# Patient Record
Sex: FEMALE | Race: BLACK OR AFRICAN AMERICAN | NOT HISPANIC OR LATINO | Employment: OTHER | URBAN - METROPOLITAN AREA
[De-identification: names, ages, dates, MRNs, and addresses within clinical notes are randomized per-mention and may not be internally consistent; named-entity substitution may affect disease eponyms.]

---

## 2017-02-20 ENCOUNTER — ANESTHESIA EVENT (OUTPATIENT)
Dept: GASTROENTEROLOGY | Facility: HOSPITAL | Age: 67
End: 2017-02-20
Payer: COMMERCIAL

## 2017-02-21 ENCOUNTER — ANESTHESIA (OUTPATIENT)
Dept: GASTROENTEROLOGY | Facility: HOSPITAL | Age: 67
End: 2017-02-21
Payer: COMMERCIAL

## 2017-02-21 ENCOUNTER — HOSPITAL ENCOUNTER (OUTPATIENT)
Facility: HOSPITAL | Age: 67
Setting detail: OUTPATIENT SURGERY
Discharge: HOME | End: 2017-02-21
Attending: INTERNAL MEDICINE | Admitting: INTERNAL MEDICINE
Payer: COMMERCIAL

## 2017-02-21 VITALS
RESPIRATION RATE: 18 BRPM | TEMPERATURE: 96.7 F | BODY MASS INDEX: 46.82 KG/M2 | WEIGHT: 281 LBS | HEART RATE: 63 BPM | DIASTOLIC BLOOD PRESSURE: 61 MMHG | HEIGHT: 65 IN | OXYGEN SATURATION: 98 % | SYSTOLIC BLOOD PRESSURE: 123 MMHG

## 2017-02-21 DIAGNOSIS — Z12.11 ENCOUNTER FOR SCREENING FOR MALIGNANT NEOPLASM OF COLON: ICD-10-CM

## 2017-02-21 PROCEDURE — 88305 TISSUE EXAM BY PATHOLOGIST: CPT | Performed by: INTERNAL MEDICINE

## 2017-02-21 RX ORDER — FUROSEMIDE 40 MG/1
40 TABLET ORAL 2 TIMES DAILY
COMMUNITY
End: 2022-03-02

## 2017-02-21 RX ORDER — MOMETASONE FUROATE 50 UG/1
2 SPRAY, METERED NASAL DAILY
COMMUNITY
End: 2020-02-14 | Stop reason: CLARIF

## 2017-02-21 RX ORDER — PROPOFOL 10 MG/ML
INJECTION, EMULSION INTRAVENOUS AS NEEDED
Status: DISCONTINUED | OUTPATIENT
Start: 2017-02-21 | End: 2017-02-21 | Stop reason: SURG

## 2017-02-21 RX ORDER — SODIUM CHLORIDE 9 MG/ML
125 INJECTION, SOLUTION INTRAVENOUS CONTINUOUS
Status: DISCONTINUED | OUTPATIENT
Start: 2017-02-21 | End: 2017-02-21 | Stop reason: HOSPADM

## 2017-02-21 RX ORDER — AMLODIPINE BESYLATE 10 MG/1
10 TABLET ORAL DAILY
COMMUNITY

## 2017-02-21 RX ADMIN — PROPOFOL 60 MG: 10 INJECTION, EMULSION INTRAVENOUS at 14:27

## 2017-02-21 RX ADMIN — PROPOFOL 50 MG: 10 INJECTION, EMULSION INTRAVENOUS at 14:21

## 2017-02-21 RX ADMIN — SODIUM CHLORIDE 125 ML/HR: 0.9 INJECTION, SOLUTION INTRAVENOUS at 13:02

## 2017-02-21 RX ADMIN — PROPOFOL 100 MG: 10 INJECTION, EMULSION INTRAVENOUS at 14:18

## 2017-02-21 RX ADMIN — PROPOFOL 60 MG: 10 INJECTION, EMULSION INTRAVENOUS at 14:32

## 2017-02-21 RX ADMIN — PROPOFOL 50 MG: 10 INJECTION, EMULSION INTRAVENOUS at 14:24

## 2017-02-21 RX ADMIN — LIDOCAINE HYDROCHLORIDE 50 MG: 20 INJECTION, SOLUTION INTRAVENOUS at 14:19

## 2019-01-03 ENCOUNTER — OFFICE VISIT (OUTPATIENT)
Dept: OBGYN CLINIC | Facility: MEDICAL CENTER | Age: 69
End: 2019-01-03
Payer: COMMERCIAL

## 2019-01-03 VITALS
DIASTOLIC BLOOD PRESSURE: 80 MMHG | BODY MASS INDEX: 47.91 KG/M2 | SYSTOLIC BLOOD PRESSURE: 140 MMHG | WEIGHT: 270.4 LBS | HEIGHT: 63 IN

## 2019-01-03 DIAGNOSIS — Z12.31 ENCOUNTER FOR SCREENING MAMMOGRAM FOR MALIGNANT NEOPLASM OF BREAST: ICD-10-CM

## 2019-01-03 DIAGNOSIS — Z78.0 POSTMENOPAUSAL: ICD-10-CM

## 2019-01-03 DIAGNOSIS — Z01.419 ENCNTR FOR GYN EXAM (GENERAL) (ROUTINE) W/O ABN FINDINGS: Primary | ICD-10-CM

## 2019-01-03 PROBLEM — I10 ESSENTIAL HYPERTENSION: Status: ACTIVE | Noted: 2018-05-29

## 2019-01-03 PROBLEM — D72.819 LEUKOPENIA: Status: ACTIVE | Noted: 2018-05-29

## 2019-01-03 PROCEDURE — S0612 ANNUAL GYNECOLOGICAL EXAMINA: HCPCS | Performed by: OBSTETRICS & GYNECOLOGY

## 2019-01-03 NOTE — PROGRESS NOTES
ASSESSMENT & PLAN: Sg was seen today for gynecologic exam     Diagnoses and all orders for this visit:    Encntr for gyn exam (general) (routine) w/o abn findings    Encounter for screening mammogram for malignant neoplasm of breast  -     Mammo screening bilateral w 3d & cad; Future    Postmenopausal  -     DXA bone density spine hip and pelvis; Future    Discussion/Summary:  Pt  Here for yearly gyn preventive exam; has not been seen for at least 3 years; The past year and a half, had a colonoscopy with removal of polyps;pt  Seen withbp a  Student, [place] , [facility]  1   Routine well woman exam done today  2   Pap and HPV:  Pap and cotesting was not done today  Current ASCCP Guidelines reviewed  3   Mammogram was ordered  4   Colonoscopy is up to date  5   DEXA is not up to date  DEXA was ordered today  6  The following were reviewed in today's visit: breast self exam, exercise, healthy diet and DEXA ordered  7   F/u 1year      CC:  Annual Gynecologic Examination    HPI: Blayne Michaels is a 77 y o  who presents for annual gynecologic examination  She has the following concerns:  none    Health Maintenance:    Patient describes her health as good  Patient has weight concerns  She exercises 7 days per week with walking  She does wears her seatbelt routinely  She does perform regular monthly self breast exams  She does feel safe at home  Patients does not follow a  diet  Patient gets 4 servings of dairy or calcium rich foods daily        Last pap: 2012  Last mammogram:  2016  Last colonoscopy: 2017    Patient Active Problem List   Diagnosis    Essential hypertension    Leukopenia       Past Medical History:   Diagnosis Date    Arthritis     osteo    Diverticula of colon     Hypertension     Rhinitis        Past Surgical History:   Procedure Laterality Date    APPENDECTOMY      BREAST SURGERY Left     biopsy    CHOLECYSTECTOMY      COLONOSCOPY      HERNIA REPAIR Right inguinal    JOINT REPLACEMENT Right     Knee    [place] COLONOSCOPY FLX DX W/COLLJ SPEC WHEN PFRMD N/A 2/21/2017    Procedure: COLONOSCOPY with polypectomy and hemo clip marjan ;  Surgeon: Ashlee Mix MD;  Location: [facility]; Service: Gastroenterology       Past OB/Gyn History:    Patient is not currently sexually active  History reviewed  No pertinent family history  Social History:   Social History     Social History    Marital status: Single     Spouse name: N/A    Number of children: N/A    Years of education: N/A     Occupational History    Not on file  Social History Main Topics    Smoking status: Never Smoker    Smokeless tobacco: Never Used    Alcohol use 0 6 oz/week     1 Glasses of wine per week      Comment: social    Drug use: No    Sexual activity: Not on file     Other Topics Concern    Not on file     Social History Narrative    No narrative on file     Presently lives with sisterDino  Patient is currently retired    No Known Allergies      Current Outpatient Prescriptions:     amLODIPine (NORVASC) 10 mg tablet, Take 10 mg by mouth daily, Disp: , Rfl:     furosemide (LASIX) 40 mg tablet, Take 40 mg by mouth 2 (two) times a day, Disp: , Rfl:     mometasone (NASONEX) 50 mcg/act nasal spray, 2 sprays into each nostril daily, Disp: , Rfl:     Review of Systems  Constitutional :no fever, feels well, no tiredness, no recent weight gain or loss  ENT: no ear ache, no loss of hearing, no nosebleeds or nasal discharge, no sore throat or hoarseness  Cardiovascular: no complaints of slow or fast heart beat, no chest pain, no palpitations, no leg claudication or lower extremity edema    Respiratory: no complaints of shortness of shortness of breath, no ROSAS  Breasts:no complaints of breast pain, breast lump, or nipple discharge  Gastrointestinal: no complaints of abdominal pain, constipation, nausea, vomiting, or diarrhea or bloody stools  Genitourinary : no complaints of dysuria, incontinence, pelvic pain, no dysmenorrhea, vaginal discharge or abnormal vaginal bleeding and as noted in HPI  Musculoskeletal: no complaints of arthralgia, no myalgia, no joint swelling or stiffness, no limb pain or swelling  Integumentary: no complaints of skin rash or lesion, itching or dry skin  Neurological: no complaints of headache, no confusion, no numbness or tingling, no dizziness or fainting     Physical Exam:   /80   Ht 5' 2 5" (1 588 m)   Wt 123 kg (270 lb 6 4 oz)   Breastfeeding? No   BMI 48 67 kg/m²     General: appears stated age, cooperative, alert normal mood and affect   Psychiatric oriented to person, place and time  Mood and affect normal   Neck: normal, supple,trachea midline, no masses  Thyroid: normal, no thyromegaly   Heart: regular rate and rhythm, S1, S2 normal, no murmur, click, rub or gallop   Lungs: clear to auscultation bilaterally, no increased work of breathing or signs of respiratory distress   Breasts: normal, no dimpling or skin changes noted   Abdomen: soft, non-tender, without masses or organomegaly   Vulva: normal , no lesions   Vagina: normal , no lesions or dryness   Urethra: normal   Urethal meatus normal   Bladder Normal, soft, non-tender and no prolapse or masses appreciated   Cervix: normal, no palpable masses    Uterus: normal , non-tender, not enlarged, no palpable masses   Adnexa: normal, non-tender without fullness or masses   Lymphatic Palpation of lymph nodes in neck, axilla, groin and/or other locations: no lymphadenopathy or masses noted   Skin Normal skin turgor and no rashes    Palpation of skin and subcutaneous tissue normal

## 2019-01-31 ENCOUNTER — HOSPITAL ENCOUNTER (OUTPATIENT)
Dept: BONE DENSITY | Facility: MEDICAL CENTER | Age: 69
Discharge: HOME | End: 2019-01-31
Payer: COMMERCIAL

## 2019-01-31 ENCOUNTER — HOSPITAL ENCOUNTER (OUTPATIENT)
Dept: MAMMOGRAPHY | Facility: MEDICAL CENTER | Age: 69
Discharge: HOME | End: 2019-01-31
Payer: COMMERCIAL

## 2019-01-31 VITALS — BODY MASS INDEX: 49.61 KG/M2 | WEIGHT: 280 LBS | HEIGHT: 63 IN

## 2019-01-31 DIAGNOSIS — Z12.31 ENCOUNTER FOR SCREENING MAMMOGRAM FOR MALIGNANT NEOPLASM OF BREAST: ICD-10-CM

## 2019-01-31 DIAGNOSIS — Z78.0 POSTMENOPAUSAL: ICD-10-CM

## 2019-01-31 PROCEDURE — 77063 BREAST TOMOSYNTHESIS BI: CPT

## 2019-01-31 PROCEDURE — 77067 SCR MAMMO BI INCL CAD: CPT

## 2019-01-31 PROCEDURE — 77080 DXA BONE DENSITY AXIAL: CPT

## 2019-02-05 ENCOUNTER — TELEPHONE (OUTPATIENT)
Dept: OBGYN CLINIC | Facility: MEDICAL CENTER | Age: 69
End: 2019-02-05

## 2019-02-05 NOTE — TELEPHONE ENCOUNTER
I called pt  About results about  DEXA Scan and sent message to [address]; On phone, spoke with Giovany Haynes who will bring Rever JERO for the Injection after it is precerted

## 2019-02-06 ENCOUNTER — TELEPHONE (OUTPATIENT)
Dept: OBGYN CLINIC | Facility: MEDICAL CENTER | Age: 69
End: 2019-02-06

## 2019-02-06 NOTE — TELEPHONE ENCOUNTER
----- Message from Anamika Bañuelos sent at 2/6/2019  8:38 AM EST -----  Regarding: RE: Prolia preceert  Benefits investigation faxed  ----- Message -----  From: Clay Dickerson DO  Sent: 2/5/2019   6:54 PM  To: Anamika Bañuelos  Subject: Prolia preceert                                  Pt  Has osteoporosis on DEXA Scan and needs Prolia; please precert, call her; Maritza Ruiz may answer and will bring Rever in for injection as needed; spoke with Clint Rodriguez    thank you

## 2019-02-13 ENCOUNTER — TELEPHONE (OUTPATIENT)
Dept: OBGYN CLINIC | Facility: MEDICAL CENTER | Age: 69
End: 2019-02-13

## 2019-02-13 NOTE — TELEPHONE ENCOUNTER
Pt's prolia is covered for her but PA is needed  Pt will also owe 20% of injection with a $40 copay for the administration fee  Prior auth initiated and faxed to Patricia   Fax# [phone]

## 2019-02-19 ENCOUNTER — TELEPHONE (OUTPATIENT)
Dept: OBGYN CLINIC | Facility: MEDICAL CENTER | Age: 69
End: 2019-02-19

## 2019-02-19 NOTE — TELEPHONE ENCOUNTER
PA for prolia approved for pt  I called pt and spoke with her in regards to coverage and cost  Pt agrees to financial cost and wanted to schedule for March  appt scheduled and prolia injection ordered

## 2019-02-20 ENCOUNTER — HOSPITAL ENCOUNTER (OUTPATIENT)
Dept: MAMMOGRAPHY | Facility: CLINIC | Age: 69
Discharge: HOME | End: 2019-02-20
Payer: COMMERCIAL

## 2019-02-20 ENCOUNTER — HOSPITAL ENCOUNTER (OUTPATIENT)
Dept: ULTRASOUND IMAGING | Facility: CLINIC | Age: 69
Discharge: HOME | End: 2019-02-20
Payer: COMMERCIAL

## 2019-02-20 VITALS — BODY MASS INDEX: 49.61 KG/M2 | WEIGHT: 280 LBS | HEIGHT: 63 IN

## 2019-02-20 DIAGNOSIS — R92.8 ABNORMAL MAMMOGRAM: ICD-10-CM

## 2019-02-20 PROCEDURE — 76642 ULTRASOUND BREAST LIMITED: CPT

## 2019-02-20 PROCEDURE — 77065 DX MAMMO INCL CAD UNI: CPT

## 2019-02-20 PROCEDURE — G0279 TOMOSYNTHESIS, MAMMO: HCPCS

## 2019-03-18 ENCOUNTER — CLINICAL SUPPORT (OUTPATIENT)
Dept: OBGYN CLINIC | Facility: MEDICAL CENTER | Age: 69
End: 2019-03-18
Payer: COMMERCIAL

## 2019-03-18 DIAGNOSIS — M81.0 OSTEOPOROSIS, UNSPECIFIED OSTEOPOROSIS TYPE, UNSPECIFIED PATHOLOGICAL FRACTURE PRESENCE: Primary | ICD-10-CM

## 2019-03-18 PROCEDURE — 96372 THER/PROPH/DIAG INJ SC/IM: CPT | Performed by: OBSTETRICS & GYNECOLOGY

## 2019-08-16 ENCOUNTER — TELEPHONE (OUTPATIENT)
Dept: OBGYN CLINIC | Facility: MEDICAL CENTER | Age: 69
End: 2019-08-16

## 2019-08-16 NOTE — TELEPHONE ENCOUNTER
I called [facility] and called in new rx for pt's jona  Spoke with indio in pharmacy dept at [phone]  They will process rx, call pt to obtain authorization to have medication shipped to us and then call us to schedule delivery   Will await cb

## 2019-08-21 ENCOUNTER — TELEPHONE (OUTPATIENT)
Dept: OBGYN CLINIC | Facility: MEDICAL CENTER | Age: 69
End: 2019-08-21

## 2019-08-21 NOTE — TELEPHONE ENCOUNTER
I called [facility] to check status on pt's prolia  State medication is ready to be shipped  Need to collect $40 copay from pt first though  Will contact pt and cb to schedule delivery

## 2019-08-29 ENCOUNTER — TELEPHONE (OUTPATIENT)
Dept: OBGYN CLINIC | Facility: MEDICAL CENTER | Age: 69
End: 2019-08-29

## 2019-08-29 NOTE — TELEPHONE ENCOUNTER
kelly for pt to cb  I contacted [facility] for prolia  Still awaiting phone call from pt to collect $40 copay  Will try calling pt again at another time

## 2019-09-05 ENCOUNTER — TELEPHONE (OUTPATIENT)
Dept: OBGYN CLINIC | Facility: MEDICAL CENTER | Age: 69
End: 2019-09-05

## 2019-09-05 NOTE — TELEPHONE ENCOUNTER
Spoke with pt  Aware she needs to call [employer] for copay  States she Will contact them now  Will await phone call

## 2019-09-09 ENCOUNTER — TELEPHONE (OUTPATIENT)
Dept: OBGYN CLINIC | Facility: MEDICAL CENTER | Age: 69
End: 2019-09-09

## 2019-09-09 NOTE — TELEPHONE ENCOUNTER
kelly for pt stating that we received her prolia   Please scheduled an appt for injection when pt calls back

## 2019-10-17 ENCOUNTER — CLINICAL SUPPORT (OUTPATIENT)
Dept: OBGYN CLINIC | Facility: MEDICAL CENTER | Age: 69
End: 2019-10-17
Payer: COMMERCIAL

## 2019-10-17 DIAGNOSIS — M81.0 OSTEOPOROSIS, UNSPECIFIED OSTEOPOROSIS TYPE, UNSPECIFIED PATHOLOGICAL FRACTURE PRESENCE: Primary | ICD-10-CM

## 2019-10-17 PROCEDURE — 96372 THER/PROPH/DIAG INJ SC/IM: CPT | Performed by: OBSTETRICS & GYNECOLOGY

## 2019-10-17 NOTE — PROGRESS NOTES
Patient presents for Prolia injection    Given SQ in left arm  MLP:[id number]  NZZ:[id number]  EXP: 10/2021

## 2020-01-29 ENCOUNTER — HOSPITAL ENCOUNTER (OUTPATIENT)
Dept: RADIOLOGY | Facility: HOSPITAL | Age: 70
Discharge: HOME | End: 2020-01-29
Payer: COMMERCIAL

## 2020-01-29 ENCOUNTER — TRANSCRIBE ORDERS (OUTPATIENT)
Dept: ADMINISTRATIVE | Facility: HOSPITAL | Age: 70
End: 2020-01-29

## 2020-01-29 ENCOUNTER — APPOINTMENT (OUTPATIENT)
Dept: LAB | Facility: HOSPITAL | Age: 70
End: 2020-01-29
Payer: COMMERCIAL

## 2020-01-29 DIAGNOSIS — M25.551 PAIN IN JOINT OF RIGHT HIP: ICD-10-CM

## 2020-01-29 DIAGNOSIS — I10 ESSENTIAL HYPERTENSION, MALIGNANT: ICD-10-CM

## 2020-01-29 DIAGNOSIS — R79.89 HYPOURICEMIA: ICD-10-CM

## 2020-01-29 DIAGNOSIS — Z13.220 SCREENING FOR LIPOID DISORDERS: ICD-10-CM

## 2020-01-29 DIAGNOSIS — M25.551 PAIN IN JOINT OF RIGHT HIP: Primary | ICD-10-CM

## 2020-01-29 LAB
ALBUMIN SERPL BCP-MCNC: 3.3 G/DL (ref 3.5–5)
ALP SERPL-CCNC: 53 U/L (ref 46–116)
ALT SERPL W P-5'-P-CCNC: 14 U/L (ref 12–78)
ANION GAP SERPL CALCULATED.3IONS-SCNC: 9 MMOL/L (ref 4–13)
AST SERPL W P-5'-P-CCNC: 15 U/L (ref 5–45)
BASOPHILS # BLD AUTO: 0.03 THOUSANDS/ΜL (ref 0–0.1)
BASOPHILS NFR BLD AUTO: 1 % (ref 0–1)
BILIRUB SERPL-MCNC: 0.71 MG/DL (ref 0.2–1)
BUN SERPL-MCNC: 19 MG/DL (ref 5–25)
CALCIUM SERPL-MCNC: 8.6 MG/DL (ref 8.3–10.1)
CHLORIDE SERPL-SCNC: 108 MMOL/L (ref 100–108)
CHOLEST SERPL-MCNC: 191 MG/DL (ref 50–200)
CO2 SERPL-SCNC: 29 MMOL/L (ref 21–32)
CREAT SERPL-MCNC: 1.34 MG/DL (ref 0.6–1.3)
EOSINOPHIL # BLD AUTO: 0.12 THOUSAND/ΜL (ref 0–0.61)
EOSINOPHIL NFR BLD AUTO: 3 % (ref 0–6)
ERYTHROCYTE [DISTWIDTH] IN BLOOD BY AUTOMATED COUNT: 13.7 % (ref 11.6–15.1)
GFR SERPL CREATININE-BSD FRML MDRD: 46 ML/MIN/1.73SQ M
GLUCOSE P FAST SERPL-MCNC: 85 MG/DL (ref 65–99)
HCT VFR BLD AUTO: 39.4 % (ref 34.8–46.1)
HDLC SERPL-MCNC: 65 MG/DL
HGB BLD-MCNC: 12.2 G/DL (ref 11.5–15.4)
IMM GRANULOCYTES # BLD AUTO: 0.01 THOUSAND/UL (ref 0–0.2)
IMM GRANULOCYTES NFR BLD AUTO: 0 % (ref 0–2)
LDLC SERPL CALC-MCNC: 113 MG/DL (ref 0–100)
LYMPHOCYTES # BLD AUTO: 1.35 THOUSANDS/ΜL (ref 0.6–4.47)
LYMPHOCYTES NFR BLD AUTO: 33 % (ref 14–44)
MCH RBC QN AUTO: 31.5 PG (ref 26.8–34.3)
MCHC RBC AUTO-ENTMCNC: 31 G/DL (ref 31.4–37.4)
MCV RBC AUTO: 102 FL (ref 82–98)
MONOCYTES # BLD AUTO: 0.39 THOUSAND/ΜL (ref 0.17–1.22)
MONOCYTES NFR BLD AUTO: 10 % (ref 4–12)
NEUTROPHILS # BLD AUTO: 2.2 THOUSANDS/ΜL (ref 1.85–7.62)
NEUTS SEG NFR BLD AUTO: 53 % (ref 43–75)
NONHDLC SERPL-MCNC: 126 MG/DL
NRBC BLD AUTO-RTO: 0 /100 WBCS
PLATELET # BLD AUTO: 138 THOUSANDS/UL (ref 149–390)
PMV BLD AUTO: 11.1 FL (ref 8.9–12.7)
POTASSIUM SERPL-SCNC: 3.7 MMOL/L (ref 3.5–5.3)
PROT SERPL-MCNC: 7.6 G/DL (ref 6.4–8.2)
RBC # BLD AUTO: 3.87 MILLION/UL (ref 3.81–5.12)
SODIUM SERPL-SCNC: 146 MMOL/L (ref 136–145)
TRIGL SERPL-MCNC: 64 MG/DL
WBC # BLD AUTO: 4.1 THOUSAND/UL (ref 4.31–10.16)

## 2020-01-29 PROCEDURE — 80061 LIPID PANEL: CPT

## 2020-01-29 PROCEDURE — 85025 COMPLETE CBC W/AUTO DIFF WBC: CPT

## 2020-01-29 PROCEDURE — 36415 COLL VENOUS BLD VENIPUNCTURE: CPT

## 2020-01-29 PROCEDURE — 80053 COMPREHEN METABOLIC PANEL: CPT

## 2020-01-29 PROCEDURE — 73502 X-RAY EXAM HIP UNI 2-3 VIEWS: CPT

## 2020-02-14 ENCOUNTER — ANNUAL EXAM (OUTPATIENT)
Dept: OBGYN CLINIC | Facility: MEDICAL CENTER | Age: 70
End: 2020-02-14
Payer: COMMERCIAL

## 2020-02-14 VITALS
SYSTOLIC BLOOD PRESSURE: 122 MMHG | WEIGHT: 270.7 LBS | HEIGHT: 63 IN | BODY MASS INDEX: 47.96 KG/M2 | DIASTOLIC BLOOD PRESSURE: 70 MMHG

## 2020-02-14 DIAGNOSIS — Z01.419 ENCNTR FOR GYN EXAM (GENERAL) (ROUTINE) W/O ABN FINDINGS: Primary | ICD-10-CM

## 2020-02-14 DIAGNOSIS — R92.2 DENSE BREASTS: ICD-10-CM

## 2020-02-14 DIAGNOSIS — Z12.31 ENCOUNTER FOR SCREENING MAMMOGRAM FOR MALIGNANT NEOPLASM OF BREAST: ICD-10-CM

## 2020-02-14 PROCEDURE — G0101 CA SCREEN;PELVIC/BREAST EXAM: HCPCS | Performed by: OBSTETRICS & GYNECOLOGY

## 2020-02-14 RX ORDER — NAPROXEN SODIUM 550 MG/1
550 TABLET ORAL
COMMUNITY
Start: 2020-02-06 | End: 2022-03-02

## 2020-02-14 RX ORDER — FLUTICASONE PROPIONATE 50 MCG
2 SPRAY, SUSPENSION (ML) NASAL DAILY PRN
COMMUNITY
Start: 2019-08-05 | End: 2022-05-09

## 2020-02-14 NOTE — PROGRESS NOTES
ASSESSMENT & PLAN: Sg was seen today for gynecologic exam     Diagnoses and all orders for this visit:    Encntr for gyn exam (general) (routine) w/o abn findings    Encounter for screening mammogram for malignant neoplasm of breast  -     Mammo screening bilateral w 3d & cad; Future    Dense breasts  -     Mammo screening bilateral w 3d & cad; Future    Discussion/Summary:  Patient here for yearly gyn preventive exam; no new health issues; seen with p a  student, Marisel Walker  1  Routine well woman exam done today  2   Pap and HPV:  Pap and cotesting was not done today  Current ASCCP Guidelines reviewed  3   Mammogram was ordered  4   Colorectal cancer screening is up to date  5   DEXA is up to date  DEXA was not ordered today  6  The following were reviewed in today's visit: breast self exam, adequate intake of calcium and vitamin D, exercise and healthy diet  7   F/u 2 years  CC:  Annual Gynecologic Examination    HPI: Buddy Schulz is a 65 y o  who presents for annual gynecologic examination  She has the following concerns:  none    Health Maintenance:    Patient describes her health as good  Patient has weight concerns  She exercises 7 days per week with walking  She does wear her seatbelt routinely  She does perform regular monthly self breast exams  She does feel safe at home  Patients does not follow a  diet  Patient gets 4 servings of dairy or calcium rich foods daily        Last pap: age 70  Last mammogram: 2019  Last colorectal cancer screenin  Last DEXA: 2019    Patient Active Problem List   Diagnosis    Essential hypertension    Leukopenia       Past Medical History:   Diagnosis Date    Arthritis     osteo    Diverticula of colon     Hypertension     Rhinitis        Past Surgical History:   Procedure Laterality Date    APPENDECTOMY      BREAST BIOPSY Left 1977    benign    BREAST SURGERY Left     biopsy    CHOLECYSTECTOMY      COLONOSCOPY  HERNIA REPAIR Right     inguinal    JOINT REPLACEMENT Right     Knee    [place] COLONOSCOPY FLX DX W/COLLJ SPEC WHEN PFRMD N/A 2/21/2017    Procedure: COLONOSCOPY with polypectomy and hemo clip marjan ;  Surgeon: Radha Lewis MD;  Location: [facility]; Service: Gastroenterology       Past OB/Gyn History:    Patient is currently sexually active  No family history on file  Social History:   Social History     Socioeconomic History    Marital status: Single     Spouse name: Not on file    Number of children: Not on file    Years of education: Not on file    Highest education level: Not on file   Occupational History    Not on file   Social Needs    Financial resource strain: Not on file    Food insecurity:     Worry: Not on file     Inability: Not on file    Transportation needs:     Medical: Not on file     Non-medical: Not on file   Tobacco Use    Smoking status: Never Smoker    Smokeless tobacco: Never Used   Substance and Sexual Activity    Alcohol use: Yes     Alcohol/week: 1 0 standard drinks     Types: 1 Glasses of wine per week     Comment: social    Drug use: No    Sexual activity: Not on file   Lifestyle    Physical activity:     Days per week: Not on file     Minutes per session: Not on file    Stress: Not on file   Relationships    Social connections:     Talks on phone: Not on file     Gets together: Not on file     Attends [employer] service: Not on file     Active member of club or organization: Not on file     Attends meetings of clubs or organizations: Not on file     Relationship status: Not on file    Intimate partner violence:     Fear of current or ex partner: Not on file     Emotionally abused: Not on file     Physically abused: Not on file     Forced sexual activity: Not on file   Other Topics Concern    Not on file   Social History Narrative    Not on file     Presently lives with sisterVictorino  Patient is currently retired       No Known Allergies      Current Outpatient Medications:     fluticasone (FLONASE) 50 mcg/act nasal spray, 2 sprays into each nostril daily, Disp: , Rfl:     naproxen sodium (ANAPROX) 550 mg tablet, Take 550 mg by mouth, Disp: , Rfl:     amLODIPine (NORVASC) 10 mg tablet, Take 10 mg by mouth daily, Disp: , Rfl:     furosemide (LASIX) 40 mg tablet, Take 40 mg by mouth 2 (two) times a day, Disp: , Rfl:     Review of Systems  Constitutional :no fever, feels well, no tiredness, no recent weight gain or loss  ENT: no ear ache, no loss of hearing, no nosebleeds or nasal discharge, no sore throat or hoarseness  Cardiovascular: no complaints of slow or fast heart beat, no chest pain, no palpitations, no leg claudication or lower extremity edema  Respiratory: no complaints of shortness of shortness of breath, no ROSAS  Breasts:no complaints of breast pain, breast lump, or nipple discharge  Gastrointestinal: no complaints of abdominal pain, constipation, nausea, vomiting, or diarrhea or bloody stools  Genitourinary : no complaints of dysuria, incontinence, pelvic pain, no dysmenorrhea, vaginal discharge or abnormal vaginal bleeding and as noted in HPI  Musculoskeletal: no complaints of arthralgia, no myalgia, no joint swelling or stiffness, no limb pain or swelling  Integumentary: no complaints of skin rash or lesion, itching or dry skin  Neurological: no complaints of headache, no confusion, no numbness or tingling, no dizziness or fainting     Physical Exam:   /70   Ht 5' 2 5" (1 588 m)   Wt 123 kg (270 lb 11 2 oz)   LMP  (LMP Unknown)   Breastfeeding No   BMI 48 72 kg/m²     General: appears stated age, cooperative, alert normal mood and affect   Psychiatric oriented to person, place and time  Mood and affect normal   Neck: normal, supple,trachea midline, no masses    Thyroid: normal, no thyromegaly   Heart: regular rate and rhythm, S1, S2 normal, no murmur, click, rub or gallop   Lungs: clear to auscultation bilaterally, no increased work of breathing or signs of respiratory distress   Breasts: normal, no dimpling or skin changes noted   Abdomen: soft, non-tender, without masses or organomegaly   Vulva: normal , no lesions   Vagina: normal , no lesions or dryness   Urethra: normal   Urethal meatus normal   Bladder Normal, soft, non-tender and no prolapse or masses appreciated   Cervix: normal, no palpable masses    Uterus: normal , non-tender, not enlarged, no palpable masses   Adnexa: normal, non-tender without fullness or masses; hemoccult neg  Lymphatic Palpation of lymph nodes in neck, axilla, groin and/or other locations: no lymphadenopathy or masses noted   Skin Normal skin turgor and no rashes    Palpation of skin and subcutaneous tissue normal

## 2020-03-19 ENCOUNTER — ANESTHESIA EVENT (OUTPATIENT)
Dept: GASTROENTEROLOGY | Facility: HOSPITAL | Age: 70
End: 2020-03-19

## 2020-03-20 ENCOUNTER — ANESTHESIA (OUTPATIENT)
Dept: GASTROENTEROLOGY | Facility: HOSPITAL | Age: 70
End: 2020-03-20

## 2020-03-20 ENCOUNTER — HOSPITAL ENCOUNTER (OUTPATIENT)
Dept: GASTROENTEROLOGY | Facility: HOSPITAL | Age: 70
Setting detail: OUTPATIENT SURGERY
Discharge: HOME | End: 2020-03-20
Attending: INTERNAL MEDICINE | Admitting: INTERNAL MEDICINE
Payer: COMMERCIAL

## 2020-03-20 VITALS
TEMPERATURE: 96.9 F | HEART RATE: 65 BPM | DIASTOLIC BLOOD PRESSURE: 56 MMHG | RESPIRATION RATE: 18 BRPM | OXYGEN SATURATION: 98 % | SYSTOLIC BLOOD PRESSURE: 107 MMHG

## 2020-03-20 DIAGNOSIS — Z86.010 PERSONAL HISTORY OF COLONIC POLYPS: ICD-10-CM

## 2020-03-20 DIAGNOSIS — Z12.11 ENCOUNTER FOR SCREENING FOR MALIGNANT NEOPLASM OF COLON: ICD-10-CM

## 2020-03-20 DIAGNOSIS — K57.30 DIVERTICULOSIS OF LARGE INTESTINE WITHOUT PERFORATION OR ABSCESS WITHOUT BLEEDING: ICD-10-CM

## 2020-03-20 RX ORDER — PROPOFOL 10 MG/ML
INJECTION, EMULSION INTRAVENOUS AS NEEDED
Status: DISCONTINUED | OUTPATIENT
Start: 2020-03-20 | End: 2020-03-20 | Stop reason: SURG

## 2020-03-20 RX ORDER — SODIUM CHLORIDE 9 MG/ML
125 INJECTION, SOLUTION INTRAVENOUS CONTINUOUS
Status: DISCONTINUED | OUTPATIENT
Start: 2020-03-20 | End: 2020-03-24 | Stop reason: HOSPADM

## 2020-03-20 RX ADMIN — PROPOFOL 40 MG: 10 INJECTION, EMULSION INTRAVENOUS at 08:42

## 2020-03-20 RX ADMIN — PROPOFOL 40 MG: 10 INJECTION, EMULSION INTRAVENOUS at 08:38

## 2020-03-20 RX ADMIN — PROPOFOL 40 MG: 10 INJECTION, EMULSION INTRAVENOUS at 08:46

## 2020-03-20 RX ADMIN — SODIUM CHLORIDE 125 ML/HR: 0.9 INJECTION, SOLUTION INTRAVENOUS at 07:59

## 2020-03-20 RX ADMIN — PROPOFOL 120 MG: 10 INJECTION, EMULSION INTRAVENOUS at 08:35

## 2020-03-20 NOTE — ANESTHESIA POSTPROCEDURE EVALUATION
Post-Op Assessment Note    CV Status:  Stable  Pain Score: 2    Pain management: adequate     Mental Status:  Alert and awake   Hydration Status:  Euvolemic   PONV Controlled:  Controlled   Airway Patency:  Patent   Post Op Vitals Reviewed: Yes      Staff: Anesthesiologist           /56 (03/20/20 0905)    Temp     Pulse 65 (03/20/20 0905)   Resp 18 (03/20/20 0905)    SpO2 98 % (03/20/20 0905)

## 2020-03-20 NOTE — DISCHARGE INSTRUCTIONS
Colonoscopy   WHAT YOU NEED TO KNOW:   A colonoscopy is a procedure to examine the inside of your colon (intestine) with a scope  Polyps or tissue growths may have been removed during your colonoscopy  It is normal to feel bloated and to have some abdominal discomfort  You should be passing gas  If you have hemorrhoids or you had polyps removed, you may have a small amount of bleeding  DISCHARGE INSTRUCTIONS:   Seek care immediately if:   · You have a large amount of bright red blood in your bowel movements  · Your abdomen is hard and firm and you have severe pain  · You have sudden trouble breathing  Contact your healthcare provider if:   · You develop a rash or hives  · You have a fever within 24 hours of your procedure  · You have not had a bowel movement for 3 days after your procedure  · You have questions or concerns about your condition or care  Activity:   · Do not lift, strain, or run  for 3 days after your procedure  · Rest after your procedure  You have been given medicine to relax you  Do not  drive or make important decisions until the day after your procedure  Return to your normal activity as directed  · Relieve gas and discomfort from bloating  by lying on your right side with a heating pad on your abdomen  You may need to take short walks to help the gas move out  Eat small meals until bloating is relieved  If you had polyps removed: For 7 days after your procedure:  · Do not  take aspirin  · Do not  go on long car rides  Help prevent constipation:   · Eat a variety of healthy foods  Healthy foods include fruit, vegetables, whole-grain breads, low-fat dairy products, beans, lean meat, and fish  Ask if you need to be on a special diet  Your healthcare provider may recommend that you eat high-fiber foods such as cooked beans  Fiber helps you have regular bowel movements  · Drink liquids as directed    Adults should drink between 9 and 13 eight-ounce cups of liquid every day  Ask what amount is best for you  For most people, good liquids to drink are water, juice, and milk  · Exercise as directed  Talk to your healthcare provider about the best exercise plan for you  Exercise can help prevent constipation, decrease your blood pressure and improve your health  Follow up with your healthcare provider as directed:  Write down your questions so you remember to ask them during your visits  © 2017 [address] Information is for End User's use only and may not be sold, redistributed or otherwise used for commercial purposes  All illustrations and images included in CareNotes® are the copyrighted property of A D A M [employer] Inc  or Omari Bell  The above information is an  only  It is not intended as medical advice for individual conditions or treatments  Talk to your doctor, nurse or pharmacist before following any medical regimen to see if it is safe and effective for you  Please call [phone] with any problems  I did not find any polyps today  I would repeat this exam in 5 years  Colonoscopy   AMBULATORY CARE:   What you need to know about a colonoscopy:  A colonoscopy is a procedure to examine the inside of your colon (intestine) with a scope  A scope is a flexible tube with a small light and camera on the end  Polyps or tissue growths may be removed during your colonoscopy  What you need to do the week before your colonoscopy: You will need to stop taking medicines that contain aspirin or iron for 7 days before your colonoscopy  If you take anticoagulants, such as warfarin, ask when you should stop taking it  Make plans for someone to drive you home after your procedure  How to prepare for your colonoscopy: Your healthcare provider will have you prepare your bowels before your procedure  Your bowels will need to be empty before your procedure to allow him to clearly see your colon   You will need to do the following the day before your procedure:  · Have only clear liquids  for the entire day before your colonoscopy  Clear liquid diet includes clear fruit juices and broths, clear flavored gelatin, and hard candy  It also includes coffee, tea, carbonated beverages, and clear sports drinks  · Follow your bowel prep as directed  There are many different preparations that can be given before a colonoscopy  Some are given over 2 hours and others over 6 hours  Some are given earlier in the afternoon the day before the colonoscopy  Others are given the day before and then the morning of the colonoscopy  With any bowel prep, stay close to the bathroom  This liquid will cause your bowels to move frequently  · An enema  may be needed  Your healthcare provider may tell you to use an enema to help clean out your bowels  · Do not eat or drink anything after midnight  This will help prevent problems that can happen if you vomit while under anesthesia  What will happen during your colonoscopy:   · You will be given medicine to help you relax  You will lie on your left side and raise one or both knees toward your chest  Your healthcare provider will examine your anus and use a finger to check your rectum  You may need another enema if your bowel is not empty  The scope will be lubricated and gently placed into your anus  It will then be passed through your rectum and into your colon  Water or air will be put into your colon to help clean or expand it  This is done so your healthcare provider can see your colon clearly  · Tissue samples may be taken from the walls of your bowel and sent to a lab for tests  If you have a polyp, your healthcare provider will pass a wire loop through the scope and use it to hold the polyp  The polyp is then burned or cut off the wall of your colon  Removed polyps are sent to a lab for tests  Pictures of your colon may be taken during the procedure   The scope will be removed when the procedure is done  What will happen after your colonoscopy:   · Rest after your procedure  You may feel bloated, have some gas and abdominal discomfort  You may need to lie on your right side with a heating pad on your abdomen  You may need to take short walks to help move the gas out  Eat small meals, if you feel bloated  Do not drive or make important decisions until the day after your procedure  · You may have polyps removed  Do not take aspirin or go on long car trips for 7 days after your procedure  Ask your healthcare provider about any other limits after your procedure  Risks of a colonoscopy: You may have pain or bleeding after the scope or polyps are removed  You may also have a slow heartbeat, decreased blood pressure, or increased sweating  Your colon may tear due to the increased pressure from the scope and other instruments  This may cause bowel contents to leak out of your colon and into your abdomen  If this happens, you will need to stay in the hospital and have surgery on your colon  Seek care immediately if:   · You have a large amount of bright red blood in your bowel movements  · Your abdomen is hard and firm and you have severe pain  · You have sudden trouble breathing  Contact your healthcare provider if:   · You develop a rash or hives  · You have a fever within 24 hours of your procedure  · You have not had a bowel movement for 3 days after your procedure  · You have questions or concerns about your condition or care  Activity:   · Do not lift, strain, or run  for 3 days after your procedure  · Rest after your procedure  You have been given medicine to relax you  Do not  drive or make important decisions until the day after your procedure  Return to your normal activity as directed  · Relieve gas and discomfort from bloating  by lying on your right side with a heating pad on your abdomen  You may need to take short walks to help the gas move out   Eat small meals until bloating is relieved  If you had polyps removed: For 7 days after your procedure:  · Do not  take aspirin  · Do not  go on long car rides  Help prevent constipation:   · Eat a variety of healthy foods  Healthy foods include fruit, vegetables, whole-grain breads, low-fat dairy products, beans, lean meat, and fish  Ask if you need to be on a special diet  Your healthcare provider may recommend that you eat high-fiber foods such as cooked beans  Fiber helps you have regular bowel movements  · Drink liquids as directed  Adults should drink between 9 and 13 eight-ounce cups of liquid every day  Ask what amount is best for you  For most people, good liquids to drink are water, juice, and milk  · Exercise as directed  Talk to your healthcare provider about the best exercise plan for you  Exercise can help prevent constipation, decrease your blood pressure and improve your health  Follow up with your healthcare provider as directed:  Write down your questions so you remember to ask them during your visits  © 2017 [address] [facility] Information is for End User's use only and may not be sold, redistributed or otherwise used for commercial purposes  All illustrations and images included in CareNotes® are the copyrighted property of [employer]  or Omari Bell  The above information is an  only  It is not intended as medical advice for individual conditions or treatments  Talk to your doctor, nurse or pharmacist before following any medical regimen to see if it is safe and effective for you

## 2020-05-19 ENCOUNTER — HOSPITAL ENCOUNTER (OUTPATIENT)
Dept: MAMMOGRAPHY | Facility: MEDICAL CENTER | Age: 70
Discharge: HOME | End: 2020-05-19
Payer: COMMERCIAL

## 2020-05-19 VITALS — HEIGHT: 63 IN | BODY MASS INDEX: 47.84 KG/M2 | WEIGHT: 270 LBS

## 2020-05-19 DIAGNOSIS — R92.2 DENSE BREASTS: ICD-10-CM

## 2020-05-19 DIAGNOSIS — Z12.31 ENCOUNTER FOR SCREENING MAMMOGRAM FOR MALIGNANT NEOPLASM OF BREAST: ICD-10-CM

## 2020-05-19 PROCEDURE — 77067 SCR MAMMO BI INCL CAD: CPT

## 2020-05-19 PROCEDURE — 77063 BREAST TOMOSYNTHESIS BI: CPT

## 2021-02-17 ENCOUNTER — ANNUAL EXAM (OUTPATIENT)
Dept: OBGYN CLINIC | Facility: MEDICAL CENTER | Age: 71
End: 2021-02-17
Payer: COMMERCIAL

## 2021-02-17 VITALS
SYSTOLIC BLOOD PRESSURE: 100 MMHG | WEIGHT: 267 LBS | BODY MASS INDEX: 47.31 KG/M2 | DIASTOLIC BLOOD PRESSURE: 70 MMHG | HEIGHT: 63 IN

## 2021-02-17 DIAGNOSIS — M81.0 AGE-RELATED OSTEOPOROSIS WITHOUT CURRENT PATHOLOGICAL FRACTURE: ICD-10-CM

## 2021-02-17 DIAGNOSIS — Z01.419 WELL WOMAN EXAM WITH ROUTINE GYNECOLOGICAL EXAM: Primary | ICD-10-CM

## 2021-02-17 DIAGNOSIS — Z12.11 COLON CANCER SCREENING: ICD-10-CM

## 2021-02-17 DIAGNOSIS — Z12.31 ENCOUNTER FOR SCREENING MAMMOGRAM FOR MALIGNANT NEOPLASM OF BREAST: ICD-10-CM

## 2021-02-17 PROCEDURE — G0101 CA SCREEN;PELVIC/BREAST EXAM: HCPCS | Performed by: OBSTETRICS & GYNECOLOGY

## 2021-02-17 NOTE — PROGRESS NOTES
Assessment   66 y o  postmenopausal female who is not currently sexually active presenting for annual exam      Plan   Diagnoses and all orders for this visit:    Well woman exam with routine gynecological exam  - Pap not indicated  - Mammo slip given  - Colon ca screening up to date  - Return in 1yr for yearly    Encounter for screening mammogram for malignant neoplasm of breast  -     Mammo screening bilateral w 3d & cad; Future    Age-related osteoporosis without current pathological fracture  -     DXA bone density spine hip and pelvis; Future  - Reviewed recommendations for Ca/VitD supplementation    Colon cancer screening  - Up to date    __________________________________________________________________      Subjective     66 y o  postmenopausal female who is not currently sexually active presenting for annual exam  She is without complaint  GYN  Complaints: denies  Denies genital discharge, genital ulcers, pelvic pain and vulvar/vaginal symptoms  Menopause occurred at age 37  She has had no bleeding since this time  Menopausal symptoms: none  Sexually active: No  Hx STI: denies   Hx Abnormal pap: denies  Last pap:  - NILM    OB   ()  Pregnancy complications: denies      Complaints: denies apart from nocturia (drinks later at night, on Lasix)  Denies hematuria, urinary incontinence and dysuria    BREAST  Complaints: denies  Denies: breast lump, breast tenderness, changed mole, dryness, nipple discharge, pruritus, rash, skin color change and skin lesion(s)  Last mammogram: 2020 - birads1  Personal hx: hx breast bx (benign)  Family hx: denies fhx of breast, uterine, ovarian, or colon cancers  Father with prostate ca  Patient does do regular self-exams    GENERAL  PMH reviewed/updated and is as below  Patient does follow with a PCP  Retired  Denies domestic violence    Exercise: nothing specific  Diet: nothing specific    Vitamin B12 + D + C, calcium, and multivitamin    SCREENING  Cervical Ca: pap not indicated  Breast Ca: mammo slip given  Colon Ca: 2020 - colonoscopy, repeat 5yr due to polyps  Metabolic: 5503 - DEXA, osteoporosis noted in spine and femur      Past Medical History:   Diagnosis Date    Arthritis     osteo    Colon polyp     Diverticula of colon     Hypertension     Obesity     Rhinitis        Past Surgical History:   Procedure Laterality Date    APPENDECTOMY      BREAST BIOPSY Left 1977    benign    BREAST SURGERY Left     biopsy    CHOLECYSTECTOMY      COLONOSCOPY      HERNIA REPAIR Right     inguinal    JOINT REPLACEMENT Right     Knee    OH COLONOSCOPY FLX DX W/COLLJ SPEC WHEN PFRMD N/A 2/21/2017    Procedure: COLONOSCOPY with polypectomy and hemo clip marjan ;  Surgeon: Tracey Dean MD;  Location: [facility]; Service: Gastroenterology         Current Outpatient Medications:     amLODIPine (NORVASC) 10 mg tablet, Take 10 mg by mouth daily, Disp: , Rfl:     fluticasone (FLONASE) 50 mcg/act nasal spray, 2 sprays into each nostril daily, Disp: , Rfl:     furosemide (LASIX) 40 mg tablet, Take 40 mg by mouth 2 (two) times a day, Disp: , Rfl:     naproxen sodium (ANAPROX) 550 mg tablet, Take 550 mg by mouth, Disp: , Rfl:     No Known Allergies    Social History     Tobacco Use    Smoking status: Never Smoker    Smokeless tobacco: Never Used   Substance Use Topics    Alcohol use: Yes     Alcohol/week: 1 0 standard drinks     Types: 1 Glasses of wine per week     Comment: social    Drug use: No           Objective  /70   Ht 5' 2 5" (1 588 m)   Wt 121 kg (267 lb)   LMP  (LMP Unknown)   BMI 48 06 kg/m²      Physical Exam:  Physical Exam  Exam conducted with a chaperone present  Constitutional:       General: She is not in acute distress  Appearance: Normal appearance  She is well-developed  She is not ill-appearing, toxic-appearing or diaphoretic  HENT:      Head: Normocephalic and atraumatic     Eyes: General: No scleral icterus  Right eye: No discharge  Left eye: No discharge  Conjunctiva/sclera: Conjunctivae normal    Cardiovascular:      Rate and Rhythm: Normal rate  Pulmonary:      Effort: Pulmonary effort is normal  No accessory muscle usage or respiratory distress  Chest:      Breasts:         Right: No inverted nipple, mass, nipple discharge, skin change or tenderness  Left: No inverted nipple, mass, nipple discharge, skin change or tenderness  Abdominal:      General: There is no distension  Palpations: Abdomen is soft  There is no mass  Tenderness: There is no abdominal tenderness  There is no guarding or rebound  Genitourinary:     General: Normal vulva  Exam position: Lithotomy position  Labia:         Right: No rash, tenderness or lesion  Left: No rash, tenderness or lesion  Vagina: No signs of injury  No vaginal discharge, erythema or tenderness  Cervix: No cervical motion tenderness, discharge or friability  Uterus: Not enlarged, not fixed and not tender  Adnexa:         Right: No mass, tenderness or fullness  Left: No mass, tenderness or fullness  Rectum: No external hemorrhoid  Normal anal tone  Comments: Urethral meatus: normal  Skin:     General: Skin is warm and dry  Coloration: Skin is not jaundiced  Findings: No bruising, erythema or rash  Neurological:      Mental Status: She is alert  Psychiatric:         Mood and Affect: Mood normal          Behavior: Behavior normal          Thought Content:  Thought content normal          Judgment: Judgment normal

## 2021-03-10 DIAGNOSIS — Z23 ENCOUNTER FOR IMMUNIZATION: ICD-10-CM

## 2021-05-25 ENCOUNTER — HOSPITAL ENCOUNTER (OUTPATIENT)
Dept: MAMMOGRAPHY | Facility: MEDICAL CENTER | Age: 71
Discharge: HOME | End: 2021-05-25
Payer: COMMERCIAL

## 2021-05-25 ENCOUNTER — HOSPITAL ENCOUNTER (OUTPATIENT)
Dept: BONE DENSITY | Facility: MEDICAL CENTER | Age: 71
Discharge: HOME | End: 2021-05-25
Payer: COMMERCIAL

## 2021-05-25 VITALS — BODY MASS INDEX: 47.31 KG/M2 | HEIGHT: 63 IN | WEIGHT: 267 LBS

## 2021-05-25 DIAGNOSIS — M81.0 AGE-RELATED OSTEOPOROSIS WITHOUT CURRENT PATHOLOGICAL FRACTURE: ICD-10-CM

## 2021-05-25 DIAGNOSIS — Z12.31 ENCOUNTER FOR SCREENING MAMMOGRAM FOR MALIGNANT NEOPLASM OF BREAST: ICD-10-CM

## 2021-05-25 PROCEDURE — 77080 DXA BONE DENSITY AXIAL: CPT

## 2021-05-25 PROCEDURE — 77063 BREAST TOMOSYNTHESIS BI: CPT

## 2021-05-25 PROCEDURE — 77067 SCR MAMMO BI INCL CAD: CPT

## 2021-10-05 NOTE — ANESTHESIA PREPROCEDURE EVALUATION
Review of Systems/Medical History  Patient summary reviewed  Chart reviewed      Cardiovascular  Exercise tolerance (METS): <4,  Hypertension controlled,    Pulmonary  Negative pulmonary ROS        GI/Hepatic  Negative GI/hepatic ROS          Negative  ROS        Endo/Other    Obesity    GYN  Negative gynecology ROS          Hematology  Negative hematology ROS      Musculoskeletal    Arthritis     Neurology  Negative neurology ROS      Psychology   Negative psychology ROS              Physical Exam    Airway    Mallampati score: II  TM Distance: <3 FB  Neck ROM: full     Dental       Cardiovascular  Rhythm: regular, Rate: normal,     Pulmonary  Breath sounds clear to auscultation,     Other Findings        Anesthesia Plan  ASA Score- 3     Anesthesia Type- general with ASA Monitors  Additional Monitors:   Airway Plan:         Plan Factors-Patient not instructed to abstain from smoking on day of procedure  Patient did not smoke on day of surgery  Induction- intravenous  Postoperative Plan-     Informed Consent- Anesthetic plan and risks discussed with patient  I personally reviewed this patient with the CRNA  Discussed and agreed on the Anesthesia Plan with the CRNA  Sharron Goldberg
n/a

## 2022-03-02 ENCOUNTER — OFFICE VISIT (OUTPATIENT)
Dept: OBGYN CLINIC | Facility: MEDICAL CENTER | Age: 72
End: 2022-03-02
Payer: COMMERCIAL

## 2022-03-02 VITALS
WEIGHT: 266 LBS | BODY MASS INDEX: 47.13 KG/M2 | SYSTOLIC BLOOD PRESSURE: 110 MMHG | DIASTOLIC BLOOD PRESSURE: 60 MMHG | HEIGHT: 63 IN

## 2022-03-02 DIAGNOSIS — N95.0 PMB (POSTMENOPAUSAL BLEEDING): Primary | ICD-10-CM

## 2022-03-02 PROBLEM — N18.31 STAGE 3A CHRONIC KIDNEY DISEASE (HCC): Status: ACTIVE | Noted: 2020-11-03

## 2022-03-02 PROCEDURE — 88342 IMHCHEM/IMCYTCHM 1ST ANTB: CPT | Performed by: PATHOLOGY

## 2022-03-02 PROCEDURE — 88341 IMHCHEM/IMCYTCHM EA ADD ANTB: CPT | Performed by: PATHOLOGY

## 2022-03-02 PROCEDURE — 88305 TISSUE EXAM BY PATHOLOGIST: CPT | Performed by: PATHOLOGY

## 2022-03-02 PROCEDURE — 99213 OFFICE O/P EST LOW 20 MIN: CPT | Performed by: NURSE PRACTITIONER

## 2022-03-02 PROCEDURE — 58100 BIOPSY OF UTERUS LINING: CPT | Performed by: NURSE PRACTITIONER

## 2022-03-02 RX ORDER — HYDROCHLOROTHIAZIDE 12.5 MG/1
12.5 TABLET ORAL DAILY
COMMUNITY

## 2022-03-02 RX ORDER — OMEPRAZOLE 40 MG/1
20 CAPSULE, DELAYED RELEASE ORAL DAILY
COMMUNITY

## 2022-03-02 NOTE — PATIENT INSTRUCTIONS
Thank you for visiting [facility]  You may be invited to complete a survey about you visit  Your responses will help us improve care we provide  A 10 means the care you received at your visit met your expectations  If you are unable to give a 10 please list reasons so we can work on improving your patient experience  Endometrial Biopsy   WHAT YOU NEED TO KNOW:   Endometrial biopsy is a procedure to remove a tissue sample from the lining of your uterus  This procedure is done through your vagina  WHILE YOU ARE HERE:   Before your procedure:   · Informed consent  is a legal document that explains the tests, treatments, or procedures that you may need  Informed consent means you understand what will be done and can make decisions about what you want  You give your permission when you sign the consent form  You can have someone sign this form for you if you are not able to sign it  You have the right to understand your medical care in words you know  Before you sign the consent form, understand the risks and benefits of what will be done  Make sure all your questions are answered  · NSAIDs  decrease swelling and pain  You may need to take an NSAID before your procedure  Follow your healthcare provider's instruction on when to take it  During your procedure: You will be awake during the procedure  An ultrasound or hysteroscope (tube with a light and a camera on the end) may be used  This helps your healthcare provider see inside your uterus to find the best spot to get the tissue sample  He or she will then insert a speculum into your vagina  This is the same tool used during a Pap smear  The speculum allows your healthcare provider to see inside your vagina to your cervix  He or she may need to numb your cervix  Your healthcare provider will insert a small tube into your vagina and cervix to remove a piece of tissue from the lining of your uterus   The tissue sample will be sent to a lab to be tested  After your procedure:  Do not get up until your healthcare provider says it is okay  When your healthcare provider sees that you are okay, you may be able to go home  You may have mild pain, cramping, or spotting for a few days after your procedure  RISKS:   You could get an infection after your procedure  Your uterus may be damaged  Damage can cause heavy bleeding and pain  You may need surgery to repair the damage  CARE AGREEMENT:   You have the right to help plan your care  Learn about your health condition and how it may be treated  Discuss treatment options with your healthcare providers to decide what care you want to receive  You always have the right to refuse treatment  © Copyright [employer] 2022 Information is for End User's use only and may not be sold, redistributed or otherwise used for commercial purposes  All illustrations and images included in CareNotes® are the copyrighted property of A D A M , Inc  or [facility] Umesh Gordillo   The above information is an  only  It is not intended as medical advice for individual conditions or treatments  Talk to your doctor, nurse or pharmacist before following any medical regimen to see if it is safe and effective for you  Postmenopausal Bleeding   WHAT YOU NEED TO KNOW:   What do I need to know about postmenopausal bleeding? Postmenopausal bleeding is bleeding that occurs after menopause  A woman who has not had a period for a full year after the age of 36 is considered to be in menopause  Postmenopausal bleeding may range from spotting to very heavy bleeding  What causes postmenopausal bleeding?    · Thinning of the endometrium (lining of the uterus) called endometrial atrophy    · Polyps (noncancerous growths) that develop on the inner wall of your uterus or cervix    · Hormone replacement therapy    · Abnormal thickening of the endometrium called endometrial hyperplasia    · Tamoxifen (medicine used to treat breast cancer)    · Cervical, endometrial, or uterine cancer    How is postmenopausal bleeding diagnosed? Your healthcare provider will ask about medical conditions that you have, and that run in your family  He or she will also do a pelvic exam to check for problems with your cervix, uterus, and ovaries  You may also need any of the following:  · An ultrasound  uses sound waves to show pictures of your uterus on a monitor  Your healthcare provider may place saline fluid into your uterus with a catheter  The fluid helps show more detail in the ultrasound pictures of your uterus  · Endometrial biopsy  is used to collect a sample of cells from the inside of your uterus to be tested for cancer  · Hysteroscopy  is a procedure that is done to look inside your uterus  A small scope with a light and camera is placed into your vagina and cervix  Liquid or gas may be put through the scope to help healthcare providers see better  · Dilation and curettage (D&C)  is a procedure to remove tissue from the lining of your uterus  The tissue is sent to a lab for tests  How is postmenopausal bleeding treated? Treatment depends on the cause of your postmenopausal bleeding  If you have polyps, you may need surgery to remove them  Endometrial atrophy can be treated with medicines  Endometrial hyperplasia may be treated with progestin hormone therapy  Surgery to remove your uterus will be needed if you have endometrial or uterine cancer  Your fallopian tubes, ovaries, and nearby lymph nodes may also be removed  When should I call my doctor? · You continue to have vaginal bleeding, even with treatment  · You have pain in your abdomen or pelvis  · You have questions or concerns about your condition or care  CARE AGREEMENT:   You have the right to help plan your care  Learn about your health condition and how it may be treated   Discuss treatment options with your healthcare providers to decide what care you want to receive  You always have the right to refuse treatment  The above information is an  only  It is not intended as medical advice for individual conditions or treatments  Talk to your doctor, nurse or pharmacist before following any medical regimen to see if it is safe and effective for you  © Copyright [employer] 2022 Information is for End User's use only and may not be sold, redistributed or otherwise used for commercial purposes   All illustrations and images included in CareNotes® are the copyrighted property of A D A M , Inc  or [address] [place] [address]

## 2022-03-07 ENCOUNTER — TELEPHONE (OUTPATIENT)
Dept: OBGYN CLINIC | Facility: MEDICAL CENTER | Age: 72
End: 2022-03-07

## 2022-03-07 ENCOUNTER — HOSPITAL ENCOUNTER (OUTPATIENT)
Dept: ULTRASOUND IMAGING | Facility: HOSPITAL | Age: 72
Discharge: HOME | End: 2022-03-07
Payer: COMMERCIAL

## 2022-03-07 DIAGNOSIS — N95.0 PMB (POSTMENOPAUSAL BLEEDING): ICD-10-CM

## 2022-03-07 PROCEDURE — 76830 TRANSVAGINAL US NON-OB: CPT

## 2022-03-07 PROCEDURE — 76856 US EXAM PELVIC COMPLETE: CPT

## 2022-03-07 NOTE — TELEPHONE ENCOUNTER
LMOM for patient to call office regarding pelvic US  Ultrasound showed uterine fibroid and thickened endometrial lining  Need to await endometrial biopsy to develop further plan

## 2022-03-08 ENCOUNTER — TELEPHONE (OUTPATIENT)
Dept: OBGYN CLINIC | Facility: CLINIC | Age: 72
End: 2022-03-08

## 2022-03-08 NOTE — TELEPHONE ENCOUNTER
LMOM for patient to call office regarding endometrial biopsy and pelvic ultrasound results  Endometrial biopsy resulted high-grade endometrial carcinoma  Will need referral to GYN/ ONC    Awaiting to place referral until speaking to patient

## 2022-03-09 ENCOUNTER — TELEPHONE (OUTPATIENT)
Dept: OBGYN CLINIC | Facility: MEDICAL CENTER | Age: 72
End: 2022-03-09

## 2022-03-09 NOTE — TELEPHONE ENCOUNTER
Telephone call to patient's sister, Nisa Cruz  Patient was with sister  Was able to speak on phone with patient  Patient's identity was confirmed by full name date of birth  Reviewed abnormal results of endometrial biopsy and pelvic ultrasound  Referral placed for  GYN/ ONC  Phone number provided  Was able to speak to both patient and sisterRomanen  Both verbalize understanding    Denied questions at this time

## 2022-03-09 NOTE — TELEPHONE ENCOUNTER
Spoke with patient's sister per communication consent, Lili  Reviewed recommendation from GYN/ONC for CT abdomen/pelvis and  blood work  Phone number for central scheduling provided    Reviewed with patient and sister blood work be done at any [facility]

## 2022-03-10 ENCOUNTER — APPOINTMENT (OUTPATIENT)
Dept: LAB | Facility: HOSPITAL | Age: 72
End: 2022-03-10
Payer: COMMERCIAL

## 2022-03-10 ENCOUNTER — TELEPHONE (OUTPATIENT)
Dept: GYNECOLOGIC ONCOLOGY | Facility: CLINIC | Age: 72
End: 2022-03-10

## 2022-03-10 DIAGNOSIS — C54.1 ENDOMETRIAL CANCER (HCC): ICD-10-CM

## 2022-03-10 DIAGNOSIS — C54.1 ENDOMETRIUM CANCER (HCC): ICD-10-CM

## 2022-03-10 LAB
ALBUMIN SERPL BCP-MCNC: 3 G/DL (ref 3.5–5)
ALP SERPL-CCNC: 65 U/L (ref 46–116)
ALT SERPL W P-5'-P-CCNC: 21 U/L (ref 12–78)
ANION GAP SERPL CALCULATED.3IONS-SCNC: 9 MMOL/L (ref 4–13)
AST SERPL W P-5'-P-CCNC: 15 U/L (ref 5–45)
BASOPHILS # BLD AUTO: 0.03 THOUSANDS/ΜL (ref 0–0.1)
BASOPHILS NFR BLD AUTO: 1 % (ref 0–1)
BILIRUB SERPL-MCNC: 0.44 MG/DL (ref 0.2–1)
BUN SERPL-MCNC: 12 MG/DL (ref 5–25)
CALCIUM ALBUM COR SERPL-MCNC: 8.9 MG/DL (ref 8.3–10.1)
CALCIUM SERPL-MCNC: 8.1 MG/DL (ref 8.3–10.1)
CANCER AG125 SERPL-ACNC: 5.1 U/ML (ref 0–30)
CHLORIDE SERPL-SCNC: 106 MMOL/L (ref 100–108)
CO2 SERPL-SCNC: 28 MMOL/L (ref 21–32)
CREAT SERPL-MCNC: 1.3 MG/DL (ref 0.6–1.3)
EOSINOPHIL # BLD AUTO: 0.15 THOUSAND/ΜL (ref 0–0.61)
EOSINOPHIL NFR BLD AUTO: 3 % (ref 0–6)
ERYTHROCYTE [DISTWIDTH] IN BLOOD BY AUTOMATED COUNT: 13.6 % (ref 11.6–15.1)
GFR SERPL CREATININE-BSD FRML MDRD: 40 ML/MIN/1.73SQ M
GLUCOSE P FAST SERPL-MCNC: 88 MG/DL (ref 65–99)
HCT VFR BLD AUTO: 39 % (ref 34.8–46.1)
HGB BLD-MCNC: 12.2 G/DL (ref 11.5–15.4)
IMM GRANULOCYTES # BLD AUTO: 0.01 THOUSAND/UL (ref 0–0.2)
IMM GRANULOCYTES NFR BLD AUTO: 0 % (ref 0–2)
LYMPHOCYTES # BLD AUTO: 2.03 THOUSANDS/ΜL (ref 0.6–4.47)
LYMPHOCYTES NFR BLD AUTO: 47 % (ref 14–44)
MCH RBC QN AUTO: 30.7 PG (ref 26.8–34.3)
MCHC RBC AUTO-ENTMCNC: 31.3 G/DL (ref 31.4–37.4)
MCV RBC AUTO: 98 FL (ref 82–98)
MONOCYTES # BLD AUTO: 0.36 THOUSAND/ΜL (ref 0.17–1.22)
MONOCYTES NFR BLD AUTO: 8 % (ref 4–12)
NEUTROPHILS # BLD AUTO: 1.78 THOUSANDS/ΜL (ref 1.85–7.62)
NEUTS SEG NFR BLD AUTO: 41 % (ref 43–75)
NRBC BLD AUTO-RTO: 0 /100 WBCS
PLATELET # BLD AUTO: 156 THOUSANDS/UL (ref 149–390)
PMV BLD AUTO: 11.1 FL (ref 8.9–12.7)
POTASSIUM SERPL-SCNC: 3.6 MMOL/L (ref 3.5–5.3)
PROT SERPL-MCNC: 7.2 G/DL (ref 6.4–8.2)
RBC # BLD AUTO: 3.97 MILLION/UL (ref 3.81–5.12)
SODIUM SERPL-SCNC: 143 MMOL/L (ref 136–145)
WBC # BLD AUTO: 4.36 THOUSAND/UL (ref 4.31–10.16)

## 2022-03-10 PROCEDURE — 85025 COMPLETE CBC W/AUTO DIFF WBC: CPT

## 2022-03-10 PROCEDURE — 36415 COLL VENOUS BLD VENIPUNCTURE: CPT

## 2022-03-10 PROCEDURE — 86304 IMMUNOASSAY TUMOR CA 125: CPT

## 2022-03-10 PROCEDURE — 80053 COMPREHEN METABOLIC PANEL: CPT

## 2022-03-10 NOTE — TELEPHONE ENCOUNTER
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

## 2022-03-15 ENCOUNTER — LAB REQUISITION (OUTPATIENT)
Dept: LAB | Facility: HOSPITAL | Age: 72
End: 2022-03-15
Payer: COMMERCIAL

## 2022-03-15 ENCOUNTER — TELEPHONE (OUTPATIENT)
Dept: GYNECOLOGIC ONCOLOGY | Facility: CLINIC | Age: 72
End: 2022-03-15

## 2022-03-15 ENCOUNTER — APPOINTMENT (OUTPATIENT)
Dept: LAB | Facility: HOSPITAL | Age: 72
End: 2022-03-15
Attending: OBSTETRICS & GYNECOLOGY
Payer: COMMERCIAL

## 2022-03-15 ENCOUNTER — TELEPHONE (OUTPATIENT)
Dept: NEPHROLOGY | Facility: CLINIC | Age: 72
End: 2022-03-15

## 2022-03-15 ENCOUNTER — CONSULT (OUTPATIENT)
Dept: GYNECOLOGIC ONCOLOGY | Facility: CLINIC | Age: 72
End: 2022-03-15
Payer: COMMERCIAL

## 2022-03-15 ENCOUNTER — HOSPITAL ENCOUNTER (OUTPATIENT)
Dept: NON INVASIVE DIAGNOSTICS | Facility: HOSPITAL | Age: 72
Discharge: HOME | End: 2022-03-15
Attending: OBSTETRICS & GYNECOLOGY
Payer: COMMERCIAL

## 2022-03-15 VITALS
BODY MASS INDEX: 47.59 KG/M2 | TEMPERATURE: 98.1 F | DIASTOLIC BLOOD PRESSURE: 80 MMHG | WEIGHT: 268.6 LBS | HEIGHT: 63 IN | SYSTOLIC BLOOD PRESSURE: 132 MMHG

## 2022-03-15 DIAGNOSIS — Z01.818 OTHER SPECIFIED PRE-OPERATIVE EXAMINATION: ICD-10-CM

## 2022-03-15 DIAGNOSIS — C54.1 ENDOMETRIUM CANCER (HCC): Primary | ICD-10-CM

## 2022-03-15 DIAGNOSIS — C54.1 ENDOMETRIAL CANCER (HCC): ICD-10-CM

## 2022-03-15 DIAGNOSIS — C54.1 ENDOMETRIUM CANCER (HCC): ICD-10-CM

## 2022-03-15 DIAGNOSIS — Z01.818 ENCOUNTER FOR OTHER PREPROCEDURAL EXAMINATION: ICD-10-CM

## 2022-03-15 PROBLEM — N95.0 PMB (POSTMENOPAUSAL BLEEDING): Status: RESOLVED | Noted: 2022-03-02 | Resolved: 2022-03-15

## 2022-03-15 LAB
ABO GROUP BLD: NORMAL
ATRIAL RATE: 70 BPM
BLD GP AB SCN SERPL QL: NEGATIVE
EST. AVERAGE GLUCOSE BLD GHB EST-MCNC: 105 MG/DL
HBA1C MFR BLD: 5.3 %
P AXIS: 71 DEGREES
PR INTERVAL: 166 MS
QRS AXIS: 42 DEGREES
QRSD INTERVAL: 74 MS
QT INTERVAL: 400 MS
QTC INTERVAL: 432 MS
RH BLD: POSITIVE
SPECIMEN EXPIRATION DATE: NORMAL
T WAVE AXIS: 54 DEGREES
VENTRICULAR RATE: 70 BPM

## 2022-03-15 PROCEDURE — 36415 COLL VENOUS BLD VENIPUNCTURE: CPT

## 2022-03-15 PROCEDURE — 99205 OFFICE O/P NEW HI 60 MIN: CPT | Performed by: OBSTETRICS & GYNECOLOGY

## 2022-03-15 PROCEDURE — 86850 RBC ANTIBODY SCREEN: CPT | Performed by: OBSTETRICS & GYNECOLOGY

## 2022-03-15 PROCEDURE — 93005 ELECTROCARDIOGRAM TRACING: CPT

## 2022-03-15 PROCEDURE — 86900 BLOOD TYPING SEROLOGIC ABO: CPT | Performed by: OBSTETRICS & GYNECOLOGY

## 2022-03-15 PROCEDURE — 86901 BLOOD TYPING SEROLOGIC RH(D): CPT | Performed by: OBSTETRICS & GYNECOLOGY

## 2022-03-15 PROCEDURE — 83036 HEMOGLOBIN GLYCOSYLATED A1C: CPT

## 2022-03-15 PROCEDURE — 93010 ELECTROCARDIOGRAM REPORT: CPT | Performed by: INTERNAL MEDICINE

## 2022-03-15 RX ORDER — ACETAMINOPHEN 325 MG/1
975 TABLET ORAL ONCE
Status: CANCELLED | OUTPATIENT
Start: 2022-03-15 | End: 2022-03-15

## 2022-03-15 RX ORDER — HEPARIN SODIUM 5000 [USP'U]/ML
5000 INJECTION, SOLUTION INTRAVENOUS; SUBCUTANEOUS
Status: CANCELLED | OUTPATIENT
Start: 2022-03-15 | End: 2022-03-16

## 2022-03-15 RX ORDER — CEFAZOLIN SODIUM 2 G/50ML
2000 SOLUTION INTRAVENOUS ONCE
Status: CANCELLED | OUTPATIENT
Start: 2022-03-15 | End: 2022-03-15

## 2022-03-15 RX ORDER — DENOSUMAB 60 MG/ML
1 INJECTION SUBCUTANEOUS
COMMUNITY
Start: 2021-10-22

## 2022-03-15 NOTE — PROGRESS NOTES
Assessment/Plan:    Problem List Items Addressed This Visit        Genitourinary    Endometrial cancer [facility])     Patient with newly diagnosed 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  Today I discussed with her implications of this diagnosis and have recommended to proceed with definitive staging/surgical treatment by means of robotic hysterectomy, bilateral salpingo-oophorectomy, staging lymphadenectomy and all other indicated procedures  Patient with stage IIIA chronic kidney disease  Will refer to nephrology per protocol for preoperative optimization  Avoid hypotension and nephrotoxic agents around time of surgery in an attempt to avoid superimposed BARRY  No history concerning for active cardiovascular disease  She has activity level consistent with >4 METS as she is able to walk up 1 flight of stairs and carry on activities of daily living and house chores without assistance  Additional cardiovascular workup not indicated given nature of intended procedure/non elective  I discussed with patient indication, risks, benefits and alternatives of surgical exploration  We discussed possibility of bleeding requiring blood transfusion, life-threatening infections requiring additional procedures, injuries to surrounding organs such as bladder, ureters, gastrointestinal tract and or neurovascular structures  Additionally, we discussed general risks associated to stress of surgery such as venous thromboembolism, acute myocardial events and stroke  All questions answered to patient's satisfaction  She agrees and wants to proceed  Informed consent form signed  Preoperative testing as per procedure pass  CT scan of the chest, abdomen and pelvis ordered given histologic type           Relevant Orders    Ambulatory Referral to Nephrology      Other Visit Diagnoses     Endometrium cancer [facility])    -  Primary    Relevant Orders    Case request operating room: ROBOTIC HYSTERECTOMY, BILATERAL SALPINGO-OOPHORECTOMY, STAGING LYMPHADENECTOMY AND ALL OTHER INDICATED PROCEDURES (Completed)    EKG 12 lead    HEMOGLOBIN A1C W/ EAG ESTIMATION    Type and screen    Ambulatory Referral to Nephrology              CHIEF COMPLAINT:   Newly diagnosed high-grade endometrial cancer, suspect clear cell  Patient ID: Jose Levy is a 74 y o  female  HPI  26-year-old  morbidly obese  female with osteoporosis, hypertension, stage 3 chronic kidney disease and recent onset of postmenopausal bleeding  Reports menopause at age 37  Three weeks ago started noticing some spotting  Light  No clots  No pain  Consulted with primary gynecologist who performed pelvic ultrasound and office endometrial biopsy  This demonstrated high-grade carcinoma 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  Patient was referred for evaluation and treatment  Reports no additional complaints  Last mammogram May 2021  Last colonoscopy 2020  Review of Systems  As per HPI  Concern about possible right-sided abdominal bulge  Twelve point review of systems otherwise noncontributory  Current Outpatient Medications   Medication Sig Dispense Refill    amLODIPine (NORVASC) 10 mg tablet Take 10 mg by mouth daily      denosumab (Prolia) 60 mg/mL Inject 1 mL under the skin every 6 (six) months      hydrochlorothiazide (HYDRODIURIL) 12 5 mg tablet Take 12 5 mg by mouth daily      omeprazole (PriLOSEC) 40 MG capsule Take 40 mg by mouth daily      fluticasone (FLONASE) 50 mcg/act nasal spray 2 sprays into each nostril daily       No current facility-administered medications for this visit         No Known Allergies    Past Medical History:   Diagnosis Date    Arthritis     osteo    Colon polyp     Diverticula of colon     Hypertension     Obesity     Rhinitis        Past Surgical History:   Procedure Laterality Date    APPENDECTOMY      BREAST BIOPSY Left     benign    BREAST SURGERY Left     biopsy    CHOLECYSTECTOMY      COLONOSCOPY      HERNIA REPAIR Right     inguinal    JOINT REPLACEMENT Right     Knee    [place] COLONOSCOPY FLX DX W/COLLJ SPEC WHEN PFRMD N/A 2017    Procedure: COLONOSCOPY with polypectomy and hemo clip marjan ;  Surgeon: Shandra Pedersen MD;  Location: [facility]; Service: Gastroenterology       OB History        1    Para   1    Term   1            AB        Living   1       SAB        IAB        Ectopic        Multiple        Live Births   1                 Family History   Problem Relation Age of Onset    No Known Problems Mother     Prostate cancer Father 80   [place] O[place] No Known Problems Sister     No Known Problems Maternal Grandmother     No Known Problems Maternal Grandfather     No Known Problems Paternal Grandmother     No Known Problems Paternal Grandfather     No Known Problems Sister     No Known Problems Sister     No Known Problems Maternal Aunt     No Known Problems Paternal Aunt     No Known Problems Paternal Aunt     No Known Problems Paternal Aunt        The following portions of the patient's history were reviewed and updated as appropriate: allergies, current medications, past family history, past medical history, past social history, past surgical history and problem list       Objective:    Blood pressure 132/80, temperature 98 1 °F (36 7 °C), temperature source Tympanic, height 5' 2 5" (1 588 m), weight 76 5 kg (168 lb 9 6 oz), not currently breastfeeding  Body mass index is 30 35 kg/m²  Physical Exam  Vitals reviewed  Exam conducted with a chaperone present  Constitutional:       General: She is not in acute distress  Appearance: She is obese  Eyes:      General: No scleral icterus  Right eye: No discharge  Left eye: No discharge  Conjunctiva/sclera: Conjunctivae normal    Cardiovascular:      Rate and Rhythm: Normal rate and regular rhythm  Heart sounds: Normal heart sounds   No murmur heard  Pulmonary:      Effort: Pulmonary effort is normal  No respiratory distress  Breath sounds: Normal breath sounds  No wheezing  Abdominal:      General: Abdomen is flat  There is no distension  Palpations: Abdomen is soft  There is no mass  Tenderness: There is no abdominal tenderness  There is no guarding  Comments: Right-sided subcostal incision consistent with prior cholecystectomy, no appreciable hernias noted  Genitourinary:     Comments: Normal external female genitalia  Normal Bartholin's and [place]'s glands  Normal urethral meatus and no evidence of urethral discharge or masses  Bladder without fullness mass or tenderness  Vagina without lesion or discharge  No significant pelvic organ prolapse noted  Cervix grossly normal appearing without visible lesions  Uterus nontender with normal contour, size and mobility  Adnexae without mass or tenderness  Anus without fissure of lesion  Musculoskeletal:      Cervical back: No rigidity  Right lower leg: No edema  Neurological:      Mental Status: She is alert             Lab Results   Component Value Date     5 1 03/10/2022     Lab Results   Component Value Date    WBC 4 36 03/10/2022    HGB 12 2 03/10/2022    HCT 39 0 03/10/2022    MCV 98 03/10/2022     03/10/2022     Lab Results   Component Value Date    K 3 6 03/10/2022     03/10/2022    CO2 28 03/10/2022    BUN 12 03/10/2022    CREATININE 1 30 03/10/2022    GLUF 88 03/10/2022    CALCIUM 8 1 (L) 03/10/2022    CORRECTEDCA 8 9 03/10/2022    AST 15 03/10/2022    ALT 21 03/10/2022    ALKPHOS 65 03/10/2022    EGFR 40 03/10/2022        Trend:  Lab Results   Component Value Date     5 1 03/10/2022     Juliana Amaya MD, [facility] [address]  3/15/2022  8:51 AM

## 2022-03-15 NOTE — H&P (VIEW-ONLY)
Assessment/Plan:    Problem List Items Addressed This Visit        Genitourinary    Endometrial cancer [facility])     Patient with newly diagnosed 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  Today I discussed with her implications of this diagnosis and have recommended to proceed with definitive staging/surgical treatment by means of robotic hysterectomy, bilateral salpingo-oophorectomy, staging lymphadenectomy and all other indicated procedures  Patient with stage IIIA chronic kidney disease  Will refer to nephrology per protocol for preoperative optimization  Avoid hypotension and nephrotoxic agents around time of surgery in an attempt to avoid superimposed BARRY  No history concerning for active cardiovascular disease  She has activity level consistent with >4 METS as she is able to walk up 1 flight of stairs and carry on activities of daily living and house chores without assistance  Additional cardiovascular workup not indicated given nature of intended procedure/non elective  I discussed with patient indication, risks, benefits and alternatives of surgical exploration  We discussed possibility of bleeding requiring blood transfusion, life-threatening infections requiring additional procedures, injuries to surrounding organs such as bladder, ureters, gastrointestinal tract and or neurovascular structures  Additionally, we discussed general risks associated to stress of surgery such as venous thromboembolism, acute myocardial events and stroke  All questions answered to patient's satisfaction  She agrees and wants to proceed  Informed consent form signed  Preoperative testing as per procedure pass  CT scan of the chest, abdomen and pelvis ordered given histologic type           Relevant Orders    Ambulatory Referral to Nephrology      Other Visit Diagnoses     Endometrium cancer [facility])    -  Primary    Relevant Orders    Case request operating room: ROBOTIC HYSTERECTOMY, BILATERAL SALPINGO-OOPHORECTOMY, STAGING LYMPHADENECTOMY AND ALL OTHER INDICATED PROCEDURES (Completed)    EKG 12 lead    HEMOGLOBIN A1C W/ EAG ESTIMATION    Type and screen    Ambulatory Referral to Nephrology              CHIEF COMPLAINT:   Newly diagnosed high-grade endometrial cancer, suspect clear cell  Patient ID: Mariana Nguyen is a 74 y o  female  HPI  68-year-old  morbidly obese  female with osteoporosis, hypertension, stage 3 chronic kidney disease and recent onset of postmenopausal bleeding  Reports menopause at age 37  Three weeks ago started noticing some spotting  Light  No clots  No pain  Consulted with primary gynecologist who performed pelvic ultrasound and office endometrial biopsy  This demonstrated high-grade carcinoma 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  Patient was referred for evaluation and treatment  Reports no additional complaints  Last mammogram May 2021  Last colonoscopy 2020  Review of Systems  As per HPI  Concern about possible right-sided abdominal bulge  Twelve point review of systems otherwise noncontributory  Current Outpatient Medications   Medication Sig Dispense Refill    amLODIPine (NORVASC) 10 mg tablet Take 10 mg by mouth daily      denosumab (Prolia) 60 mg/mL Inject 1 mL under the skin every 6 (six) months      hydrochlorothiazide (HYDRODIURIL) 12 5 mg tablet Take 12 5 mg by mouth daily      omeprazole (PriLOSEC) 40 MG capsule Take 40 mg by mouth daily      fluticasone (FLONASE) 50 mcg/act nasal spray 2 sprays into each nostril daily       No current facility-administered medications for this visit         No Known Allergies    Past Medical History:   Diagnosis Date    Arthritis     osteo    Colon polyp     Diverticula of colon     Hypertension     Obesity     Rhinitis        Past Surgical History:   Procedure Laterality Date    APPENDECTOMY      BREAST BIOPSY Left     benign    BREAST SURGERY Left     biopsy    CHOLECYSTECTOMY      COLONOSCOPY      HERNIA REPAIR Right     inguinal    JOINT REPLACEMENT Right     Knee    [place] COLONOSCOPY FLX DX W/COLLJ SPEC WHEN PFRMD N/A 2017    Procedure: COLONOSCOPY with polypectomy and hemo clip marjan ;  Surgeon: Williams Wright MD;  Location: [facility]; Service: Gastroenterology       OB History        1    Para   1    Term   1            AB        Living   1       SAB        IAB        Ectopic        Multiple        Live Births   1                 Family History   Problem Relation Age of Onset    No Known Problems Mother     Prostate cancer Father 80   [place] No Known Problems Sister     No Known Problems Maternal Grandmother     No Known Problems Maternal Grandfather     No Known Problems Paternal Grandmother     No Known Problems Paternal Grandfather     No Known Problems Sister     No Known Problems Sister     No Known Problems Maternal Aunt     No Known Problems Paternal Aunt     No Known Problems Paternal Aunt     No Known Problems Paternal Aunt        The following portions of the patient's history were reviewed and updated as appropriate: allergies, current medications, past family history, past medical history, past social history, past surgical history and problem list       Objective:    Blood pressure 132/80, temperature 98 1 °F (36 7 °C), temperature source Tympanic, height 5' 2 5" (1 588 m), weight 76 5 kg (168 lb 9 6 oz), not currently breastfeeding  Body mass index is 30 35 kg/m²  Physical Exam  Vitals reviewed  Exam conducted with a chaperone present  Constitutional:       General: She is not in acute distress  Appearance: She is obese  Eyes:      General: No scleral icterus  Right eye: No discharge  Left eye: No discharge  Conjunctiva/sclera: Conjunctivae normal    Cardiovascular:      Rate and Rhythm: Normal rate and regular rhythm  Heart sounds: Normal heart sounds   No murmur heard  Pulmonary:      Effort: Pulmonary effort is normal  No respiratory distress  Breath sounds: Normal breath sounds  No wheezing  Abdominal:      General: Abdomen is flat  There is no distension  Palpations: Abdomen is soft  There is no mass  Tenderness: There is no abdominal tenderness  There is no guarding  Comments: Right-sided subcostal incision consistent with prior cholecystectomy, no appreciable hernias noted  Genitourinary:     Comments: Normal external female genitalia  Normal Bartholin's and [place]'s glands  Normal urethral meatus and no evidence of urethral discharge or masses  Bladder without fullness mass or tenderness  Vagina without lesion or discharge  No significant pelvic organ prolapse noted  Cervix grossly normal appearing without visible lesions  Uterus nontender with normal contour, size and mobility  Adnexae without mass or tenderness  Anus without fissure of lesion  Musculoskeletal:      Cervical back: No rigidity  Right lower leg: No edema  Neurological:      Mental Status: She is alert             Lab Results   Component Value Date     5 1 03/10/2022     Lab Results   Component Value Date    WBC 4 36 03/10/2022    HGB 12 2 03/10/2022    HCT 39 0 03/10/2022    MCV 98 03/10/2022     03/10/2022     Lab Results   Component Value Date    K 3 6 03/10/2022     03/10/2022    CO2 28 03/10/2022    BUN 12 03/10/2022    CREATININE 1 30 03/10/2022    GLUF 88 03/10/2022    CALCIUM 8 1 (L) 03/10/2022    CORRECTEDCA 8 9 03/10/2022    AST 15 03/10/2022    ALT 21 03/10/2022    ALKPHOS 65 03/10/2022    EGFR 40 03/10/2022        Trend:  Lab Results   Component Value Date     5 1 03/10/2022     Zoe Aldana MD, [facility] [address]  3/15/2022  8:51 AM

## 2022-03-15 NOTE — ASSESSMENT & PLAN NOTE
Patient with newly diagnosed 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  Today I discussed with her implications of this diagnosis and have recommended to proceed with definitive staging/surgical treatment by means of robotic hysterectomy, bilateral salpingo-oophorectomy, staging lymphadenectomy and all other indicated procedures  Patient with stage IIIA chronic kidney disease  Will refer to nephrology per protocol for preoperative optimization  Avoid hypotension and nephrotoxic agents around time of surgery in an attempt to avoid superimposed BARRY  No history concerning for active cardiovascular disease  She has activity level consistent with >4 METS as she is able to walk up 1 flight of stairs and carry on activities of daily living and house chores without assistance  Additional cardiovascular workup not indicated given nature of intended procedure/non elective  I discussed with patient indication, risks, benefits and alternatives of surgical exploration  We discussed possibility of bleeding requiring blood transfusion, life-threatening infections requiring additional procedures, injuries to surrounding organs such as bladder, ureters, gastrointestinal tract and or neurovascular structures  Additionally, we discussed general risks associated to stress of surgery such as venous thromboembolism, acute myocardial events and stroke  All questions answered to patient's satisfaction  She agrees and wants to proceed  Informed consent form signed  Preoperative testing as per procedure pass  CT scan of the chest, abdomen and pelvis ordered given histologic type

## 2022-03-15 NOTE — TELEPHONE ENCOUNTER
[facility] with date and location of surgery for the patient

## 2022-03-15 NOTE — TELEPHONE ENCOUNTER
New Patient Intake Form   Patient Details   Rever John Aas     1950     [id number]     Appointment Information   Who is calling to schedule? If not patient, what is callers name? Providers office; Referring Provider Saulo Cárdenas   Referring Provider Number [id number]   Reason for Appt (Diagnosis) C54 1 (ICD-10-CM) - Endometrium cancer ([facility] [address] )   surgical clearance needed prior to 3/31   Is patient aware of why they are being referred? Yes   Does Patient have labs done at [facility]? If not, where do they go? Yes/[facility]   Has patient had labs / urine work done? List date of most recent lab / urine work Yes   Has patient had a BMP & CBC done in the past 2 years? If so, list the date Yes   Has patient been hospitalized recently? If yes, list name and location of hospital they were In No   Has patient been seen by a Nephrologist before? If yes, list name, location and phone number No   Has patient been see by another Specialty before (ex  Neurology, urology, cardiology)? If yes, please list name, and specialty Yes   Has the patient had imaging done? If so, list the most recent date and type of imagineg Yes   Does the patient has a stone analysis report if history of kidney stones? n/a   Appointment Details   Is there a referral on file?  Yes   Appointment Date 3/21   Location [place]   Miscellaneous   Appointment required for surgical clearance prior to 3/31

## 2022-03-15 NOTE — PATIENT INSTRUCTIONS
Keep appointment for CT scan  Give appointment with Nephrology  Preoperative testing as per procedure pass

## 2022-03-15 NOTE — TELEPHONE ENCOUNTER
Renuka from outpatient registration needs a call back asap to know where patient's sx is scheduled  Patient is currently there

## 2022-03-17 ENCOUNTER — HOSPITAL ENCOUNTER (OUTPATIENT)
Dept: CT IMAGING | Facility: HOSPITAL | Age: 72
Discharge: HOME | End: 2022-03-17
Payer: COMMERCIAL

## 2022-03-17 DIAGNOSIS — C54.1 ENDOMETRIAL CANCER (HCC): ICD-10-CM

## 2022-03-17 PROCEDURE — 71250 CT THORAX DX C-: CPT

## 2022-03-17 PROCEDURE — 74176 CT ABD & PELVIS W/O CONTRAST: CPT

## 2022-03-21 ENCOUNTER — CONSULT (OUTPATIENT)
Dept: NEPHROLOGY | Facility: CLINIC | Age: 72
End: 2022-03-21
Payer: COMMERCIAL

## 2022-03-21 VITALS
BODY MASS INDEX: 47.66 KG/M2 | HEART RATE: 98 BPM | WEIGHT: 269 LBS | DIASTOLIC BLOOD PRESSURE: 82 MMHG | HEIGHT: 63 IN | SYSTOLIC BLOOD PRESSURE: 135 MMHG

## 2022-03-21 DIAGNOSIS — C54.1 ENDOMETRIAL CANCER (HCC): ICD-10-CM

## 2022-03-21 DIAGNOSIS — E66.01 MORBID OBESITY WITH BMI OF 45.0-49.9, ADULT (HCC): ICD-10-CM

## 2022-03-21 DIAGNOSIS — C54.1 ENDOMETRIUM CANCER (HCC): ICD-10-CM

## 2022-03-21 DIAGNOSIS — N18.31 STAGE 3A CHRONIC KIDNEY DISEASE (HCC): Primary | ICD-10-CM

## 2022-03-21 DIAGNOSIS — I10 ESSENTIAL HYPERTENSION: ICD-10-CM

## 2022-03-21 PROCEDURE — 99204 OFFICE O/P NEW MOD 45 MIN: CPT | Performed by: INTERNAL MEDICINE

## 2022-03-21 NOTE — LETTER
March 21, 2022     Christy Manuel, [address] [place] [id number]    Patient: Pastora Zhao   YOB: 1950   Date of Visit: 3/21/2022       Dear Dr Radha Camara: Thank you for referring Sg Scherer to me for evaluation  Below are my notes for this consultation  If you have questions, please do not hesitate to call me  I look forward to following your patient along with you  Sincerely,        Charlene Thornton MD        CC: MD Charlene Gaviria MD  3/21/2022  2:46 PM  Incomplete  OFFICE CONSULT - Nephrology   Sg Scherer 74 y o  female MRN: [id number]        ASSESSMENT and PLAN:  Sg was seen today for consult  Diagnoses and all orders for this visit:    Stage 3a chronic kidney disease ([facility] [address] )  -     CBC; Future  -     Renal function panel; Future  -     PTH, intact; Future  -     Microalbumin / creatinine urine ratio; Future    Essential hypertension    Endometrium cancer ([facility] )  -     Ambulatory Referral to Nephrology    Endometrial cancer [facility])  -     Ambulatory Referral to Nephrology    Morbid obesity with BMI of 45 0-49 9, adult [facility])        This is a 64-year-old lady with known history of chronic kidney disease stage 3 as per review of Care everywhere previous creatinine around 1 1-1 4 back since 2018, hypertension for over 20 years, obesity, recently diagnosed 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 after being evaluated for postmenopausal bleeding  Patient was referred to our office for Nephrology evaluation as per protocol for preoperative optimization  1  Chronic kidney disease stage 3  After review of previous records and Care everywhere, noted her creatinine around 1 1-1 4 back since 2018  Most recent creatinine 1 3  CKD suspected secondary to long-term history hypertension, obesity as well as age-related nephron loss    Long discussion with patient and with her sister regarding her underlying kidney disease  Recommend to hold water pills (hydrochlorothiazide) on the day of the procedure at that can be resumed at discharge as long as blood pressure and kidney function remains stable  Okay to keep taking amlodipine  Noted patient is not on Ace or Arb  Recommend avoid NSAIDs  Recommend to follow low-salt diet  Avoid intraoperative hypotension  Follow labs and intravenously fluids postoperatively as per protocol  Consider nephrology consultation during hospitalization if needed  Follow-up in 4 months with repeat labs    2  Hypertension, blood pressure well controlled on amlodipine and hydrochlorothiazide  As above recommend to hold hydrochlorothiazide on the day of procedure  Follow low-salt diet  3  Postmenopausal breathing with newly diagnosed 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  Plan for hysterectomy as per gynecology Oncology  4  Morbid obesity, advised to lose weight    Patient Instructions   As we discussed in the office visit you have mild-moderate chronic kidney disease for several years  Recommend to hold hydrochlorothiazide (water pill) on the day of the surgery and do not resume it until instructed by primary team as long as your blood pressure and kidney function remains stable  You can continue taking amlodipine 1 tablet daily  It is very important you follow low-salt diet  Avoid NSAIDs (no ibuprofen, Motrin, Advil, Aleve, naproxen)  Okay to take Tylenol or paracetamol or acetaminophen as needed for pain  Would like to see you back in the office in 4 months with repeat labs  Continue close follow-up with your primary care doctor and gynecologist         HPI:  Eloisa Fuchs is a 74 y  o female who was referred by Dr Coburn for evaluation of Consult        This is a patient with history of chronic kidney disease with creatinine fluctuating around 1 1-1 4 back since 2018 as per Care everywhere, hypertension for over 20 years, obesity, was recently diagnosed with 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, patient was seen by gynecologist oncologist and is scheduled for a hysterectomy, given underlying chronic kidney disease patient was referred to Nephrology as part of protocol for preoperative optimization  Patient today presents to the office with her sister Moe Ruiz  Patient today in general is doing well, denies any significant complaints other than postmenopausal bleeding that started approximately 3-4 weeks ago  She denies any chest pain, no leg swelling, she refers some chronic dyspnea on exertion  Denies any abdominal pain, no nausea, no vomiting, no diarrhea or constipation but she noted some indigestion symptoms on and off  Denies any urinary problems, no burning sensation, no gross hematuria  Denies NSAID use, she only take Tylenol as needed for pain  Denies history of kidney problems, no kidney stones, no family history of kidney disease  Denies tobacco abuse    I personally spent over half of a total 42 minutes face to face with the patient in counseling and discussion and/or coordination of care as described above  ROS: All the systems were reviewed and were negative except as documented on the HPI  Allergies: Patient has no known allergies      Medications:   Current Outpatient Medications:     amLODIPine (NORVASC) 10 mg tablet, Take 10 mg by mouth daily, Disp: , Rfl:     denosumab (Prolia) 60 mg/mL, Inject 1 mL under the skin every 6 (six) months, Disp: , Rfl:     fluticasone (FLONASE) 50 mcg/act nasal spray, 2 sprays into each nostril daily, Disp: , Rfl:     hydrochlorothiazide (HYDRODIURIL) 12 5 mg tablet, Take 12 5 mg by mouth daily, Disp: , Rfl:     omeprazole (PriLOSEC) 40 MG capsule, Take 40 mg by mouth daily, Disp: , Rfl:     Past Medical History:   Diagnosis Date    Arthritis     osteo    Colon polyp     Diverticula of colon     Hypertension     Obesity     Rhinitis Past Surgical History:   Procedure Laterality Date    APPENDECTOMY      BREAST BIOPSY Left 1977    benign    BREAST SURGERY Left     biopsy    CHOLECYSTECTOMY      COLONOSCOPY      HERNIA REPAIR Right     inguinal    JOINT REPLACEMENT Right     Knee    AK COLONOSCOPY FLX DX W/COLLJ SPEC WHEN PFRMD N/A 2/21/2017    Procedure: COLONOSCOPY with polypectomy and hemo clip marjan ;  Surgeon: Alejandro Ohara MD;  Location: [facility]; Service: Gastroenterology     Family History   Problem Relation Age of Onset    No Known Problems Mother     Prostate cancer Father 80    No Known Problems Sister     No Known Problems Maternal Grandmother     No Known Problems Maternal Grandfather     No Known Problems Paternal Grandmother     No Known Problems Paternal Grandfather     No Known Problems Sister     No Known Problems Sister     No Known Problems Maternal Aunt     No Known Problems Paternal Aunt     No Known Problems Paternal Aunt     No Known Problems Paternal Aunt       reports that she has never smoked  She has never used smokeless tobacco  She reports current alcohol use of about 1 0 standard drink of alcohol per week  She reports that she does not use drugs  Physical Exam:   Vitals:    03/21/22 1418   BP: 135/82   BP Location: Left arm   Patient Position: Sitting   Cuff Size: Standard   Pulse: 98   Weight: 122 kg (269 lb)   Height: 5' 2 5" (1 588 m)     Body mass index is 48 42 kg/m²      General:  Morbidly obese, conscious, cooperative, in not acute distress  Eyes: conjunctivae pink, anicteric sclerae  ENT: lips and mucous membranes moist  Neck: supple, no JVD  Chest: clear breath sounds bilateral, no crackles, ronchus or wheezings  CVS: distinct S1 & S2, normal rate, regular rhythm  Abdomen:  Obese, non-tender, non-distended, normoactive bowel sounds  Back: no CVA tenderness  Extremities: no edema of both legs  Skin: no rash  Neuro: awake, alert, oriented      Laboratory Results:  Lab Results Component Value Date    WBC 4 36 03/10/2022    HGB 12 2 03/10/2022    HCT 39 0 03/10/2022    MCV 98 03/10/2022     03/10/2022     Lab Results   Component Value Date    SODIUM 143 03/10/2022    K 3 6 03/10/2022     03/10/2022    CO2 28 03/10/2022    BUN 12 03/10/2022    CREATININE 1 30 03/10/2022    CALCIUM 8 1 (L) 03/10/2022         Portions of the record may have been created with voice recognition software  Occasional wrong word or "sound a like" substitutions may have occurred due to the inherent limitations of voice recognition software  Read the chart carefully and recognize, using context, where substitutions have occurred  If you have any questions, please contact the dictating provider

## 2022-03-21 NOTE — PATIENT INSTRUCTIONS
As we discussed in the office visit you have mild-moderate chronic kidney disease for several years  Recommend to hold hydrochlorothiazide (water pill) on the day of the surgery and do not resume it until instructed by primary team as long as your blood pressure and kidney function remains stable  You can continue taking amlodipine 1 tablet daily  It is very important you follow low-salt diet  Avoid NSAIDs (no ibuprofen, Motrin, Advil, Aleve, naproxen)  Okay to take Tylenol or paracetamol or acetaminophen as needed for pain  Would like to see you back in the office in 4 months with repeat labs    Continue close follow-up with your primary care doctor and gynecologist

## 2022-03-28 ENCOUNTER — ANESTHESIA EVENT (OUTPATIENT)
Dept: PERIOP | Facility: HOSPITAL | Age: 72
End: 2022-03-28
Payer: COMMERCIAL

## 2022-03-28 NOTE — PRE-PROCEDURE INSTRUCTIONS
Pre-Surgery Instructions:   Medication Instructions    amLODIPine (NORVASC) 10 mg tablet Instructed patient per Anesthesia Guidelines  take am of sx    denosumab (Prolia) 60 mg/mL Instructed patient per Anesthesia Guidelines  monthly    fluticasone (FLONASE) 50 mcg/act nasal spray Instructed patient per Anesthesia Guidelines  may use    hydrochlorothiazide (HYDRODIURIL) 12 5 mg tablet Instructed patient per Anesthesia Guidelines  hold am of sx    omeprazole (PriLOSEC) 40 MG capsule Instructed patient per Anesthesia Guidelines  take am of sx    You will receive a phone call from hospital for arrival time  Please call surgeons office if any changes in your condition  Wear easy on/off clothing; consider type of surgery;  Valuables, jewelry, piercing's please keep at home  **COVID-19  education/surgical guidelines  Updated covid    Visitation policy  Please: No contact lenses or eye make up, artificial eyelashes    *ensure as per office    Please secure transportation     Follow pre surgery showering or cleaning instructions as  Reviewed by nurse or surgeons office      Questions answered and concerns addressed

## 2022-03-31 ENCOUNTER — HOSPITAL ENCOUNTER (OUTPATIENT)
Facility: HOSPITAL | Age: 72
Setting detail: OUTPATIENT SURGERY
Discharge: HOME | End: 2022-03-31
Attending: OBSTETRICS & GYNECOLOGY | Admitting: OBSTETRICS & GYNECOLOGY
Payer: COMMERCIAL

## 2022-03-31 ENCOUNTER — ANESTHESIA (OUTPATIENT)
Dept: PERIOP | Facility: HOSPITAL | Age: 72
End: 2022-03-31
Payer: COMMERCIAL

## 2022-03-31 VITALS
SYSTOLIC BLOOD PRESSURE: 123 MMHG | DIASTOLIC BLOOD PRESSURE: 65 MMHG | TEMPERATURE: 97 F | OXYGEN SATURATION: 93 % | HEIGHT: 63 IN | WEIGHT: 269 LBS | BODY MASS INDEX: 47.66 KG/M2 | RESPIRATION RATE: 16 BRPM | HEART RATE: 81 BPM

## 2022-03-31 DIAGNOSIS — Z90.710 S/P HYSTERECTOMY WITH OOPHORECTOMY: Primary | ICD-10-CM

## 2022-03-31 DIAGNOSIS — C54.1 ENDOMETRIUM CANCER (HCC): ICD-10-CM

## 2022-03-31 DIAGNOSIS — Z90.721 S/P HYSTERECTOMY WITH OOPHORECTOMY: Primary | ICD-10-CM

## 2022-03-31 LAB
ABO GROUP BLD: NORMAL
RH BLD: POSITIVE

## 2022-03-31 PROCEDURE — 88342 IMHCHEM/IMCYTCHM 1ST ANTB: CPT | Performed by: PATHOLOGY

## 2022-03-31 PROCEDURE — NC001 PR NO CHARGE: Performed by: PHYSICIAN ASSISTANT

## 2022-03-31 PROCEDURE — 38570 LAPAROSCOPY LYMPH NODE BIOP: CPT | Performed by: OBSTETRICS & GYNECOLOGY

## 2022-03-31 PROCEDURE — 88307 TISSUE EXAM BY PATHOLOGIST: CPT | Performed by: PATHOLOGY

## 2022-03-31 PROCEDURE — 88341 IMHCHEM/IMCYTCHM EA ADD ANTB: CPT | Performed by: PATHOLOGY

## 2022-03-31 PROCEDURE — 58575 LAPS TOT HYST RESJ MAL: CPT | Performed by: OBSTETRICS & GYNECOLOGY

## 2022-03-31 PROCEDURE — 38900 IO MAP OF SENT LYMPH NODE: CPT | Performed by: OBSTETRICS & GYNECOLOGY

## 2022-03-31 PROCEDURE — 86920 COMPATIBILITY TEST SPIN: CPT

## 2022-03-31 PROCEDURE — 88309 TISSUE EXAM BY PATHOLOGIST: CPT | Performed by: PATHOLOGY

## 2022-03-31 PROCEDURE — S2900 ROBOTIC SURGICAL SYSTEM: HCPCS | Performed by: OBSTETRICS & GYNECOLOGY

## 2022-03-31 PROCEDURE — 88305 TISSUE EXAM BY PATHOLOGIST: CPT | Performed by: PATHOLOGY

## 2022-03-31 RX ORDER — DEXAMETHASONE SODIUM PHOSPHATE 10 MG/ML
INJECTION, SOLUTION INTRAMUSCULAR; INTRAVENOUS AS NEEDED
Status: DISCONTINUED | OUTPATIENT
Start: 2022-03-31 | End: 2022-03-31

## 2022-03-31 RX ORDER — HYDROMORPHONE HCL IN WATER/PF 6 MG/30 ML
0.2 PATIENT CONTROLLED ANALGESIA SYRINGE INTRAVENOUS
Status: DISCONTINUED | OUTPATIENT
Start: 2022-03-31 | End: 2022-03-31 | Stop reason: HOSPADM

## 2022-03-31 RX ORDER — MAGNESIUM HYDROXIDE 1200 MG/15ML
LIQUID ORAL AS NEEDED
Status: DISCONTINUED | OUTPATIENT
Start: 2022-03-31 | End: 2022-03-31 | Stop reason: HOSPADM

## 2022-03-31 RX ORDER — SUCCINYLCHOLINE/SOD CL,ISO/PF 100 MG/5ML
SYRINGE (ML) INTRAVENOUS AS NEEDED
Status: DISCONTINUED | OUTPATIENT
Start: 2022-03-31 | End: 2022-03-31

## 2022-03-31 RX ORDER — SODIUM CHLORIDE, SODIUM LACTATE, POTASSIUM CHLORIDE, CALCIUM CHLORIDE 600; 310; 30; 20 MG/100ML; MG/100ML; MG/100ML; MG/100ML
75 INJECTION, SOLUTION INTRAVENOUS CONTINUOUS
Status: DISCONTINUED | OUTPATIENT
Start: 2022-03-31 | End: 2022-03-31 | Stop reason: HOSPADM

## 2022-03-31 RX ORDER — ONDANSETRON 2 MG/ML
4 INJECTION INTRAMUSCULAR; INTRAVENOUS EVERY 6 HOURS PRN
Status: DISCONTINUED | OUTPATIENT
Start: 2022-03-31 | End: 2022-03-31 | Stop reason: HOSPADM

## 2022-03-31 RX ORDER — SODIUM CHLORIDE 9 MG/ML
INJECTION, SOLUTION INTRAVENOUS CONTINUOUS PRN
Status: DISCONTINUED | OUTPATIENT
Start: 2022-03-31 | End: 2022-03-31

## 2022-03-31 RX ORDER — KETAMINE HYDROCHLORIDE 50 MG/ML
INJECTION, SOLUTION, CONCENTRATE INTRAMUSCULAR; INTRAVENOUS AS NEEDED
Status: DISCONTINUED | OUTPATIENT
Start: 2022-03-31 | End: 2022-03-31

## 2022-03-31 RX ORDER — MIDAZOLAM HYDROCHLORIDE 2 MG/2ML
INJECTION, SOLUTION INTRAMUSCULAR; INTRAVENOUS AS NEEDED
Status: DISCONTINUED | OUTPATIENT
Start: 2022-03-31 | End: 2022-03-31

## 2022-03-31 RX ORDER — PROPOFOL 10 MG/ML
INJECTION, EMULSION INTRAVENOUS AS NEEDED
Status: DISCONTINUED | OUTPATIENT
Start: 2022-03-31 | End: 2022-03-31

## 2022-03-31 RX ORDER — OXYCODONE HYDROCHLORIDE 5 MG/1
2.5 TABLET ORAL EVERY 4 HOURS PRN
Status: DISCONTINUED | OUTPATIENT
Start: 2022-03-31 | End: 2022-03-31 | Stop reason: HOSPADM

## 2022-03-31 RX ORDER — FENTANYL CITRATE 50 UG/ML
INJECTION, SOLUTION INTRAMUSCULAR; INTRAVENOUS AS NEEDED
Status: DISCONTINUED | OUTPATIENT
Start: 2022-03-31 | End: 2022-03-31

## 2022-03-31 RX ORDER — SODIUM CHLORIDE, SODIUM LACTATE, POTASSIUM CHLORIDE, CALCIUM CHLORIDE 600; 310; 30; 20 MG/100ML; MG/100ML; MG/100ML; MG/100ML
125 INJECTION, SOLUTION INTRAVENOUS CONTINUOUS
Status: DISCONTINUED | OUTPATIENT
Start: 2022-03-31 | End: 2022-03-31 | Stop reason: HOSPADM

## 2022-03-31 RX ORDER — OXYCODONE HYDROCHLORIDE 5 MG/1
5 TABLET ORAL EVERY 4 HOURS PRN
Status: DISCONTINUED | OUTPATIENT
Start: 2022-03-31 | End: 2022-03-31 | Stop reason: HOSPADM

## 2022-03-31 RX ORDER — FENTANYL CITRATE/PF 50 MCG/ML
25 SYRINGE (ML) INJECTION
Status: DISCONTINUED | OUTPATIENT
Start: 2022-03-31 | End: 2022-03-31 | Stop reason: HOSPADM

## 2022-03-31 RX ORDER — ACETAMINOPHEN 325 MG/1
975 TABLET ORAL ONCE
Status: COMPLETED | OUTPATIENT
Start: 2022-03-31 | End: 2022-03-31

## 2022-03-31 RX ORDER — SENNOSIDES 8.6 MG
650 CAPSULE ORAL EVERY 6 HOURS PRN
Qty: 30 TABLET | Refills: 0
Start: 2022-03-31

## 2022-03-31 RX ORDER — INDOCYANINE GREEN AND WATER 25 MG
KIT INJECTION AS NEEDED
Status: DISCONTINUED | OUTPATIENT
Start: 2022-03-31 | End: 2022-03-31 | Stop reason: HOSPADM

## 2022-03-31 RX ORDER — LIDOCAINE HYDROCHLORIDE 10 MG/ML
INJECTION, SOLUTION EPIDURAL; INFILTRATION; INTRACAUDAL; PERINEURAL AS NEEDED
Status: DISCONTINUED | OUTPATIENT
Start: 2022-03-31 | End: 2022-03-31

## 2022-03-31 RX ORDER — ONDANSETRON 2 MG/ML
4 INJECTION INTRAMUSCULAR; INTRAVENOUS ONCE AS NEEDED
Status: DISCONTINUED | OUTPATIENT
Start: 2022-03-31 | End: 2022-03-31 | Stop reason: HOSPADM

## 2022-03-31 RX ORDER — HEPARIN SODIUM 5000 [USP'U]/ML
5000 INJECTION, SOLUTION INTRAVENOUS; SUBCUTANEOUS
Status: COMPLETED | OUTPATIENT
Start: 2022-03-31 | End: 2022-03-31

## 2022-03-31 RX ORDER — HEPARIN SODIUM 5000 [USP'U]/ML
INJECTION, SOLUTION INTRAVENOUS; SUBCUTANEOUS AS NEEDED
Status: DISCONTINUED | OUTPATIENT
Start: 2022-03-31 | End: 2022-03-31

## 2022-03-31 RX ORDER — SENNOSIDES 8.6 MG
650 CAPSULE ORAL EVERY 8 HOURS PRN
Qty: 30 TABLET | Refills: 0
Start: 2022-03-31 | End: 2022-03-31 | Stop reason: SDUPTHER

## 2022-03-31 RX ORDER — LIDOCAINE HYDROCHLORIDE 10 MG/ML
0.5 INJECTION, SOLUTION EPIDURAL; INFILTRATION; INTRACAUDAL; PERINEURAL ONCE AS NEEDED
Status: COMPLETED | OUTPATIENT
Start: 2022-03-31 | End: 2022-03-31

## 2022-03-31 RX ORDER — BUPIVACAINE HYDROCHLORIDE 2.5 MG/ML
INJECTION, SOLUTION EPIDURAL; INFILTRATION; INTRACAUDAL AS NEEDED
Status: DISCONTINUED | OUTPATIENT
Start: 2022-03-31 | End: 2022-03-31 | Stop reason: HOSPADM

## 2022-03-31 RX ORDER — SODIUM CHLORIDE, SODIUM LACTATE, POTASSIUM CHLORIDE, CALCIUM CHLORIDE 600; 310; 30; 20 MG/100ML; MG/100ML; MG/100ML; MG/100ML
INJECTION, SOLUTION INTRAVENOUS CONTINUOUS PRN
Status: DISCONTINUED | OUTPATIENT
Start: 2022-03-31 | End: 2022-03-31

## 2022-03-31 RX ORDER — ACETAMINOPHEN 325 MG/1
650 TABLET ORAL EVERY 6 HOURS PRN
Status: DISCONTINUED | OUTPATIENT
Start: 2022-03-31 | End: 2022-03-31 | Stop reason: HOSPADM

## 2022-03-31 RX ORDER — ONDANSETRON 2 MG/ML
INJECTION INTRAMUSCULAR; INTRAVENOUS AS NEEDED
Status: DISCONTINUED | OUTPATIENT
Start: 2022-03-31 | End: 2022-03-31

## 2022-03-31 RX ORDER — ROCURONIUM BROMIDE 10 MG/ML
INJECTION, SOLUTION INTRAVENOUS AS NEEDED
Status: DISCONTINUED | OUTPATIENT
Start: 2022-03-31 | End: 2022-03-31

## 2022-03-31 RX ADMIN — HEPARIN SODIUM 5000 UNITS: 5000 INJECTION INTRAVENOUS; SUBCUTANEOUS at 09:21

## 2022-03-31 RX ADMIN — ROCURONIUM BROMIDE 10 MG: 50 INJECTION INTRAVENOUS at 12:48

## 2022-03-31 RX ADMIN — Medication 2000 MG: at 11:00

## 2022-03-31 RX ADMIN — SODIUM CHLORIDE: 0.9 INJECTION, SOLUTION INTRAVENOUS at 11:12

## 2022-03-31 RX ADMIN — SODIUM CHLORIDE, SODIUM LACTATE, POTASSIUM CHLORIDE, AND CALCIUM CHLORIDE: .6; .31; .03; .02 INJECTION, SOLUTION INTRAVENOUS at 10:56

## 2022-03-31 RX ADMIN — Medication 100 MG: at 11:07

## 2022-03-31 RX ADMIN — LIDOCAINE HYDROCHLORIDE 50 MG: 10 INJECTION, SOLUTION EPIDURAL; INFILTRATION; INTRACAUDAL; PERINEURAL at 11:07

## 2022-03-31 RX ADMIN — FENTANYL CITRATE 50 MCG: 50 INJECTION INTRAMUSCULAR; INTRAVENOUS at 13:16

## 2022-03-31 RX ADMIN — HEPARIN SODIUM 2500 UNITS: 5000 INJECTION INTRAVENOUS; SUBCUTANEOUS at 11:38

## 2022-03-31 RX ADMIN — LIDOCAINE HYDROCHLORIDE 0.2 ML: 10 INJECTION, SOLUTION EPIDURAL; INFILTRATION; INTRACAUDAL; PERINEURAL at 09:58

## 2022-03-31 RX ADMIN — KETAMINE HYDROCHLORIDE 10 MG: 50 INJECTION, SOLUTION INTRAMUSCULAR; INTRAVENOUS at 12:09

## 2022-03-31 RX ADMIN — SUGAMMADEX 200 MG: 100 INJECTION, SOLUTION INTRAVENOUS at 13:05

## 2022-03-31 RX ADMIN — FENTANYL CITRATE 50 MCG: 50 INJECTION INTRAMUSCULAR; INTRAVENOUS at 13:10

## 2022-03-31 RX ADMIN — ROCURONIUM BROMIDE 50 MG: 50 INJECTION INTRAVENOUS at 11:15

## 2022-03-31 RX ADMIN — DEXAMETHASONE SODIUM PHOSPHATE 10 MG: 10 INJECTION, SOLUTION INTRAMUSCULAR; INTRAVENOUS at 11:07

## 2022-03-31 RX ADMIN — ONDANSETRON 4 MG: 2 INJECTION INTRAMUSCULAR; INTRAVENOUS at 13:02

## 2022-03-31 RX ADMIN — PHENYLEPHRINE HYDROCHLORIDE 40 MCG/MIN: 10 INJECTION INTRAVENOUS at 11:07

## 2022-03-31 RX ADMIN — KETAMINE HYDROCHLORIDE 20 MG: 50 INJECTION, SOLUTION INTRAMUSCULAR; INTRAVENOUS at 11:30

## 2022-03-31 RX ADMIN — PROPOFOL 200 MG: 10 INJECTION, EMULSION INTRAVENOUS at 11:07

## 2022-03-31 RX ADMIN — ACETAMINOPHEN 975 MG: 325 TABLET ORAL at 09:21

## 2022-03-31 RX ADMIN — FENTANYL CITRATE 50 MCG: 50 INJECTION INTRAMUSCULAR; INTRAVENOUS at 11:44

## 2022-03-31 RX ADMIN — MIDAZOLAM 1 MG: 1 INJECTION INTRAMUSCULAR; INTRAVENOUS at 10:56

## 2022-03-31 RX ADMIN — SODIUM CHLORIDE, SODIUM LACTATE, POTASSIUM CHLORIDE, AND CALCIUM CHLORIDE 125 ML/HR: .6; .31; .03; .02 INJECTION, SOLUTION INTRAVENOUS at 09:58

## 2022-03-31 RX ADMIN — FENTANYL CITRATE 50 MCG: 50 INJECTION INTRAMUSCULAR; INTRAVENOUS at 11:07

## 2022-03-31 NOTE — INTERVAL H&P NOTE
H&P reviewed  After examining the patient I find no changes in the patients condition since the H&P was written  Vitals:    03/31/22 0834   BP: 129/83   Pulse: 75   Resp: 16   Temp: (!) 95 8 °F (35 4 °C)   SpO2: 99%      Preop test results noted  All questions answered  Pt agrees to proceed to OR as planned      Maru Tinoco MD, [address]  3/31/2022  10:26 AM

## 2022-03-31 NOTE — ANESTHESIA PREPROCEDURE EVALUATION
Medical History    History Comments   Arthritis osteo   Diverticula of colon    Hypertension    Rhinitis    Colon polyp    Obesity    Chronic kidney disease      Procedure:  ROBOTIC HYSTERECTOMY, BILATERAL SALPINGO-OOPHORECTOMY, STAGING POSSIBLE LYMPHADENECTOMY AND ALL OTHER INDICATED PROCEDURES (N/A Abdomen)    Relevant Problems   ANESTHESIA (within normal limits)      CARDIO   (+) Essential hypertension      /RENAL   (+) Stage 3a chronic kidney disease (HCC)      GYN   (+) Endometrial cancer (HCC)        Physical Exam    Airway    Mallampati score: III  TM Distance: >3 FB  Neck ROM: limited     Dental   No notable dental hx     Cardiovascular  Rate: normal,     Pulmonary  Pulmonary exam normal     Other Findings        Anesthesia Plan  ASA Score- 3     Anesthesia Type- general with ASA Monitors  Additional Monitors:   Airway Plan: ETT  Plan Factors-Exercise tolerance (METS): >4 METS  Chart reviewed  Patient summary reviewed  Patient is not a current smoker  Induction- intravenous  Postoperative Plan- Plan for postoperative opioid use  Informed Consent- Anesthetic plan and risks discussed with patient  I personally reviewed this patient with the CRNA  Discussed and agreed on the Anesthesia Plan with the CRNA  Kyle Sullivan

## 2022-03-31 NOTE — DISCHARGE INSTRUCTIONS
Robotic hysterectomy, bilateral salpingo-oophorectomy, sentinel lymph node resection and cystoscopy   Discharge Instructions    WHAT YOU NEED TO KNOW:   Today using the robotic platform were removed your uterus, cervix, bilateral tubes and ovaries, pelvic lymph nodes and also took samples from pelvic lining  At the completion of the procedure we put a camera in the bladder and there was no evidence of abnormalities or injuries  DISCHARGE INSTRUCTIONS:   You must stay well hydrated to prevent kidney injury  Drink plenty of fluids  Contact your doctor at the number above if:   · You have a fever over 101o  · You have nausea or are vomiting that does not improve after a light meal    · Your pain is getting worse, even after you take medicine  · You feel pain or burning when you urinate, or you have trouble urinating  · You have pus or a foul-smelling odor coming from your vagina and/or wound  · Your wound is red, swollen, or draining pus  · You see new or an increased amount of bright red blood coming from your vagina or your incisions  · You have questions or concerns about your condition or care  Seek care immediately:   · Your arm or leg feels warm, tender, and painful  It may look swollen and red  · You have increasing abdominal or pelvic pain  · You have heavy vaginal bleeding that fills 1 or more sanitary pads in 1 hour  Call 911 for any of the following:   · You feel lightheaded, short of breath, and have chest pain  · You cough up blood  Medicines: You may need any of the following:  · Prescription medicine will not be given for this procedure  If pain is severe and not controlled with extra-strength Tylenol call Dr Delia Massey on his cell phone at [phone]  · Resume your previous medications as directed  · Extra-strength Tylenol: This medications over-the-counter  Take 2 tablets every 6 hours as needed for mild-to-moderate pain    · You should never take ibuprofen, Motrin, Advil, naproxen or Aleve  Those medications may harm your kidneys  · Metamucil or MiraLax: This product is over-the-counter  Distal 1 large tbsp in a large glass of water  Use once or twice a day for 1-2 weeks prevent and or treat constipation  · Take your medicines as directed  Contact your healthcare provider if you think your medicine is not helping or if you have side effects  Tell him or her if you are allergic to any medicine  Keep a list of the medicines, vitamins, and herbs you take  Include the amounts, and when and why you take them  Bring the list or the pill bottles to follow-up visits  Carry your medicine list with you in case of an emergency  Activity:   · Rest as needed  Get up and move around as directed to help prevent blood clots  Start with short walks and slowly increase the distance every day  Limit the number of times you climb stairs to 2 times each day for the first week  Plan most of your daily activities on one level of your home  · Do not lift objects heavier than 10 pounds until seen in the office for your follow-up appointment  · Do not strain during bowel movements  High-fiber foods and extra liquids can help you prevent constipation  Examples of high-fiber foods are fruit and bran  Prune juice and water are good liquids to drink  · Do not have sex, use tampons, or douche and do not do tub baths/swimming until cleared by your physician at your postoperative appointment  You may shower as soon as the day after surgery  Do not go into pools or hot tubs until cleared by your doctor  · Ask when it is safe for you to drive  It is generally safe to drive as soon as your legs are strong, you are off pain medicines and you feel like you will have the appropriate reflexes to step on the breaks in case of an emergency  · Ask when you may return to work and to other regular activities  Wound care: Care for your abdominal incisions as directed   Carefully wash around the wound with soap and water  If you have Hibiclens or medicated soap that you were instructed to use before surgery, you may use that to wash with for up to 2 days after surgery  If not, any mild non-scented, non-abrasive soap is safe  It is okay to let the soap and water run over your incision  Do not scrub your incision  Dry the area and put on new, clean bandages as directed  Change your bandages when they get wet or dirty  If you have strips of medical tape, let them fall off on their own  It may take 7 to 14 days for them to fall off  Check your incision every day for redness, swelling, or pus  Deep breathing: Take deep breaths and cough 10 times each hour  This will decrease your risk for a lung infection  Take a deep breath and hold it for as long as you can  Let the air out and then cough strongly  Deep breaths help open your airway  You may be given an incentive spirometer to help you take deep breaths  Put the plastic piece in your mouth and take a slow, deep breath, then let the air out and cough  Repeat these steps 10 times every hour  Get support: This surgery may be life-changing for you and your family  You will no longer be able to get pregnant  Sudden changes in the levels of your hormones may occur and cause mood swings and depression  You may feel angry, sad, or frightened, or cry frequently and unexpectedly  These feelings are normal  Talk to your healthcare provider about where you can get support  You can also ask if hormone replacement medicine is right for you  Follow up with your healthcare provider or gynecologist as directed: You may need to return to have stitches removed, and for other tests  Write down your questions so you remember to ask them during your visits  © 2017 [address]  Information is for End User's use only and may not be sold, redistributed or otherwise used for commercial purposes   All illustrations and images included in CareNotes® are the copyrighted property of [employer]  or [employer]  The above information is an  only  It is not intended as medical advice for individual conditions or treatments  Talk to your doctor, nurse or pharmacist before following any medical regimen to see if it is safe and effective for you

## 2022-03-31 NOTE — ANESTHESIA POSTPROCEDURE EVALUATION
Post-Op Assessment Note    CV Status:  Stable    Pain management: adequate     Mental Status:  Alert and awake   PONV Controlled:  None   Airway Patency:  Patent      Post Op Vitals Reviewed: Yes      Staff: Anesthesiologist, CRNA         No complications documented      BP   107/67   Temp (!) (P) 97 2 °F (36 2 °C) (03/31/22 1329)    Pulse (P) 79 (03/31/22 1329)   Resp (P) 16 (03/31/22 1329)    SpO2   97

## 2022-03-31 NOTE — OP NOTE
OPERATIVE REPORT  PATIENT NAME: Sg Cordova    :  1950  MRN: [id number]  Pt Location: [facility] OR ROOM 14    SURGERY DATE: 3/31/2022    Surgeon(s) and Role:     * Porsha Cortez MD - Primary     * Rubio Lyle PA-C - Assisting     * Rhona Thomas MD - Lenda Cabot, MD - Assisting    Preop Diagnosis:  Endometrium cancer ([facility] [address] ) [C54 1]    Post-Op Diagnosis Codes:     * Endometrium cancer ([address] ) [C54 1]    Procedure(s) (LRB):  ROBOTIC HYSTERECTOMY, BILATERAL SALPINGO-OOPHORECTOMY, STAGING PERITONEAL AND OMENTAL BIOPSIES, BILATERAL PELVIC SENTINEL LYMPH NODE BIOPSIES (N/A)  CYSTOSCOPY (N/A)    Specimen(s):  ID Type Source Tests Collected by Time Destination   1 :  Tissue Uterus w/Bilateral Ovaries and Fallopian Tubes TISSUE EXAM Porsha Cortez MD 3/31/2022 1222    2 : left external illiac sentinel LN Tissue Lymph Node, [place] TISSUE EXAM Porsha Cortez MD 3/31/2022 1233    3 : right internal illiac sentinal lymph node Tissue Lymph Node TISSUE EXAM Porsha Cortez MD 3/31/2022 1238    4 : pelvic peritineal biopsies Tissue Pelvic TISSUE EXAM Porsha Cortez MD 3/31/2022 1250    5 : omental biopsy Tissue Omentum TISSUE EXAM Porsha Cortez MD 3/31/2022 1255        Estimated Blood Loss:   50 mL    Drains:  [REMOVED] NG/OG/Enteral Tube 18 Fr Center mouth (Removed)   Number of days: 0       [REMOVED] Urethral Catheter Non-latex 16 Fr  (Removed)   Number of days: 0       Anesthesia Type:   General    Operative Indications:  Patient with biopsy-proven clear cell carcinoma of the endometrium  After counseling, she elected to undergo definitive surgical treatment  Operative Findings:  1  Approximately 12-14 week size uterus  Extremely friable inherent to tumor all involvement  Point fundal perforation caused by manipulator which was indispensable to complete this operation in this morbidly obese patient given limited visualization    Through this pinpoint perforation some tumor content extruded into the pelvis  All gross tumor was completely removed  2  Thin adhesions from rectosigmoid to left pelvic sidewall, urine fundus to rectosigmoid and posterior cul-de-sac peritoneum  Adhesiolysis completed without complications  3  Grossly normal bilateral fallopian tubes and ovaries  4  After cervical ICG injection left external iliac and right internal iliac sentinel lymph nodes were identified and excised  Earnestine evaluation of the periaortic regions was not feasible due to patient's limited ability to tolerate Trendelenburg, body habitus and limited visualization  5  Cystoscopy demonstrated normal bladder mucosa and bilateral potent ureteral urine jets  Complications:   None    Procedure and Technique:  The patient was taken to the operating room where general endotracheal anesthesia was induced without complications  The patient received antibiotic prophylaxis per hospital protocol  Sequential compression devices were applied to the lower extremities and activated prior to induction of anesthesia  A single dose of preoperative heparin was administered  The patient was placed in the dorsolithotomy position in [address] [place] [address] and her lower abdomen, perineum and vagina were prepped and draped in the usual sterile fashion  Under direct visualization the uterus was sounded to approximately 10 cm  The endocervix was dilated  A ESTRELLA uterine manipulator was secured in place with a stitch of 0 Vicryl  Marie catheter was placed and the intravaginal occluder balloon was inflated  Attention was turned to the abdomen  All port sites were infiltrated with bupivacaine at the beginning of completion of the procedure  An 8 mm skin incision was made approximately 5 cm above the umbilicus near the midline  Then, using a 5 mm Endopath Excel trocar and the 5 mm laparoscope, the peritoneal cavity was entered under directvisualization   Good intraperitoneal location was confirmed and pneumoperitoneum was created to maximal pressure 15 mm of mercury  A total of four 8 mm robotic trocars were placed (2 on the left, 1 in the midline replacing the 5 mm entry port and 1 on the right) and an 11 mm trocar was inserted in the right flank for assistant's use  The patient was placed in steep Trendelenburg and the Virtual Paperinci system was docked  The above-mentioned findings were noted  Pelvic washings were not collected due to gross tumor contamination of pelvic peritoneum  The round ligaments were cauterized and divided bilaterally and the bladder was mobilized anteriorly without difficulty  The ovarian vessels were skeletonized,cauterized and divided bilaterally  The fallopian tubes and ovaries were mobilized without difficulties  The uterine vessels were skeletonized cauterized and divided bilaterally  The cardinal ligaments were serially cauterized and divided and a circumferential colpotomy was made  The specimen was removed through the vagina  Small fragments of tumor which were extruded through fundal perforation were collected in Endo-Catch back and evacuated through the vagina  Then pararectal and paravesical spaces were developed and sentinel lymph nodes were identified as described  Those were excised using sharp dissection and sent for permanent  The vaginal cuff was closed using a running stitch of 2-0 PDO Stratafix  Airtight closure an excellent hemostasis was obtained  Copious irrigation was used and excellent hemostasis was confirmed at low intraperitoneal pressures  At this point the robot was undocked  Pneumoperitoneum was completely released with the assistance of Valsalva maneuvers  All trocars were removed and the skin was closed using a subcuticular stitch of 4-0 Monocryl and Exofin was applied to all incisions  The Marie was removed  Using the [place]hat, the bladder was retrograde filled with approximately 150 mL of NS   We examined the bladder then using the 5 mm laparoscope  The above mentioned findings were noted  The Marie catheter was then used to drain the bladder and the catheter was then removed  The patient tolerated the procedure well  Sponge, lap, needle and instrument counts were reported as correct x2  At the time of this dictation the patient remained stable in the operating room awaiting extubation         I was present for the entire procedure    Patient Disposition:  PACU       SIGNATURE: Gee Griffin MD , Gerry Gowers   DATE: March 31, 2022  TIME: 1:14 PM

## 2022-04-01 LAB
ABO GROUP BLD BPU: NORMAL
ABO GROUP BLD BPU: NORMAL
BPU ID: NORMAL
BPU ID: NORMAL
CROSSMATCH: NORMAL
CROSSMATCH: NORMAL
UNIT DISPENSE STATUS: NORMAL
UNIT DISPENSE STATUS: NORMAL
UNIT PRODUCT CODE: NORMAL
UNIT PRODUCT CODE: NORMAL
UNIT PRODUCT VOLUME: 350 ML
UNIT PRODUCT VOLUME: 350 ML
UNIT RH: NORMAL
UNIT RH: NORMAL

## 2022-04-14 ENCOUNTER — HOSPITAL ENCOUNTER (EMERGENCY)
Facility: HOSPITAL | Age: 72
Discharge: HOME | End: 2022-04-14
Admitting: OBSTETRICS & GYNECOLOGY
Payer: COMMERCIAL

## 2022-04-14 ENCOUNTER — TELEPHONE (OUTPATIENT)
Dept: SURGICAL ONCOLOGY | Facility: CLINIC | Age: 72
End: 2022-04-14

## 2022-04-14 VITALS
DIASTOLIC BLOOD PRESSURE: 70 MMHG | HEART RATE: 72 BPM | RESPIRATION RATE: 18 BRPM | OXYGEN SATURATION: 100 % | TEMPERATURE: 98 F | BODY MASS INDEX: 46.82 KG/M2 | SYSTOLIC BLOOD PRESSURE: 157 MMHG | WEIGHT: 260.14 LBS

## 2022-04-14 DIAGNOSIS — T81.31XA POSTOPERATIVE WOUND DEHISCENCE, INITIAL ENCOUNTER: Primary | ICD-10-CM

## 2022-04-14 DIAGNOSIS — C54.1 ENDOMETRIAL CANCER (HCC): Primary | ICD-10-CM

## 2022-04-14 PROCEDURE — 99284 EMERGENCY DEPT VISIT MOD MDM: CPT

## 2022-04-14 PROCEDURE — 99024 POSTOP FOLLOW-UP VISIT: CPT | Performed by: OBSTETRICS & GYNECOLOGY

## 2022-04-14 RX ORDER — SULFAMETHOXAZOLE AND TRIMETHOPRIM 800; 160 MG/1; MG/1
1 TABLET ORAL EVERY 12 HOURS SCHEDULED
Qty: 10 TABLET | Refills: 0 | Status: SHIPPED | OUTPATIENT
Start: 2022-04-14 | End: 2022-04-19

## 2022-04-14 NOTE — ED NOTES
Dr Novoa Senior contacted via tiger text at this time to let him know what room the pt is in        Brandie Gomez, KALA  04/14/22 [id number]

## 2022-04-14 NOTE — TELEPHONE ENCOUNTER
Per your direction, I spoke with Brandan Holder (sister ) as well as Rever,  They were instructed to present to [facility] ED by 12:45 today and inform the ED they are there for a private exam by Dr Sunil Colmenares  I explained that the ED staff is to message Dr Reji Fuentes upon arrival and he will stop over from the OR to examine her wound

## 2022-04-14 NOTE — ED NOTES
Dr Luciano De La Torre to be contacted once placed into a room  Private exam for GYN        Mary Torres, KALA  04/14/22 1300

## 2022-04-14 NOTE — TELEPHONE ENCOUNTER
Patients sister is calling in stating she is forming a sore with pus where the surgical incision is  She would like a sooner appt if possible or a call to let them know how to resolve

## 2022-04-14 NOTE — PROGRESS NOTES
EMERGENCY DEPARTMENT ASSESSMENT NOTE    Patient called the office concerned about the appearance of her midline robotic incision  She is in picture demonstrated superficial separation with some debris  She was asked to come to the emergency department as we had no personnel in the [facility] office today  I evaluated the patient as a private visit  Patient was mostly asymptomatic except for the appearance of this wound  Denies fever  Denies nausea vomiting  Denies pain  Denies drainage through wound itself  /70 (BP Location: Right arm)   Pulse 72   Temp 98 °F (36 7 °C) (Oral)   Resp 18   Wt 118 kg (260 lb 2 3 oz)   LMP  (LMP Unknown)   SpO2 100%   BMI 46 82 kg/m²   Abdomen: All incisions well-healed with the exception of midline robotic port incision which was completely  with debris and somewhat necrotic scab which was easily debrided after cleansing the wound with Betadine  The wound was debrided to cleaning edges and packed with 2 x 2 gauze  Sister was instructed on 2 x 2 gauze changes  Given manipulation and patient's risk factor I plan to empirically provide 5 day oral antibiotic prescription even though there was no evidence of infection  Script for Bactrim double strength 1 tablet p o  Q 12 hours for 5 days sent to patient's pharmacy ([facility] on [address] )  Instructions discussed  All questions answered  Patient discharged home in good condition      Sonu Weathers MD, [address] 132  4/14/2022  2:21 PM

## 2022-04-18 ENCOUNTER — DOCUMENTATION (OUTPATIENT)
Dept: GYNECOLOGIC ONCOLOGY | Facility: CLINIC | Age: 72
End: 2022-04-18

## 2022-04-18 NOTE — PROGRESS NOTES
Multidisciplinary Gynecologic Oncology Tumor Case Review       Physician Recommended Plan     Sg Dalton is a 74 y o  female     Diagnosis: 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     Patient was discussed at the Multidisciplinary Gynecologic Oncology Case review on 04/18/2022  The group recommended to consider treatment with chemotherapy, EBRT and vaginal brachytherapy  Follow-up appointment with Dr Adrian Yeager on 4/19/22  NCCN guidelines were available for review  The final treatment plan will be left to the discretion of the patient and the treating physician  DISCLAIMERS:    TO THE TREATING PHYSICIAN:  This conference is a meeting of clinicians from various specialty areas who evaluate and discuss patients for whom a multidisciplinary treatment approach is being considered  Please note that the above opinion was a consensus of the conference attendees and is intended only to assist in quality care of the discussed patient  The responsibility for follow up on the input given during the conference, along with any final decisions regarding plan of care, is that of the patient and the patient's provider  Accordingly, appointments have only been recommended based on this information and have NOT been scheduled unless otherwise noted  TO THE PATIENT:  This summary is a brief record of major aspects of your cancer treatment  You may choose to share a copy with any of your doctors or nurses  However, this is not a detailed or comprehensive record of your care

## 2022-04-19 ENCOUNTER — OFFICE VISIT (OUTPATIENT)
Dept: GYNECOLOGIC ONCOLOGY | Facility: CLINIC | Age: 72
End: 2022-04-19
Payer: COMMERCIAL

## 2022-04-19 VITALS
WEIGHT: 268 LBS | HEIGHT: 62 IN | BODY MASS INDEX: 49.32 KG/M2 | DIASTOLIC BLOOD PRESSURE: 86 MMHG | SYSTOLIC BLOOD PRESSURE: 124 MMHG | OXYGEN SATURATION: 98 % | TEMPERATURE: 97.3 F | HEART RATE: 76 BPM | RESPIRATION RATE: 17 BRPM

## 2022-04-19 DIAGNOSIS — T81.30XA WOUND DEHISCENCE: ICD-10-CM

## 2022-04-19 DIAGNOSIS — C54.1 ENDOMETRIAL CANCER (HCC): Primary | ICD-10-CM

## 2022-04-19 PROCEDURE — 99215 OFFICE O/P EST HI 40 MIN: CPT | Performed by: OBSTETRICS & GYNECOLOGY

## 2022-04-19 NOTE — ASSESSMENT & PLAN NOTE
Superficial wound dehiscence of midline robotic incision site  This has been debrided  No measures approximately 3 x 1 5 x 0 5 cm  Clean beefy red granulation tissue noted  Wound cleansed and repacked  Plan to reassess next week prior to initiation of chemotherapy

## 2022-04-19 NOTE — PATIENT INSTRUCTIONS
Keep appointment with interventional radiology  Keep follow-up appointment for wound check next week  Chemotherapy per calendar

## 2022-04-19 NOTE — PROGRESS NOTES
Assessment/Plan:    Problem List Items Addressed This Visit        Genitourinary    Endometrial cancer ([facility] [address] ) - Primary     As recommended by tumor board I discussed with patient consideration of adjuvant therapy with systemic chemotherapy followed by external beam radiotherapy/vaginal brachytherapy  I counseled her and recommended to proceed with carboplatin AUC 6 and paclitaxel 135 mg/m2 both to be administer on day 1 of a 21 day cycle for a total of 6 cycles  This will be followed by restaging CT scan and referral to Radiation Oncology  Patient will be referred to interventional Radiology for Port-A-Cath placement given poor peripheral access  I discussed with patient rationale, logistics, common side effects and toxicities associated with chemotherapy regimen  She understands toxicities include but are not limited to alopecia, fatigue, decreased appetite, nausea and vomiting, peripheral neuropathy, bone marrow suppression ( anemia, neutropenia and or thrombocytopenia) with subsequent risk of life-threatening infection or hemorrhage, kidney and or liver damage, etc   Patient verbalizes understanding, agrees and wants to proceed  Other    Wound dehiscence     Superficial wound dehiscence of midline robotic incision site  This has been debrided  No measures approximately 3 x 1 5 x 0 5 cm  Clean beefy red granulation tissue noted  Wound cleansed and repacked  Plan to reassess next week prior to initiation of chemotherapy  CHIEF COMPLAINT:   Postoperative follow-up, pre chemotherapy visit  Problem:  Cancer Staging  Endometrial cancer [facility])  Staging form: Corpus Uteri - Carcinoma, AJCC 8th Edition  - Pathologic stage from 3/31/2022:  FIGO Stage IIIC1 - Signed by Александр Kiser MD on 4/19/2022        Previous therapy:  Oncology History   Endometrial cancer ([facility] [address] )   3/15/2022 Initial Diagnosis    Endometrial cancer ([facility] [address] )     3/31/2022 -  Cancer Staged    Staging form: Corpus Uteri - Carcinoma, AJCC 8th Edition  - Pathologic stage from 3/31/2022: FIGO Stage IIIC1 - Signed by Gee Griffin MD on 4/19/2022  Histopathologic type: Clear cell adenocarcinoma, NOS  Stage prefix: Initial diagnosis  Histologic grade (G): G3  Histologic grading system: 3 grade system  Residual tumor (R): R0 - None  Pelvic fátima status: Positive  Number of pelvic nodes positive from dissection: 1  Number of pelvic nodes examined during dissection: 2  Lymph node metastasis: Present  Omentectomy performed: Yes  Morcellation performed: No       3/31/2022 Surgery    Robotic hysterectomy, bilateral salpingo oophorectomy, bilateral pelvic sentinel lymph node biopsies, pelvic peritoneal/omental biopsies  Final pathology demonstrated clear cell carcinoma, 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%)  Negative cervix  Negative parametria  1/2 sentinel pelvic lymph nodes positive for malignancy  Negative staging biopsies  Stage IIIC1  No evidence of DNA mismatch repair protein loss  Patient ID: Lamont Cabrera is a 74 y o  female  HPI  Patient with newly diagnosed stage IIIC1 clear cell carcinoma of the endometrium  She underwent comprehensive surgical staging on March 31, 2022  She recovered uneventfully with the exception of superficial separation and devitalized skin at midline robotic incision site  This was debrided in the emergency department at bedside by me approximately 3-4 days ago  Empiric antibiotics given despite lack of definitive infection  She has been doing packings at home and wound is healing well  Denies vaginal bleeding, drainage or discharge  No other symptoms  The following portions of the patient's history were reviewed and updated as appropriate: allergies, current medications, past family history, past medical history, past social history, past surgical history and problem list     Review of Systems  As per [facility]  Twelve point review of systems otherwise noncontributory    Current Outpatient Medications   Medication Sig Dispense Refill    acetaminophen (TYLENOL) 650 mg CR tablet Take 1 tablet (650 mg total) by mouth every 6 (six) hours as needed for mild pain 30 tablet 0    amLODIPine (NORVASC) 10 mg tablet Take 10 mg by mouth daily      denosumab (Prolia) 60 mg/mL Inject 1 mL under the skin every 6 (six) months      hydrochlorothiazide (HYDRODIURIL) 12 5 mg tablet Take 12 5 mg by mouth daily      omeprazole (PriLOSEC) 40 MG capsule Take 20 mg by mouth daily        sulfamethoxazole-trimethoprim (BACTRIM DS) 800-160 mg per tablet Take 1 tablet by mouth every 12 (twelve) hours for 5 days 10 tablet 0    fluticasone (FLONASE) 50 mcg/act nasal spray 2 sprays into each nostril daily as needed         No current facility-administered medications for this visit  Objective:    Blood pressure 124/86, pulse 76, temperature (!) 97 3 °F (36 3 °C), temperature source Temporal, resp  rate 17, height 5' 2" (1 575 m), weight 122 kg (268 lb), SpO2 98 %, not currently breastfeeding  Body mass index is 49 02 kg/m²  Body surface area is 2 17 meters squared  Physical Exam  Vitals reviewed  Exam conducted with a chaperone present  Constitutional:       Appearance: Normal appearance  She is obese  She is not toxic-appearing  Eyes:      General: No scleral icterus  Right eye: No discharge  Left eye: No discharge  Conjunctiva/sclera: Conjunctivae normal    Cardiovascular:      Rate and Rhythm: Normal rate and regular rhythm  Heart sounds: Normal heart sounds  No murmur heard  Pulmonary:      Effort: No respiratory distress  Breath sounds: Normal breath sounds  No stridor  No wheezing  Abdominal:      General: There is no distension  Palpations: Abdomen is soft  Tenderness: There is no abdominal tenderness  There is no guarding        Comments: Obese, incisions well healed with the exception of midline supraumbilical site which has superficial separation with clean beefy red granulation tissue  Wound measures approximately 3 x 1 5 x 0 5 cm  Musculoskeletal:      Cervical back: No rigidity  Lymphadenopathy:      Cervical: No cervical adenopathy  Neurological:      Mental Status: She is alert         Jeronimo Sanford MD, [address] [place] [address] 132  4/19/2022  9:49 AM

## 2022-04-19 NOTE — ASSESSMENT & PLAN NOTE
As recommended by tumor board I discussed with patient consideration of adjuvant therapy with systemic chemotherapy followed by external beam radiotherapy/vaginal brachytherapy  I counseled her and recommended to proceed with carboplatin AUC 6 and paclitaxel 135 mg/m2 both to be administer on day 1 of a 21 day cycle for a total of 6 cycles  This will be followed by restaging CT scan and referral to Radiation Oncology  Patient will be referred to interventional Radiology for Port-A-Cath placement given poor peripheral access  I discussed with patient rationale, logistics, common side effects and toxicities associated with chemotherapy regimen  She understands toxicities include but are not limited to alopecia, fatigue, decreased appetite, nausea and vomiting, peripheral neuropathy, bone marrow suppression ( anemia, neutropenia and or thrombocytopenia) with subsequent risk of life-threatening infection or hemorrhage, kidney and or liver damage, etc   Patient verbalizes understanding, agrees and wants to proceed

## 2022-04-21 DIAGNOSIS — C54.1 ENDOMETRIAL CANCER (HCC): Primary | ICD-10-CM

## 2022-04-28 ENCOUNTER — TELEPHONE (OUTPATIENT)
Dept: GYNECOLOGIC ONCOLOGY | Facility: CLINIC | Age: 72
End: 2022-04-28

## 2022-04-28 NOTE — TELEPHONE ENCOUNTER
Patient's sister (edgardo) called in to reschedule appointment with dr paul that is tomorrow at 11:15am, because of her insurance she needs a later date appointment   Looking at the end of may

## 2022-04-29 ENCOUNTER — OFFICE VISIT (OUTPATIENT)
Dept: GYNECOLOGIC ONCOLOGY | Facility: CLINIC | Age: 72
End: 2022-04-29

## 2022-04-29 VITALS
WEIGHT: 266 LBS | OXYGEN SATURATION: 98 % | SYSTOLIC BLOOD PRESSURE: 124 MMHG | TEMPERATURE: 97.4 F | HEART RATE: 85 BPM | RESPIRATION RATE: 17 BRPM | BODY MASS INDEX: 48.95 KG/M2 | HEIGHT: 62 IN | DIASTOLIC BLOOD PRESSURE: 80 MMHG

## 2022-04-29 DIAGNOSIS — E66.01 MORBID OBESITY WITH BMI OF 45.0-49.9, ADULT (HCC): ICD-10-CM

## 2022-04-29 DIAGNOSIS — N18.31 STAGE 3A CHRONIC KIDNEY DISEASE (HCC): ICD-10-CM

## 2022-04-29 DIAGNOSIS — C54.1 ENDOMETRIAL CANCER (HCC): ICD-10-CM

## 2022-04-29 DIAGNOSIS — T81.30XA WOUND DEHISCENCE: Primary | ICD-10-CM

## 2022-04-29 PROBLEM — D72.819 LEUKOPENIA: Status: RESOLVED | Noted: 2018-05-29 | Resolved: 2022-04-29

## 2022-04-29 PROCEDURE — 99024 POSTOP FOLLOW-UP VISIT: CPT | Performed by: OBSTETRICS & GYNECOLOGY

## 2022-04-29 NOTE — ASSESSMENT & PLAN NOTE
Lab Results   Component Value Date    EGFR 40 03/10/2022    EGFR 46 01/29/2020    CREATININE 1 30 03/10/2022    CREATININE 1 34 (H) 01/29/2020     Patient is aware of the potential nephrotoxicity associated with carboplatin  However, given malignancy, potential benefits exceed risks  I emphasized critical importance of aggressive hydration during treatment  I also reminded her to avoid all nephrotoxic agents/NSAIDs

## 2022-04-29 NOTE — PROGRESS NOTES
Assessment/Plan:    Problem List Items Addressed This Visit        Genitourinary    Stage 3a chronic kidney disease ([facility] [address] )     Lab Results   Component Value Date    EGFR 40 03/10/2022    EGFR 46 01/29/2020    CREATININE 1 30 03/10/2022    CREATININE 1 34 (H) 01/29/2020     Patient is aware of the potential nephrotoxicity associated with carboplatin  However, given malignancy, potential benefits exceed risks  I emphasized critical importance of aggressive hydration during treatment  I also reminded her to avoid all nephrotoxic agents/NSAIDs  Endometrial cancer ([facility] )     Will proceed with chemotherapy as previously planned as her superficial wound dehiscence is weekly healing  Other    Morbid obesity with BMI of 45 0-49 9, adult (HCC)    Wound dehiscence - Primary     Superficial separation which was previously debrided is now completely leveled with skin with healthy appearing granulation tissue  No signs of infection or additional debris  Continue to cover with dry gauze  Continue to monitor healing  I explained they should expect some slowing in healing process once chemotherapy begins in the next 1-2 weeks  CHIEF COMPLAINT:       Problem:  Cancer Staging  Endometrial cancer [facility])  Staging form: Corpus Uteri - Carcinoma, AJCC 8th Edition  - Pathologic stage from 3/31/2022: FIGO Stage IIIC1 - Signed by Jad Pratt MD on 4/19/2022        Previous therapy:  Oncology History   Endometrial cancer ([facility] )   3/15/2022 Initial Diagnosis    Endometrial cancer ([facility] )     3/31/2022 -  Cancer Staged    Staging form: Corpus Uteri - Carcinoma, AJCC 8th Edition  - Pathologic stage from 3/31/2022:  FIGO Stage IIIC1 - Signed by Jad Pratt MD on 4/19/2022  Histopathologic type: Clear cell adenocarcinoma, NOS  Stage prefix: Initial diagnosis  Histologic grade (G): G3  Histologic grading system: 3 grade system  Residual tumor (R): R0 - None  Pelvic fátima status: Positive  Number of pelvic nodes positive from dissection: 1  Number of pelvic nodes examined during dissection: 2  Lymph node metastasis: Present  Omentectomy performed: Yes  Morcellation performed: No       3/31/2022 Surgery    Robotic hysterectomy, bilateral salpingo oophorectomy, bilateral pelvic sentinel lymph node biopsies, pelvic peritoneal/omental biopsies  Final pathology demonstrated clear cell carcinoma, 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%)  Negative cervix  Negative parametria  1/2 sentinel pelvic lymph nodes positive for malignancy  Negative staging biopsies  Stage IIIC1  No evidence of DNA mismatch repair protein loss  5/11/2022 -  Chemotherapy    palonosetron (ALOXI), 0 25 mg, Intravenous, Once, 0 of 6 cycles  fosaprepitant (EMEND) IVPB, 150 mg, Intravenous, Once, 0 of 6 cycles  CARBOplatin (PARAPLATIN) IVPB (GO AUC DOSING), , Intravenous, Once, 0 of 6 cycles  PACLItaxel (TAXOL) chemo IVPB, 135 mg/m2 = 292 8 mg (77 1 % of original dose 175 mg/m2), Intravenous, Once, 0 of 6 cycles  Dose modification: 135 mg/m2 (original dose 175 mg/m2, Cycle 1, Reason: Other (Must fill in a comment), Comment: prescribed starting dose)           Patient ID: Sg Sue is a 74 y o  female  [facility]  Patient with stage 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  She presents today for wound check prior to initiation of chemotherapy as previously planned with carboplatin and paclitaxel  She has been packing her superficial wound dehiscence with gauze and clean Blanco with Betadine  She is asymptomatic  Denies vaginal bleeding, drainage or discharge  Reports normal bowel bladder function  She has no complaints  The following portions of the patient's history were reviewed and updated as appropriate: allergies, current medications, past family history, past medical history, past social history, past surgical history and problem list     Review of Systems  As per [facility]    Twelve point review of systems otherwise unremarkable  Current Outpatient Medications   Medication Sig Dispense Refill    acetaminophen (TYLENOL) 650 mg CR tablet Take 1 tablet (650 mg total) by mouth every 6 (six) hours as needed for mild pain 30 tablet 0    amLODIPine (NORVASC) 10 mg tablet Take 10 mg by mouth daily      denosumab (Prolia) 60 mg/mL Inject 1 mL under the skin every 6 (six) months      hydrochlorothiazide (HYDRODIURIL) 12 5 mg tablet Take 12 5 mg by mouth daily      omeprazole (PriLOSEC) 40 MG capsule Take 20 mg by mouth daily        fluticasone (FLONASE) 50 mcg/act nasal spray 2 sprays into each nostril daily as needed         No current facility-administered medications for this visit  Objective:    Blood pressure 124/80, pulse 85, temperature (!) 97 4 °F (36 3 °C), resp  rate 17, height 5' 2" (1 575 m), weight 121 kg (266 lb), SpO2 98 %, not currently breastfeeding  Body mass index is 48 65 kg/m²  Body surface area is 2 16 meters squared  Physical Exam  Vitals reviewed  Constitutional:       General: She is not in acute distress  Appearance: She is obese  She is not ill-appearing or toxic-appearing  Eyes:      General: No scleral icterus  Right eye: No discharge  Left eye: No discharge  Conjunctiva/sclera: Conjunctivae normal    Pulmonary:      Effort: Pulmonary effort is normal    Abdominal:      General: There is no distension  Palpations: Abdomen is soft  Tenderness: There is no abdominal tenderness  Hernia: No hernia is present  Comments: Laparoscopic incisions all healed with the exception of midline incision site which has now granulation tissue up to level of the skin  No peripheral erythema  No debris noted  No signs of infection         Lorri Huizar MD, [address]  4/29/2022  11:40 AM

## 2022-04-29 NOTE — ASSESSMENT & PLAN NOTE
Superficial separation which was previously debrided is now completely leveled with skin with healthy appearing granulation tissue  No signs of infection or additional debris  Continue to cover with dry gauze  Continue to monitor healing  I explained they should expect some slowing in healing process once chemotherapy begins in the next 1-2 weeks

## 2022-04-29 NOTE — ASSESSMENT & PLAN NOTE
Will proceed with chemotherapy as previously planned as her superficial wound dehiscence is weekly healing

## 2022-05-02 ENCOUNTER — TELEPHONE (OUTPATIENT)
Dept: RADIOLOGY | Facility: HOSPITAL | Age: 72
End: 2022-05-02

## 2022-05-02 DIAGNOSIS — C54.1 ENDOMETRIAL CANCER (HCC): Primary | ICD-10-CM

## 2022-05-02 PROBLEM — Z45.2 ENCOUNTER FOR CENTRAL LINE CARE: Status: ACTIVE | Noted: 2022-05-02

## 2022-05-02 RX ORDER — LORAZEPAM 1 MG/1
1 TABLET ORAL EVERY 8 HOURS PRN
Qty: 30 TABLET | Refills: 0 | Status: SHIPPED | OUTPATIENT
Start: 2022-05-02 | End: 2022-05-09

## 2022-05-02 RX ORDER — ONDANSETRON HYDROCHLORIDE 8 MG/1
8 TABLET, FILM COATED ORAL EVERY 8 HOURS PRN
Qty: 20 TABLET | Refills: 1 | Status: SHIPPED | OUTPATIENT
Start: 2022-05-02 | End: 2022-05-09

## 2022-05-03 ENCOUNTER — CONSULT (OUTPATIENT)
Dept: BARIATRICS | Facility: CLINIC | Age: 72
End: 2022-05-03

## 2022-05-03 VITALS
WEIGHT: 262 LBS | HEIGHT: 62 IN | BODY MASS INDEX: 48.21 KG/M2 | HEART RATE: 83 BPM | DIASTOLIC BLOOD PRESSURE: 76 MMHG | SYSTOLIC BLOOD PRESSURE: 136 MMHG | TEMPERATURE: 98.4 F

## 2022-05-03 DIAGNOSIS — Z91.89 AT RISK FOR SLEEP APNEA: ICD-10-CM

## 2022-05-03 DIAGNOSIS — C54.1 ENDOMETRIAL CANCER (HCC): ICD-10-CM

## 2022-05-03 DIAGNOSIS — I10 ESSENTIAL HYPERTENSION: ICD-10-CM

## 2022-05-03 DIAGNOSIS — E66.01 MORBID OBESITY WITH BMI OF 45.0-49.9, ADULT (HCC): Primary | ICD-10-CM

## 2022-05-03 PROCEDURE — 99243 OFF/OP CNSLTJ NEW/EST LOW 30: CPT | Performed by: NURSE PRACTITIONER

## 2022-05-03 NOTE — PROGRESS NOTES
Assessment/Plan:  Sg was seen today for consult  Diagnoses and all orders for this visit:    Morbid obesity with BMI of 45 0-49 9, adult ([facility] )  - Discussed options of HealthyCORE-Intensive Lifestyle Intervention Program, Very Low Calorie Diet-VLCD, Conservative Program, [place]-En-Y Gastric Bypass and Vertical Sleeve Gastrectomy and the role of weight loss medications  Not a candidate for VLCD due to currently undergoing cancer treatment and eGFR of 40    - Explained the importance of making lifestyle changes  Patient should demonstrate lifestyle changes first before anti-obesity medication can be initiated  - Patient is interested in pursuing either Healthy CORE or Conservative program  - Initial weight loss goal of 5-10% weight loss for improved health  - Weight loss can improve patient's co-morbid conditions and/or prevent weight-related complications  - I will reach out to patient's oncologist, as she will be starting chemotherapy in the near future and I want to make sure calorie reduction is not contraindicated in the setting of that  - I will then update Rever once I have heard back with their recommendations  - She would not be a candidate for weight loss medications while undergoing chemotherapy  - Blood work reviewed: A1C from 3/15/2022 was in acceptable range  CMP from 3/10/2022 with eGFR of 40, but otherwise in acceptable range  - Will check TSH, lipid panel and fasting insulin  Goals:  Do not skip meals  Food log (ie ) www myfitnesspal com,sparkpeople  com,loseit com,calorieking  com,etc  baritastic (use skinnytaste  com, dietdoctor  com or smartphone marjan Effcon MXR for recipes)  No sugary beverages  At least 64oz of water daily   - Increase activity as tolerated  Endometrial cancer ([facility] )  - Will be starting chemotherapy in the near future  Continue management with prescribing provider  Essential hypertension  Taking Norvasc  Continue management with prescribing provider       At risk for sleep apnea  - Stop bang 5/8  Risks of untreated sleep apnea reviewed, including sudden cardiac death reviewed  Order to sleep medicine is already pending and encouraged patient to have sleep medicine evaluation  Discussed in detail surgical and nonsurgical options including intensive lifestyle intervention program, very low-calorie diet program and conservative program   Discussed the role of weight loss medications  Counseled patient on diet behavior and exercise modification for weight loss  Follow up will be scheduled with medical weight management provider based on review of recommendations with oncology  Subjective:   Chief Complaint   Patient presents with    Consult     MWM goal wt 220       Patient ID: Hitesh Felix  is a 74 y o  female with excess weight/obesity here to pursue weight management  Previous notes and records have been reviewed  Past Medical History:   Diagnosis Date    Arthritis     osteo    Chronic kidney disease     Colon polyp     Diverticula of colon     Hypertension     Obesity     Rhinitis      Past Surgical History:   Procedure Laterality Date    APPENDECTOMY      BREAST BIOPSY Left 1977    benign    BREAST SURGERY Left     biopsy    CHOLECYSTECTOMY      COLONOSCOPY      CYSTOSCOPY N/A 3/31/2022    Procedure: CYSTOSCOPY;  Surgeon: Maru Tinoco MD;  [facility];  Service: Gynecology Oncology    HERNIA REPAIR Right     inguinal    JOINT REPLACEMENT Right     Knee    CO COLONOSCOPY FLX DX W/COLLJ SPEC WHEN PFRMD N/A 2/21/2017    Procedure: COLONOSCOPY with polypectomy and hemo clip marjan ;  Surgeon: Tyesha Cabral MD;  [facility];   Service: Gastroenterology    CO LAPAROSCOPY W TOT HYSTERECTUTERUS <=250 [place] TUBE/OVARY N/A 3/31/2022    Procedure: ROBOTIC HYSTERECTOMY, BILATERAL SALPINGO-OOPHORECTOMY, STAGING PERITONEAL AND OMENTAL BIOPSIES, BILATERAL PELVIC SENTINEL LYMPH NODE BIOPSIES;  Surgeon: Georgina MD Irish;  [facility];  Service: Gynecology Oncology       HPI:  Wt Readings from Last 20 Encounters:   22 121 kg (266 lb)   22 122 kg (268 lb)   22 118 kg (260 lb 2 3 oz)   22 122 kg (269 lb)   22 122 kg (269 lb)   03/15/22 122 kg (268 lb 9 6 oz)   22 121 kg (266 lb)   21 121 kg (267 lb)   21 121 kg (267 lb)   20 122 kg (270 lb)   20 123 kg (270 lb 11 2 oz)   19 127 kg (280 lb)   19 127 kg (280 lb)   19 123 kg (270 lb 6 4 oz)   17 127 kg (281 lb)   01/08/15 131 kg (288 lb 3 oz)   12 118 kg (261 lb)   12 117 kg (257 lb)     Sg is here today with her sister Marty Lowe  She was recently diagnosed with endometrial cancer and will be starting chemotherapy in the near future  She was referred to weight management through the preadmission testing process for recent hysterectomy     Obesity/Excess Weight:  Severity: Severe  Onset:  Started after age 23, but in 16's weight gain increased  Modifiers: Diet and Exercise and Commercial Weight Loss Programs-ie  Weight Watchers, Minetta Pong, Nutrisystem, etc   Contributing factors: Insufficient Physical Activity  Associated symptoms: fatigue, decreased exercise capacity and increased shortness of breath    Hydration: 1 bottle of water with Yoni, 1 cup of coffee with cream SF sweetner, 4 16 oz sodas per month  Alcohol: 1 drink twice a year  Smoking: none  Exercise: non  Occupation: Retired - worked at    Sleep: 6-7 hours per night  STOP ban/8    Highest weight: 298 lbs 15 years ago  Current weight: 262 lbs  Goal weight: 220 lbs    Colonoscopy: UTD - due 3/2025  Mammogram: patient will be calling to get the script as she will be undergoing chemotherapy treatment       The following portions of the patient's history were reviewed and updated as appropriate: allergies, current medications, past family history, past medical history, past social history, past surgical history, and problem list     Review of Systems   HENT: Negative for sore throat  Respiratory: Negative for cough and shortness of breath  Cardiovascular: Negative for chest pain and palpitations  Gastrointestinal: Negative for constipation, diarrhea, nausea and vomiting         + GERD - controlled with medications  Endocrine: Negative for cold intolerance and heat intolerance  Genitourinary: Negative for dysuria  Musculoskeletal: Negative for arthralgias and back pain  Skin: Negative for rash  Neurological: Negative for headaches  Psychiatric/Behavioral: Negative for suicidal ideas (or HI)  Denies depression and anxiety       Objective:  /76 (BP Location: Left arm, Patient Position: Sitting, Cuff Size: Standard)   Pulse 83   Temp 98 4 °F (36 9 °C) (Tympanic)   Ht 5' 2 4" (1 585 m)   Wt 119 kg (262 lb)   LMP  (LMP Unknown)   BMI 47 31 kg/m²     Physical Exam  Vitals and nursing note reviewed  Constitutional   General appearance: Abnormal   well developed and morbidly obese  Eyes No conjunctival injection  Ears, Nose, Mouth, and Throat Oral mucosa moist    Pulmonary   Respiratory effort: No increased work of breathing or signs of respiratory distress  Cardiovascular    Examination of extremities for edema and/or varicosities: Normal   no edema  Abdomen   Abdomen: Abnormal   The abdomen was obese  Musculoskeletal   Gait and station: Normal     Psychiatric   Orientation to person, place and time: Normal     Affect: appropriate      Labs  Recent labs have been personally reviewed      Lab Results   Component Value Date    SODIUM 143 03/10/2022    K 3 6 03/10/2022     03/10/2022    CO2 28 03/10/2022    AGAP 9 03/10/2022    BUN 12 03/10/2022    CREATININE 1 30 03/10/2022    GLUF 88 03/10/2022    CALCIUM 8 1 (L) 03/10/2022    AST 15 03/10/2022    ALT 21 03/10/2022    ALKPHOS 65 03/10/2022    TP 7 2 03/10/2022    TBILI 0 44 03/10/2022    EGFR 40 03/10/2022     Lab Results   Component Value Date    HGBA1C 5 3 03/15/2022

## 2022-05-09 ENCOUNTER — HOSPITAL ENCOUNTER (OUTPATIENT)
Dept: RADIOLOGY | Facility: HOSPITAL | Age: 72
Discharge: HOME | End: 2022-05-09
Attending: OBSTETRICS & GYNECOLOGY | Admitting: INTERNAL MEDICINE
Payer: COMMERCIAL

## 2022-05-09 VITALS
WEIGHT: 262.79 LBS | DIASTOLIC BLOOD PRESSURE: 60 MMHG | BODY MASS INDEX: 48.36 KG/M2 | HEIGHT: 62 IN | OXYGEN SATURATION: 97 % | RESPIRATION RATE: 18 BRPM | TEMPERATURE: 97.5 F | HEART RATE: 79 BPM | SYSTOLIC BLOOD PRESSURE: 112 MMHG

## 2022-05-09 DIAGNOSIS — C54.1 ENDOMETRIAL CANCER (HCC): ICD-10-CM

## 2022-05-09 LAB
ALBUMIN SERPL BCP-MCNC: 2.9 G/DL (ref 3.5–5)
ALP SERPL-CCNC: 58 U/L (ref 46–116)
ALT SERPL W P-5'-P-CCNC: 17 U/L (ref 12–78)
ANION GAP SERPL CALCULATED.3IONS-SCNC: 6 MMOL/L (ref 4–13)
AST SERPL W P-5'-P-CCNC: 13 U/L (ref 5–45)
BASOPHILS # BLD AUTO: 0.02 THOUSANDS/ΜL (ref 0–0.1)
BASOPHILS NFR BLD AUTO: 1 % (ref 0–1)
BILIRUB SERPL-MCNC: 0.27 MG/DL (ref 0.2–1)
BUN SERPL-MCNC: 21 MG/DL (ref 5–25)
CALCIUM ALBUM COR SERPL-MCNC: 9.5 MG/DL (ref 8.3–10.1)
CALCIUM SERPL-MCNC: 8.6 MG/DL (ref 8.3–10.1)
CHLORIDE SERPL-SCNC: 107 MMOL/L (ref 100–108)
CO2 SERPL-SCNC: 30 MMOL/L (ref 21–32)
CREAT SERPL-MCNC: 1.33 MG/DL (ref 0.6–1.3)
EOSINOPHIL # BLD AUTO: 0.15 THOUSAND/ΜL (ref 0–0.61)
EOSINOPHIL NFR BLD AUTO: 4 % (ref 0–6)
ERYTHROCYTE [DISTWIDTH] IN BLOOD BY AUTOMATED COUNT: 13.5 % (ref 11.6–15.1)
GFR SERPL CREATININE-BSD FRML MDRD: 39 ML/MIN/1.73SQ M
GLUCOSE SERPL-MCNC: 139 MG/DL (ref 65–140)
HCT VFR BLD AUTO: 36.4 % (ref 34.8–46.1)
HGB BLD-MCNC: 11.7 G/DL (ref 11.5–15.4)
IMM GRANULOCYTES # BLD AUTO: 0.01 THOUSAND/UL (ref 0–0.2)
IMM GRANULOCYTES NFR BLD AUTO: 0 % (ref 0–2)
LYMPHOCYTES # BLD AUTO: 1.36 THOUSANDS/ΜL (ref 0.6–4.47)
LYMPHOCYTES NFR BLD AUTO: 36 % (ref 14–44)
MAGNESIUM SERPL-MCNC: 2.1 MG/DL (ref 1.6–2.6)
MCH RBC QN AUTO: 32.6 PG (ref 26.8–34.3)
MCHC RBC AUTO-ENTMCNC: 32.1 G/DL (ref 31.4–37.4)
MCV RBC AUTO: 101 FL (ref 82–98)
MONOCYTES # BLD AUTO: 0.22 THOUSAND/ΜL (ref 0.17–1.22)
MONOCYTES NFR BLD AUTO: 6 % (ref 4–12)
NEUTROPHILS # BLD AUTO: 1.99 THOUSANDS/ΜL (ref 1.85–7.62)
NEUTS SEG NFR BLD AUTO: 53 % (ref 43–75)
NRBC BLD AUTO-RTO: 0 /100 WBCS
PLATELET # BLD AUTO: 126 THOUSANDS/UL (ref 149–390)
PMV BLD AUTO: 11.1 FL (ref 8.9–12.7)
POTASSIUM SERPL-SCNC: 3.7 MMOL/L (ref 3.5–5.3)
PROT SERPL-MCNC: 6.4 G/DL (ref 6.4–8.2)
RBC # BLD AUTO: 3.59 MILLION/UL (ref 3.81–5.12)
SODIUM SERPL-SCNC: 143 MMOL/L (ref 136–145)
WBC # BLD AUTO: 3.75 THOUSAND/UL (ref 4.31–10.16)

## 2022-05-09 PROCEDURE — 80053 COMPREHEN METABOLIC PANEL: CPT | Performed by: PHYSICIAN ASSISTANT

## 2022-05-09 PROCEDURE — 99152 MOD SED SAME PHYS/QHP 5/>YRS: CPT | Performed by: INTERNAL MEDICINE

## 2022-05-09 PROCEDURE — C1894 INTRO/SHEATH, NON-LASER: HCPCS

## 2022-05-09 PROCEDURE — 76937 US GUIDE VASCULAR ACCESS: CPT | Performed by: INTERNAL MEDICINE

## 2022-05-09 PROCEDURE — 77001 FLUOROGUIDE FOR VEIN DEVICE: CPT | Performed by: INTERNAL MEDICINE

## 2022-05-09 PROCEDURE — 99152 MOD SED SAME PHYS/QHP 5/>YRS: CPT

## 2022-05-09 PROCEDURE — 83735 ASSAY OF MAGNESIUM: CPT | Performed by: PHYSICIAN ASSISTANT

## 2022-05-09 PROCEDURE — 36561 INSERT TUNNELED CV CATH: CPT

## 2022-05-09 PROCEDURE — 76937 US GUIDE VASCULAR ACCESS: CPT

## 2022-05-09 PROCEDURE — 99153 MOD SED SAME PHYS/QHP EA: CPT

## 2022-05-09 PROCEDURE — 36561 INSERT TUNNELED CV CATH: CPT | Performed by: INTERNAL MEDICINE

## 2022-05-09 PROCEDURE — 85025 COMPLETE CBC W/AUTO DIFF WBC: CPT | Performed by: PHYSICIAN ASSISTANT

## 2022-05-09 PROCEDURE — C1788 PORT, INDWELLING, IMP: HCPCS

## 2022-05-09 RX ORDER — MIDAZOLAM HYDROCHLORIDE 2 MG/2ML
INJECTION, SOLUTION INTRAMUSCULAR; INTRAVENOUS CODE/TRAUMA/SEDATION MEDICATION
Status: COMPLETED | OUTPATIENT
Start: 2022-05-09 | End: 2022-05-09

## 2022-05-09 RX ORDER — FENTANYL CITRATE 50 UG/ML
INJECTION, SOLUTION INTRAMUSCULAR; INTRAVENOUS CODE/TRAUMA/SEDATION MEDICATION
Status: COMPLETED | OUTPATIENT
Start: 2022-05-09 | End: 2022-05-09

## 2022-05-09 RX ORDER — CEFAZOLIN SODIUM 2 G/50ML
SOLUTION INTRAVENOUS
Status: COMPLETED | OUTPATIENT
Start: 2022-05-09 | End: 2022-05-09

## 2022-05-09 RX ADMIN — SODIUM CHLORIDE 1000 ML: 0.9 INJECTION, SOLUTION INTRAVENOUS at 09:44

## 2022-05-09 RX ADMIN — Medication 10 ML: at 11:26

## 2022-05-09 RX ADMIN — CEFAZOLIN SODIUM 3000 MG: 2 SOLUTION INTRAVENOUS at 11:01

## 2022-05-09 RX ADMIN — MIDAZOLAM 1 MG: 1 INJECTION INTRAMUSCULAR; INTRAVENOUS at 11:18

## 2022-05-09 RX ADMIN — FENTANYL CITRATE 25 MCG: 50 INJECTION INTRAMUSCULAR; INTRAVENOUS at 11:26

## 2022-05-09 RX ADMIN — MIDAZOLAM 0.5 MG: 1 INJECTION INTRAMUSCULAR; INTRAVENOUS at 11:26

## 2022-05-09 RX ADMIN — FENTANYL CITRATE 50 MCG: 50 INJECTION INTRAMUSCULAR; INTRAVENOUS at 11:18

## 2022-05-09 NOTE — H&P
Interventional Radiology  History and Physical 5/9/2022     Sg Acharya   1950 [id number]    Assessment/Plan:      74year old female with endometrial CA  Plan for port placement for chemotherapy  Procedure, risks, benefits, alternatives discussed with the patient  Consent obtained at bedside  /74   Pulse 71   Temp 97 9 °F (36 6 °C)   Resp 16   Ht 5' 2" (1 575 m)   Wt 119 kg (262 lb 12 6 oz)   LMP  (LMP Unknown)   SpO2 96%   BMI 48 06 kg/m²     Problem List Items Addressed This Visit        Genitourinary    Endometrial cancer ([address] )    Relevant Orders    IR port placement             Subjective: Normal state of health, no acute complaints  Patient ID: Jason Rebollar is a 74 y o  female  History of Present Illness  See A/P above  Review of Systems   Respiratory: Negative  Cardiovascular: Negative  Past Medical History:   Diagnosis Date    Arthritis     osteo    Chronic kidney disease     Colon polyp     Diverticula of colon     Hypertension     Obesity     Rhinitis         Past Surgical History:   Procedure Laterality Date    APPENDECTOMY      BREAST BIOPSY Left 1977    benign    BREAST SURGERY Left     biopsy    CHOLECYSTECTOMY      COLONOSCOPY      CYSTOSCOPY N/A 3/31/2022    Procedure: CYSTOSCOPY;  Surgeon: Alex Liu MD;  [facility];  Service: Gynecology Oncology    HERNIA REPAIR Right     inguinal    HYSTERECTOMY      JOINT REPLACEMENT Right     Knee    PA COLONOSCOPY FLX DX W/COLLJ SPEC WHEN PFRMD N/A 2/21/2017    Procedure: COLONOSCOPY with polypectomy and hemo clip marjan ;  Surgeon: Rell Cortes MD;  [facility];   Service: Gastroenterology    PA LAPAROSCOPY W TOT HYSTERECTUTERUS <=250 Felix Grooms TUBE/OVARY N/A 3/31/2022    Procedure: ROBOTIC HYSTERECTOMY, BILATERAL SALPINGO-OOPHORECTOMY, STAGING PERITONEAL AND OMENTAL BIOPSIES, BILATERAL PELVIC SENTINEL LYMPH NODE BIOPSIES;  Surgeon: Alex Liu MD;  [facility];  Service: Gynecology Oncology        Social History     Tobacco Use   Smoking Status Never Smoker   Smokeless Tobacco Never Used        Social History     Substance and Sexual Activity   Alcohol Use Yes    Alcohol/week: 1 0 standard drink    Types: 1 Glasses of wine per week    Comment: social-seldom        Social History     Substance and Sexual Activity   Drug Use Never        No Known Allergies    Current Outpatient Medications   Medication Sig Dispense Refill    fluticasone (FLONASE) 50 mcg/act nasal spray 2 sprays into each nostril daily as needed        acetaminophen (TYLENOL) 650 mg CR tablet Take 1 tablet (650 mg total) by mouth every 6 (six) hours as needed for mild pain 30 tablet 0    amLODIPine (NORVASC) 10 mg tablet Take 10 mg by mouth daily      denosumab (Prolia) 60 mg/mL Inject 1 mL under the skin every 6 (six) months      hydrochlorothiazide (HYDRODIURIL) 12 5 mg tablet Take 12 5 mg by mouth daily      omeprazole (PriLOSEC) 40 MG capsule Take 20 mg by mouth daily         Current Facility-Administered Medications   Medication Dose Route Frequency Provider Last Rate Last Admin    ceFAZolin (ANCEF) 3,000 mg in dextrose 5 % 100 mL IVPB  3,000 mg Intravenous Once Ksenia Velasco MD              Objective:    Vitals:    05/09/22 0920   BP: 138/74   Pulse: 71   Resp: 16   Temp: 97 9 °F (36 6 °C)   SpO2: 96%   Weight: 119 kg (262 lb 12 6 oz)   Height: 5' 2" (1 575 m)        Physical Exam  Cardiovascular:      Pulses: Normal pulses  Heart sounds: Normal heart sounds  Pulmonary:      Effort: Pulmonary effort is normal            No results found for: BNP   Lab Results   Component Value Date    WBC 4 36 03/10/2022    HGB 12 2 03/10/2022    HCT 39 0 03/10/2022    MCV 98 03/10/2022     03/10/2022     No results found for: INR, PROTIME  No results found for: PTT      I have personally reviewed pertinent imaging and laboratory results       Code Status: No Order  Advance Directive and Living Will:      Power of :    POLST:      This text is generated with voice recognition software  There may be translation, syntax,  or grammatical errors  If you have any questions, please contact the dictating provider

## 2022-05-09 NOTE — BRIEF OP NOTE (RAD/CATH)
INTERVENTIONAL RADIOLOGY PROCEDURE NOTE    Date: 5/9/2022    Procedure: IR PORT PLACEMENT    Preoperative diagnosis:   1  Endometrial cancer ([facility] [address] )         Postoperative diagnosis: Same  Surgeon: Tank Coleman MD     Assistant: None  No qualified resident was available  Blood loss: Minimal    Specimens: None     Findings:     Right internal jugular Dignity midsize chest port placement  Tip in mid to inferior right atrium  Line functions properly  Complications: None immediate      Anesthesia: conscious sedation

## 2022-05-09 NOTE — DISCHARGE INSTRUCTIONS
Implanted Venous Access Port     WHAT YOU NEED TO KNOW:   An implanted venous access port is a device used to give treatments and take blood  It may also be called a central venous access device (CVAD)  The port is a small container that is placed under your skin, usually in your upper chest  The port is attached to a catheter that enters a large vein  DISCHARGE INSTRUCTIONS:   Resume your normal diet  Small sips of flat soda will help with mild nausea  Prevent an infection:   · Wash your hands often  Use soap and water  Clean your hands before and after you care for your port  Remind everyone who cares for your port to wash their hands  · Check your skin for infection every day  Look for redness, swelling, or fluid oozing from the port site  Care for your port:   1  You may shower beginning 48 hours after procedure  2  Change dressing if it becomes wet  3  Remove dressing after 24 hours  Leave glue in place  4  It is normal for some bruising to occur  5  Use Tylenol for pain  6  Limit use of arm on the side that your port was placed  Lift nothing heavier than 5 pounds for 1 week, and then gradually increase activity as tolerated  7  DO NOT apply ointment, lotion or cream to port site until incision is healed  Allow glue to fall off  DO NOT attempt to peel glue from skin even it it begins to flake  8, After the port incision is healed you may swim, bathe  Notify the Interventional Radiologist if you have any of the followin  Fever above 101 F    2  Increased redness or swelling after 1st day  3  Increased pain after 1st day  4  Any sign of infection (drainage from port site, skin separation, hot to touch)  5  Persistent nausea or vomiting  Contact Interventional Radiology at [phone] [facility] PATIENTS: Contact Interventional Radiology at [phone]) ([address] [facility] [address]: Contact Interventional Radiology at [phone])         Moderate Sedation   WHAT YOU NEED TO KNOW:   Moderate sedation, or conscious sedation, is medicine used during procedures to help you feel relaxed and calm  You will be awake and able to follow directions without anxiety or pain  You will remember little to none of the procedure  You may feel tired, weak, or unsteady on your feet after you get sedation  You may also have trouble concentrating or short-term memory loss  These symptoms should go away in 24 hours or less  DISCHARGE INSTRUCTIONS:   Call 911 or have someone else call for any of the following:   · You have sudden trouble breathing  · You cannot be woken  Seek care immediately if:   · You have a severe headache or dizziness  · Your heart is beating faster than usual   Contact your healthcare provider if:   · You have a fever  · You have nausea or are vomiting for more than 8 hours after the procedure  · Your skin is itchy, swollen, or you have a rash  · You have questions or concerns about your condition or care  Self-care:   · Have someone stay with you for 24 hours  This person can drive you to errands and help you do things around the house  This person can also watch for problems  · Rest and do quiet activities for 24 hours  Do not exercise, ride a bike, or play sports  Stand up slowly to prevent dizziness and falls  Take short walks around the house with another person  Slowly return to your usual activities the next day  · Do not drive or use dangerous machines or tools for 24 hours  You may injure yourself or others  Examples include a lawnmower, saw, or drill  Do not return to work for 24 hours if you use dangerous machines or tools for work  · Do not make important decisions for 24 hours  For example, do not sign important papers or invest money  · Drink liquids as directed  Liquids help flush the sedation medicine out of your body  Ask how much liquid to drink each day and which liquids are best for you        · Eat small, frequent meals to prevent nausea and vomiting  Start with clear liquids such as juice or broth  If you do not vomit after clear liquids, you can eat your usual foods  · Do not drink alcohol or take medicines that make you drowsy  This includes medicines that help you sleep and anxiety medicines  Ask your healthcare provider if it is safe for you to take pain medicine  Follow up with your healthcare provider as directed: Write down your questions so you remember to ask them during your visits  © 2017 [address] Information is for End User's use only and may not be sold, redistributed or otherwise used for commercial purposes  All illustrations and images included in CareNotes® are the copyrighted property of A D A M , Inc  or Omari Bell  The above information is an  only  It is not intended as medical advice for individual conditions or treatments  Talk to your doctor, nurse or pharmacist before following any medical regimen to see if it is safe and effective for you

## 2022-05-10 RX ORDER — SODIUM CHLORIDE 9 MG/ML
20 INJECTION, SOLUTION INTRAVENOUS ONCE
Status: CANCELLED | OUTPATIENT
Start: 2022-05-11

## 2022-05-10 RX ORDER — PALONOSETRON 0.05 MG/ML
0.25 INJECTION, SOLUTION INTRAVENOUS ONCE
Status: CANCELLED | OUTPATIENT
Start: 2022-05-11

## 2022-05-11 ENCOUNTER — HOSPITAL ENCOUNTER (OUTPATIENT)
Dept: INFUSION CENTER | Facility: CLINIC | Age: 72
Discharge: HOME | End: 2022-05-11
Payer: COMMERCIAL

## 2022-05-11 VITALS
SYSTOLIC BLOOD PRESSURE: 116 MMHG | DIASTOLIC BLOOD PRESSURE: 77 MMHG | WEIGHT: 260.14 LBS | HEIGHT: 63 IN | BODY MASS INDEX: 46.09 KG/M2 | RESPIRATION RATE: 18 BRPM | TEMPERATURE: 96.9 F | HEART RATE: 71 BPM

## 2022-05-11 DIAGNOSIS — C54.1 ENDOMETRIAL CANCER (HCC): Primary | ICD-10-CM

## 2022-05-11 PROCEDURE — 96367 TX/PROPH/DG ADDL SEQ IV INF: CPT

## 2022-05-11 PROCEDURE — 96413 CHEMO IV INFUSION 1 HR: CPT

## 2022-05-11 PROCEDURE — 96375 TX/PRO/DX INJ NEW DRUG ADDON: CPT

## 2022-05-11 PROCEDURE — 96415 CHEMO IV INFUSION ADDL HR: CPT

## 2022-05-11 PROCEDURE — 96417 CHEMO IV INFUS EACH ADDL SEQ: CPT

## 2022-05-11 RX ORDER — SODIUM CHLORIDE 9 MG/ML
20 INJECTION, SOLUTION INTRAVENOUS ONCE
Status: COMPLETED | OUTPATIENT
Start: 2022-05-11 | End: 2022-05-11

## 2022-05-11 RX ORDER — LORAZEPAM 1 MG/1
1 TABLET ORAL EVERY 8 HOURS PRN
Qty: 30 TABLET | Refills: 0 | Status: SHIPPED | OUTPATIENT
Start: 2022-05-11

## 2022-05-11 RX ORDER — PALONOSETRON 0.05 MG/ML
0.25 INJECTION, SOLUTION INTRAVENOUS ONCE
Status: COMPLETED | OUTPATIENT
Start: 2022-05-11 | End: 2022-05-11

## 2022-05-11 RX ORDER — ONDANSETRON HYDROCHLORIDE 8 MG/1
8 TABLET, FILM COATED ORAL EVERY 8 HOURS PRN
Qty: 20 TABLET | Refills: 1 | Status: SHIPPED | OUTPATIENT
Start: 2022-05-11

## 2022-05-11 RX ADMIN — SODIUM CHLORIDE 20 ML/HR: 0.9 INJECTION, SOLUTION INTRAVENOUS at 08:50

## 2022-05-11 RX ADMIN — FAMOTIDINE 20 MG: 10 INJECTION INTRAVENOUS at 09:36

## 2022-05-11 RX ADMIN — DIPHENHYDRAMINE HYDROCHLORIDE 25 MG: 50 INJECTION, SOLUTION INTRAMUSCULAR; INTRAVENOUS at 09:12

## 2022-05-11 RX ADMIN — CARBOPLATIN 352.5 MG: 10 INJECTION, SOLUTION INTRAVENOUS at 13:56

## 2022-05-11 RX ADMIN — PALONOSETRON HYDROCHLORIDE 0.25 MG: 0.25 INJECTION INTRAVENOUS at 08:57

## 2022-05-11 RX ADMIN — FOSAPREPITANT 150 MG: 150 INJECTION, POWDER, LYOPHILIZED, FOR SOLUTION INTRAVENOUS at 09:59

## 2022-05-11 RX ADMIN — PACLITAXEL 290.4 MG: 6 INJECTION, SOLUTION, CONCENTRATE INTRAVENOUS at 10:49

## 2022-05-11 RX ADMIN — DEXAMETHASONE SODIUM PHOSPHATE 20 MG: 10 INJECTION, SOLUTION INTRAMUSCULAR; INTRAVENOUS at 08:56

## 2022-05-11 NOTE — PROGRESS NOTES
Pt presents for taxol and carboplatin  No new meds or concerns  Future visits and AVS discussed with pt and sister  Pt is to have weekly labs, labs before chemo, zofran script was sent to her pharmacy, and she can take imodium for diarrhea per [facility]

## 2022-05-11 NOTE — PROGRESS NOTES
Pt  Tolerated Taxol and Carboplatin without adverse event  Post infusion instructions provided  AVS given for future reference  Pt  Instruction to call Dr Jayne Chisholm with any concerns  Pt  Verbalized understanding  Future appointment appts reviewed  Including lab appts scheduled

## 2022-05-16 ENCOUNTER — HOSPITAL ENCOUNTER (OUTPATIENT)
Dept: INFUSION CENTER | Facility: CLINIC | Age: 72
Discharge: HOME | DRG: 175 | End: 2022-05-16
Payer: COMMERCIAL

## 2022-05-16 DIAGNOSIS — Z45.2 ENCOUNTER FOR CENTRAL LINE CARE: Primary | ICD-10-CM

## 2022-05-16 DIAGNOSIS — C54.1 ENDOMETRIAL CANCER (HCC): ICD-10-CM

## 2022-05-16 LAB
ALBUMIN SERPL BCP-MCNC: 3.2 G/DL (ref 3.5–5)
ALP SERPL-CCNC: 63 U/L (ref 46–116)
ALT SERPL W P-5'-P-CCNC: 30 U/L (ref 12–78)
ANION GAP SERPL CALCULATED.3IONS-SCNC: 11 MMOL/L (ref 4–13)
AST SERPL W P-5'-P-CCNC: 27 U/L (ref 5–45)
BASOPHILS # BLD MANUAL: 0.05 THOUSAND/UL (ref 0–0.1)
BASOPHILS NFR MAR MANUAL: 1 % (ref 0–1)
BILIRUB SERPL-MCNC: 0.86 MG/DL (ref 0.2–1)
BUN SERPL-MCNC: 24 MG/DL (ref 5–25)
CALCIUM ALBUM COR SERPL-MCNC: 9.3 MG/DL (ref 8.3–10.1)
CALCIUM SERPL-MCNC: 8.7 MG/DL (ref 8.3–10.1)
CHLORIDE SERPL-SCNC: 103 MMOL/L (ref 100–108)
CO2 SERPL-SCNC: 26 MMOL/L (ref 21–32)
CREAT SERPL-MCNC: 1.5 MG/DL (ref 0.6–1.3)
EOSINOPHIL # BLD MANUAL: 0 THOUSAND/UL (ref 0–0.4)
EOSINOPHIL NFR BLD MANUAL: 0 % (ref 0–6)
ERYTHROCYTE [DISTWIDTH] IN BLOOD BY AUTOMATED COUNT: 13.2 % (ref 11.6–15.1)
GFR SERPL CREATININE-BSD FRML MDRD: 34 ML/MIN/1.73SQ M
GLUCOSE P FAST SERPL-MCNC: 109 MG/DL (ref 65–99)
GLUCOSE SERPL-MCNC: 109 MG/DL (ref 65–140)
HCT VFR BLD AUTO: 35.6 % (ref 34.8–46.1)
HGB BLD-MCNC: 11.5 G/DL (ref 11.5–15.4)
LYMPHOCYTES # BLD AUTO: 1.84 THOUSAND/UL (ref 0.6–4.47)
LYMPHOCYTES # BLD AUTO: 37 % (ref 14–44)
MAGNESIUM SERPL-MCNC: 1.6 MG/DL (ref 1.6–2.6)
MCH RBC QN AUTO: 31.6 PG (ref 26.8–34.3)
MCHC RBC AUTO-ENTMCNC: 32.3 G/DL (ref 31.4–37.4)
MCV RBC AUTO: 98 FL (ref 82–98)
MONOCYTES # BLD AUTO: 0 THOUSAND/UL (ref 0–1.22)
MONOCYTES NFR BLD: 0 % (ref 4–12)
NEUTROPHILS # BLD MANUAL: 2.89 THOUSAND/UL (ref 1.85–7.62)
NEUTS SEG NFR BLD AUTO: 58 % (ref 43–75)
PLATELET # BLD AUTO: 91 THOUSANDS/UL (ref 149–390)
PLATELET BLD QL SMEAR: ABNORMAL
PMV BLD AUTO: 12.5 FL (ref 8.9–12.7)
POTASSIUM SERPL-SCNC: 3.9 MMOL/L (ref 3.5–5.3)
PROT SERPL-MCNC: 7 G/DL (ref 6.4–8.2)
RBC # BLD AUTO: 3.64 MILLION/UL (ref 3.81–5.12)
RBC MORPH BLD: NORMAL
SODIUM SERPL-SCNC: 140 MMOL/L (ref 136–145)
VARIANT LYMPHS # BLD AUTO: 4 %
WBC # BLD AUTO: 4.98 THOUSAND/UL (ref 4.31–10.16)

## 2022-05-16 PROCEDURE — 85027 COMPLETE CBC AUTOMATED: CPT

## 2022-05-16 PROCEDURE — 80053 COMPREHEN METABOLIC PANEL: CPT

## 2022-05-16 PROCEDURE — 83735 ASSAY OF MAGNESIUM: CPT

## 2022-05-16 PROCEDURE — 85007 BL SMEAR W/DIFF WBC COUNT: CPT

## 2022-05-16 NOTE — PLAN OF CARE
Problem: INFECTION - ADULT  Goal: Absence or prevention of progression during hospitalization  Description: INTERVENTIONS:  - Assess and monitor for signs and symptoms of infection  - Monitor lab/diagnostic results  - Monitor all insertion sites, i e  indwelling lines, tubes, and drains  - Monitor endotracheal if appropriate and nasal secretions for changes in amount and color  - [place] appropriate cooling/warming therapies per order  - Administer medications as ordered  - Instruct and encourage patient and family to use good hand hygiene technique  - Identify and instruct in appropriate isolation precautions for identified infection/condition  Outcome: Progressing

## 2022-05-17 ENCOUNTER — HOSPITAL ENCOUNTER (INPATIENT)
Facility: HOSPITAL | Age: 72
LOS: 2 days | Discharge: HOME | DRG: 175 | End: 2022-05-19
Attending: EMERGENCY MEDICINE | Admitting: INTERNAL MEDICINE
Payer: COMMERCIAL

## 2022-05-17 ENCOUNTER — APPOINTMENT (EMERGENCY)
Dept: CT IMAGING | Facility: HOSPITAL | Age: 72
DRG: 175 | End: 2022-05-17
Payer: COMMERCIAL

## 2022-05-17 DIAGNOSIS — I26.09 COR PULMONALE, ACUTE (HCC): ICD-10-CM

## 2022-05-17 DIAGNOSIS — I26.99 BILATERAL PULMONARY EMBOLISM (HCC): ICD-10-CM

## 2022-05-17 DIAGNOSIS — R55 SYNCOPE: Primary | ICD-10-CM

## 2022-05-17 DIAGNOSIS — E66.01 MORBID OBESITY WITH BMI OF 45.0-49.9, ADULT (HCC): ICD-10-CM

## 2022-05-17 DIAGNOSIS — C54.1 ENDOMETRIAL CANCER (HCC): ICD-10-CM

## 2022-05-17 DIAGNOSIS — T81.30XA WOUND DEHISCENCE: ICD-10-CM

## 2022-05-17 PROBLEM — J96.01 ACUTE RESPIRATORY FAILURE WITH HYPOXIA (HCC): Status: ACTIVE | Noted: 2018-05-29

## 2022-05-17 PROBLEM — J96.01 ACUTE RESPIRATORY FAILURE WITH HYPOXIA (HCC): Status: RESOLVED | Noted: 2018-05-29 | Resolved: 2022-04-29

## 2022-05-17 LAB
2HR DELTA HS TROPONIN: 178 NG/L
ALBUMIN SERPL BCP-MCNC: 3.3 G/DL (ref 3.5–5)
ALP SERPL-CCNC: 79 U/L (ref 46–116)
ALT SERPL W P-5'-P-CCNC: 54 U/L (ref 12–78)
ANION GAP SERPL CALCULATED.3IONS-SCNC: 10 MMOL/L (ref 4–13)
APTT PPP: 27 SECONDS (ref 23–37)
AST SERPL W P-5'-P-CCNC: 38 U/L (ref 5–45)
ATRIAL RATE: 83 BPM
ATRIAL RATE: 88 BPM
BASOPHILS # BLD MANUAL: 0 THOUSAND/UL (ref 0–0.1)
BASOPHILS NFR MAR MANUAL: 0 % (ref 0–1)
BILIRUB SERPL-MCNC: 0.51 MG/DL (ref 0.2–1)
BUN SERPL-MCNC: 20 MG/DL (ref 5–25)
CALCIUM ALBUM COR SERPL-MCNC: 9.2 MG/DL (ref 8.3–10.1)
CALCIUM SERPL-MCNC: 8.6 MG/DL (ref 8.3–10.1)
CARDIAC TROPONIN I PNL SERPL HS: 201 NG/L
CARDIAC TROPONIN I PNL SERPL HS: 23 NG/L
CHLORIDE SERPL-SCNC: 99 MMOL/L (ref 100–108)
CO2 SERPL-SCNC: 28 MMOL/L (ref 21–32)
CREAT SERPL-MCNC: 1.52 MG/DL (ref 0.6–1.3)
EOSINOPHIL # BLD MANUAL: 0.56 THOUSAND/UL (ref 0–0.4)
EOSINOPHIL NFR BLD MANUAL: 11 % (ref 0–6)
ERYTHROCYTE [DISTWIDTH] IN BLOOD BY AUTOMATED COUNT: 13.2 % (ref 11.6–15.1)
GFR SERPL CREATININE-BSD FRML MDRD: 33 ML/MIN/1.73SQ M
GLUCOSE SERPL-MCNC: 154 MG/DL (ref 65–140)
HCT VFR BLD AUTO: 37 % (ref 34.8–46.1)
HGB BLD-MCNC: 12.1 G/DL (ref 11.5–15.4)
INR PPP: 1.13 (ref 0.84–1.19)
LYMPHOCYTES # BLD AUTO: 2.27 THOUSAND/UL (ref 0.6–4.47)
LYMPHOCYTES # BLD AUTO: 45 % (ref 14–44)
MCH RBC QN AUTO: 32.1 PG (ref 26.8–34.3)
MCHC RBC AUTO-ENTMCNC: 32.7 G/DL (ref 31.4–37.4)
MCV RBC AUTO: 98 FL (ref 82–98)
MONOCYTES # BLD AUTO: 0.2 THOUSAND/UL (ref 0–1.22)
MONOCYTES NFR BLD: 4 % (ref 4–12)
NEUTROPHILS # BLD MANUAL: 2.02 THOUSAND/UL (ref 1.85–7.62)
NEUTS BAND NFR BLD MANUAL: 1 % (ref 0–8)
NEUTS SEG NFR BLD AUTO: 39 % (ref 43–75)
P AXIS: 75 DEGREES
P AXIS: 80 DEGREES
PLATELET # BLD AUTO: 98 THOUSANDS/UL (ref 149–390)
PLATELET BLD QL SMEAR: ABNORMAL
PMV BLD AUTO: 12 FL (ref 8.9–12.7)
POTASSIUM SERPL-SCNC: 3 MMOL/L (ref 3.5–5.3)
PR INTERVAL: 160 MS
PR INTERVAL: 168 MS
PROT SERPL-MCNC: 7.7 G/DL (ref 6.4–8.2)
PROTHROMBIN TIME: 14.1 SECONDS (ref 11.6–14.5)
QRS AXIS: 69 DEGREES
QRS AXIS: 71 DEGREES
QRSD INTERVAL: 82 MS
QRSD INTERVAL: 82 MS
QT INTERVAL: 366 MS
QT INTERVAL: 392 MS
QTC INTERVAL: 442 MS
QTC INTERVAL: 460 MS
RBC # BLD AUTO: 3.77 MILLION/UL (ref 3.81–5.12)
RBC MORPH BLD: NORMAL
SODIUM SERPL-SCNC: 137 MMOL/L (ref 136–145)
T WAVE AXIS: 54 DEGREES
T WAVE AXIS: 67 DEGREES
VENTRICULAR RATE: 83 BPM
VENTRICULAR RATE: 88 BPM
WBC # BLD AUTO: 5.05 THOUSAND/UL (ref 4.31–10.16)

## 2022-05-17 PROCEDURE — 85610 PROTHROMBIN TIME: CPT | Performed by: EMERGENCY MEDICINE

## 2022-05-17 PROCEDURE — 85007 BL SMEAR W/DIFF WBC COUNT: CPT | Performed by: EMERGENCY MEDICINE

## 2022-05-17 PROCEDURE — 99223 1ST HOSP IP/OBS HIGH 75: CPT | Performed by: PHYSICIAN ASSISTANT

## 2022-05-17 PROCEDURE — 0241U HB NFCT DS VIR RESP RNA 4 TRGT: CPT | Performed by: PHYSICIAN ASSISTANT

## 2022-05-17 PROCEDURE — 36415 COLL VENOUS BLD VENIPUNCTURE: CPT | Performed by: EMERGENCY MEDICINE

## 2022-05-17 PROCEDURE — 85027 COMPLETE CBC AUTOMATED: CPT | Performed by: EMERGENCY MEDICINE

## 2022-05-17 PROCEDURE — 99285 EMERGENCY DEPT VISIT HI MDM: CPT | Performed by: EMERGENCY MEDICINE

## 2022-05-17 PROCEDURE — 85730 THROMBOPLASTIN TIME PARTIAL: CPT | Performed by: EMERGENCY MEDICINE

## 2022-05-17 PROCEDURE — 80053 COMPREHEN METABOLIC PANEL: CPT | Performed by: EMERGENCY MEDICINE

## 2022-05-17 PROCEDURE — 96361 HYDRATE IV INFUSION ADD-ON: CPT

## 2022-05-17 PROCEDURE — G1004 CDSM NDSC: HCPCS

## 2022-05-17 PROCEDURE — 96375 TX/PRO/DX INJ NEW DRUG ADDON: CPT

## 2022-05-17 PROCEDURE — 99285 EMERGENCY DEPT VISIT HI MDM: CPT

## 2022-05-17 PROCEDURE — 71275 CT ANGIOGRAPHY CHEST: CPT

## 2022-05-17 PROCEDURE — 84484 ASSAY OF TROPONIN QUANT: CPT | Performed by: EMERGENCY MEDICINE

## 2022-05-17 PROCEDURE — 93005 ELECTROCARDIOGRAM TRACING: CPT

## 2022-05-17 PROCEDURE — 93010 ELECTROCARDIOGRAM REPORT: CPT | Performed by: INTERNAL MEDICINE

## 2022-05-17 PROCEDURE — 96374 THER/PROPH/DIAG INJ IV PUSH: CPT

## 2022-05-17 RX ORDER — POTASSIUM CHLORIDE 20 MEQ/1
40 TABLET, EXTENDED RELEASE ORAL ONCE
Status: COMPLETED | OUTPATIENT
Start: 2022-05-17 | End: 2022-05-17

## 2022-05-17 RX ORDER — HEPARIN SODIUM 1000 [USP'U]/ML
9600 INJECTION, SOLUTION INTRAVENOUS; SUBCUTANEOUS
Status: DISCONTINUED | OUTPATIENT
Start: 2022-05-17 | End: 2022-05-19

## 2022-05-17 RX ORDER — HEPARIN SODIUM 10000 [USP'U]/100ML
3-30 INJECTION, SOLUTION INTRAVENOUS
Status: DISCONTINUED | OUTPATIENT
Start: 2022-05-17 | End: 2022-05-19

## 2022-05-17 RX ORDER — HEPARIN SODIUM 1000 [USP'U]/ML
9600 INJECTION, SOLUTION INTRAVENOUS; SUBCUTANEOUS ONCE
Status: COMPLETED | OUTPATIENT
Start: 2022-05-17 | End: 2022-05-17

## 2022-05-17 RX ORDER — HEPARIN SODIUM 1000 [USP'U]/ML
4800 INJECTION, SOLUTION INTRAVENOUS; SUBCUTANEOUS
Status: DISCONTINUED | OUTPATIENT
Start: 2022-05-17 | End: 2022-05-19

## 2022-05-17 RX ORDER — HYDROCHLOROTHIAZIDE 12.5 MG/1
CAPSULE, GELATIN COATED ORAL
COMMUNITY
Start: 2022-04-24 | End: 2022-05-17

## 2022-05-17 RX ADMIN — POTASSIUM CHLORIDE 40 MEQ: 20 TABLET, EXTENDED RELEASE ORAL at 21:17

## 2022-05-17 RX ADMIN — IOHEXOL 70 ML: 350 INJECTION, SOLUTION INTRAVENOUS at 20:43

## 2022-05-17 RX ADMIN — SODIUM CHLORIDE 1000 ML: 0.9 INJECTION, SOLUTION INTRAVENOUS at 20:06

## 2022-05-17 RX ADMIN — HEPARIN SODIUM 18 UNITS/KG/HR: 10000 INJECTION, SOLUTION INTRAVENOUS at 22:08

## 2022-05-17 RX ADMIN — HEPARIN SODIUM 9600 UNITS: 1000 INJECTION INTRAVENOUS; SUBCUTANEOUS at 22:01

## 2022-05-18 ENCOUNTER — APPOINTMENT (INPATIENT)
Dept: NON INVASIVE DIAGNOSTICS | Facility: HOSPITAL | Age: 72
DRG: 175 | End: 2022-05-18
Payer: COMMERCIAL

## 2022-05-18 LAB
4HR DELTA HS TROPONIN: 296 NG/L
ANION GAP SERPL CALCULATED.3IONS-SCNC: 9 MMOL/L (ref 4–13)
AORTIC ROOT: 3.2 CM
AORTIC VALVE MEAN VELOCITY: 8.4 M/S
APTT PPP: 157 SECONDS (ref 23–37)
APTT PPP: >210 SECONDS (ref 23–37)
APTT PPP: >210 SECONDS (ref 23–37)
ASCENDING AORTA: 3.6 CM
ATRIAL RATE: 84 BPM
AV AREA BY CONTINUOUS VTI: 2.7 CM2
AV AREA PEAK VELOCITY: 2.6 CM2
AV LVOT MEAN GRADIENT: 3 MMHG
AV LVOT PEAK GRADIENT: 5 MMHG
AV MEAN GRADIENT: 3 MMHG
AV PEAK GRADIENT: 5 MMHG
AV VALVE AREA: 2.7 CM2
AV VELOCITY RATIO: 0.93
BUN SERPL-MCNC: 17 MG/DL (ref 5–25)
CALCIUM SERPL-MCNC: 8.6 MG/DL (ref 8.3–10.1)
CARDIAC TROPONIN I PNL SERPL HS: 319 NG/L
CHLORIDE SERPL-SCNC: 105 MMOL/L (ref 100–108)
CO2 SERPL-SCNC: 24 MMOL/L (ref 21–32)
CREAT SERPL-MCNC: 1.16 MG/DL (ref 0.6–1.3)
DOP CALC AO PEAK VEL: 1.16 M/S
DOP CALC AO VTI: 23.33 CM
DOP CALC LVOT AREA: 2.83 CM2
DOP CALC LVOT DIAMETER: 1.9 CM
DOP CALC LVOT PEAK VEL VTI: 22.21 CM
DOP CALC LVOT PEAK VEL: 1.08 M/S
DOP CALC LVOT STROKE INDEX: 29.3 ML/M2
DOP CALC LVOT STROKE VOLUME: 62.94 CM3
E WAVE DECELERATION TIME: 263 MS
FLUAV RNA RESP QL NAA+PROBE: NEGATIVE
FLUBV RNA RESP QL NAA+PROBE: NEGATIVE
FRACTIONAL SHORTENING: 40 % (ref 28–44)
GFR SERPL CREATININE-BSD FRML MDRD: 46 ML/MIN/1.73SQ M
GLUCOSE SERPL-MCNC: 104 MG/DL (ref 65–140)
INTERVENTRICULAR SEPTUM IN DIASTOLE (PARASTERNAL SHORT AXIS VIEW): 1.3 CM
INTERVENTRICULAR SEPTUM: 1.3 CM (ref 0.6–1.1)
LEFT ATRIUM SIZE: 3.9 CM
LEFT INTERNAL DIMENSION IN SYSTOLE: 1.8 CM (ref 2.1–4)
LEFT VENTRICULAR INTERNAL DIMENSION IN DIASTOLE: 3 CM (ref 3.5–6)
LEFT VENTRICULAR POSTERIOR WALL IN END DIASTOLE: 1.3 CM
LEFT VENTRICULAR STROKE VOLUME: 26 ML
LVSV (TEICH): 26 ML
MV E'TISSUE VEL-LAT: 8 CM/S
MV E'TISSUE VEL-SEP: 9 CM/S
MV PEAK A VEL: 0.55 M/S
MV PEAK E VEL: 40 CM/S
P AXIS: 79 DEGREES
POTASSIUM SERPL-SCNC: 4.5 MMOL/L (ref 3.5–5.3)
PR INTERVAL: 174 MS
QRS AXIS: 73 DEGREES
QRSD INTERVAL: 80 MS
QT INTERVAL: 380 MS
QTC INTERVAL: 449 MS
RSV RNA RESP QL NAA+PROBE: NEGATIVE
SARS-COV-2 RNA RESP QL NAA+PROBE: NEGATIVE
SL CV LV EF: 75
SL CV PED ECHO LEFT VENTRICLE DIASTOLIC VOLUME (MOD BIPLANE) 2D: 36 ML
SL CV PED ECHO LEFT VENTRICLE SYSTOLIC VOLUME (MOD BIPLANE) 2D: 10 ML
SODIUM SERPL-SCNC: 138 MMOL/L (ref 136–145)
T WAVE AXIS: 66 DEGREES
TR MAX PG: 34 MMHG
TR PEAK VELOCITY: 2.9 M/S
TRICUSPID VALVE PEAK REGURGITATION VELOCITY: 2.93 M/S
VENTRICULAR RATE: 84 BPM

## 2022-05-18 PROCEDURE — 80048 BASIC METABOLIC PNL TOTAL CA: CPT | Performed by: INTERNAL MEDICINE

## 2022-05-18 PROCEDURE — 93970 EXTREMITY STUDY: CPT

## 2022-05-18 PROCEDURE — 93306 TTE W/DOPPLER COMPLETE: CPT

## 2022-05-18 PROCEDURE — 36415 COLL VENOUS BLD VENIPUNCTURE: CPT | Performed by: INTERNAL MEDICINE

## 2022-05-18 PROCEDURE — 93970 EXTREMITY STUDY: CPT | Performed by: SURGERY

## 2022-05-18 PROCEDURE — 99232 SBSQ HOSP IP/OBS MODERATE 35: CPT | Performed by: INTERNAL MEDICINE

## 2022-05-18 PROCEDURE — 99223 1ST HOSP IP/OBS HIGH 75: CPT | Performed by: INTERNAL MEDICINE

## 2022-05-18 PROCEDURE — 85730 THROMBOPLASTIN TIME PARTIAL: CPT | Performed by: INTERNAL MEDICINE

## 2022-05-18 PROCEDURE — 99223 1ST HOSP IP/OBS HIGH 75: CPT | Performed by: NURSE PRACTITIONER

## 2022-05-18 PROCEDURE — 93306 TTE W/DOPPLER COMPLETE: CPT | Performed by: INTERNAL MEDICINE

## 2022-05-18 PROCEDURE — 93010 ELECTROCARDIOGRAM REPORT: CPT | Performed by: INTERNAL MEDICINE

## 2022-05-18 PROCEDURE — 99024 POSTOP FOLLOW-UP VISIT: CPT | Performed by: OBSTETRICS & GYNECOLOGY

## 2022-05-18 RX ORDER — OXYCODONE HYDROCHLORIDE 5 MG/1
5 TABLET ORAL EVERY 6 HOURS PRN
Status: DISCONTINUED | OUTPATIENT
Start: 2022-05-18 | End: 2022-05-19 | Stop reason: HOSPADM

## 2022-05-18 RX ORDER — ONDANSETRON 2 MG/ML
4 INJECTION INTRAMUSCULAR; INTRAVENOUS EVERY 6 HOURS PRN
Status: DISCONTINUED | OUTPATIENT
Start: 2022-05-18 | End: 2022-05-19 | Stop reason: HOSPADM

## 2022-05-18 RX ORDER — AMLODIPINE BESYLATE 5 MG/1
5 TABLET ORAL DAILY
Status: DISCONTINUED | OUTPATIENT
Start: 2022-05-19 | End: 2022-05-19 | Stop reason: HOSPADM

## 2022-05-18 RX ORDER — ACETAMINOPHEN 325 MG/1
650 TABLET ORAL EVERY 6 HOURS PRN
Status: DISCONTINUED | OUTPATIENT
Start: 2022-05-18 | End: 2022-05-19 | Stop reason: HOSPADM

## 2022-05-18 RX ORDER — SENNOSIDES 8.6 MG
1 TABLET ORAL
Status: DISCONTINUED | OUTPATIENT
Start: 2022-05-18 | End: 2022-05-19 | Stop reason: HOSPADM

## 2022-05-18 RX ORDER — LORAZEPAM 1 MG/1
1 TABLET ORAL EVERY 8 HOURS PRN
Status: DISCONTINUED | OUTPATIENT
Start: 2022-05-18 | End: 2022-05-19 | Stop reason: HOSPADM

## 2022-05-18 RX ORDER — MAGNESIUM HYDROXIDE/ALUMINUM HYDROXICE/SIMETHICONE 120; 1200; 1200 MG/30ML; MG/30ML; MG/30ML
30 SUSPENSION ORAL EVERY 6 HOURS PRN
Status: DISCONTINUED | OUTPATIENT
Start: 2022-05-18 | End: 2022-05-19 | Stop reason: HOSPADM

## 2022-05-18 RX ORDER — PANTOPRAZOLE SODIUM 40 MG/1
40 TABLET, DELAYED RELEASE ORAL
Status: DISCONTINUED | OUTPATIENT
Start: 2022-05-18 | End: 2022-05-19 | Stop reason: HOSPADM

## 2022-05-18 RX ORDER — OXYCODONE HYDROCHLORIDE 5 MG/1
2.5 TABLET ORAL EVERY 6 HOURS PRN
Status: DISCONTINUED | OUTPATIENT
Start: 2022-05-18 | End: 2022-05-19 | Stop reason: HOSPADM

## 2022-05-18 RX ORDER — LOPERAMIDE HYDROCHLORIDE 2 MG/1
2 CAPSULE ORAL 2 TIMES DAILY PRN
Status: DISCONTINUED | OUTPATIENT
Start: 2022-05-18 | End: 2022-05-19 | Stop reason: HOSPADM

## 2022-05-18 RX ORDER — AMLODIPINE BESYLATE 10 MG/1
10 TABLET ORAL DAILY
Status: DISCONTINUED | OUTPATIENT
Start: 2022-05-18 | End: 2022-05-18

## 2022-05-18 RX ORDER — LANOLIN ALCOHOL/MO/W.PET/CERES
3 CREAM (GRAM) TOPICAL
Status: DISCONTINUED | OUTPATIENT
Start: 2022-05-18 | End: 2022-05-19 | Stop reason: HOSPADM

## 2022-05-18 RX ORDER — HYDROCHLOROTHIAZIDE 12.5 MG/1
12.5 TABLET ORAL DAILY
Status: DISCONTINUED | OUTPATIENT
Start: 2022-05-18 | End: 2022-05-18

## 2022-05-18 RX ADMIN — HYDROCHLOROTHIAZIDE 12.5 MG: 12.5 TABLET ORAL at 08:32

## 2022-05-18 RX ADMIN — PANTOPRAZOLE SODIUM 40 MG: 40 TABLET, DELAYED RELEASE ORAL at 05:49

## 2022-05-18 RX ADMIN — HEPARIN SODIUM 15 UNITS/KG/HR: 10000 INJECTION, SOLUTION INTRAVENOUS at 11:05

## 2022-05-18 NOTE — CONSULTS
Patient was visited , sister presence, provided listening support, pastoral presence  Sister Karen Chacko, pastoral care

## 2022-05-18 NOTE — PLAN OF CARE
Problem: Potential for Falls  Goal: Patient will remain free of falls  Description: INTERVENTIONS:  - Educate patient/family on patient safety including physical limitations  - Instruct patient to call for assistance with activity   - Consult OT/PT to assist with strengthening/mobility   - Keep Call bell within reach  - Keep bed low and locked with side rails adjusted as appropriate  - Keep care items and personal belongings within reach  - Initiate and maintain comfort rounds  - Make Fall Risk Sign visible to staff  - Apply yellow socks and bracelet for high fall risk patients  - Consider moving patient to room near nurses station  Outcome: Progressing     Problem: Nutrition/Hydration-ADULT  Goal: Nutrient/Hydration intake appropriate for improving, restoring or maintaining nutritional needs  Description: Monitor and assess patient's nutrition/hydration status for malnutrition  Collaborate with interdisciplinary team and initiate plan and interventions as ordered  Monitor patient's weight and dietary intake as ordered or per policy  Utilize nutrition screening tool and intervene as necessary  Determine patient's food preferences and provide high-protein, high-caloric foods as appropriate       INTERVENTIONS:  - Monitor oral intake, urinary output, labs, and treatment plans  - Assess nutrition and hydration status and recommend course of action  - Evaluate amount of meals eaten  - Assist patient with eating if necessary   - Allow adequate time for meals  - Recommend/ encourage appropriate diets, oral nutritional supplements, and vitamin/mineral supplements  - Order, calculate, and assess calorie counts as needed  - Recommend, monitor, and adjust tube feedings and TPN/PPN based on assessed needs  - Assess need for intravenous fluids  - Provide specific nutrition/hydration education as appropriate  - Include patient/family/caregiver in decisions related to nutrition  Outcome: Progressing     Problem: MOBILITY - ADULT  Goal: Maintain or return to baseline ADL function  Description: INTERVENTIONS:  -  Assess patient's ability to carry out ADLs; assess patient's baseline for ADL function and identify physical deficits which impact ability to perform ADLs (bathing, care of mouth/teeth, toileting, grooming, dressing, etc )  - Assess/evaluate cause of self-care deficits   - Assess range of motion  - Assess patient's mobility; develop plan if impaired  - Assess patient's need for assistive devices and provide as appropriate  - Encourage maximum independence but intervene and supervise when necessary  - Involve family in performance of ADLs  - Assess for home care needs following discharge   - Consider OT consult to assist with ADL evaluation and planning for discharge  - Provide patient education as appropriate  Outcome: Progressing  Goal: Maintains/Returns to pre admission functional level  Description: INTERVENTIONS:  - Perform BMAT or MOVE assessment daily    - Set and communicate daily mobility goal to care team and patient/family/caregiver     - Collaborate with rehabilitation services on mobility goals if consulted  - Out of bed for toileting  - Record patient progress and toleration of activity level   Outcome: Progressing     Problem: PAIN - ADULT  Goal: Verbalizes/displays adequate comfort level or baseline comfort level  Description: Interventions:  - Encourage patient to monitor pain and request assistance  - Assess pain using appropriate pain scale  - Administer analgesics based on type and severity of pain and evaluate response  - Implement non-pharmacological measures as appropriate and evaluate response  - Consider cultural and social influences on pain and pain management  - Notify physician/advanced practitioner if interventions unsuccessful or patient reports new pain  Outcome: Progressing     Problem: SAFETY ADULT  Goal: Patient will remain free of falls  Description: INTERVENTIONS:  - Educate patient/family on patient safety including physical limitations  - Instruct patient to call for assistance with activity   - Consult OT/PT to assist with strengthening/mobility   - Keep Call bell within reach  - Keep bed low and locked with side rails adjusted as appropriate  - Keep care items and personal belongings within reach  - Initiate and maintain comfort rounds  - Make Fall Risk Sign visible to staff  - Apply yellow socks and bracelet for high fall risk patients  - Consider moving patient to room near nurses station  Outcome: Progressing  Goal: Maintain or return to baseline ADL function  Description: INTERVENTIONS:  -  Assess patient's ability to carry out ADLs; assess patient's baseline for ADL function and identify physical deficits which impact ability to perform ADLs (bathing, care of mouth/teeth, toileting, grooming, dressing, etc )  - Assess/evaluate cause of self-care deficits   - Assess range of motion  - Assess patient's mobility; develop plan if impaired  - Assess patient's need for assistive devices and provide as appropriate  - Encourage maximum independence but intervene and supervise when necessary  - Involve family in performance of ADLs  - Assess for home care needs following discharge   - Consider OT consult to assist with ADL evaluation and planning for discharge  - Provide patient education as appropriate  Outcome: Progressing  Goal: Maintains/Returns to pre admission functional level  Description: INTERVENTIONS:  - Perform BMAT or MOVE assessment daily    - Set and communicate daily mobility goal to care team and patient/family/caregiver     - Collaborate with rehabilitation services on mobility goals if consulted  - Out of bed for toileting  - Record patient progress and toleration of activity level   Outcome: Progressing     Problem: DISCHARGE PLANNING  Goal: Discharge to home or other facility with appropriate resources  Description: INTERVENTIONS:  - Identify barriers to discharge w/patient and caregiver  - Arrange for needed discharge resources and transportation as appropriate  - Identify discharge learning needs (meds, wound care, etc )  - Arrange for interpretive services to assist at discharge as needed  - Refer to Case Management Department for coordinating discharge planning if the patient needs post-hospital services based on physician/advanced practitioner order or complex needs related to functional status, cognitive ability, or social support system  Outcome: Progressing     Problem: Knowledge Deficit  Goal: Patient/family/caregiver demonstrates understanding of disease process, treatment plan, medications, and discharge instructions  Description: Complete learning assessment and assess knowledge base    Interventions:  - Provide teaching at level of understanding  - Provide teaching via preferred learning methods  Outcome: Progressing     Problem: RESPIRATORY - ADULT  Goal: Achieves optimal ventilation and oxygenation  Description: INTERVENTIONS:  - Assess for changes in respiratory status  - Assess for changes in mentation and behavior  - Position to facilitate oxygenation and minimize respiratory effort  - Oxygen administered by appropriate delivery if ordered  - Initiate smoking cessation education as indicated  - Encourage broncho-pulmonary hygiene including cough, deep breathe, Incentive Spirometry  - Assess the need for suctioning and aspirate as needed  - Assess and instruct to report SOB or any respiratory difficulty  - Respiratory Therapy support as indicated  Outcome: Progressing

## 2022-05-18 NOTE — ASSESSMENT & PLAN NOTE
Presents with 1 day h/o fatigue, busby, left chest pain & syncope today    · CTA PE with bl PE massive clot burden  · VTE heparin gtt initiated  · Echo ordered  · Pain management  · NOAC prices placed in dc tab for price check  · Tele

## 2022-05-18 NOTE — ED PROVIDER NOTES
History  Chief Complaint   Patient presents with    Syncope     Pt states feeling dizzy since this morning and passed out  Per EMS, family started doing CPR  A&O upon EMS arrival  Pt c/o dizziness, sob, nausea, and chest pain  Started chemo last week  HPI  Patient is a 68-year-old female with history of CKD, hypertension, recent diagnosis of endometrial cancer with chemo started 6 days ago presenting with syncope  Patient states that she was sitting in a chair and while getting up and moving to a different chair she felt like she had to faint and called out her sister who immediately came to her aid and saw her unconscious  Upon realizing that patient was unresponsive sister initiated CPR and states that the loss of consciousness lasted about 2 minutes where she returned to her baseline with no postictal like symptoms  Since regaining consciousness patient has been complaining of constant chest pain as well as shortness of breath  Upon arrival patient has been satting in the 80s and required nasal cannula  Patient states that she did notice right-sided leg swelling more so than the left  Has no history of prior DVTs or PE  Prior to Admission Medications   Prescriptions Last Dose Informant Patient Reported? Taking?    LORazepam (ATIVAN) 1 mg tablet   No Yes   Sig: Take 1 tablet (1 mg total) by mouth every 8 (eight) hours as needed for anxiety (nausea or anxiety)   acetaminophen (TYLENOL) 650 mg CR tablet   No Yes   Sig: Take 1 tablet (650 mg total) by mouth every 6 (six) hours as needed for mild pain   amLODIPine (NORVASC) 10 mg tablet   Yes Yes   Sig: Take 10 mg by mouth daily   denosumab (Prolia) 60 mg/mL More than a month at Unknown time  Yes No   Sig: Inject 1 mL under the skin every 6 (six) months   fluticasone (FLONASE) 50 mcg/act nasal spray   Yes No   Si sprays into each nostril daily as needed     hydrochlorothiazide (HYDRODIURIL) 12 5 mg tablet   Yes Yes   Sig: Take 12 5 mg by mouth daily omeprazole (PriLOSEC) 40 MG capsule   Yes Yes   Sig: Take 20 mg by mouth daily     ondansetron (ZOFRAN) 8 mg tablet   No Yes   Sig: Take 1 tablet (8 mg total) by mouth every 8 (eight) hours as needed for nausea or vomiting      Facility-Administered Medications: None       Past Medical History:   Diagnosis Date    Arthritis     osteo    Chronic kidney disease     Colon polyp     Diverticula of colon     Hypertension     Obesity     Rhinitis        Past Surgical History:   Procedure Laterality Date    APPENDECTOMY      BREAST BIOPSY Left 1977    benign    BREAST SURGERY Left     biopsy    CHOLECYSTECTOMY      COLONOSCOPY      CYSTOSCOPY N/A 3/31/2022    Procedure: CYSTOSCOPY;  Surgeon: Rich Rogers MD;  [facility]: [facility];  Service: Gynecology Oncology    HERNIA REPAIR Right     inguinal    HYSTERECTOMY      IR PORT PLACEMENT  5/9/2022    JOINT REPLACEMENT Right     Knee    NM COLONOSCOPY FLX DX W/COLLJ SPEC WHEN PFRMD N/A 2/21/2017    Procedure: COLONOSCOPY with polypectomy and hemo clip marjan ;  Surgeon: Delio Rivera MD;  Location: [facility];   Service: Gastroenterology    NM LAPAROSCOPY W TOT HYSTERECTUTERUS <=[address] [place] [place] TUBE/OVARY N/A 3/31/2022    Procedure: ROBOTIC HYSTERECTOMY, BILATERAL SALPINGO-OOPHORECTOMY, STAGING PERITONEAL AND OMENTAL BIOPSIES, BILATERAL PELVIC SENTINEL LYMPH NODE BIOPSIES;  Surgeon: Rich Rogers MD;  [facility];  Service: Gynecology Oncology       Family History   Problem Relation Age of Onset    Heart disease Mother     Prostate cancer Father 80    Diabetes Sister     No Known Problems Maternal Grandmother     No Known Problems Maternal Grandfather     No Known Problems Paternal Grandmother     No Known Problems Paternal Grandfather     Hypertension Sister     Transient ischemic attack Sister     No Known Problems Sister     No Known Problems Maternal Aunt     No Known Problems Paternal Aunt     No Known Problems Paternal Aunt     No Known Problems Paternal Aunt     Diabetes Brother     Heart disease Brother     Stomach cancer Other     Thyroid disease Other      I have reviewed and agree with the history as documented  E-Cigarette/Vaping    E-Cigarette Use Never User      E-Cigarette/Vaping Substances     Social History     Tobacco Use    Smoking status: Never Smoker    Smokeless tobacco: Never Used   Vaping Use    Vaping Use: Never used   Substance Use Topics    Alcohol use: Yes     Alcohol/week: 1 0 standard drink     Types: 1 Glasses of wine per week     Comment: social-seldom    Drug use: Never        Review of Systems   Constitutional: Negative for chills, diaphoresis, fever and unexpected weight change  HENT: Negative for ear pain and sore throat  Eyes: Negative for visual disturbance  Respiratory: Positive for shortness of breath  Negative for cough and chest tightness  Cardiovascular: Positive for chest pain  Negative for leg swelling  Gastrointestinal: Negative for abdominal distention, abdominal pain, constipation, diarrhea, nausea and vomiting  Endocrine: Negative  Genitourinary: Negative for difficulty urinating and dysuria  Musculoskeletal: Negative  Skin: Negative  Allergic/Immunologic: Negative  Neurological: Positive for syncope  Hematological: Negative  Psychiatric/Behavioral: Negative  All other systems reviewed and are negative        Physical Exam  ED Triage Vitals [05/17/22 1928]   Temperature Pulse Respirations Blood Pressure SpO2   97 7 °F (36 5 °C) 85 20 140/64 (!) 87 %      Temp Source Heart Rate Source Patient Position - Orthostatic VS BP Location FiO2 (%)   Oral Monitor Sitting Left arm --      Pain Score       --             Orthostatic Vital Signs  Vitals:    05/17/22 2000 05/17/22 2130 05/17/22 2230 05/17/22 2330   BP: 114/58 104/58 124/63 117/80   Pulse: 86 86 86 82   Patient Position - Orthostatic VS: Lying Lying Sitting Lying       Physical Exam  Vitals and nursing note reviewed  Constitutional:       General: She is not in acute distress  Appearance: Normal appearance  She is not ill-appearing  HENT:      Head: Normocephalic and atraumatic  Right Ear: External ear normal       Left Ear: External ear normal       Nose: Nose normal       Mouth/Throat:      Mouth: Mucous membranes are moist       Pharynx: Oropharynx is clear  Eyes:      General: No scleral icterus  Right eye: No discharge  Left eye: No discharge  Extraocular Movements: Extraocular movements intact  Conjunctiva/sclera: Conjunctivae normal       Pupils: Pupils are equal, round, and reactive to light  Cardiovascular:      Rate and Rhythm: Normal rate and regular rhythm  Pulses: Normal pulses  Heart sounds: Normal heart sounds  Pulmonary:      Effort: Pulmonary effort is normal       Breath sounds: Normal breath sounds  Abdominal:      General: Abdomen is flat  Bowel sounds are normal  There is no distension  Palpations: Abdomen is soft  Tenderness: There is no abdominal tenderness  There is no guarding or rebound  Musculoskeletal:         General: Normal range of motion  Cervical back: Normal range of motion and neck supple  Right lower leg: Edema present  Left lower leg: Edema present  Skin:     General: Skin is warm and dry  Capillary Refill: Capillary refill takes less than 2 seconds  Neurological:      General: No focal deficit present  Mental Status: She is alert and oriented to person, place, and time  Mental status is at baseline  Psychiatric:         Mood and Affect: Mood normal          Behavior: Behavior normal          Thought Content:  Thought content normal          Judgment: Judgment normal          ED Medications  Medications   heparin (porcine) 25,000 units in 0 45% NaCl 250 mL infusion (premix) (18 Units/kg/hr × 120 kg (Order-Specific) Intravenous New Bag 5/17/22 5723) heparin (porcine) injection 9,600 Units (has no administration in time range)   heparin (porcine) injection 4,800 Units (has no administration in time range)   sodium chloride 0 9 % bolus 1,000 mL (0 mL Intravenous Stopped 5/17/22 2152)   iohexol (OMNIPAQUE) 350 MG/ML injection (MULTI-DOSE) 70 mL (70 mL Intravenous Given 5/17/22 2043)   potassium chloride (K-DUR,KLOR-CON) CR tablet 40 mEq (40 mEq Oral Given 5/17/22 2117)   heparin (porcine) injection 9,600 Units (9,600 Units Intravenous Given 5/17/22 2201)       Diagnostic Studies  Results Reviewed     Procedure Component Value Units Date/Time    COVID/FLU/RSV [[id number]]  (Normal) Collected: 05/17/22 2327    Lab Status: Final result Specimen: Nares from Nose Updated: 05/18/22 0038     SARS-CoV-2 Negative     INFLUENZA A PCR Negative     INFLUENZA B PCR Negative     RSV PCR Negative    Narrative:      FOR PEDIATRIC PATIENTS - copy/paste COVID Guidelines URL to browser: [email or web address]  [employer]    SARS-CoV-2 assay is a Nucleic Acid Amplification assay intended for the  qualitative detection of nucleic acid from SARS-CoV-2 in nasopharyngeal  swabs  Results are for the presumptive identification of SARS-CoV-2 RNA  Positive results are indicative of infection with SARS-CoV-2, the virus  causing COVID-19, but do not rule out bacterial infection or co-infection  with other viruses  Laboratories within the [place] and its  territories are required to report all positive results to the appropriate  public health authorities  Negative results do not preclude SARS-CoV-2  infection and should not be used as the sole basis for treatment or other  patient management decisions  Negative results must be combined with  clinical observations, patient history, and epidemiological information  This test has not been FDA cleared or approved  This test has been authorized by FDA under an Emergency Use Authorization  (EUA)  This test is only authorized for the duration of time the  declaration that circumstances exist justifying the authorization of the  emergency use of an in vitro diagnostic tests for detection of SARS-CoV-2  virus and/or diagnosis of COVID-19 infection under section 564(b)(1) of  the Act, 21 U  S C  080LPV-9(A)(9), unless the authorization is terminated  or revoked sooner  The test has been validated but independent review by FDA  and CLIA is pending  Test performed using [employer] Gene[employer]pert: This RT-PCR assay targets N2,  a region unique to SARS-CoV-2  A conserved region in the E-gene was chosen  for pan-Sarbecovirus detection which includes SARS-CoV-2      HS Troponin I 4hr [[id number]]  (Abnormal) Collected: 05/17/22 2352    Lab Status: Final result Specimen: Blood from Arm, Left Updated: 05/18/22 0028     hs TnI 4hr 319 ng/L      Delta 4hr hsTnI 296 ng/L     HS Troponin I 2hr [[id number]]  (Abnormal) Collected: 05/17/22 2200    Lab Status: Final result Specimen: Blood from Arm, Left Updated: 05/17/22 2256     hs TnI 2hr 201 ng/L      Delta 2hr hsTnI 178 ng/L     APTT [[id number]]  (Normal) Collected: 05/17/22 2130    Lab Status: Final result Specimen: Blood from Arm, Left Updated: 05/17/22 2154     PTT 27 seconds     Protime-INR [[id number]]  (Normal) Collected: 05/17/22 2130    Lab Status: Final result Specimen: Blood from Arm, Left Updated: 05/17/22 2154     Protime 14 1 seconds      INR 1 13    Manual Differential(PHLEBS Do Not Order) [[id number]]  (Abnormal) Collected: 05/17/22 1958    Lab Status: Final result Specimen: Blood from Arm, Left Updated: 05/17/22 2046     Segmented % 39 %      Bands % 1 %      Lymphocytes % 45 %      Monocytes % 4 %      Eosinophils, % 11 %      Basophils % 0 %      Absolute Neutrophils 2 02 Thousand/uL      Lymphocytes Absolute 2 27 Thousand/uL      Monocytes Absolute 0 20 Thousand/uL      Eosinophils Absolute 0 56 Thousand/uL      Basophils Absolute 0 00 Thousand/uL      Total Counted --     RBC Morphology Normal     Platelet Estimate Decreased    CBC and differential [[id number]]  (Abnormal) Collected: 05/17/22 1958    Lab Status: Final result Specimen: Blood from Arm, Left Updated: 05/17/22 2046     WBC 5 05 Thousand/uL      RBC 3 77 Million/uL      Hemoglobin 12 1 g/dL      Hematocrit 37 0 %      MCV 98 fL      MCH 32 1 pg      MCHC 32 7 g/dL      RDW 13 2 %      MPV 12 0 fL      Platelets 98 Thousands/uL     HS Troponin 0hr (reflex protocol) [[id number]]  (Normal) Collected: 05/17/22 1958    Lab Status: Final result Specimen: Blood from Arm, Left Updated: 05/17/22 2033     hs TnI 0hr 23 ng/L     Comprehensive metabolic panel [[id number]]  (Abnormal) Collected: 05/17/22 1958    Lab Status: Final result Specimen: Blood from Arm, Left Updated: 05/17/22 2030     Sodium 137 mmol/L      Potassium 3 0 mmol/L      Chloride 99 mmol/L      CO2 28 mmol/L      ANION GAP 10 mmol/L      BUN 20 mg/dL      Creatinine 1 52 mg/dL      Glucose 154 mg/dL      Calcium 8 6 mg/dL      Corrected Calcium 9 2 mg/dL      AST 38 U/L      ALT 54 U/L      Alkaline Phosphatase 79 U/L      Total Protein 7 7 g/dL      Albumin 3 3 g/dL      Total Bilirubin 0 51 mg/dL      eGFR 33 ml/min/1 73sq m     Narrative:      Ricardo guidelines for Chronic Kidney Disease (CKD):     Stage 1 with normal or high GFR (GFR > 90 mL/min/1 73 square meters)    Stage 2 Mild CKD (GFR = 60-89 mL/min/1 73 square meters)    Stage 3A Moderate CKD (GFR = 45-59 mL/min/1 73 square meters)    Stage 3B Moderate CKD (GFR = 30-44 mL/min/1 73 square meters)    Stage 4 Severe CKD (GFR = 15-29 mL/min/1 73 square meters)    Stage 5 End Stage CKD (GFR <15 mL/min/1 73 square meters)  Note: GFR calculation is accurate only with a steady state creatinine                 CTA ED chest PE Study   Final Result by Shannon Mendiola MD (05/17 2109)         1    Acute pulmonary thromboembolism bilaterally with extensive clot burden in the right and left pulmonary arteries, lobar arteries and segmental arteries with an RV/LV ratio of 1 5 indicative of right heart strain  Please see comments below  The calculated ratio of right ventricular to left ventricular diameter (RV/LV ratio) is 1 5      This is greater than 0 9, which is abnormal and indicates right heart strain  An abnormal RV/LV ratio has been shown to be associated with an increased risk of 30 day mortality in the setting of acute pulmonary embolism  Urgent consultation with the    medical critical care team is recommended  Findings were discussed with Dr Jl Newton in the emergency department at the time of this dictation              Workstation performed: [id number]               Procedures  Procedures      ED Course  ED Course as of 05/18/22 0104   Tue May 17, 2022   2107 Extensive PE bilaterally                                 Wells' Criteria for PE    Flowsheet Row Most Recent Value   Wells' Criteria for PE    Clinical signs and symptoms of DVT 3 Filed at: 05/17/2022 2001   PE is primary diagnosis or equally likely 3 Filed at: 05/17/2022 2001   HR >100 0 Filed at: 05/17/2022 2001   Immobilization at least 3 days or Surgery in the previous 4 weeks 0 Filed at: 05/17/2022 2001   Previous, objectively diagnosed PE or DVT 0 Filed at: 05/17/2022 2001   Hemoptysis 0 Filed at: 05/17/2022 2001   Malignancy with treatment within 6 months or palliative 1 Filed at: 05/17/2022 2001   Lee Siddiqui' Criteria Total 7 Filed at: 05/17/2022 2001            MDM  Number of Diagnoses or Management Options  Bilateral pulmonary embolism ([facility] [address] )  Syncope  Diagnosis management comments:    Patient is a 66-year-old female presenting with syncope, chest pain, shortness of breath  With patient's history of cancer concerning for acute PE  Cardiac work-up obtained as well as PE study  CT reveals bilateral PE with significant clot burden in the pulmonary arteries with RV to LV ratio of 1 5  Fluids initiated  Potassium at 3 so administered potassium tablets  Spoke with ICU attending Chasity Loredo who stated that with patient's stable vitals and her only oxygen requirement being nasal cannula that she is eligible to be admitted to general medicine and on heparin drip  Heparin drip initiated can patient to be admitted to [facility]    Disposition  Final diagnoses:   Syncope   Bilateral pulmonary embolism ([facility] [address] )     Time reflects when diagnosis was documented in both MDM as applicable and the Disposition within this note     Time User Action Codes Description Comment    5/17/2022 10:14 PM Carina Farfan Add [R55] Syncope     5/17/2022 10:14 PM Carina Gambler Add [I26 99] Bilateral pulmonary embolism ([facility] [address] )     5/17/2022 10:53 PM Lina Moses Add [I26 09] Cor pulmonale, acute ([facility] [address] )     5/17/2022 10:59 PM John Rivera [C54 1] Endometrial cancer ([facility] [address] )     5/17/2022 10:59 PM Demo, [id number] [place] Wound dehiscence       ED Disposition     ED Disposition   Admit    Condition   Stable    Date/Time   Tue May 17, 2022 10:14 PM    Comment   Case was discussed with Dr Mansi Napier and the patient's admission status was agreed to be Admission Status: inpatient status to the service of Dr Mansi Napier   Follow-up Information    None         Patient's Medications   Discharge Prescriptions    No medications on file     No discharge procedures on file  PDMP Review       Value Time User    PDMP Reviewed  Yes 5/11/2022  3:20 PM Dimitris Dorsey PA-C           ED Provider  Attending physically available and evaluated Sg Scherer I managed the patient along with the ED Attending      Electronically Signed by         Sarah Salas MD  05/18/22 0101

## 2022-05-18 NOTE — ASSESSMENT & PLAN NOTE
Likely secondary to known malignancy   Appreciate SLIM managing acute treatment  Agree with Cardiology consultation   Will likely need anticoagulation for at least six months after acute treatment

## 2022-05-18 NOTE — PROGRESS NOTES
Pastoral Care Progress Note    2022  Patient: Tom Morgan : 1950  Admission Date & Time: 2022  MRN: [id number] [facility]: [id number]        Patient visited by volunteer , patient will be admitted to hospital, sister presence, will continue to follow patient

## 2022-05-18 NOTE — CONSULTS
CONSULT - Gynecology/Onccology  Sg Felipe 74 y o  female MRN: [id number]  Unit/Bed#: ED 15 Encounter: [id number]    Assessment/Plan:  Endometrial cancer [facility])  Assessment & Plan  S/p staging 3/31/22   Post op course complicated by wound dehiscence/breakdown at central trocar site, now healed  Patient received first cycle of carboplatin/paclitaxel on 5/11/22, tolerated well   Next cycle due 5/23, will likely need to place on hold for now given acute PE     * Acute pulmonary embolism with acute cor pulmonale (HCC)  Assessment & Plan  Likely secondary to known malignancy   Appreciate SLIM managing acute treatment  Agree with Cardiology consultation   Will likely need anticoagulation for at least six months after acute treatment         Subjective:  Sg Felipe is a 74 y o  female who is currently admitted to [place] [address] with acute bilateral pulmonary embolism in the setting of known, 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  Patient had syncopal episode at home recognized by sister  Problem:  Cancer Staging  Endometrial cancer [facility])  Staging form: Corpus Uteri - Carcinoma, AJCC 8th Edition  - Pathologic stage from 3/31/2022: FIGO Stage IIIC1 - Signed by Nay Cano MD on 4/19/2022      Previous therapy:  Oncology History   Endometrial cancer ([facility] [address] )   3/15/2022 Initial Diagnosis    Endometrial cancer ([facility] [address] )     3/31/2022 -  Cancer Staged    Staging form: Corpus Uteri - Carcinoma, AJCC 8th Edition  - Pathologic stage from 3/31/2022:  FIGO Stage IIIC1 - Signed by Nay Cano MD on 4/19/2022  Histopathologic type: Clear cell adenocarcinoma, NOS  Stage prefix: Initial diagnosis  Histologic grade (G): G3  Histologic grading system: 3 grade system  Residual tumor (R): R0 - None  Pelvic fátima status: Positive  Number of pelvic nodes positive from dissection: 1  Number of pelvic nodes examined during dissection: 2  Lymph node metastasis: Present  Omentectomy performed: Yes  Morcellation performed: No       3/31/2022 Surgery    Robotic hysterectomy, bilateral salpingo oophorectomy, bilateral pelvic sentinel lymph node biopsies, pelvic peritoneal/omental biopsies  Final pathology demonstrated clear cell carcinoma, 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%)  Negative cervix  Negative parametria  1/2 sentinel pelvic lymph nodes positive for malignancy  Negative staging biopsies  Stage IIIC1  No evidence of DNA mismatch repair protein loss  5/11/2022 -  Chemotherapy    palonosetron (ALOXI), 0 25 mg, Intravenous, Once, 1 of 6 cycles  Administration: 0 25 mg (5/11/2022)  fosaprepitant (EMEND) IVPB, 150 mg, Intravenous, Once, 1 of 6 cycles  Administration: 150 mg (5/11/2022)  CARBOplatin (PARAPLATIN) IVPB (GOG AUC DOSING), 352 5 mg, Intravenous, Once, 1 of 6 cycles  Administration: 352 5 mg (5/11/2022)  PACLItaxel (TAXOL) chemo IVPB, 135 mg/m2 = 290 4 mg (77 1 % of original dose 175 mg/m2), Intravenous, Once, 1 of 6 cycles  Dose modification: 135 mg/m2 (original dose 175 mg/m2, Cycle 1, Reason: Other (Must fill in a comment), Comment: prescribed starting dose)  Administration: 290 4 mg (5/11/2022)         Patient ID: Jason Rebollar is a 74 y o  female     Review of Systems   Gastrointestinal: Negative for abdominal pain  Genitourinary: Negative for dysuria and vaginal bleeding  Neurological: Negative for dizziness and headaches  Psychiatric/Behavioral: Negative for confusion  All other systems reviewed and are negative        Current Facility-Administered Medications   Medication Dose Route Frequency Provider Last Rate Last Admin    acetaminophen (TYLENOL) tablet 650 mg  650 mg Oral Q6H PRN Jewels Ribera PA-C        aluminum-magnesium hydroxide-simethicone (MYLANTA) oral suspension 30 mL  30 mL Oral Q6H PRN Jewels Ribera PA-C        amLODIPine (NORVASC) tablet 10 mg  10 mg Oral Daily Jewels Ribera PA-C        heparin (porcine) 25,000 units in 0 45% NaCl 250 mL infusion (premix)  3-30 Units/kg/hr (Order-Specific) Intravenous Titrated Anu Oden PA-C 18 mL/hr at 05/18/22 0644 15 Units/kg/hr at 05/18/22 0644    heparin (porcine) injection 4,800 Units  4,800 Units Intravenous Q1H PRN Jewels Ribera, PA-C        heparin (porcine) injection 9,600 Units  9,600 Units Intravenous Q1H PRN Jewels Hearto, PA-C        hydrochlorothiazide (HYDRODIURIL) tablet 12 5 mg  12 5 mg Oral Daily Jewels Demo, PA-C   12 5 mg at 05/18/22 2045    loperamide (IMODIUM) capsule 2 mg  2 mg Oral BID PRN Jewels Unao, PA-C        LORazepam (ATIVAN) tablet 1 mg  1 mg Oral Q8H PRN Jewels Hearto, PA-C        melatonin tablet 3 mg  3 mg Oral HS PRN Jewels Hearto, PA-C        [facility] injection 4 mg  4 mg Intravenous Q6H PRN Jewels Hearto, PA-C        oxyCODONE (ROXICODONE) IR tablet 2 5 mg  2 5 mg Oral Q6H PRN Jewels Hearto, PA-C        Or    oxyCODONE (ROXICODONE) IR tablet 5 mg  5 mg Oral Q6H PRN Jewels Hearto, PA-C        pantoprazole (PROTONIX) EC tablet 40 mg  40 mg Oral Early Morning Jewels Ribera, PA-C   40 mg at 05/18/22 0549    senna (SENOKOT) tablet 8 6 mg  1 tablet Oral HS PRN Anu Oden PA-C         Current Outpatient Medications   Medication Sig Dispense Refill    acetaminophen (TYLENOL) 650 mg CR tablet Take 1 tablet (650 mg total) by mouth every 6 (six) hours as needed for mild pain 30 tablet 0    amLODIPine (NORVASC) 10 mg tablet Take 10 mg by mouth daily      apixaban (Eliquis) 5 mg Take 2 tablets (10 mg total) by mouth 2 (two) times a day for 7 days, THEN 1 tablet (5 mg total) 2 (two) times a day for 23 days   74 tablet 0    hydrochlorothiazide (HYDRODIURIL) 12 5 mg tablet Take 12 5 mg by mouth daily      LORazepam (ATIVAN) 1 mg tablet Take 1 tablet (1 mg total) by mouth every 8 (eight) hours as needed for anxiety (nausea or anxiety) 30 tablet 0    omeprazole (PriLOSEC) 40 MG capsule Take 20 mg by mouth daily        ondansetron (ZOFRAN) 8 mg tablet Take 1 tablet (8 mg total) by mouth every 8 (eight) hours as needed for nausea or vomiting 20 tablet 1    denosumab (Prolia) 60 mg/mL Inject 1 mL under the skin every 6 (six) months      fluticasone (FLONASE) 50 mcg/act nasal spray 2 sprays into each nostril daily as needed           No Known Allergies    Past Medical History:   Diagnosis Date    Arthritis     osteo    Chronic kidney disease     Colon polyp     Diverticula of colon     Hypertension     Obesity     Rhinitis        Past Surgical History:   Procedure Laterality Date    APPENDECTOMY      BREAST BIOPSY Left 1977    benign    BREAST SURGERY Left     biopsy    CHOLECYSTECTOMY      COLONOSCOPY      CYSTOSCOPY N/A 3/31/2022    Procedure: CYSTOSCOPY;  Surgeon: Bhavya Handy MD;  [facility]: [facility];  Service: Gynecology Oncology    HERNIA REPAIR Right     inguinal    HYSTERECTOMY      IR PORT PLACEMENT  2022    JOINT REPLACEMENT Right     Knee    CA COLONOSCOPY FLX DX W/COLLJ SPEC WHEN PFRMD N/A 2017    Procedure: COLONOSCOPY with polypectomy and hemo clip marjan ;  Surgeon: Kim Chappell MD;  Location: [facility];   Service: Gastroenterology    CA LAPAROSCOPY W TOT HYSTERECTUTERUS <=250 True Constant TUBE/OVARY N/A 3/31/2022    Procedure: ROBOTIC HYSTERECTOMY, BILATERAL SALPINGO-OOPHORECTOMY, STAGING PERITONEAL AND OMENTAL BIOPSIES, BILATERAL PELVIC SENTINEL LYMPH NODE BIOPSIES;  Surgeon: Bhavya Handy MD;  Location: [facility];  Service: Gynecology Oncology       OB History        1    Para   1    Term   1            AB        Living   1       SAB        IAB        Ectopic        Multiple        Live Births   1                 Family History   Problem Relation Age of Onset    Heart disease Mother     Prostate cancer Father 80    Diabetes Sister     No Known Problems Maternal Grandmother     No Known Problems Maternal Grandfather     No Known Problems Paternal Grandmother     No Known Problems Paternal Grandfather     Hypertension Sister     Transient ischemic attack Sister     No Known Problems Sister     No Known Problems Maternal Aunt     No Known Problems Paternal Aunt     No Known Problems Paternal Aunt     No Known Problems Paternal Aunt     Diabetes Brother     Heart disease Brother     Stomach cancer Other     Thyroid disease Other        Objective:    Blood pressure 106/61, pulse 76, temperature 97 7 °F (36 5 °C), temperature source Oral, resp  rate 22, weight 120 kg (265 lb 3 4 oz), SpO2 96 %, not currently breastfeeding  Body mass index is 47 59 kg/m²  Physical Exam  Constitutional:       Appearance: She is obese  She is ill-appearing  She is not diaphoretic  HENT:      Head: Normocephalic and atraumatic  Eyes:      Conjunctiva/sclera: Conjunctivae normal       Pupils: Pupils are equal, round, and reactive to light  Cardiovascular:      Rate and Rhythm: Normal rate  Pulmonary:      Effort: Respiratory distress present  Comments: Nasal cannula in place  Abdominal:      General: There is distension  Palpations: Abdomen is soft  Comments: Five, 8 mm laparoscopic incisions across upper abdomen, central trocar site with evidence of healing, granulation tissue; no evidence of separation  Genitourinary:     Comments: Deferred   Musculoskeletal:      Cervical back: Normal range of motion  Skin:     General: Skin is warm and dry  Neurological:      Mental Status: She is alert  Psychiatric:         Mood and Affect: Mood normal          Behavior: Behavior normal          Thought Content:  Thought content normal          Lab Results   Component Value Date     5 1 03/10/2022     Lab Results   Component Value Date    WBC 5 05 05/17/2022    HGB 12 1 05/17/2022    HCT 37 0 05/17/2022    MCV 98 05/17/2022    PLT 98 (L) 05/17/2022     Lab Results   Component Value Date    K 3 0 (L) 05/17/2022    CL 99 (L) 05/17/2022    CO2 28 05/17/2022    BUN 20 05/17/2022    CREATININE 1 52 (H) 05/17/2022    GLUF 109 (H) 05/16/2022    CALCIUM 8 6 05/17/2022    CORRECTEDCA 9 2 05/17/2022    AST 38 05/17/2022    ALT 54 05/17/2022    ALKPHOS 79 05/17/2022    EGFR 33 05/17/2022     I/O       05/16 0701 05/17 0700 05/17 0701 05/18 0700 05/18 0701 05/19 0700    IV Piggyback  1000     Total Intake(mL/kg)  1000 (8 3)     Net  +1000                  Randa Sellers MD  PGY-3, OBGYN  5/18/2022 8:43 AM

## 2022-05-18 NOTE — ASSESSMENT & PLAN NOTE
· Requiring 2 L NC to maintain goal SpO2 > 90%  · Titrate as needed  · Treatment for Acute PE as above

## 2022-05-18 NOTE — ASSESSMENT & PLAN NOTE
· S/p ROBOTIC HYSTERECTOMY, BILATERAL SALPINGO-OOPHORECTOMY, STAGING PERITONEAL AND OMENTAL BIOPSIES, BILATERAL PELVIC SENTINEL LYMPH NODE BIOPSIES (N/A), CYSTOSCOPY (N/A) by Dr Panchito Pardo on 6/79/0848 without complication  · Started chemo 5/11/22 with paxol & PARAPLATIN  · Symptom management  · Gyn/Onco consult since this is in the window of their surgery post-op recovery period and in case they have preferences for [facility] on dc

## 2022-05-18 NOTE — PROGRESS NOTES
[facility]  Progress Note - Rever Lajime Levels 1950, 74 y o  female MRN: [id number]  Unit/Bed#: ED 15 Encounter: [id number]  Primary Care Provider: Gisella Ness MD   Date and time admitted to hospital: 5/17/2022  7:20 PM    * Acute pulmonary embolism with acute cor pulmonale (HCC)  Assessment & Plan  · CTA chest with bilateral PE, RV/LV ratio 1 5  · Per ER notes case was discussed with Critical Care overnight and did not require ICU admission  · Discussed case with pulmonology  · Will get stat echo to evaluate for RV failure, if present may benefit from IR evaluation  · Will continue heparin drip  · Currently requiring 2 L nasal cannula, will titrate as tolerated  · Likely provoked given patient's endometrial cancer  Per sister at bedside patient has been sitting around a lot lately and does not get around much    Acute respiratory failure with hypoxia [facility])  Assessment & Plan  · Secondary to pulmonary embolism  · Currently requiring 2 L nasal cannula  Will titrate as tolerated    Wound dehiscence  Assessment & Plan  · Stable  Continue local wound care    Endometrial cancer [facility])  Assessment & Plan  · Patient status post hysterectomy and bilateral oophorectomy in March of this year  · Currently on chemotherapy, has received 1 treatment session  · Gyn input appreciated  · Will need continue outpatient care    Stage 3a chronic kidney disease ([address] )  Assessment & Plan  · Creatinine at baseline  Will continue to monitor    Essential hypertension  Assessment & Plan  · BP currently stable  · Continue amlodipine  · HCTZ held for now    VTE Prophylaxis:  Heparin Drip    Patient Centered Rounds: I have performed bedside rounds with nursing staff today      Discussions with Specialists or Other Care Team Provider: yes  Education and Discussions with Family / Patient:  Spoke with patient's sister at bedside regarding plan of care    Current Length of Stay: 1 day(s)    Current Patient Status: Inpatient   Certification Statement: The patient will continue to require additional inpatient hospital stay due to Pulmonary embolism    Discharge Plan:  Pending hospital course    Code Status: Level 1 - Full Code    Subjective:   No overnight events noted  Patient denies any chest pain at this time  Patient does have shortness of breath worse with exertion  Objective:     Vitals:   Temp (24hrs), Av 7 °F (36 5 °C), Min:97 6 °F (36 4 °C), Max:97 7 °F (36 5 °C)    Temp:  [97 6 °F (36 4 °C)-97 7 °F (36 5 °C)] 97 6 °F (36 4 °C)  HR:  [72-86] 72  Resp:  [20-26] 22  BP: (104-140)/(58-80) 106/61  SpO2:  [87 %-99 %] 97 %  Body mass index is 50 15 kg/m²  Input and Output Summary (last 24 hours): Intake/Output Summary (Last 24 hours) at 2022 1122  Last data filed at 2022 2152  Gross per 24 hour   Intake 1000 ml   Output --   Net 1000 ml       Physical Exam:   Physical Exam  Constitutional:       General: She is not in acute distress  Appearance: She is obese  HENT:      Head: Normocephalic and atraumatic  Nose: Nose normal       Mouth/Throat:      Mouth: Mucous membranes are moist    Eyes:      Extraocular Movements: Extraocular movements intact  Conjunctiva/sclera: Conjunctivae normal    Cardiovascular:      Rate and Rhythm: Normal rate and regular rhythm  Pulmonary:      Effort: Pulmonary effort is normal  No respiratory distress  Comments: Slightly tachypneic  Abdominal:      Palpations: Abdomen is soft  Tenderness: There is no abdominal tenderness  Musculoskeletal:         General: No tenderness  Normal range of motion  Cervical back: Normal range of motion and neck supple  Skin:     General: Skin is warm and dry  Neurological:      General: No focal deficit present  Mental Status: She is alert  Mental status is at baseline  Cranial Nerves: No cranial nerve deficit     Psychiatric:         Mood and Affect: Mood normal          Behavior: Behavior normal          Additional Data:     Labs:    Results from last 7 days   Lab Units 05/17/22 1958   WBC Thousand/uL 5 05   HEMOGLOBIN g/dL 12 1   HEMATOCRIT % 37 0   PLATELETS Thousands/uL 98*   LYMPHO PCT % 45*   MONO PCT % 4   EOS PCT % 11*     Results from last 7 days   Lab Units 05/18/22  0851 05/17/22 1958   SODIUM mmol/L 138 137   POTASSIUM mmol/L 4 5 3 0*   CHLORIDE mmol/L 105 99*   CO2 mmol/L 24 28   BUN mg/dL 17 20   CREATININE mg/dL 1 16 1 52*   CALCIUM mg/dL 8 6 8 6   ALK PHOS U/L  --  79   ALT U/L  --  54   AST U/L  --  38     Results from last 7 days   Lab Units 05/17/22  2130   INR  1 13               * I Have Reviewed All Lab Data Listed Above  * Additional Pertinent Lab Tests Reviewed:  [address] Admission  Reviewed    Imaging:  Imaging Reports Reviewed Today Include:  No new imaging    Recent Cultures (last 7 days):           Last 24 Hours Medication List:   Current Facility-Administered Medications   Medication Dose Route Frequency Provider Last Rate    acetaminophen  650 mg Oral Q6H PRN Jewels Demo, PA-C      aluminum-magnesium hydroxide-simethicone  30 mL Oral Q6H PRN Jewels Demo, PA-C      [START ON 5/19/2022] amLODIPine  5 mg Oral Daily Melissa Peguero PA-C      heparin (porcine)  3-30 Units/kg/hr (Order-Specific) Intravenous Titrated Dashawn Zafar PA-C 15 Units/kg/hr (05/18/22 1105)    heparin (porcine)  4,800 Units Intravenous Q1H PRN Jewels Demo, PA-C      heparin (porcine)  9,600 Units Intravenous Q1H PRN Jewels Demo, PA-C      loperamide  2 mg Oral BID PRN Jewels Demo, PA-C      LORazepam  1 mg Oral Q8H PRN Jewels Demo, PA-C      melatonin  3 mg Oral HS PRN Jewels Demo, PA-C      ondansetron  4 mg Intravenous Q6H PRN Jewels Demo, PA-C      oxyCODONE  2 5 mg Oral Q6H PRN Jewels Demo, PA-C      Or    oxyCODONE  5 mg Oral Q6H PRN Jewels Demo, PA-C      pantoprazole  40 mg Oral Early Morning Jewels Demo, PA-C      senna  1 tablet Oral HS PRN Dashawn Zafar PA-C          Today, Patient Was Seen By: Henok Edmonds MD    ** Please Note: Dictation voice to text software may have been used in the creation of this document   **

## 2022-05-18 NOTE — UTILIZATION REVIEW
Initial Clinical Review    Admission: Date/Time/Statement:   Admission Orders (From admission, onward)     Ordered        05/17/22 2214  Inpatient Admission  Once                      Orders Placed This Encounter   Procedures    Inpatient Admission     Standing Status:   Standing     Number of Occurrences:   1     Order Specific Question:   Level of Care     Answer:   Med Surg [16]     Order Specific Question:   Estimated length of stay     Answer:   More than 2 Midnights     Order Specific Question:   Certification     Answer:   I certify that inpatient services are medically necessary for this patient for a duration of greater than two midnights  See H&P and MD Progress Notes for additional information about the patient's course of treatment  ED Arrival Information     Expected   -    Arrival   5/17/2022 19:20    Acuity   Urgent            Means of arrival   Ambulance    Escorted by   [facility] ([address])    Service   Hospitalist    Admission type   Urgent            Arrival complaint   Medical Problem           Chief Complaint   Patient presents with    Syncope     Pt states feeling dizzy since this morning and passed out  Per EMS, family started doing CPR  A&O upon EMS arrival  Pt c/o dizziness, sob, nausea, and chest pain  Started chemo last week  Initial Presentation: 74 y o  female  Presents to ED  Via  EMS  From home after an episode of syncope  Complained of fatigue after walking up stairs, sat down in a chair  Stood  Up after a minute of rest, immediately felt like she   Was going to pass out, and  Family member  Noticed her unresponsive  CPR  Started  By sister  Within a few minutes, patient came   Around,  Started breathing stronger and said  " that hurts"  PMH  Is  HTN,   CKD   Stage 3,   GERD and endometrial cancer, S/P  SERGIO   3/22  CTA  Showed  Bilateral  PE    Admit  Ip with Acute  Pulmonary embolism, wound dehiscence and plan is  Tele, IV  Heparin, monitor labs, 2 DE, pain control, oncology consult, wound care  And  O2  2L to keep sats  >  90 %  Date:       5/18       Day 2:   GYN/oncology consult  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 for leading to an increased risk of venous thromboembolic events  Therefore, and given recent diagnosis and ongoing therapy we can consider this a provoked pulmonary embolus  Agree with therapeutic anticoagulation with heparin and further evaluation/workup as per primary team    S/P  IR port placement  5/9/22 and chemo initiated  5/11  Cardiology consult  History is  Suggestive of orthostasis followed by fall with possible very brief  LOC  Sats  87  %  RA  Monitor orthostatic vital signs  No acute ischemic changes noted on EKG  Progress note  Remains on  O2  2L  NC  Continue  IV heparin  Has persistent shortness of breath, worse  with exertion  Continue current meds/treatment plan        ED Triage Vitals   Temperature Pulse Respirations Blood Pressure SpO2   05/17/22 1928 05/17/22 1928 05/17/22 1928 05/17/22 1928 05/17/22 1928   97 7 °F (36 5 °C) 85 20 140/64 (!) 87 %      Temp Source Heart Rate Source Patient Position - Orthostatic VS BP Location FiO2 (%)   05/17/22 1928 05/17/22 1928 05/17/22 1928 05/17/22 1928 --   Oral Monitor Sitting Left arm       Pain Score       05/18/22 0350       No Pain          Wt Readings from Last 1 Encounters:   05/18/22 127 kg (279 lb 8 7 oz)     Additional Vital Signs:   97 6 °F (36 4 °C) 72 22 106/61 77 97 % -- -- None (Room air) Lying    05/18/22 0832 -- -- -- 106/61 -- -- -- -- -- --   05/18/22 0549 -- 76 22 115/67 -- 96 % 28 2 L/min Nasal cannula Lying   05/18/22 0515 -- 76 -- -- -- 96 % -- -- -- --   05/18/22 0350 -- 78 22 114/59 -- 99 % 28 2 L/min Nasal cannula Lying   05/18/22 0130 -- 80 21 117/66 86 97 % 28 2 L/min Nasal cannula Lying   05/17/22 2330 -- 82 26 Abnormal  117/80 89 95 % 28 2 L/min Nasal cannula Lying   05/17/22 2230 -- 86 22 124/63 87 98 % 28 2 L/min Nasal cannula Sitting 05/17/22 2130 -- 86 22 104/58 78 96 % 28 2 L/min Nasal cannula Lying   05/17/22 2000 -- 86 20 114/58 80 93 % 28 2 L/min Nasal cannula Lying   05/17/22 1943 -- -- -- -- -- -- -- -- Nasal cannula --   05/17/22 1928 97 7 °F (36 5 °C) 85 20 140/64 -- 87 % Abnormal  -- -- -- Sitting       Pertinent Labs/Diagnostic Test Results:   EKG   NSR     HR  80  CTA ED chest PE Study   Final Result by Bonnie Davis MD (05/17 2109)         1  Acute pulmonary thromboembolism bilaterally with extensive clot burden in the right and left pulmonary arteries, lobar arteries and segmental arteries with an RV/LV ratio of 1 5 indicative of right heart strain  Please see comments below  The calculated ratio of right ventricular to left ventricular diameter (RV/LV ratio) is 1 5      This is greater than 0 9, which is abnormal and indicates right heart strain  An abnormal RV/LV ratio has been shown to be associated with an increased risk of 30 day mortality in the setting of acute pulmonary embolism  Urgent consultation with the    medical critical care team is recommended  Findings were discussed with Dr Darryl Arita in the emergency department at the time of this dictation              Workstation performed: [id number]           Results from last 7 days   Lab Units 05/17/22 2327   SARS-COV-2  Negative     Results from last 7 days   Lab Units 05/17/22 1958 05/16/22  1122   WBC Thousand/uL 5 05 4 98   HEMOGLOBIN g/dL 12 1 11 5   HEMATOCRIT % 37 0 35 6   PLATELETS Thousands/uL 98* 91*   BANDS PCT % 1  --          Results from last 7 days   Lab Units 05/18/22  0851 05/17/22 1958 05/16/22  1122   SODIUM mmol/L 138 137 140   POTASSIUM mmol/L 4 5 3 0* 3 9   CHLORIDE mmol/L 105 99* 103   CO2 mmol/L 24 28 26   ANION GAP mmol/L 9 10 11   BUN mg/dL 17 20 24   CREATININE mg/dL 1 16 1 52* 1 50*   EGFR ml/min/1 73sq m 46 33 34   CALCIUM mg/dL 8 6 8 6 8 7   MAGNESIUM mg/dL  --   --  1 6     Results from last 7 days   Lab Units 05/17/22 1958 05/16/22  1122   AST U/L 38 27   ALT U/L 54 30   ALK PHOS U/L 79 63   TOTAL PROTEIN g/dL 7 7 7 0   ALBUMIN g/dL 3 3* 3 2*   TOTAL BILIRUBIN mg/dL 0 51 0 86         Results from last 7 days   Lab Units 05/18/22  0851 05/17/22 1958 05/16/22  1122   GLUCOSE RANDOM mg/dL 104 154* 109           Results from last 7 days   Lab Units 05/17/22  2352 05/17/22 2200 05/17/22 1958   HS TNI 0HR ng/L  --   --  23   HS TNI 2HR ng/L  --  201*  --    HSTNI D2 ng/L  --  178*  --    HS TNI 4HR ng/L 319*  --   --    HSTNI D4 ng/L 296*  --   --          Results from last 7 days   Lab Units 05/18/22  0437 05/17/22  2130   PROTIME seconds  --  14 1   INR   --  1 13   PTT seconds >210* 27               Results from last 7 days   Lab Units 05/17/22  2327   INFLUENZA A PCR  Negative   INFLUENZA B PCR  Negative   RSV PCR  Negative         ED Treatment:   Medication Administration from 05/17/2022 1920 to 05/18/2022 1147       Date/Time Order Dose Route Action Comments     05/17/2022 2152 sodium chloride 0 9 % bolus 1,000 mL 0 mL Intravenous Stopped      05/17/2022 2006 sodium chloride 0 9 % bolus 1,000 mL 1,000 mL Intravenous New Bag      05/17/2022 2043 iohexol (OMNIPAQUE) 350 MG/ML injection (MULTI-DOSE) 70 mL 70 mL Intravenous Given      05/17/2022 2117 potassium chloride (K-DUR,KLOR-CON) CR tablet 40 mEq 40 mEq Oral Given      05/17/2022 2201 heparin (porcine) injection 9,600 Units 9,600 Units Intravenous Given      05/18/2022 1105 heparin (porcine) 25,000 units in 0 45% NaCl 250 mL infusion (premix) 15 Units/kg/hr Intravenous New Bag      05/18/2022 0644 heparin (porcine) 25,000 units in 0 45% NaCl 250 mL infusion (premix) 15 Units/kg/hr Intravenous Restarted      05/18/2022 0538 heparin (porcine) 25,000 units in 0 45% NaCl 250 mL infusion (premix) 0 Units/kg/hr Intravenous Hold      05/17/2022 2208 heparin (porcine) 25,000 units in 0 45% NaCl 250 mL infusion (premix) 18 Units/kg/hr Intravenous New Bag      05/18/2022 0832 amLODIPine (NORVASC) tablet 10 mg 0 mg Oral Hold      05/18/2022 0832 hydrochlorothiazide (HYDRODIURIL) tablet 12 5 mg 12 5 mg Oral Given      05/18/2022 0549 pantoprazole (PROTONIX) EC tablet 40 mg 40 mg Oral Given         Present on Admission:   Endometrial cancer ([facility] [address] )   Stage 3a chronic kidney disease ([facility] [address] )   Essential hypertension   Wound dehiscence   Acute pulmonary embolism with acute cor pulmonale (HCC)   Acute respiratory failure with hypoxia (HCC)      Admitting Diagnosis: Syncope [R55]  Age/Sex: 74 y o  female  Admission Orders:  Scheduled Medications:  [START ON 5/19/2022] amLODIPine, 5 mg, Oral, Daily  pantoprazole, 40 mg, Oral, Early Morning      Continuous IV Infusions:  heparin (porcine), 3-30 Units/kg/hr (Order-Specific), Intravenous, Titrated      PRN Meds:  acetaminophen, 650 mg, Oral, Q6H PRN  aluminum-magnesium hydroxide-simethicone, 30 mL, Oral, Q6H PRN  heparin (porcine), 4,800 Units, Intravenous, Q1H PRN  heparin (porcine), 9,600 Units, Intravenous, Q1H PRN  loperamide, 2 mg, Oral, BID PRN  LORazepam, 1 mg, Oral, Q8H PRN  melatonin, 3 mg, Oral, HS PRN  ondansetron, 4 mg, Intravenous, Q6H PRN  oxyCODONE, 2 5 mg, Oral, Q6H PRN   Or  oxyCODONE, 5 mg, Oral, Q6H PRN  senna, 1 tablet, Oral, HS PRN        IP CONSULT TO GYNECOLOGIC ONCOLOGY  IP CONSULT TO CARDIOLOGY  IP CONSULT TO PASTORAL CARE  IP CONSULT TO CASE MANAGEMENT  IP CONSULT TO PULMONOLOGY    Network Utilization Review Department  ATTENTION: Please call with any questions or concerns to [phone] and carefully listen to the prompts so that you are directed to the right person  All voicemails are confidential   Elle Brown all requests for admission clinical reviews, approved or denied determinations and any other requests to dedicated fax number below belonging to the campus where the patient is receiving treatment   List of dedicated fax numbers for the Facilities:  FACILITY NAME UR FAX NUMBER   ADMISSION DENIALS (Administrative/Medical Necessity) [address] [facility] (Maternity/NICU/Pediatrics) [address] [place] [address],[address] [facility] [address] [place] [address]  [phone]   [address] [place] [address] [facility] [address] [address] [place] [address] [address] [phone]   [address] [phone]

## 2022-05-18 NOTE — PHYSICAL THERAPY NOTE
PHYSICAL THERAPY NOTE          Patient Name: Laureen Renee  [facility]SEBASTIAN Date: 5/18/2022     PT consult received  Chart reviewed  MD recommended to hold PT/OT evals today  Will continue to follow as appropriate   [place] Juarez PT

## 2022-05-18 NOTE — ASSESSMENT & PLAN NOTE
· Secondary to pulmonary embolism  · Currently requiring 2 L nasal cannula    Will titrate as tolerated

## 2022-05-18 NOTE — ED ATTENDING ATTESTATION
5/17/2022  I, Corrinne Ambrosia, MD, saw and evaluated the patient  I have discussed the patient with the resident/non-physician practitioner and agree with the resident's/non-physician practitioner's findings, Plan of Care, and MDM as documented in the resident's/non-physician practitioner's note, except where noted  All available labs and Radiology studies were reviewed  I was present for key portions of any procedure(s) performed by the resident/non-physician practitioner and I was immediately available to provide assistance  At this point I agree with the current assessment done in the Emergency Department  I have conducted an independent evaluation of this patient a history and physical is as follows:  Patient is a 73-year-old female  She has a history of endometrial cancer  She recently started chemotherapy  Today she became near syncopal and actually passed out  Although she fell, she denies injury  Family started CPR on scene  Patient awoke  There is a seizure activity  No postictal phase  She is complaining of left-sided chest pain  No fever or hemoptysis  No cough  She does feel her left leg is been somewhat swollen  She does report shortness of breath  Physical exam:  Obese  No acute distress  Regular rate and rhythm  Clear to auscultation bilaterally  There is some left-sided chest wall tenderness  No crepitus  Abdominal exam is benign  There is some edema to both legs  When leg does not appear to be swollen over the other  Patient is wake alert and oriented  No focal motor sensory complaints  Assessment/plan:  EKG, lab work, CTA of chest rule out pulmonary embolism  Patient will likely require admission    ED Course         Critical Care Time  Procedures

## 2022-05-18 NOTE — ED NOTES
Brief report and tele notification given to Emily, receiving nurse       Manda Darnell RN  05/18/22 2302

## 2022-05-18 NOTE — ED NOTES
Patient declines pain management at this time  RN relayed to utilize call bell if pain increases so we can be ahead of the pain       Get Santiago RN  05/18/22 6241

## 2022-05-18 NOTE — PROGRESS NOTES
05/18/22 1300   Clinical Encounter Type   Visited With Patient   Routine Visit Introduction   Referral From Physician   Referral To    Consult Patient care

## 2022-05-18 NOTE — ASSESSMENT & PLAN NOTE
S/p staging 3/31/22   Post op course complicated by wound dehiscence/breakdown at central trocar site, now healed  Patient received first cycle of carboplatin/paclitaxel on 5/11/22, tolerated well   Next cycle due 5/23, will likely need to place on hold for now given acute PE

## 2022-05-18 NOTE — OCCUPATIONAL THERAPY NOTE
Occupational Therapy         Patient Name: Guerda COLMENARES'A Date: 5/18/2022      Md's orders received  Md recommending holding tx intervention pending testing(i e echo)  Will continue when appropriate  Garrick Madison OT

## 2022-05-18 NOTE — CONSULTS
Consult - Cardiology   Rever Nilda Cardenas 74 y o  female MRN: [id number]  Unit/Bed#: ED 15 Encounter: [id number]        Reason For Consult:  Pulmonary embolism                 Assessment:  Probable syncope:  Hx suggestive of orthostasis followed by fall with possibility of very brief LOC  Bilateral Pulmonary embolism   -CT 5/17/2022:  Acute bilateral PE w/ extensive clot burden in the Rt & Lt pulmonary arteries, lobar arteries, and segmental arteries w/ increased RV:LV ratio indicative of Rt heart strain  Acute hypoxic respiratory failure (POA): d/t above   -87% room air saturation  Nonischemic myocardial injury   -High sensitivity troponin 23, 21, 319   Hypokalemia (POA):  Presenting potassium 3 0  Thrombocytopenia (POA):  Current platelet count 73,146  Endometrial cancer   -s/p hysterectomy/BSO/lymph node & omental biopsy biopsy   -IR port placement 5/9/2022   -Chemotherapy initiated 5/11/2022 (Paraplatin and Paxol)  CKD 3:  Baseline creatinine 1 3-1 5  Hypertension:  Controlled  Obesity:  BMI 47    Discussion / Plan:  # patient with recent history of subjective orthostasis with symptoms of same followed by collapse and possible brief LOC   # effort dyspnea on day of admission and immediately before LOC with discovery of bilateral PE with extensive clot burden              Initiated on heparin in the emergency department ~~> suggest transition to [address] (direct oral anticoagulant) deferring to primary service and approval of surgery suspecting time lapse since surgery to presently be acceptable   Echocardiogram ordered and pending   Hypokalemia (3 0) on arrival - now improved to 4 5 ~~> continue to optimize electrolytes with magnesium and potassium goals respectively not less than 2 0 and 4 0   Check orthostatic blood pressures   With low normal supine blood pressures (-117) and recent orthostasis will discontinue HCTZ and halve amlodipine following trend with further amendment guided by response      History Of Present Illness:  Sg Oscar is a 75-year-old without prior care by cardiologist and cardiac problems limited to hypertension and prior dyslipidemia  Her history is highlighted by those things listed in the assessment above noting a recent diagnosis of endometrial cancer for which she underwent hysterectomy with recent initiation of chemotherapy  On the day of admission the patient sustained what sounds like a syncopal fall in addition to symptoms of effort dyspnea  At her current baseline the patient states she inconsistently has episodes of subjective orthostasis occurring a few times per week without prior fall/syncope  An though she is not bothered by emesis or diarrhea the patient states she has recently had some anorexia with decreased oral intake with ingestion of fluids ranging between 20 and 40 oz per day  On the day of admission the patient was also experiencing some increased feelings of effort dyspnea and overall fatigue with events leading to her presentation as follows  Patient states that she was on the lower level of her home with intent to go up stairs  She across the room to the bottom of the steps feeling a bit dyspnea from this limited ambulation  She then ascended the steps and felt more dyspneic than moving to a nearby chair for rest   After briefly sitting she stood and felt lightheaded without any chest pain or perceived cardiac ectopy  She began to move 1 or 2 steps from the chair she yelled out to her sister indicating feeling like she was going to fall  Than before she could get to a chair she did collapsed to the floor  Her sister was then upon her and apparently initiated CPR with report that the patient then almost immediately had a full return to lucidity without any sequelae  She was then brought to the hospital by ambulance  Initial chemistry showed the patient to be hypokalemic at 3 0    Creatinine was at the upper end of her typical range at 1 5 though this morning it is 1 16  On arrival patient was hypoxic-87% on room air and a CT scan showed bilateral pulmonary emboli with significant clot burden  Serial ECGs are without acute ischemic change  Serial high sensitivity troponin levels were 23, 201, 319 in this scenario  Past Medical History:        Past Medical History:   Diagnosis Date    Arthritis     osteo    Chronic kidney disease     Colon polyp     Diverticula of colon     Hypertension     Obesity     Rhinitis       Past Surgical History:   Procedure Laterality Date    APPENDECTOMY      BREAST BIOPSY Left 1977    benign    BREAST SURGERY Left     biopsy    CHOLECYSTECTOMY      COLONOSCOPY      CYSTOSCOPY N/A 3/31/2022    Procedure: CYSTOSCOPY;  Surgeon: Therese Barry MD;  [facility];  Service: Gynecology Oncology    HERNIA REPAIR Right     inguinal    HYSTERECTOMY      IR PORT PLACEMENT  5/9/2022    JOINT REPLACEMENT Right     Knee    CT COLONOSCOPY FLX DX W/COLLJ SPEC WHEN PFRMD N/A 2/21/2017    Procedure: COLONOSCOPY with polypectomy and hemo clip marjan ;  Surgeon: Angel Castillo MD;  Location: [facility]; Service: Gastroenterology    CT LAPAROSCOPY W TOT HYSTERECTUTERUS <=[address] TUBE/OVARY N/A 3/31/2022    Procedure: ROBOTIC HYSTERECTOMY, BILATERAL SALPINGO-OOPHORECTOMY, STAGING PERITONEAL AND OMENTAL BIOPSIES, BILATERAL PELVIC SENTINEL LYMPH NODE BIOPSIES;  Surgeon: Therese Barry MD;  [facility];  Service: Gynecology Oncology        Allergy:        No Known Allergies    Medications:       Prior to Admission medications    Medication Sig Start Date End Date Taking?  Authorizing Provider   acetaminophen (TYLENOL) 650 mg CR tablet Take 1 tablet (650 mg total) by mouth every 6 (six) hours as needed for mild pain 3/31/22  Yes Mary Peñaloza MD   amLODIPine (NORVASC) 10 mg tablet Take 10 mg by mouth daily   Yes Historical Provider, MD   apixaban (Eliquis) 5 mg Take 2 tablets (10 mg total) by mouth 2 (two) times a day for 7 days, THEN 1 tablet (5 mg total) 2 (two) times a day for 23 days   5/18/22 6/17/22 Yes Jewels Ribera PA-C   hydrochlorothiazide (HYDRODIURIL) 12 5 mg tablet Take 12 5 mg by mouth daily   Yes Historical Provider, MD   LORazepam (ATIVAN) 1 mg tablet Take 1 tablet (1 mg total) by mouth every 8 (eight) hours as needed for anxiety (nausea or anxiety) 5/11/22  Yes Nicole Jose PA-C   omeprazole (PriLOSEC) 40 MG capsule Take 20 mg by mouth daily     Yes Historical Provider, MD   ondansetron (ZOFRAN) 8 mg tablet Take 1 tablet (8 mg total) by mouth every 8 (eight) hours as needed for nausea or vomiting 5/11/22  Yes Nicole Jose PA-C   denosumab (Prolia) 60 mg/mL Inject 1 mL under the skin every 6 (six) months 10/22/21   Historical Provider, MD   fluticasone Juliet Browne) 50 mcg/act nasal spray 2 sprays into each nostril daily as needed   8/5/19 5/9/22  Historical Provider, MD       Family History:     Family History   Problem Relation Age of Onset    Heart disease Mother     Prostate cancer Father 80    Diabetes Sister     No Known Problems Maternal Grandmother     No Known Problems Maternal Grandfather     No Known Problems Paternal Grandmother     No Known Problems Paternal Grandfather     Hypertension Sister     Transient ischemic attack Sister     No Known Problems Sister     No Known Problems Maternal Aunt     No Known Problems Paternal Aunt     No Known Problems Paternal Aunt     No Known Problems Paternal Aunt     Diabetes Brother     Heart disease Brother     Stomach cancer Other     Thyroid disease Other         Social History:       Social History     Socioeconomic History    Marital status: Single     Spouse name: None    Number of children: None    Years of education: None    Highest education level: None   Occupational History    None   Tobacco Use    Smoking status: Never Smoker    Smokeless tobacco: Never Used   Vaping Use    Vaping Use: Never used   Substance and Sexual Activity    Alcohol use: Yes     Alcohol/week: 1 0 standard drink     Types: 1 Glasses of wine per week     Comment: social-seldom    Drug use: Never    Sexual activity: Not Currently   Other Topics Concern    None   Social History Narrative    None     Social Determinants of Health     Financial Resource Strain: Not on file   Food Insecurity: Not on file   Transportation Needs: Not on file   Physical Activity: Not on file   Stress: Not on file   Social Connections: Not on file   Intimate Partner Violence: Not on file   Housing Stability: Not on file       ROS:  Symptoms per HPI  Occasional mild edema of the distal lower extremities  The remainder of the review of systems is negative    Exam:  General:  Alert, normally conversant, woman who overall appears quite comfortable on low-flow nasal cannula oxygen  Head: Normocephalic, atraumatic  Eyes:  EOMI  Pupils - equal, round, reactive to accomodation  No icterus  Normal Conjunctiva  Oropharynx: Moist without lesion  Neck:  No gross bruit, JVD, thyromegaly, or lymphadenopathy  Heart:  Regular with controlled rate  No rub nor pathologic murmur  Lungs:  Clear without rales/rhonchi/wheeze  Abdomen:  Soft and nontender with normal bowel sounds  No organomegaly or mass  Lower Limbs:  Trivial edema at malleolar areas bilaterally  Pulses[de-identified]  RLE - DP:  2+                 LLE - DP:  2+  Musculoskeletal: Independent movement of limbs observed, Formal ROM and strength eval not performed  Neurologic:    Oriented to: person, place, situation  Cranial Nerves: grossly intact - vision, smell, taste, and hearing were not tested       Motor function: grossly normal, symmetric   Sensation: Was not tested      Vitals:    05/18/22 0130 05/18/22 0350 05/18/22 0515 05/18/22 0549   BP: 117/66 114/59  115/67   BP Location: Right arm Right arm  Right arm   Pulse: 80 78 76 76   Resp: 21 22 22   Temp:       TempSrc:       SpO2: 97% 99% 96% 96%   Weight:               DATA:      -----------    ECG:                      -----------------------------------------------------------------------------------------------------------------------------------------------  Telemetry:   Normal sinus rhythm without ectopy with ventricular rate trend around 70 beats per minute         -----------------------------------------------------------------------------------------------------------------------------------------------  Weights: Wt Readings from Last 20 Encounters:   05/17/22 120 kg (265 lb 3 4 oz)   05/11/22 118 kg (260 lb 2 3 oz)   05/09/22 119 kg (262 lb 12 6 oz)   05/03/22 119 kg (262 lb)   04/29/22 121 kg (266 lb)   04/19/22 122 kg (268 lb)   04/14/22 118 kg (260 lb 2 3 oz)   03/31/22 122 kg (269 lb)   03/21/22 122 kg (269 lb)   03/15/22 122 kg (268 lb 9 6 oz)   03/02/22 121 kg (266 lb)   05/25/21 121 kg (267 lb)   02/17/21 121 kg (267 lb)   05/19/20 122 kg (270 lb)   02/14/20 123 kg (270 lb 11 2 oz)   02/20/19 127 kg (280 lb)   01/31/19 127 kg (280 lb)   01/03/19 123 kg (270 lb 6 4 oz)   02/21/17 127 kg (281 lb)   01/08/15 131 kg (288 lb 3 oz)   , Body mass index is 47 59 kg/m²           Lab Studies:    Results from last 7 days   Lab Units 05/17/22 1958 05/16/22  1122   WBC Thousand/uL 5 05 4 98   HEMOGLOBIN g/dL 12 1 11 5   HEMATOCRIT % 37 0 35 6   PLATELETS Thousands/uL 98* 91*   ,   Results from last 7 days   Lab Units 05/17/22 1958 05/16/22  1122   POTASSIUM mmol/L 3 0* 3 9   CHLORIDE mmol/L 99* 103   CO2 mmol/L 28 26   BUN mg/dL 20 24   CREATININE mg/dL 1 52* 1 50*   CALCIUM mg/dL 8 6 8 7   ALK PHOS U/L 79 63   ALT U/L 54 30   AST U/L 38 27

## 2022-05-18 NOTE — ASSESSMENT & PLAN NOTE
Lab Results   Component Value Date    EGFR 33 05/17/2022    EGFR 34 05/16/2022    EGFR 39 05/09/2022    CREATININE 1 52 (H) 05/17/2022    CREATININE 1 50 (H) 05/16/2022    CREATININE 1 33 (H) 05/09/2022     · At baseline

## 2022-05-18 NOTE — ASSESSMENT & PLAN NOTE
· Patient status post hysterectomy and bilateral oophorectomy in March of this year  · Currently on chemotherapy, has received 1 treatment session  · Gyn input appreciated  · Will need continue outpatient care

## 2022-05-18 NOTE — CONSULTS
Pulmonary Consultation   Sg Canales 74 y o  female MRN: [id number]  Unit/Bed#: E5 -01 Encounter: [id number]      Reason for consultation: bilateral PE     Requesting physician: Dr Wilma Gee    Impressions/Recommendations:    1  Acute hypoxic respiratory failure secondary to PE  1  Continue to titrate oxygen as needed to maintain SpO2>/=89%  2  Pulmonary toilet: IS, cough deep breathe, increase activity and time OOB as tolerated  3  No baseline requirements  4  Home O2 evaluation prior to discharge  2  Acute bilateral submassive PE with evidence of right heart strain  1  Likely secondary active malignancy  2  Continue current anticoagulation with heparin-will determine any additional interventions based on results of echocardiogram  3  Echocardiogram results pending-will reach out to cardiology for official review  4  Venous dopplers-will order as patient's with endometrial cancer may have increased risk of bleeding and therefore IVC filter may be considered if bleeding on AC occurs  5  Outpatient plan: management per oncology    3  Obesity  4  CKD  5  Endometrial cancer  1  Follow up oncology  2  Started chemotherapy the week prior      History of Present Illness   HPI:  Sg Canales is a 74 y o  female seen in consult for bilateral PE  Medical history significant for:  Endometrial cancer-recently started on chemotherapy morbid obesity, CKD, HTN, and rhinitis  She presented to the ED due to syncope episode with fall  CPR was started per family on seen  Upon admission to the emergency department she did report that her left leg has been swollen and she has been short of breath  Was also to be noted hypoxic with an SpO2 87% on room air  CTA was completed that demonstrated bilateral PE with extensive clot burden and therefore pulmonary consult was placed  At the time of evaluation she reports dyspnea with walking to the bathroom    Left sided leg pain has improved  Denies : chest pain, fevers, cough, SOB at rest, chills or hemoptysis    From a pulmonary standpoint she does not follow with Pulmonary, shortness she has a formal lung disease diagnosis or inhaled medication regimen  She is a lifelong never smoker   Baseline supplemental oxygen needs are -none  Current exercise tolerance-decreased    Denies: GERD, AYAH, Dysphagia  Denies: recent sick contacts, exposures or travel  Review of systems:  12 point review of systems was completed and was otherwise negative except as listed in HPI  Historical Information   Past Medical History:   Diagnosis Date    Arthritis     osteo    Chronic kidney disease     Colon polyp     Diverticula of colon     Hypertension     Obesity     Rhinitis      Past Surgical History:   Procedure Laterality Date    APPENDECTOMY      BREAST BIOPSY Left 1977    benign    BREAST SURGERY Left     biopsy    CHOLECYSTECTOMY      COLONOSCOPY      CYSTOSCOPY N/A 3/31/2022    Procedure: CYSTOSCOPY;  Surgeon: Suly Batres MD;  [facility];  Service: Gynecology Oncology    HERNIA REPAIR Right     inguinal    HYSTERECTOMY      IR PORT PLACEMENT  5/9/2022    JOINT REPLACEMENT Right     Knee    PA COLONOSCOPY FLX DX W/COLLJ SPEC WHEN PFRMD N/A 2/21/2017    Procedure: COLONOSCOPY with polypectomy and hemo clip marjan ;  Surgeon: Antwon Meyers MD;  Location[facility];   Service: Gastroenterology    PA LAPAROSCOPY W TOT HYSTERECTUTERUS <=250 [address] TUBE/OVARY N/A 3/31/2022    Procedure: ROBOTIC HYSTERECTOMY, BILATERAL SALPINGO-OOPHORECTOMY, STAGING PERITONEAL AND OMENTAL BIOPSIES, BILATERAL PELVIC SENTINEL LYMPH NODE BIOPSIES;  Surgeon: Suly Batres MD;  [facility];  Service: Gynecology Oncology     Family History   Problem Relation Age of Onset    Heart disease Mother     Prostate cancer Father 80    Diabetes Sister     No Known Problems Maternal Grandmother     No Known Problems Maternal Grandfather     No Known Problems Paternal Grandmother     No Known Problems Paternal Grandfather     Hypertension Sister     Transient ischemic attack Sister     No Known Problems Sister     No Known Problems Maternal Aunt     No Known Problems Paternal Aunt     No Known Problems Paternal Aunt     No Known Problems Paternal Aunt     Diabetes Brother     Heart disease Brother     Stomach cancer Other     Thyroid disease Other        Occupational history: non contributory    Tobacco history: never smoker    Meds/Allergies   Current Facility-Administered Medications   Medication Dose Route Frequency    acetaminophen (TYLENOL) tablet 650 mg  650 mg Oral Q6H PRN    aluminum-magnesium hydroxide-simethicone (MYLANTA) oral suspension 30 mL  30 mL Oral Q6H PRN    [START ON 5/19/2022] amLODIPine (NORVASC) tablet 5 mg  5 mg Oral Daily    heparin (porcine) 25,000 units in 0 45% NaCl 250 mL infusion (premix)  3-30 Units/kg/hr (Order-Specific) Intravenous Titrated    heparin (porcine) injection 4,800 Units  4,800 Units Intravenous Q1H PRN    heparin (porcine) injection 9,600 Units  9,600 Units Intravenous Q1H PRN    loperamide (IMODIUM) capsule 2 mg  2 mg Oral BID PRN    LORazepam (ATIVAN) tablet 1 mg  1 mg Oral Q8H PRN    melatonin tablet 3 mg  3 mg Oral HS PRN    ondansetron (ZOFRAN) injection 4 mg  4 mg Intravenous Q6H PRN    oxyCODONE (ROXICODONE) IR tablet 2 5 mg  2 5 mg Oral Q6H PRN    Or    oxyCODONE (ROXICODONE) IR tablet 5 mg  5 mg Oral Q6H PRN    pantoprazole (PROTONIX) EC tablet 40 mg  40 mg Oral Early Morning    senna (SENOKOT) tablet 8 6 mg  1 tablet Oral HS PRN     Medications Prior to Admission   Medication    acetaminophen (TYLENOL) 650 mg CR tablet    amLODIPine (NORVASC) 10 mg tablet    hydrochlorothiazide (HYDRODIURIL) 12 5 mg tablet    omeprazole (PriLOSEC) 40 MG capsule    ondansetron (ZOFRAN) 8 mg tablet    denosumab (Prolia) 60 mg/mL    fluticasone (FLONASE) 50 mcg/act nasal spray    LORazepam (ATIVAN) 1 mg tablet     No Known Allergies    Vitals: Blood pressure 100/80, pulse 74, temperature 97 6 °F (36 4 °C), temperature source Oral, resp  rate 22, height 5' 2 6" (1 59 m), weight 127 kg (279 lb 8 7 oz), SpO2 98 %, not currently breastfeeding  , 2L NC, Body mass index is 50 15 kg/m²  Intake/Output Summary (Last 24 hours) at 5/18/2022 1510  Last data filed at 5/18/2022 1237  Gross per 24 hour   Intake 1100 ml   Output --   Net 1100 ml       Physical exam:    General Appearance:    Alert, cooperative, no conversational dyspnea no accessory     muscle use       Head/eyes:    Normocephalic, without obvious abnormality, atraumatic,         PERRL, extraocular muscles intact, no scleral icterus    Nose:   Nares normal, septum midline, mucosa normal, no drainage    or sinus tenderness   Throat:   Moist mucous membranes, no thrush   Neck:   Supple, trachea midline, no adenopathy; no carotid    bruit or JVD   Lungs:     Decreased breath sounds bilaterally   Chest Wall:    No tenderness or deformity    Heart:    Regular rate and rhythm, S1 and S2 normal, no murmur, rub   or gallop   Abdomen:     Obese, Soft, non-tender, bowel sounds active all four quadrants,     no masses, no organomegaly   Extremities:   Extremities normal, atraumatic, no cyanosis no edema   Skin:   Warm, dry, turgor normal, no rashes or lesions   Lymph nodes:   Cervical and supraclavicular nodes normal   Neurologic:   CNII-XII intact, normal strength, non-focal         Labs: I have personally reviewed pertinent lab results  , CBC:   Lab Results   Component Value Date    WBC 5 05 05/17/2022    HGB 12 1 05/17/2022    HCT 37 0 05/17/2022    MCV 98 05/17/2022    PLT 98 (L) 05/17/2022    MCH 32 1 05/17/2022    MCHC 32 7 05/17/2022    RDW 13 2 05/17/2022    MPV 12 0 05/17/2022   , CMP:   Lab Results   Component Value Date    SODIUM 138 05/18/2022    K 4 5 05/18/2022     05/18/2022    CO2 24 05/18/2022    BUN 17 05/18/2022    CREATININE 1 16 05/18/2022 CALCIUM 8 6 05/18/2022    AST 38 05/17/2022    ALT 54 05/17/2022    ALKPHOS 79 05/17/2022    EGFR 46 05/18/2022       Imaging and other studies: I have personally reviewed pertinent reports  and I have personally reviewed pertinent films in PACS  CTA ED chest PE 5/17/22    IMPRESSION:        1  Acute pulmonary thromboembolism bilaterally with extensive clot burden in the right and left pulmonary arteries, lobar arteries and segmental arteries with an RV/LV ratio of 1 5 indicative of right heart strain  Please see comments below      The calculated ratio of right ventricular to left ventricular diameter (RV/LV ratio) is 1 5     This is greater than 0 9, which is abnormal and indicates right heart strain  An abnormal RV/LV ratio has been shown to be associated with an increased risk of 30 day mortality in the setting of acute pulmonary embolism  Pulmonary function testing: none    EKG, Pathology, and Other Studies: I have personally reviewed pertinent reports     and EKG 5/17/22-EKG    Code Status: Level 1 - Full Code      RUBEN Sainz

## 2022-05-18 NOTE — ASSESSMENT & PLAN NOTE
· CTA chest with bilateral PE, RV/LV ratio 1 5  · Per ER notes case was discussed with Critical Care overnight and did not require ICU admission  · Discussed case with pulmonology  · Will get stat echo to evaluate for RV failure, if present may benefit from IR evaluation  · Will continue heparin drip  · Currently requiring 2 L nasal cannula, will titrate as tolerated  · Likely provoked given patient's endometrial cancer    Per sister at bedside patient has been sitting around a lot lately and does not get around much

## 2022-05-19 VITALS
RESPIRATION RATE: 18 BRPM | WEIGHT: 268.08 LBS | HEIGHT: 62 IN | DIASTOLIC BLOOD PRESSURE: 61 MMHG | OXYGEN SATURATION: 97 % | SYSTOLIC BLOOD PRESSURE: 102 MMHG | BODY MASS INDEX: 49.33 KG/M2 | HEART RATE: 70 BPM | TEMPERATURE: 97.4 F

## 2022-05-19 LAB
ANION GAP SERPL CALCULATED.3IONS-SCNC: 7 MMOL/L (ref 4–13)
APTT PPP: 72 SECONDS (ref 23–37)
APTT PPP: 76 SECONDS (ref 23–37)
BUN SERPL-MCNC: 18 MG/DL (ref 5–25)
CALCIUM SERPL-MCNC: 8.5 MG/DL (ref 8.3–10.1)
CHLORIDE SERPL-SCNC: 107 MMOL/L (ref 100–108)
CO2 SERPL-SCNC: 28 MMOL/L (ref 21–32)
CREAT SERPL-MCNC: 1.25 MG/DL (ref 0.6–1.3)
ERYTHROCYTE [DISTWIDTH] IN BLOOD BY AUTOMATED COUNT: 13.3 % (ref 11.6–15.1)
GFR SERPL CREATININE-BSD FRML MDRD: 42 ML/MIN/1.73SQ M
GLUCOSE SERPL-MCNC: 92 MG/DL (ref 65–140)
HCT VFR BLD AUTO: 30.5 % (ref 34.8–46.1)
HGB BLD-MCNC: 9.8 G/DL (ref 11.5–15.4)
MCH RBC QN AUTO: 31.4 PG (ref 26.8–34.3)
MCHC RBC AUTO-ENTMCNC: 32.1 G/DL (ref 31.4–37.4)
MCV RBC AUTO: 98 FL (ref 82–98)
PLATELET # BLD AUTO: 105 THOUSANDS/UL (ref 149–390)
PMV BLD AUTO: 11.5 FL (ref 8.9–12.7)
POTASSIUM SERPL-SCNC: 4 MMOL/L (ref 3.5–5.3)
RBC # BLD AUTO: 3.12 MILLION/UL (ref 3.81–5.12)
SODIUM SERPL-SCNC: 142 MMOL/L (ref 136–145)
WBC # BLD AUTO: 3.22 THOUSAND/UL (ref 4.31–10.16)

## 2022-05-19 PROCEDURE — 85027 COMPLETE CBC AUTOMATED: CPT | Performed by: INTERNAL MEDICINE

## 2022-05-19 PROCEDURE — 97166 OT EVAL MOD COMPLEX 45 MIN: CPT

## 2022-05-19 PROCEDURE — 99232 SBSQ HOSP IP/OBS MODERATE 35: CPT | Performed by: INTERNAL MEDICINE

## 2022-05-19 PROCEDURE — 99239 HOSP IP/OBS DSCHRG MGMT >30: CPT | Performed by: INTERNAL MEDICINE

## 2022-05-19 PROCEDURE — 85730 THROMBOPLASTIN TIME PARTIAL: CPT | Performed by: INTERNAL MEDICINE

## 2022-05-19 PROCEDURE — 97163 PT EVAL HIGH COMPLEX 45 MIN: CPT

## 2022-05-19 PROCEDURE — NC001 PR NO CHARGE: Performed by: OBSTETRICS & GYNECOLOGY

## 2022-05-19 PROCEDURE — 80048 BASIC METABOLIC PNL TOTAL CA: CPT | Performed by: INTERNAL MEDICINE

## 2022-05-19 RX ADMIN — HEPARIN SODIUM 9 UNITS/KG/HR: 10000 INJECTION, SOLUTION INTRAVENOUS at 10:11

## 2022-05-19 RX ADMIN — PANTOPRAZOLE SODIUM 40 MG: 40 TABLET, DELAYED RELEASE ORAL at 06:06

## 2022-05-19 RX ADMIN — APIXABAN 10 MG: 5 TABLET, FILM COATED ORAL at 14:54

## 2022-05-19 NOTE — OCCUPATIONAL THERAPY NOTE
Occupational Therapy Evaluation     Patient Name: Sg Covington  LANHF'U Date: 5/19/2022  Problem List  Principal Problem:    Acute pulmonary embolism with acute cor pulmonale ([facility] [facility] )  Active Problems:    Essential hypertension    Acute respiratory failure with hypoxia (HCC)    Stage 3a chronic kidney disease ([facility] [address] )    [facility] )    Wound dehiscence    Past Medical History  Past Medical History:   Diagnosis Date    Arthritis     osteo    Chronic kidney disease     Colon polyp     Diverticula of colon     Hypertension     Obesity     Rhinitis      Past Surgical History  Past Surgical History:   Procedure Laterality Date    APPENDECTOMY      BREAST BIOPSY Left 1977    benign    BREAST SURGERY Left     biopsy    CHOLECYSTECTOMY      COLONOSCOPY      CYSTOSCOPY N/A 3/31/2022    Procedure: CYSTOSCOPY;  Surgeon: Александр Kiser MD;  [facility]: [facility];  Service: Gynecology Oncology    HERNIA REPAIR Right     inguinal    HYSTERECTOMY      IR PORT PLACEMENT  5/9/2022    JOINT REPLACEMENT Right     Knee    HI COLONOSCOPY FLX DX W/COLLJ SPEC WHEN PFRMD N/A 2/21/2017    Procedure: COLONOSCOPY with polypectomy and hemo clip marjan ;  Surgeon: Martina Triana MD;  Location: [facility]; Service: Gastroenterology    HI LAPAROSCOPY W TOT HYSTERECTUTERUS <=[address] TUBE/OVARY N/A 3/31/2022    Procedure: ROBOTIC HYSTERECTOMY, BILATERAL SALPINGO-OOPHORECTOMY, STAGING PERITONEAL AND OMENTAL BIOPSIES, BILATERAL PELVIC SENTINEL LYMPH NODE BIOPSIES;  Surgeon: Александр Kiser MD;  [facility];  Service: Gynecology Oncology         05/19/22 1030   OT Last Visit   OT Visit Date 05/19/22   Note Type   Note type Evaluation   Restrictions/Precautions   Weight Bearing Precautions Per Order No   Other Precautions Multiple lines; Fall Risk   Pain Assessment   Pain Assessment Tool 0-10   Pain Score No Pain   Home Living   Type of Home House   Home Layout Two level;Bed/bath upstairs;Stairs to enter with rails  (4 IGOR)   Bathroom Shower/Tub Tub/shower unit   Bathroom Toilet Standard   Bathroom Equipment   (Denies DME)   Bathroom Accessibility Accessible   Home Equipment Walker;Cane   Additional Comments Pt lives with her sister and son in a two level house with 4STE and FOS to 2nd floor bed/full bath  Pt (-) home alone  Prior Function   Level of [place] Independent with ADLs and functional mobility; Needs assistance with IADLs   Lives With Son;Other (Comment)  (sister)   Receives Help From Family   ADL Assistance Independent   IADLs Needs assistance  (I w/ laundry)   Falls in the last 6 months 1 to 4  (1x 2* to syncope leading to this admission)   Vocational Retired   Comments At baseline, pt was I w/ ADLs and functional transfers/mobility w/o use of AD  Family assisted w/ IADLs  (+) , however has not driven recently  Sister assists w/ transportation  (+) fall PTA  Lifestyle   Autonomy At baseline, pt was I w/ ADLs and functional transfers/mobility w/o use of AD  Family assisted w/ IADLs  (+) , however has not driven recently  Sister assists w/ transportation  (+) fall PTA  Reciprocal Relationships Sister, son   Service to Others Retired   Intrinsic Gratification Watching TV   Psychosocial   Psychosocial ([address]) WDL   ADL   Where Assessed Chair   Eating Assistance 7  Independent   Grooming Assistance 5  [address] [facility] 5  Supervision/Setup   LB [address] 4  [address] 5  [facility]  4  [facility]  5  [address] [facility] 5  Supervision/Setup   Bed Mobility   Supine to Sit 5  Supervision   Additional items HOB elevated; Bedrails; Increased time required;Verbal cues   Sit to Supine Unable to assess   Additional Comments Pt seated OOB in chair at end of session  Call bell and phone within reach   All needs met and pt reports no further questions for OT at this time  Transfers   Sit to Stand 5  Supervision   Additional items Armrests; Increased time required;Verbal cues   Stand to Sit 5  Supervision   Additional items Armrests; Increased time required;Verbal cues   Toilet transfer 5  Supervision   Additional items Increased time required;Verbal cues;Standard toilet  (grab bar use)   Additional Comments Cues for safe technique and hand placement   Functional Mobility   Functional Mobility 5  Supervision   Additional Comments Assist x1 w/o use of AD; Pt initially required Min A, however progressed to Supervision  SpO2: % on room air throughout   Balance   Static Sitting Good   Dynamic Sitting Fair +   Static Standing Fair   Dynamic Standing Fair -   Ambulatory Fair -   Activity Tolerance   Activity Tolerance Patient limited by fatigue;Treatment limited secondary to medical complications (Comment)   Medical Staff Made Aware Ramakrishna, PT   Nurse Made Aware yes; Florina, RN   RUE Assessment   RUE Assessment WFL   RUE Strength   RUE Overall Strength Within Functional Limits - able to perform ADL tasks with strength  (4/5 throughout)   LUE Assessment   LUE Assessment WFL   LUE Strength   LUE Overall Strength Within Functional Limits - able to perform ADL tasks with strength  (4/5 throughout)   Hand Function   Gross Motor Coordination Functional   Fine Motor Coordination Functional   Sensation   Light Touch No apparent deficits   Proprioception   Proprioception No apparent deficits   Vision-Basic Assessment   Current Vision Wears glasses only for reading   Vision - Complex Assessment   Ocular Range of Motion [facility]   Acuity Able to read clock/calendar on wall without difficulty; Able to read employee name badge without difficulty   Perception   Inattention/Neglect Appears intact   Cognition   Overall Cognitive Status [facility]   Arousal/Participation Alert; Cooperative   Attention Within functional limits   Orientation Level Oriented X4   Memory Within functional limits   Following Commands Follows one step commands without difficulty   Comments Pleasant and cooperative   Assessment   Limitation Decreased ADL status; Decreased UE strength;Decreased endurance;Decreased self-care trans;Decreased high-level ADLs   Prognosis Good   Assessment Pt is a 74 y o  female seen for OT evaluation s/p adm to [facility] on 5/17/2022 s/p syncopal episode and admitted w/ Acute pulmonary embolism with acute cor pulmonale and Acute respiratory failure with hypoxia  CTA chest with B/L PE  Comorbidities affecting pts functional performance include a significant PMH of Arthritis, CKD, HTN, Endometrial CA s/p SERGIO in 03/2022, and Obesity  Pt with active OT orders and activity orders for Activity as tolerated  Pt lives with her sister and son in a two level house with 4STE and FOS to 2nd floor bed/full bath  Pt (-) home alone  At baseline, pt was I w/ ADLs and functional transfers/mobility w/o use of AD  Family assisted w/ IADLs  (+) , however has not driven recently  Sister assists w/ transportation  (+) fall PTA  Upon evaluation, pt currently requires Supervision for UB ADLs, Min A for LB ADLs, Supervision for toileting, Supervision for bed mobility, and Supervision for functional mobility/transfers 2* the following deficits impacting occupational performance: ROSAS, decreased strength, decreased balance and decreased tolerance  These impairments, as well at pts fall risk, steps to enter environment and difficulty performing ADLS limit pts ability to safely engage in all baseline areas of occupation  Based on the aforementioned OT evaluation, functional performance deficits, and assessments, pt has been identified as a Moderate complexity evaluation   Pt to continue to benefit from continued acute OT services during hospital stay to address defined deficits and to maximize level of functional independence in the following Occupational Performance areas: grooming, bathing/shower, toilet hygiene, dressing, medication management, health maintenance, functional mobility, community mobility and clothing management  From OT standpoint, recommend Home OT upon D/C  OT will continue to follow pt 2-3x/wk to address the following goals to  w/in 10-14 days:   Goals   Patient Goals To go home   LTG Time Frame 10-14   Long Term Goal Please refer to LTGs listed below   Plan   Treatment Interventions ADL retraining;UE strengthening/ROM; Endurance training;Functional transfer training;Patient/family training;Equipment evaluation/education; Compensatory technique education; Energy conservation; Activityengagement   Goal Expiration Date 22   OT Treatment Day 0   OT Frequency 2-3x/wk   Recommendation   OT Discharge Recommendation Home with home health rehabilitation   OT - OK to Discharge Yes  (when medically cleared)   Additional Comments  The patient's raw score on the AM-PAC Daily Activity inpatient short form is 19, standardized score is 40 22, greater than 39 4  Patients at this level are likely to benefit from discharge to home  Please refer to the recommendation of the Occupational Therapist for safe discharge planning  AM-PAC Daily Activity Inpatient   Lower Body Dressing 3   Bathing 3   Toileting 3   Upper Body Dressing 3   Grooming 3   Eating 4   Daily Activity Raw Score 19   Daily Activity Standardized Score (Calc for Raw Score >=11) 40 22   AM-PAC Applied Cognition Inpatient   Following a Speech/Presentation 4   Understanding Ordinary Conversation 4   Taking Medications 4   Remembering Where Things Are Placed or Put Away 4   Remembering List of 4-5 Errands 4   Taking Care of Complicated Tasks 4   Applied Cognition Raw Score 24   Applied Cognition Standardized Score 62 21       GOALS    1  Pt will improve activity tolerance to G for min 30 min txment sessions for increase engagement in functional tasks    2   Pt will complete bed mobility at a Mod I level w/ G balance/safety demonstrated to decrease caregiver assistance required     3  Pt will complete UB dressing/self care w/ mod I using adaptive device and DME as needed     4  Pt will complete LB dressing/self care w/ mod I using adaptive device and DME as needed    5  Pt will complete toileting w/ mod I w/ G hygiene/thoroughness using DME as needed    6  Pt will improve functional transfers to Mod I on/off all surfaces using DME as needed w/ G balance/safety     7  Pt will improve functional mobility during ADL/IADL/leisure tasks to Mod I using DME as needed w/ G balance/safety     8  Pt will be attentive 100% of the time during ongoing cognitive assessment w/ G participation to assist w/ safe d/c planning/recommendations    9  Pt will demonstrate G carryover of pt/caregiver education and training as appropriate w/o cues w/ good tolerance to increase safety during functional tasks    10  Pt will demonstrate 100% carryover of energy conservation techniques t/o functional I/ADL/leisure tasks w/o cues s/p skilled education to increase endurance during functional tasks    11  Pt will verbalize 3 potential fall hazards and identify appropriate compensatory techniques to decrease fall risk in home environment     12   Pt will increase standing tolerance to 10-15 mins with Fair+ dynamic standing balance to increase safety during participation in ADLs       Alejandra Medina, OTR/L

## 2022-05-19 NOTE — CASE MANAGEMENT
Case Management Assessment & Discharge Planning Note    Patient name Alton Randolph  Location [place] 5 /E5 -* MRN [id number]  : 1950 Date 2022       Current Admission Date: 2022  Current Admission Diagnosis:Acute pulmonary embolism with acute cor pulmonale [facility])   Patient Active Problem List    Diagnosis Date Noted    Encounter for central line care 2022    Wound dehiscence 2022    Morbid obesity with BMI of 45 0-49 9, adult ([facility] [address] ) 2022    Endometrial cancer ([facility] ) 03/15/2022    Acute pulmonary embolism with acute cor pulmonale ([facility] [address] ) 2022    Stage 3a chronic kidney disease ([facility] [address] ) 2020    Essential hypertension 2018    Acute respiratory failure with hypoxia ([facility] [address] ) 2018      LOS (days): 2  Geometric Mean LOS (GMLOS) (days): 4 10  Days to GMLOS:2 4     OBJECTIVE:    Risk of Unplanned Readmission Score: 12 92         Current admission status: Inpatient  Referral Reason:  (Discharge Planning)    Preferred Pharmacy:   [address] [place] [address], [address] [facility]  [address]  [place] [address]  Phone: [phone] Fax: [phone]    Primary Care Provider: Eugenia Rosa MD    Primary Insurance: [facility] [place]  Secondary Insurance:     ASSESSMENT:  Monique 26 Proxies    There are no active Health Care Proxies on file  Readmission Root Cause  30 Day Readmission: No    Patient Information  Admitted from[de-identified] Home  Mental Status: Alert  During Assessment patient was accompanied by: Sister  Support Systems: Son, Family members  [place] of Residence: [address] [facility] do you live in?: [address] [facility] entry access options   Select all that apply : Stairs  Number of steps to enter home : 2  Do the steps have railings?: Yes  Type of Current Residence: [address] [place] [address] home  Upon entering residence, is there a bedroom on the main floor (no further steps)?: No  A bedroom is located on the following floor levels of residence (select all that apply):: 2nd Floor  Upon entering residence, is there a bathroom on the main floor (no further steps)?: No  Indicate which floors of current residence have a bathroom (select all the apply):: 2nd Floor, Basement  Number of steps to basement from main floor: One Flight  Number of steps to 2nd floor from main floor: One Flight  In the last 12 months, was there a time when you were not able to pay the mortgage or rent on time?: No  In the last 12 months, how many places have you lived?: 1  In the last 12 months, was there a time when you did not have a steady place to sleep or slept in a shelter (including now)?: No  Homeless/housing insecurity resource given?: N/A  Living Arrangements: Lives w/ Extended Family, Lives w/ Son  Is patient a ?: No    Activities of Daily Living Prior to Admission  Functional Status: Independent  Completes ADLs independently?: Yes  Ambulates independently?: Yes  Does patient use assisted devices?: No  Does patient currently own DME?: Yes  What DME does the patient currently own?: Bedside Commode, Straight [place], Shower Chair  Does patient have a history of Outpatient Therapy (PT/OT)?: No  Does the patient have a history of Short-Term Rehab?: No  Does patient have a history of HHC?: No  Does patient currently have [facility]?: No    Patient Information Continued  Income Source: Pension/USP  Does patient have prescription coverage?: Yes  Within the past 12 months, you worried that your food would run out before you got the money to buy more : Never true  Within the past 12 months, the food you bought just didn't last and you didn't have money to get more : Never true  Food insecurity resource given?: N/A  Does patient receive dialysis treatments?: No  Does patient have a history of substance abuse?: No  Does patient have a history of Mental Health Diagnosis?: No    Means of Transportation  Means of Transport to [facility][de-identified] Family transport  In the past 12 months, has lack of transportation kept you from medical appointments or from getting medications?: No  In the past 12 months, has lack of transportation kept you from meetings, work, or from getting things needed for daily living?: No  Was application for public transport provided?: N/A        DISCHARGE DETAILS:    Discharge planning discussed with[de-identified] Patient and Dalton Talamantes (Sister)  [phone] (M)     Freedom of Choice: Yes  Comments - Freedom of Choice: Pt prefers to d/c to home -Pt agreeable to OP therapy  Pt will d/c w/ New Eliquis  CM contacted family/caregiver?: Yes  Were Treatment Team discharge recommendations reviewed with patient/caregiver?: Yes  Did patient/caregiver verbalize understanding of patient care needs?: Yes  Were patient/caregiver advised of the risks associated with not following Treatment Team discharge recommendations?: Yes    Contacts  Patient Contacts: Dalton Talamantes (Sister)  [phone] (M)  Relationship to Patient[de-identified] Family  Contact Method:  In Person, Phone  Phone Number: Dalton Talamantes (Sister)  [phone] [facility]  Reason/Outcome: Continuity of Care, Emergency Contact, Discharge [address]         Is the patient interested in [address] at discharge?: No  DME Referral Provided  Referral made for DME?: No  Other Referral/Resources/Interventions Provided:  Interventions: Outpatient OT, Outpatient PT    Treatment Team Recommendation: Home  Discharge Destination Plan[de-identified] Home  Transport at Discharge : Automobile, Family  Transfer Mode: Ambulate  Accompanied by: Family member

## 2022-05-19 NOTE — ASSESSMENT & PLAN NOTE
· CTA chest with bilateral PE, RV/LV ratio 1 5  · Pulmonology input appreciated  · Echo results noted, RV function low normal  · Patient is not requiring oxygen  Troponins were mildly bumped however echo with no signs of wall motion abnormalities  · Patient was seen by Cardiology and pulmonology  · Initially on heparin drip and transitioned to Eliquis  · Initially requiring oxygen, has been titrated off    Saturating in the 90s on room air  · PT/OT eval recommending outpatient therapy  · Case management followed up with patient's pharmacy and patient will have her Eliquis covered by insurance

## 2022-05-19 NOTE — NURSING NOTE
Pt rang to go to bathroom  Found IV no longer in her arm with blood on her gown, sheets, and blankets  Pt currently on heparin drip  No dizziness noted  BP stable at 106/61

## 2022-05-19 NOTE — PHYSICAL THERAPY NOTE
PT EVALUATION    Pt  Name: Zaida Shukla  Pt  Age: 74 y o  MRN: [id number]  LENGTH OF STAY: 2      Admitting Diagnoses:   Syncope [R55]  Endometrial cancer ([address] ) [C54 1]  Wound dehiscence [T81 30XA]  Bilateral pulmonary embolism (HCC) [I26 99]  Cor pulmonale, acute ([facility] [address] ) [I26 09]    Past Medical History:   Diagnosis Date    Arthritis     osteo    Chronic kidney disease     Colon polyp     Diverticula of colon     Hypertension     Obesity     Rhinitis        Past Surgical History:   Procedure Laterality Date    APPENDECTOMY      BREAST BIOPSY Left 1977    benign    BREAST SURGERY Left     biopsy    CHOLECYSTECTOMY      COLONOSCOPY      CYSTOSCOPY N/A 3/31/2022    Procedure: CYSTOSCOPY;  Surgeon: Preethi Virk MD;  Location: [facility];  Service: Gynecology Oncology    HERNIA REPAIR Right     inguinal    HYSTERECTOMY      IR PORT PLACEMENT  5/9/2022    JOINT REPLACEMENT Right     Knee    VT COLONOSCOPY FLX DX W/COLLJ SPEC WHEN PFRMD N/A 2/21/2017    Procedure: COLONOSCOPY with polypectomy and hemo clip marjan ;  Surgeon: Kristie Arteaga MD;  Location: [facility]; Service: Gastroenterology    VT LAPAROSCOPY W TOT HYSTERECTUTERUS <=250 Sam Brill TUBE/OVARY N/A 3/31/2022    Procedure: ROBOTIC HYSTERECTOMY, BILATERAL SALPINGO-OOPHORECTOMY, STAGING PERITONEAL AND OMENTAL BIOPSIES, BILATERAL PELVIC SENTINEL LYMPH NODE BIOPSIES;  Surgeon: Preethi Virk MD;  Location: [facility];  Service: Gynecology Oncology       Imaging Studies:  VAS lower limb venous duplex study, complete bilateral   Final Result by Amelia Jean Baptiste MD (05/18 1659)      CTA ED chest PE Study   Final Result by Bonnie Davis MD (05/17 2109)         1  Acute pulmonary thromboembolism bilaterally with extensive clot burden in the right and left pulmonary arteries, lobar arteries and segmental arteries with an RV/LV ratio of 1 5 indicative of right heart strain  Please see comments below        The calculated ratio of right ventricular to left ventricular diameter (RV/LV ratio) is 1 5      This is greater than 0 9, which is abnormal and indicates right heart strain  An abnormal RV/LV ratio has been shown to be associated with an increased risk of 30 day mortality in the setting of acute pulmonary embolism  Urgent consultation with the    medical critical care team is recommended  Findings were discussed with Dr Aristides Yoon in the emergency department at the time of this dictation  Workstation performed: [id number]               05/19/22 1031   PT Last Visit   PT Visit Date 05/19/22   Note Type   Note type Evaluation   Pain Assessment   Pain Score No Pain   Restrictions/Precautions   Weight Bearing Precautions Per Order No   Other Precautions Multiple lines; Fall Risk  (currently on RA)   Home Living   Type of [address] [place] [address] Two level;Bed/bath upstairs;Stairs to enter with rails  (4STE w/ HR; FOS to 2nd flr bed/bath)   Bathroom Shower/Tub Tub/shower unit   Bathroom Toilet Standard   Bathroom Equipment Other (Comment)  (no DME)   Home Equipment Walker;Cane  (does not use at baseline)   Prior Function   Level of [place] Independent with ADLs and functional mobility  (w/o AD)   Lives With Son;Other (Comment)  (& sister)   Receives Help From Family   ADL Assistance Independent   Falls in the last 6 months 1 to 4 (1x 2* to syncope leading to this admission)   Comments Pt reports she can drive but has not driven for awhile; family provides transportation; never alone between her sister & son; pt's sister is mostly home w/ her  General   Additional Pertinent History endometrial CA   Family/Caregiver Present Yes  (sister)   Cognition   Overall Cognitive Status WFL   Arousal/Participation Alert   Orientation Level Oriented X4   Following Commands Follows one step commands without difficulty   Comments pt pleasant & cooperative   Subjective   Subjective Pt agreeable to PT & OT evals     RUE Assessment   RUE Assessment (refer to OT)   LUE Assessment   LUE Assessment   (refer to OT)   RLE Assessment   RLE Assessment WFL  (4/5 grossly)   LLE Assessment   LLE Assessment WFL  (4/5 grossly)   Coordination   Movements are Fluid and Coordinated 1   Sensation WFL   Bed Mobility   Supine to Sit 5  Supervision   Additional items HOB elevated; Increased time required;Verbal cues   Additional Comments cues for techniques & safety   Transfers   Sit to Stand 5  Supervision   Additional items Increased time required;Verbal cues   Stand to Sit 5  Supervision   Additional items Increased time required;Verbal cues   Toilet transfer 5  Supervision   Additional items Increased time required;Verbal cues;Standard toilet   Additional Comments cues for hand placement   Ambulation/Elevation   Gait pattern Wide JONES; Decreased foot clearance; Short stride; Excessively slow  (inc lateral displacement bilaterally 2* body habitus)   Gait Assistance 5  Supervision   Additional items Verbal cues   Assistive Device None   Distance 50'x1 + 30'x2  (seated rest in between amb trials 2* to SOB)   Ambulation/Elevation Additional Comments pt initially require minAx1 on the first few feet of amb but progressed to S w/ further amb   Balance   Static Sitting Good   Dynamic Sitting Fair +   Static Standing Fair   Dynamic Standing Fair -   Ambulatory Poor +   Endurance Deficit   Endurance Deficit Yes   Endurance Deficit Description SOB   Activity Tolerance   Activity Tolerance Treatment limited secondary to medical complications (Comment)   Medical Staff Made Aware OT Dominguez Mathews   Nurse Made Aware RN Florina   Assessment   Prognosis Good   Problem List Decreased strength;Decreased endurance; Impaired balance;Decreased mobility;Obesity   Assessment Pt  74 y  o female w/ known endometrial CA s/p recent S/p ROBOTIC HYSTERECTOMY, BILATERAL SALPINGO-OOPHORECTOMY, STAGING PERITONEAL AND OMENTAL BIOPSIES, BILATERAL PELVIC SENTINEL LYMPH NODE BIOPSIES (N/A), CYSTOSCOPY (N/A) on 3/31/2022 without complication  Started chemo on 5/11/22  Pt presented w/ fatigue, ROSAS, L sided chest pain & syncope  Pt admitted for Acute pulmonary embolism with acute cor pulmonale (HCC) w/ Syncope & Wound dehiscence  Pt referred to PT for mobility assessment & D/C planning w/ orders of activity as tolerated  Please see above for information re: home set-up & PLOF as well as objective findings during PT assessment  PTA, pt reports being fairly I w/o AD but admits to feeling fatigued easily  On eval, pt functioning below baseline hence will continue skilled PT to improve function & safety  Pt require S for most functional mobility + cues for techniques & safety  Gait deviations as above but no gross LOB noted  Require seated rests in between amb trials 2* to fatigue  The patient's AM-PAC Basic Mobility Inpatient Short Form Raw Score is 18  A Raw score of greater than 16 suggests the patient may benefit from discharge to home  Please also refer to the recommendation of the Physical Therapist for safe discharge planning  From PT standpoint, will anticipate good progress in PT for safe D/C to home  Will recommend HHPT & family support at D/C  No SOB & dizziness reported t/o session  Nsg staff most recent vital signs as follows: /61   Pulse 70   Temp (!) 97 4 °F (36 3 °C)   Resp 18   Ht 5' 2" (1 575 m)   Wt 122 kg (268 lb 1 3 oz)   LMP  (LMP Unknown)   SpO2 97%   BMI 49 03 kg/m²   At end of session, pt OOB in chair in stable condition, call bell & phone in reach, all lines intact  Fall precautions reinforced w/ good understanding  CM to follow  Nsg staff to continue to mobilized pt (OOB in chair for all meals & ambulate in room/unit) as tolerated to prevent further decline in function  Nsg notified  Co-eval was necessary to complete this PT eval for the pt's best interest given pt's medical acuity & complexity     Goals   Patient Goals to go home   STG Expiration Date 05/26/22   Short Term Goal #1 Goals to be met in 7 days; pt will be able to: 1) inc strength & balance by 1/2 grade to improve overall functional mobility & dec fall risk; 2) inc bed mobility to I for pt to be able to get in/OOB safely w/ proper techniques 100% of the time, to dec caregiver burden & safely function at home; 3) inc transfers to modified I for pt to transition safely from one surface to another w/o % of the time, to dec caregiver burden & safely function at home; 4) inc amb w/ appropriate AD approx  >80' w/ modified I for pt to ambulate household distances w/o any % of the time, to dec caregiver burden & safely function at home; 5) negotiate stairs w/ S for inc safety during stair mgt inside/outside of home & dec caregiver burden; 6) pt/caregiver ed   PT Treatment Day 0   Plan   Treatment/Interventions Functional transfer training;LE strengthening/ROM; Elevations; Therapeutic exercise; Endurance training;Patient/family training;Bed mobility;Gait training;Spoke to nursing;OT;Family   PT Frequency 3-5x/wk   Recommendation   PT Discharge Recommendation Home with home health rehabilitation   [address] [place] [address] Mobility Inpatient   Turning in Bed Without Bedrails 3   Lying on Back to Sitting on Edge of Flat Bed 3   Moving Bed to Chair 3   Standing Up From Chair 3   Walk in Room 3   Climb 3-5 Stairs 3   Basic Mobility Inpatient Raw Score 18   Basic Mobility Standardized Score 41 05   Highest Level Of Mobility   -HLM Goal 6: Walk 10 steps or more   JH-HLM Achieved 7: Walk 25 feet or more   End of Consult   Patient Position at End of Consult Bedside chair; All needs within reach   End of Consult Comments Pt in stable condition at end of session  All needs in reach  Lines intact     Hx/personal factors: co-morbidities, inaccessible home, advanced age, mutliple lines, fall risk, assist w/ ADL's, and obesity  Examination: dec mobility, dec balance, dec endurance, dec amb, risk for falls  Clinical: unpredictable (ongoing medical status, abnormal lab values, and risk for falls)  Complexity: high    Irene Hermosillo, PT

## 2022-05-19 NOTE — PLAN OF CARE
Problem: Potential for Falls  Goal: Patient will remain free of falls  Description: INTERVENTIONS:  - Educate patient/family on patient safety including physical limitations  - Instruct patient to call for assistance with activity   - Consult OT/PT to assist with strengthening/mobility   - Keep Call bell within reach  - Keep bed low and locked with side rails adjusted as appropriate  - Keep care items and personal belongings within reach  - Initiate and maintain comfort rounds  - Make Fall Risk Sign visible to staff  - Apply yellow socks and bracelet for high fall risk patients  - Consider moving patient to room near nurses station  Outcome: Progressing     Problem: Nutrition/Hydration-ADULT  Goal: Nutrient/Hydration intake appropriate for improving, restoring or maintaining nutritional needs  Description: Monitor and assess patient's nutrition/hydration status for malnutrition  Collaborate with interdisciplinary team and initiate plan and interventions as ordered  Monitor patient's weight and dietary intake as ordered or per policy  Utilize nutrition screening tool and intervene as necessary  Determine patient's food preferences and provide high-protein, high-caloric foods as appropriate       INTERVENTIONS:  - Monitor oral intake, urinary output, labs, and treatment plans  - Assess nutrition and hydration status and recommend course of action  - Evaluate amount of meals eaten  - Assist patient with eating if necessary   - Allow adequate time for meals  - Recommend/ encourage appropriate diets, oral nutritional supplements, and vitamin/mineral supplements  - Order, calculate, and assess calorie counts as needed  - Recommend, monitor, and adjust tube feedings and TPN/PPN based on assessed needs  - Assess need for intravenous fluids  - Provide specific nutrition/hydration education as appropriate  - Include patient/family/caregiver in decisions related to nutrition  Outcome: Progressing     Problem: MOBILITY - ADULT  Goal: Maintain or return to baseline ADL function  Description: INTERVENTIONS:  -  Assess patient's ability to carry out ADLs; assess patient's baseline for ADL function and identify physical deficits which impact ability to perform ADLs (bathing, care of mouth/teeth, toileting, grooming, dressing, etc )  - Assess/evaluate cause of self-care deficits   - Assess range of motion  - Assess patient's mobility; develop plan if impaired  - Assess patient's need for assistive devices and provide as appropriate  - Encourage maximum independence but intervene and supervise when necessary  - Involve family in performance of ADLs  - Assess for home care needs following discharge   - Consider OT consult to assist with ADL evaluation and planning for discharge  - Provide patient education as appropriate  Outcome: Progressing  Goal: Maintains/Returns to pre admission functional level  Description: INTERVENTIONS:  - Perform BMAT or MOVE assessment daily    - Set and communicate daily mobility goal to care team and patient/family/caregiver     - Collaborate with rehabilitation services on mobility goals if consulted  - Out of bed for toileting  - Record patient progress and toleration of activity level   Outcome: Progressing     Problem: SAFETY ADULT  Goal: Patient will remain free of falls  Description: INTERVENTIONS:  - Educate patient/family on patient safety including physical limitations  - Instruct patient to call for assistance with activity   - Consult OT/PT to assist with strengthening/mobility   - Keep Call bell within reach  - Keep bed low and locked with side rails adjusted as appropriate  - Keep care items and personal belongings within reach  - Initiate and maintain comfort rounds  - Make Fall Risk Sign visible to staff  - Apply yellow socks and bracelet for high fall risk patients  - Consider moving patient to room near nurses station  Outcome: Progressing  Goal: Maintain or return to baseline ADL function  Description: INTERVENTIONS:  -  Assess patient's ability to carry out ADLs; assess patient's baseline for ADL function and identify physical deficits which impact ability to perform ADLs (bathing, care of mouth/teeth, toileting, grooming, dressing, etc )  - Assess/evaluate cause of self-care deficits   - Assess range of motion  - Assess patient's mobility; develop plan if impaired  - Assess patient's need for assistive devices and provide as appropriate  - Encourage maximum independence but intervene and supervise when necessary  - Involve family in performance of ADLs  - Assess for home care needs following discharge   - Consider OT consult to assist with ADL evaluation and planning for discharge  - Provide patient education as appropriate  Outcome: Progressing  Goal: Maintains/Returns to pre admission functional level  Description: INTERVENTIONS:  - Perform BMAT or MOVE assessment daily    - Set and communicate daily mobility goal to care team and patient/family/caregiver     - Collaborate with rehabilitation services on mobility goals if consulted  - Out of bed for toileting  - Record patient progress and toleration of activity level   Outcome: Progressing     Problem: DISCHARGE PLANNING  Goal: Discharge to home or other facility with appropriate resources  Description: INTERVENTIONS:  - Identify barriers to discharge w/patient and caregiver  - Arrange for needed discharge resources and transportation as appropriate  - Identify discharge learning needs (meds, wound care, etc )  - Arrange for interpretive services to assist at discharge as needed  - Refer to Case Management Department for coordinating discharge planning if the patient needs post-hospital services based on physician/advanced practitioner order or complex needs related to functional status, cognitive ability, or social support system  Outcome: Progressing     Problem: Knowledge Deficit  Goal: Patient/family/caregiver demonstrates understanding of disease process, treatment plan, medications, and discharge instructions  Description: Complete learning assessment and assess knowledge base    Interventions:  - Provide teaching at level of understanding  - Provide teaching via preferred learning methods  Outcome: Progressing     Problem: RESPIRATORY - ADULT  Goal: Achieves optimal ventilation and oxygenation  Description: INTERVENTIONS:  - Assess for changes in respiratory status  - Assess for changes in mentation and behavior  - Position to facilitate oxygenation and minimize respiratory effort  - Oxygen administered by appropriate delivery if ordered  - Initiate smoking cessation education as indicated  - Encourage broncho-pulmonary hygiene including cough, deep breathe, Incentive Spirometry  - Assess the need for suctioning and aspirate as needed  - Assess and instruct to report SOB or any respiratory difficulty  - Respiratory Therapy support as indicated  Outcome: Progressing

## 2022-05-19 NOTE — CASE MANAGEMENT
Case Management Progress Note    Patient name Rever Daryl Minaya  Location [facility] 5 /E5 -* MRN [id number]  : 1950 Date 2022       LOS (days): 2  Geometric Mean LOS (GMLOS) (days): 4 10  Days to GMLOS:2 4        OBJECTIVE:        Current admission status: Inpatient  Preferred Pharmacy:   [address] [place], [place] [address]Th [facility]  5   [place] [address]  Phone: [phone] Fax: [phone]    Primary Care Provider: Nonnie Sever, MD    Primary Insurance: [address] [place] [address]  Secondary Insurance:     PROGRESS NOTE:      Eliquis sent to Pts pharmacy and filled  Pt will p/u today  CM provided Pt with 30 day free trial coupon   Pts monthly co-pay is $9 60

## 2022-05-19 NOTE — PROGRESS NOTES
Progress Note - Pulmonary   Rever V Gilmer 74 y o  female MRN: [id number]  Unit/Bed#: E5 -01 Encounter: [id number]      Assessment/Plan:    1  Acute hypoxic respiratory failure secondary to PE  1  Continue to titrate oxygen as needed to maintain SpO2>/=89%  2  Pulmonary toilet: IS, cough deep breathe, increase activity and time OOB as tolerated  3  No baseline requirements  4  Weaned to RA   5  Home O2 evaluation prior to discharge  2  Acute bilateral submassive PE with evidence of right heart strain  1  Likely secondary active malignancy  2  Echo with only mild decreased in systolic function and mild RV enlargement  3  Transition to eliquis today at 10 mg BID x 7 days and 5 mg BID for the duration of active cancer  4  Venous dopplers-left posttibial subacute clot  5  Outpatient plan: management per oncology     1  Obesity  2  CKD  3  Endometrial cancer  1  Follow up oncology  2  Started chemotherapy the week prior             Subjective:     Rever the same benefit is here  Denies current events  Overall reports that her breathing has improved today her shortness of breath at rest has improved  She has not ambulated in the room at this time and is interested in increasing activity prior to discharge  Denies cough chest pain, penetration of fevers, chills and bronchospasm or hemoptysis  Objective:         Vitals: Blood pressure 102/61, pulse 70, temperature (!) 97 4 °F (36 3 °C), resp  rate 18, height 5' 2" (1 575 m), weight 122 kg (268 lb 1 3 oz), SpO2 97 %, not currently breastfeeding  , room air, Body mass index is 49 03 kg/m²        Intake/Output Summary (Last 24 hours) at 5/19/2022 1055  Last data filed at 5/19/2022 0823  Gross per 24 hour   Intake 340 ml   Output --   Net 340 ml         Physical Exam  Gen: Awake, alert, oriented x 3, no acute distress  HEENT: Mucous membranes moist, no oral lesions, no thrush  NECK: no accessory muscle use, JVP not elevated  Cardiac: Regular, single S1, single S2, no murmurs, no rubs, no gallops  Lungs: clear breath sounds bilaterally  Abdomen: obese, normoactive bowel sounds, soft nontender, nondistended, no rebound or rigidity, no guarding  Extremities: no cyanosis, no clubbing, no edema    Labs: I have personally reviewed pertinent lab results  , CBC:   Lab Results   Component Value Date    WBC 3 22 (L) 05/19/2022    HGB 9 8 (L) 05/19/2022    HCT 30 5 (L) 05/19/2022    MCV 98 05/19/2022     (L) 05/19/2022    MCH 31 4 05/19/2022    MCHC 32 1 05/19/2022    RDW 13 3 05/19/2022    MPV 11 5 05/19/2022   , CMP:   Lab Results   Component Value Date    SODIUM 142 05/19/2022    K 4 0 05/19/2022     05/19/2022    CO2 28 05/19/2022    BUN 18 05/19/2022    CREATININE 1 25 05/19/2022    CALCIUM 8 5 05/19/2022    EGFR 42 05/19/2022     Imaging and other studies: I have personally reviewed pertinent reports     and Echo 5/18/22  EF 75% with grade 1 diastolic dysfunction  Mild dilation of RV and mild reduction in systolic function, PAP [id number]    venous duplex 5/18/22  Subacute clot left post tibial    [address] [facility], [place]

## 2022-05-19 NOTE — ASSESSMENT & PLAN NOTE
· Secondary to pulmonary embolism  · Titrated off oxygen  Saturating well on room air    Noted for oxygen on discharge

## 2022-05-19 NOTE — DISCHARGE SUMMARY
[address] [facility]  Discharge- Rever Yisroel Baumgarten 1950, 74 y o  female MRN: [id number]  Unit/Bed#: E5 -01 Encounter: [id number]  Primary Care Provider: Jacquelyn Cortes MD   Date and time admitted to hospital: 5/17/2022  7:20 PM    * Acute pulmonary embolism with acute cor pulmonale (HCC)  Assessment & Plan  · CTA chest with bilateral PE, RV/LV ratio 1 5  · Pulmonology input appreciated  · Echo results noted, RV function low normal  · Patient is not requiring oxygen  Troponins were mildly bumped however echo with no signs of wall motion abnormalities  · Patient was seen by Cardiology and pulmonology  · Initially on heparin drip and transitioned to Eliquis  · Initially requiring oxygen, has been titrated off  Saturating in the 90s on room air  · PT/OT eval recommending outpatient therapy  · Case management followed up with patient's pharmacy and patient will have her Eliquis covered by insurance    Acute respiratory failure with hypoxia ([facility] [address] [address] )  Assessment & Plan  · Secondary to pulmonary embolism  · Titrated off oxygen  Saturating well on room air  Noted for oxygen on discharge    Morbid obesity with BMI of 45 0-49 9, adult [facility])  Assessment & Plan  The patient follow up with PCP for possible bariatric referral    Endometrial cancer [facility])  Assessment & Plan  · Patient status post hysterectomy and bilateral oophorectomy in March of this year  · Currently on chemotherapy, has received 1 treatment session  · Gyn input appreciated  · Will need continue outpatient care    Stage 3a chronic kidney disease ([facility] [address] [address] )  Assessment & Plan  · Creatinine at baseline    Continue routine lab monitoring with PCP as outpatient    Essential hypertension  Assessment & Plan  · BP currently stable  · Continue amlodipine  · Resume HCTZ on discharge      Discharging Physician / Practitioner: Jo Butcher MD  PCP: Jacquelyn Cortes MD  Admission Date:   Admission Orders (From admission, onward)     Ordered 05/17/22 2214  Inpatient Admission  Once                      Discharge Date: 05/19/22    Medical Problems             Resolved Problems  Date Reviewed: 5/3/2022   None                 Consultations During Hospital Stay:  · Pulmonology, Cardiology    Procedures Performed:   · None    Significant Findings / Test Results:   · Pulmonary embolism    Incidental Findings:   · None     Test Results Pending at Discharge (will require follow up): · None     Outpatient Tests Requested:  · Routine labs with PCP as outpatient    Complications:     None    Reason for Admission:  Pulmonary embolism    Hospital Course:     Sg Linares is a 74 y o  female patient who originally presented to the hospital on 5/17/2022 due to syncope/shortness of breath  Patient found to have elevated D-dimer and CTA chest was positive for bilateral PE  Troponins were elevated and patient was requiring oxygen  Initially discussed with ICU, was not a candidate for ICU admission  Echo was performed and showed low normal RV function  Per pulmonology patient does not need any acute intervention and continue with heparin drip  Patient was on heparin drip and then eventually transitioned to Eliquis  Patient doing well today  Off oxygen  Ambulated with physical therapy  Outpatient physical therapy referral provided on discharge  Patient will follow up with Hematology and pulmonology as outpatient  Lower extremity Doppler was performed and negative for DVT    Please see above list of diagnoses and related plan for additional information       Condition at Discharge: stable     Discharge Day Visit / Exam:     Subjective:  No complaints at this time    Vitals: Blood Pressure: 102/61 (05/19/22 0749)  Pulse: 70 (05/19/22 0749)  Temperature: (!) 97 4 °F (36 3 °C) (05/19/22 0704)  Temp Source: Oral (05/18/22 0845)  Respirations: 18 (05/19/22 0704)  Height: 5' 2" (157 5 cm) (05/18/22 1519)  Weight - Scale: 122 kg (268 lb 1 3 oz) (05/19/22 0550)  SpO2: 97 % (05/19/22 0775)     Exam:   Physical Exam  Constitutional:       General: She is not in acute distress  Appearance: She is obese  HENT:      Head: Normocephalic and atraumatic  Nose: Nose normal       Mouth/Throat:      Mouth: Mucous membranes are moist    Eyes:      Extraocular Movements: Extraocular movements intact  Conjunctiva/sclera: Conjunctivae normal    Cardiovascular:      Rate and Rhythm: Normal rate and regular rhythm  Pulmonary:      Effort: Pulmonary effort is normal  No respiratory distress  Abdominal:      Palpations: Abdomen is soft  Tenderness: There is no abdominal tenderness  Musculoskeletal:         General: Normal range of motion  Cervical back: Normal range of motion and neck supple  Skin:     General: Skin is warm and dry  Neurological:      General: No focal deficit present  Mental Status: She is alert  Cranial Nerves: No cranial nerve deficit  Psychiatric:         Mood and Affect: Mood normal          Behavior: Behavior normal          Discussion with Family:  Spoke with patient's sister at bedside regarding discharge plan    Discharge instructions/Information to patient and family:   See after visit summary for information provided to patient and family  Provisions for Follow-Up Care:  See after visit summary for information related to follow-up care and any pertinent home health orders  Disposition:     Home      Planned Readmission:    no     Discharge Statement:  I spent 35 minutes discharging the patient  This time was spent on the day of discharge  I had direct contact with the patient on the day of discharge  Greater than 50% of the total time was spent examining patient, answering all patient questions, arranging and discussing plan of care with patient as well as directly providing post-discharge instructions  Additional time then spent on discharge activities      Discharge Medications:  See after visit summary for reconciled discharge medications provided to patient and family        ** Please Note: This note has been constructed using a voice recognition system **

## 2022-05-19 NOTE — PROGRESS NOTES
GYNECOLOGIC ONCOLOGY SOCIAL ROUNDS    Patient appears comfortable  No longer dyspnea  Of oxygen  Been transitioned of heparin drip to oral anticoagulants  Medical management per SLIM  Will resume chemotherapy as outpatient per calendar      Lexii Nguyen MD, [address]  5/19/2022  3:13 PM

## 2022-05-19 NOTE — PLAN OF CARE
Problem: OCCUPATIONAL THERAPY ADULT  Goal: Performs self-care activities at highest level of function for planned discharge setting  See evaluation for individualized goals  Description: Treatment Interventions: ADL retraining, UE strengthening/ROM, Endurance training, Functional transfer training, Patient/family training, Equipment evaluation/education, Compensatory technique education, Energy conservation, Activityengagement          See flowsheet documentation for full assessment, interventions and recommendations  Note: Limitation: Decreased ADL status, Decreased UE strength, Decreased endurance, Decreased self-care trans, Decreased high-level ADLs  Prognosis: Good  Assessment: Pt is a 74 y o  female seen for OT evaluation s/p adm to [facility] on 5/17/2022 s/p syncopal episode and admitted w/ Acute pulmonary embolism with acute cor pulmonale and Acute respiratory failure with hypoxia  CTA chest with B/L PE  Comorbidities affecting pts functional performance include a significant PMH of Arthritis, CKD, HTN, Endometrial CA s/p SERGIO in 03/2022, and Obesity  Pt with active OT orders and activity orders for Activity as tolerated  Pt lives with her sister and son in a two level house with 4STE and FOS to 2nd floor bed/full bath  Pt (-) home alone  At baseline, pt was I w/ ADLs and functional transfers/mobility w/o use of AD  Family assisted w/ IADLs  (+) , however has not driven recently  Sister assists w/ transportation  (+) fall PTA  Upon evaluation, pt currently requires Supervision for UB ADLs, Min A for LB ADLs, Supervision for toileting, Supervision for bed mobility, and Supervision for functional mobility/transfers 2* the following deficits impacting occupational performance: ROSAS, decreased strength, decreased balance and decreased tolerance   These impairments, as well at pts fall risk, steps to enter environment and difficulty performing ADLS limit pts ability to safely engage in all Spoke with patient. Patient understands and agrees.    baseline areas of occupation  Based on the aforementioned OT evaluation, functional performance deficits, and assessments, pt has been identified as a Moderate complexity evaluation  Pt to continue to benefit from continued acute OT services during hospital stay to address defined deficits and to maximize level of functional independence in the following Occupational Performance areas: grooming, bathing/shower, toilet hygiene, dressing, medication management, health maintenance, functional mobility, community mobility and clothing management  From OT standpoint, recommend Home OT upon D/C   OT will continue to follow pt 2-3x/wk to address the following goals to  w/in 10-14 days:     OT Discharge Recommendation: Home with home health rehabilitation  OT - OK to Discharge: Yes (when medically cleared)

## 2022-05-20 NOTE — UTILIZATION REVIEW
Inpatient Admission Authorization Request   NOTIFICATION OF INPATIENT ADMISSION/INPATIENT AUTHORIZATION REQUEST   SERVICING FACILITY:   [address] [place] [address]  [address] [place] [address], [address]  Tax ID: [id number]  NPI: [id number]  Place of Service: Inpatient [address] [facility]  [address]  Place of Service Code: 24     ATTENDING PROVIDER:  Attending Name and NPI#: Rhonda Meade [[id number]]  Address: [address] [place] [address], [address]   Phone: [phone]     UTILIZATION REVIEW CONTACT:  Christopher Cogan, Utilization   Network Utilization Review Department  Phone: [phone]  Fax: [phone]  Email: [email or web address]     PHYSICIAN ADVISORY SERVICES:  FOR YRZM-HF-XAWU REVIEW - MEDICAL NECESSITY DENIAL  Phone: [phone]  Fax: [phone]  Email: [email or web address]     TYPE OF REQUEST:  Inpatient Status     ADMISSION INFORMATION:  ADMISSION DATE/TIME: 5/17/22 10:14 PM  PATIENT DIAGNOSIS CODE/DESCRIPTION:  Syncope [R55]  Endometrial cancer ([facility] ) [C54 1]  Wound dehiscence [T81 30XA]  Bilateral pulmonary embolism ([facility] ) [I26 99]  Cor pulmonale, acute ([facility] ) [I26 09]  DISCHARGE DATE/TIME: 5/19/2022  5:26 PM   IMPORTANT INFORMATION:  Please contact the Christopher Cogan directly with any questions or concerns regarding this request  Department voicemails are confidential     Send requests for admission clinical reviews, concurrent reviews, approvals, and administrative denials due to lack of clinical to fax [phone]

## 2022-05-20 NOTE — UTILIZATION REVIEW
Notification of Discharge   This is a Notification of Discharge from our facility [address]  Please be advised that this patient has been discharge from our facility  Below you will find the admission and discharge date and time including the patients disposition  UTILIZATION REVIEW CONTACT:  Cindia Brunner  Utilization   Network Utilization Review Department  Phone: [phone] x carefully listen to the prompts  All voicemails are confidential   Email: [email or web address]     PHYSICIAN ADVISORY SERVICES:  FOR MRKD-OE-COLK REVIEW - MEDICAL NECESSITY DENIAL  Phone: [phone]  Fax: [phone]  Email: [email or web address]     PRESENTATION DATE: 5/17/2022  7:20 PM    INPATIENT ADMISSION DATE: 5/17/22 10:14 PM   DISCHARGE DATE: 5/19/2022  5:26 PM  DISPOSITION: Home/Self Care Home/Self Care      IMPORTANT INFORMATION:  Send all requests for admission clinical reviews, approved or denied determinations and any other requests to dedicated fax number below belonging to the campus where the patient is receiving treatment   List of dedicated fax numbers:  [address] DENIALS (Administrative/Medical Necessity) [phone]   [address] (Maternity/NICU/Pediatrics) [phone]   Ortega Congress [phone]   130 [facility] [phone]   [address] [place] [address] [phone]   2000 [facility] [address] [place] [address],[address] [phone]   [facility]  [phone]   2205 [facility] [address]  2401 [facility] 1000 [address] [facility] [phone]

## 2022-05-23 ENCOUNTER — HOSPITAL ENCOUNTER (OUTPATIENT)
Dept: INFUSION CENTER | Facility: CLINIC | Age: 72
Discharge: HOME | End: 2022-05-23
Payer: COMMERCIAL

## 2022-05-23 DIAGNOSIS — Z45.2 ENCOUNTER FOR CENTRAL LINE CARE: ICD-10-CM

## 2022-05-23 DIAGNOSIS — C54.1 ENDOMETRIAL CANCER (HCC): Primary | ICD-10-CM

## 2022-05-23 LAB
ALBUMIN SERPL BCP-MCNC: 3.2 G/DL (ref 3.5–5)
ALP SERPL-CCNC: 69 U/L (ref 46–116)
ALT SERPL W P-5'-P-CCNC: 28 U/L (ref 12–78)
ANION GAP SERPL CALCULATED.3IONS-SCNC: 9 MMOL/L (ref 4–13)
AST SERPL W P-5'-P-CCNC: 24 U/L (ref 5–45)
BASOPHILS # BLD AUTO: 0.03 THOUSANDS/ΜL (ref 0–0.1)
BASOPHILS NFR BLD AUTO: 1 % (ref 0–1)
BILIRUB SERPL-MCNC: 0.31 MG/DL (ref 0.2–1)
BUN SERPL-MCNC: 19 MG/DL (ref 5–25)
CALCIUM ALBUM COR SERPL-MCNC: 9.2 MG/DL (ref 8.3–10.1)
CALCIUM SERPL-MCNC: 8.6 MG/DL (ref 8.3–10.1)
CHLORIDE SERPL-SCNC: 108 MMOL/L (ref 100–108)
CO2 SERPL-SCNC: 26 MMOL/L (ref 21–32)
CREAT SERPL-MCNC: 1.34 MG/DL (ref 0.6–1.3)
EOSINOPHIL # BLD AUTO: 0.07 THOUSAND/ΜL (ref 0–0.61)
EOSINOPHIL NFR BLD AUTO: 2 % (ref 0–6)
ERYTHROCYTE [DISTWIDTH] IN BLOOD BY AUTOMATED COUNT: 14.4 % (ref 11.6–15.1)
GFR SERPL CREATININE-BSD FRML MDRD: 39 ML/MIN/1.73SQ M
GLUCOSE SERPL-MCNC: 87 MG/DL (ref 65–140)
HCT VFR BLD AUTO: 34.4 % (ref 34.8–46.1)
HGB BLD-MCNC: 10.5 G/DL (ref 11.5–15.4)
IMM GRANULOCYTES # BLD AUTO: 0.02 THOUSAND/UL (ref 0–0.2)
IMM GRANULOCYTES NFR BLD AUTO: 1 % (ref 0–2)
LYMPHOCYTES # BLD AUTO: 1.65 THOUSANDS/ΜL (ref 0.6–4.47)
LYMPHOCYTES NFR BLD AUTO: 55 % (ref 14–44)
MAGNESIUM SERPL-MCNC: 2 MG/DL (ref 1.6–2.6)
MCH RBC QN AUTO: 31 PG (ref 26.8–34.3)
MCHC RBC AUTO-ENTMCNC: 30.5 G/DL (ref 31.4–37.4)
MCV RBC AUTO: 102 FL (ref 82–98)
MONOCYTES # BLD AUTO: 0.34 THOUSAND/ΜL (ref 0.17–1.22)
MONOCYTES NFR BLD AUTO: 11 % (ref 4–12)
NEUTROPHILS # BLD AUTO: 0.91 THOUSANDS/ΜL (ref 1.85–7.62)
NEUTS SEG NFR BLD AUTO: 30 % (ref 43–75)
NRBC BLD AUTO-RTO: 0 /100 WBCS
PLATELET # BLD AUTO: 159 THOUSANDS/UL (ref 149–390)
PMV BLD AUTO: 10.7 FL (ref 8.9–12.7)
POTASSIUM SERPL-SCNC: 4 MMOL/L (ref 3.5–5.3)
PROT SERPL-MCNC: 7 G/DL (ref 6.4–8.2)
RBC # BLD AUTO: 3.39 MILLION/UL (ref 3.81–5.12)
SODIUM SERPL-SCNC: 143 MMOL/L (ref 136–145)
WBC # BLD AUTO: 3.02 THOUSAND/UL (ref 4.31–10.16)

## 2022-05-23 PROCEDURE — 85025 COMPLETE CBC W/AUTO DIFF WBC: CPT

## 2022-05-23 PROCEDURE — 83735 ASSAY OF MAGNESIUM: CPT

## 2022-05-23 PROCEDURE — 80053 COMPREHEN METABOLIC PANEL: CPT

## 2022-05-23 NOTE — PROGRESS NOTES
Pt  Denied new symptoms or concerns today  Labs obtained as ordered via port a cath  Excellent blood return noted  Future appointments reviewed  AVS provided

## 2022-05-26 ENCOUNTER — TELEPHONE (OUTPATIENT)
Dept: NEPHROLOGY | Facility: CLINIC | Age: 72
End: 2022-05-26

## 2022-05-31 ENCOUNTER — OFFICE VISIT (OUTPATIENT)
Dept: GYNECOLOGIC ONCOLOGY | Facility: CLINIC | Age: 72
End: 2022-05-31

## 2022-05-31 ENCOUNTER — HOSPITAL ENCOUNTER (OUTPATIENT)
Dept: INFUSION CENTER | Facility: CLINIC | Age: 72
Discharge: HOME | End: 2022-05-31
Payer: COMMERCIAL

## 2022-05-31 VITALS
HEIGHT: 62 IN | SYSTOLIC BLOOD PRESSURE: 110 MMHG | TEMPERATURE: 98.8 F | BODY MASS INDEX: 49.13 KG/M2 | WEIGHT: 267 LBS | DIASTOLIC BLOOD PRESSURE: 80 MMHG

## 2022-05-31 DIAGNOSIS — Z45.2 ENCOUNTER FOR CENTRAL LINE CARE: Primary | ICD-10-CM

## 2022-05-31 DIAGNOSIS — C54.1 ENDOMETRIAL CANCER (HCC): ICD-10-CM

## 2022-05-31 DIAGNOSIS — I26.09 OTHER ACUTE PULMONARY EMBOLISM WITH ACUTE COR PULMONALE (HCC): Primary | ICD-10-CM

## 2022-05-31 PROBLEM — J96.01 ACUTE RESPIRATORY FAILURE WITH HYPOXIA (HCC): Status: RESOLVED | Noted: 2018-05-29 | Resolved: 2022-05-31

## 2022-05-31 PROBLEM — T81.30XA WOUND DEHISCENCE: Status: RESOLVED | Noted: 2022-04-19 | Resolved: 2022-05-31

## 2022-05-31 LAB
ALBUMIN SERPL BCP-MCNC: 3.2 G/DL (ref 3.5–5)
ALP SERPL-CCNC: 71 U/L (ref 46–116)
ALT SERPL W P-5'-P-CCNC: 21 U/L (ref 12–78)
ANION GAP SERPL CALCULATED.3IONS-SCNC: 7 MMOL/L (ref 4–13)
AST SERPL W P-5'-P-CCNC: 20 U/L (ref 5–45)
BASOPHILS # BLD AUTO: 0.04 THOUSANDS/ΜL (ref 0–0.1)
BASOPHILS NFR BLD AUTO: 1 % (ref 0–1)
BILIRUB SERPL-MCNC: 0.33 MG/DL (ref 0.2–1)
BUN SERPL-MCNC: 18 MG/DL (ref 5–25)
CALCIUM ALBUM COR SERPL-MCNC: 8.6 MG/DL (ref 8.3–10.1)
CALCIUM SERPL-MCNC: 8 MG/DL (ref 8.3–10.1)
CHLORIDE SERPL-SCNC: 107 MMOL/L (ref 100–108)
CO2 SERPL-SCNC: 28 MMOL/L (ref 21–32)
CREAT SERPL-MCNC: 1.2 MG/DL (ref 0.6–1.3)
EOSINOPHIL # BLD AUTO: 0.08 THOUSAND/ΜL (ref 0–0.61)
EOSINOPHIL NFR BLD AUTO: 2 % (ref 0–6)
ERYTHROCYTE [DISTWIDTH] IN BLOOD BY AUTOMATED COUNT: 14.6 % (ref 11.6–15.1)
GFR SERPL CREATININE-BSD FRML MDRD: 44 ML/MIN/1.73SQ M
GLUCOSE SERPL-MCNC: 93 MG/DL (ref 65–140)
HCT VFR BLD AUTO: 35.5 % (ref 34.8–46.1)
HGB BLD-MCNC: 11.3 G/DL (ref 11.5–15.4)
IMM GRANULOCYTES # BLD AUTO: 0.01 THOUSAND/UL (ref 0–0.2)
IMM GRANULOCYTES NFR BLD AUTO: 0 % (ref 0–2)
LYMPHOCYTES # BLD AUTO: 2.02 THOUSANDS/ΜL (ref 0.6–4.47)
LYMPHOCYTES NFR BLD AUTO: 47 % (ref 14–44)
MAGNESIUM SERPL-MCNC: 2.3 MG/DL (ref 1.6–2.6)
MCH RBC QN AUTO: 32 PG (ref 26.8–34.3)
MCHC RBC AUTO-ENTMCNC: 31.8 G/DL (ref 31.4–37.4)
MCV RBC AUTO: 101 FL (ref 82–98)
MONOCYTES # BLD AUTO: 0.32 THOUSAND/ΜL (ref 0.17–1.22)
MONOCYTES NFR BLD AUTO: 7 % (ref 4–12)
NEUTROPHILS # BLD AUTO: 1.84 THOUSANDS/ΜL (ref 1.85–7.62)
NEUTS SEG NFR BLD AUTO: 43 % (ref 43–75)
NRBC BLD AUTO-RTO: 0 /100 WBCS
PLATELET # BLD AUTO: 181 THOUSANDS/UL (ref 149–390)
PMV BLD AUTO: 11.1 FL (ref 8.9–12.7)
POTASSIUM SERPL-SCNC: 3.8 MMOL/L (ref 3.5–5.3)
PROT SERPL-MCNC: 7 G/DL (ref 6.4–8.2)
RBC # BLD AUTO: 3.53 MILLION/UL (ref 3.81–5.12)
SODIUM SERPL-SCNC: 142 MMOL/L (ref 136–145)
WBC # BLD AUTO: 4.31 THOUSAND/UL (ref 4.31–10.16)

## 2022-05-31 PROCEDURE — 83735 ASSAY OF MAGNESIUM: CPT

## 2022-05-31 PROCEDURE — 99024 POSTOP FOLLOW-UP VISIT: CPT | Performed by: OBSTETRICS & GYNECOLOGY

## 2022-05-31 PROCEDURE — 85025 COMPLETE CBC W/AUTO DIFF WBC: CPT

## 2022-05-31 PROCEDURE — 80053 COMPREHEN METABOLIC PANEL: CPT

## 2022-05-31 RX ORDER — SODIUM CHLORIDE 9 MG/ML
20 INJECTION, SOLUTION INTRAVENOUS ONCE
Status: CANCELLED | OUTPATIENT
Start: 2022-06-01

## 2022-05-31 RX ORDER — PALONOSETRON 0.05 MG/ML
0.25 INJECTION, SOLUTION INTRAVENOUS ONCE
Status: CANCELLED | OUTPATIENT
Start: 2022-06-01

## 2022-05-31 NOTE — PATIENT INSTRUCTIONS
Continue Eliquis twice per day  Continue chemotherapy per calendar  Call if you have any questions or new concerns

## 2022-05-31 NOTE — PROGRESS NOTES
Pt arrived to unit without complaint  Central labs drawn and sent  AVS provided  Pt left unit in stable condition

## 2022-05-31 NOTE — ASSESSMENT & PLAN NOTE
She has been cleared to receive cycle 2  Of carboplatin and paclitaxel  We plan to treat with a total of 6 cycles and then obtain repeat CT scan to evaluate response  Will continue to monitor her laboratory and clinical parameters prior to each infusion  She knows to contact us if she has any new symptoms

## 2022-05-31 NOTE — PROGRESS NOTES
Assessment/Plan:    Problem List Items Addressed This Visit        Cardiovascular and Mediastinum    Acute pulmonary embolism with acute cor pulmonale ([facility] ) - Primary     Diagnosed in May of 2022  She was treated in-house with heparin drip and has now been transition to Eliquis  Tolerating well  Dyspnea improving  She will need treatment at minimum until November 2022  Anticoagulation will only be discontinued if she has complete recovery and no evidence of active malignancy at that time  Genitourinary    Endometrial cancer ([facility] )     She has been cleared to receive cycle 2  Of carboplatin and paclitaxel  We plan to treat with a total of 6 cycles and then obtain repeat CT scan to evaluate response  Will continue to monitor her laboratory and clinical parameters prior to each infusion  She knows to contact us if she has any new symptoms  CHIEF COMPLAINT:   Pre chemotherapy visit, follow-up after hospital admission for pulmonary embolism  Problem:  Cancer Staging  Endometrial cancer [facility])  Staging form: Corpus Uteri - Carcinoma, AJCC 8th Edition  - Pathologic stage from 3/31/2022: FIGO Stage IIIC1 - Signed by Calos Daly MD on 4/19/2022        Previous therapy:  Oncology History   Endometrial cancer ([facility] )   3/15/2022 Initial Diagnosis    Endometrial cancer ([facility] )     3/31/2022 -  Cancer Staged    Staging form: Corpus Uteri - Carcinoma, AJCC 8th Edition  - Pathologic stage from 3/31/2022:  FIGO Stage IIIC1 - Signed by Calos Daly MD on 4/19/2022  Histopathologic type: Clear cell adenocarcinoma, NOS  Stage prefix: Initial diagnosis  Histologic grade (G): G3  Histologic grading system: 3 grade system  Residual tumor (R): R0 - None  Pelvic fátima status: Positive  Number of pelvic nodes positive from dissection: 1  Number of pelvic nodes examined during dissection: 2  Lymph node metastasis: Present  Omentectomy performed: Yes  Morcellation performed: No 3/31/2022 Surgery    Robotic hysterectomy, bilateral salpingo oophorectomy, bilateral pelvic sentinel lymph node biopsies, pelvic peritoneal/omental biopsies  Final pathology demonstrated clear cell carcinoma, 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%)  Negative cervix  Negative parametria  1/2 sentinel pelvic lymph nodes positive for malignancy  Negative staging biopsies  Stage IIIC1  No evidence of DNA mismatch repair protein loss  5/11/2022 -  Chemotherapy    palonosetron (ALOXI), 0 25 mg, Intravenous, Once, 1 of 6 cycles  Administration: 0 25 mg (5/11/2022)  fosaprepitant (EMEND) IVPB, 150 mg, Intravenous, Once, 1 of 6 cycles  Administration: 150 mg (5/11/2022)  CARBOplatin (PARAPLATIN) IVPB (GOG AUC DOSING), 352 5 mg, Intravenous, Once, 1 of 6 cycles  Administration: 352 5 mg (5/11/2022)  PACLItaxel (TAXOL) chemo IVPB, 135 mg/m2 = 290 4 mg (77 1 % of original dose 175 mg/m2), Intravenous, Once, 1 of 6 cycles  Dose modification: 135 mg/m2 (original dose 175 mg/m2, Cycle 1, Reason: Other (Must fill in a comment), Comment: prescribed starting dose)  Administration: 290 4 mg (5/11/2022)           Patient ID: Bear Patel is a 74 y o  female  HPI  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  She recently presented to the hospital with acute shortness of breath and was diagnosed with acute pulmonary embolus with some right heart strain  She was treated with heparin drip and transition to Eliquis which she is now tolerating well  Her dyspnea has markedly improved  She denies vaginal bleeding, drainage or discharge  She is also due for vaginal cuff check  Previous superficial separation of epigastric robotic incision has completely healed  Reports some constipation and normal bladder function  Ongoing alopecia  No neuropathy  No other complaints    The following portions of the patient's history were reviewed and updated as appropriate: allergies, current medications, past family history, past medical history, past social history, past surgical history and problem list     Review of Systems  As per HPI  Twelve point review of systems otherwise unremarkable  Current Outpatient Medications   Medication Sig Dispense Refill    acetaminophen (TYLENOL) 650 mg CR tablet Take 1 tablet (650 mg total) by mouth every 6 (six) hours as needed for mild pain 30 tablet 0    amLODIPine (NORVASC) 10 mg tablet Take 10 mg by mouth daily      apixaban (Eliquis) 5 mg Take 2 tablets (10 mg total) by mouth 2 (two) times a day for 7 days, THEN 1 tablet (5 mg total) 2 (two) times a day for 23 days  74 tablet 0    denosumab (Prolia) 60 mg/mL Inject 1 mL under the skin every 6 (six) months      fluticasone (FLONASE) 50 mcg/act nasal spray 2 sprays into each nostril daily as needed        hydrochlorothiazide (HYDRODIURIL) 12 5 mg tablet Take 12 5 mg by mouth daily      LORazepam (ATIVAN) 1 mg tablet Take 1 tablet (1 mg total) by mouth every 8 (eight) hours as needed for anxiety (nausea or anxiety) 30 tablet 0    omeprazole (PriLOSEC) 40 MG capsule Take 20 mg by mouth daily        ondansetron (ZOFRAN) 8 mg tablet Take 1 tablet (8 mg total) by mouth every 8 (eight) hours as needed for nausea or vomiting 20 tablet 1     No current facility-administered medications for this visit  Objective:    Blood pressure 110/80, temperature 98 8 °F (37 1 °C), temperature source Tympanic, height 5' 2" (1 575 m), weight 121 kg (267 lb), not currently breastfeeding  Body mass index is 48 83 kg/m²  Body surface area is 2 16 meters squared  Physical Exam  Vitals reviewed  Exam conducted with a chaperone present  Constitutional:       General: She is not in acute distress  Appearance: She is obese  She is not ill-appearing or toxic-appearing  Cardiovascular:      Rate and Rhythm: Normal rate and regular rhythm  Heart sounds: Normal heart sounds  No murmur heard    Pulmonary:      Effort: Pulmonary effort is normal  No respiratory distress  Breath sounds: Normal breath sounds  No wheezing  Abdominal:      General: Abdomen is flat  There is no distension  Palpations: Abdomen is soft  There is no mass  Tenderness: There is no abdominal tenderness  There is no guarding  Comments: All incisions well-healed  Genitourinary:     Comments: Normal external female genitalia  Normal Bartholin's and [place]'s glands  Normal urethral meatus and no evidence of urethral discharge or masses  Bladder without fullness mass or tenderness  Vaginal cuff well healed, no granulation tissue, no dehiscence, suture material still palpable  There are no lesions, blood or discharge  No significant pelvic organ prolapse noted  Cervix and uterus are surgically absent  Bimanual exam demonstrates no evidence of pelvic masses  Anus without fissure of lesion  Musculoskeletal:      Right lower leg: No edema  Left lower leg: No edema       Zunilda Santos MD, [address]  5/31/2022  12:25 PM

## 2022-05-31 NOTE — ASSESSMENT & PLAN NOTE
Diagnosed in May of 2022  She was treated in-house with heparin drip and has now been transition to Eliquis  Tolerating well  Dyspnea improving  She will need treatment at minimum until November 2022  Anticoagulation will only be discontinued if she has complete recovery and no evidence of active malignancy at that time

## 2022-06-01 ENCOUNTER — HOSPITAL ENCOUNTER (OUTPATIENT)
Dept: INFUSION CENTER | Facility: CLINIC | Age: 72
Discharge: HOME | End: 2022-06-01
Payer: COMMERCIAL

## 2022-06-01 VITALS
WEIGHT: 266.76 LBS | DIASTOLIC BLOOD PRESSURE: 77 MMHG | BODY MASS INDEX: 49.09 KG/M2 | HEART RATE: 84 BPM | HEIGHT: 62 IN | TEMPERATURE: 96.9 F | SYSTOLIC BLOOD PRESSURE: 127 MMHG | RESPIRATION RATE: 18 BRPM

## 2022-06-01 DIAGNOSIS — C54.1 ENDOMETRIAL CANCER (HCC): Primary | ICD-10-CM

## 2022-06-01 PROCEDURE — 96375 TX/PRO/DX INJ NEW DRUG ADDON: CPT

## 2022-06-01 PROCEDURE — 96415 CHEMO IV INFUSION ADDL HR: CPT

## 2022-06-01 PROCEDURE — 96367 TX/PROPH/DG ADDL SEQ IV INF: CPT

## 2022-06-01 PROCEDURE — 96413 CHEMO IV INFUSION 1 HR: CPT

## 2022-06-01 PROCEDURE — 96417 CHEMO IV INFUS EACH ADDL SEQ: CPT

## 2022-06-01 RX ORDER — LIDOCAINE AND PRILOCAINE 25; 25 MG/G; MG/G
CREAM TOPICAL
Qty: 30 G | Refills: 1 | Status: SHIPPED | OUTPATIENT
Start: 2022-06-01

## 2022-06-01 RX ORDER — SODIUM CHLORIDE 9 MG/ML
20 INJECTION, SOLUTION INTRAVENOUS ONCE
Status: COMPLETED | OUTPATIENT
Start: 2022-06-01 | End: 2022-06-01

## 2022-06-01 RX ORDER — PALONOSETRON 0.05 MG/ML
0.25 INJECTION, SOLUTION INTRAVENOUS ONCE
Status: COMPLETED | OUTPATIENT
Start: 2022-06-01 | End: 2022-06-01

## 2022-06-01 RX ADMIN — DIPHENHYDRAMINE HYDROCHLORIDE 25 MG: 50 INJECTION, SOLUTION INTRAMUSCULAR; INTRAVENOUS at 08:53

## 2022-06-01 RX ADMIN — CARBOPLATIN 377.5 MG: 10 INJECTION, SOLUTION INTRAVENOUS at 13:08

## 2022-06-01 RX ADMIN — PALONOSETRON HYDROCHLORIDE 0.25 MG: 0.25 INJECTION INTRAVENOUS at 08:33

## 2022-06-01 RX ADMIN — PACLITAXEL 290.4 MG: 6 INJECTION, SOLUTION, CONCENTRATE INTRAVENOUS at 10:06

## 2022-06-01 RX ADMIN — SODIUM CHLORIDE 20 ML/HR: 9 INJECTION, SOLUTION INTRAVENOUS at 08:31

## 2022-06-01 RX ADMIN — FAMOTIDINE 20 MG: 10 INJECTION INTRAVENOUS at 09:16

## 2022-06-01 RX ADMIN — FOSAPREPITANT 150 MG: 150 INJECTION, POWDER, LYOPHILIZED, FOR SOLUTION INTRAVENOUS at 09:36

## 2022-06-01 RX ADMIN — DEXAMETHASONE SODIUM PHOSPHATE 20 MG: 100 INJECTION INTRAMUSCULAR; INTRAVENOUS at 08:31

## 2022-06-01 NOTE — PROGRESS NOTES
Pt presents for taxol and carboplatin infusion  No new meds or concerns  Pt tolerated infusion without adverse reaction  Future visits discussed  AVS given

## 2022-06-06 ENCOUNTER — HOSPITAL ENCOUNTER (OUTPATIENT)
Dept: INFUSION CENTER | Facility: CLINIC | Age: 72
Discharge: HOME | End: 2022-06-06
Payer: COMMERCIAL

## 2022-06-06 DIAGNOSIS — C54.1 ENDOMETRIAL CANCER (HCC): Primary | ICD-10-CM

## 2022-06-06 DIAGNOSIS — Z45.2 ENCOUNTER FOR CENTRAL LINE CARE: ICD-10-CM

## 2022-06-06 LAB
ALBUMIN SERPL BCP-MCNC: 3.2 G/DL (ref 3.5–5)
ALP SERPL-CCNC: 69 U/L (ref 46–116)
ALT SERPL W P-5'-P-CCNC: 42 U/L (ref 12–78)
ANION GAP SERPL CALCULATED.3IONS-SCNC: 9 MMOL/L (ref 4–13)
AST SERPL W P-5'-P-CCNC: 31 U/L (ref 5–45)
BASOPHILS # BLD MANUAL: 0 THOUSAND/UL (ref 0–0.1)
BASOPHILS NFR MAR MANUAL: 0 % (ref 0–1)
BILIRUB SERPL-MCNC: 0.47 MG/DL (ref 0.2–1)
BUN SERPL-MCNC: 19 MG/DL (ref 5–25)
CALCIUM ALBUM COR SERPL-MCNC: 8.5 MG/DL (ref 8.3–10.1)
CALCIUM SERPL-MCNC: 7.9 MG/DL (ref 8.3–10.1)
CHLORIDE SERPL-SCNC: 102 MMOL/L (ref 100–108)
CO2 SERPL-SCNC: 26 MMOL/L (ref 21–32)
CREAT SERPL-MCNC: 1.48 MG/DL (ref 0.6–1.3)
EOSINOPHIL # BLD MANUAL: 0.37 THOUSAND/UL (ref 0–0.4)
EOSINOPHIL NFR BLD MANUAL: 12 % (ref 0–6)
ERYTHROCYTE [DISTWIDTH] IN BLOOD BY AUTOMATED COUNT: 14 % (ref 11.6–15.1)
GFR SERPL CREATININE-BSD FRML MDRD: 34 ML/MIN/1.73SQ M
GLUCOSE SERPL-MCNC: 138 MG/DL (ref 65–140)
HCT VFR BLD AUTO: 35.5 % (ref 34.8–46.1)
HGB BLD-MCNC: 11.4 G/DL (ref 11.5–15.4)
LYMPHOCYTES # BLD AUTO: 1.27 THOUSAND/UL (ref 0.6–4.47)
LYMPHOCYTES # BLD AUTO: 41 % (ref 14–44)
MAGNESIUM SERPL-MCNC: 2.2 MG/DL (ref 1.6–2.6)
MCH RBC QN AUTO: 31.8 PG (ref 26.8–34.3)
MCHC RBC AUTO-ENTMCNC: 32.1 G/DL (ref 31.4–37.4)
MCV RBC AUTO: 99 FL (ref 82–98)
MONOCYTES # BLD AUTO: 0.03 THOUSAND/UL (ref 0–1.22)
MONOCYTES NFR BLD: 1 % (ref 4–12)
NEUTROPHILS # BLD MANUAL: 1.43 THOUSAND/UL (ref 1.85–7.62)
NEUTS SEG NFR BLD AUTO: 46 % (ref 43–75)
PLATELET # BLD AUTO: 92 THOUSANDS/UL (ref 149–390)
PLATELET BLD QL SMEAR: ABNORMAL
PMV BLD AUTO: 12.2 FL (ref 8.9–12.7)
POTASSIUM SERPL-SCNC: 4.1 MMOL/L (ref 3.5–5.3)
PROT SERPL-MCNC: 7.1 G/DL (ref 6.4–8.2)
RBC # BLD AUTO: 3.59 MILLION/UL (ref 3.81–5.12)
RBC MORPH BLD: NORMAL
SODIUM SERPL-SCNC: 137 MMOL/L (ref 136–145)
WBC # BLD AUTO: 3.1 THOUSAND/UL (ref 4.31–10.16)

## 2022-06-06 PROCEDURE — 85007 BL SMEAR W/DIFF WBC COUNT: CPT

## 2022-06-06 PROCEDURE — 80053 COMPREHEN METABOLIC PANEL: CPT

## 2022-06-06 PROCEDURE — 83735 ASSAY OF MAGNESIUM: CPT

## 2022-06-06 PROCEDURE — 85027 COMPLETE CBC AUTOMATED: CPT

## 2022-06-06 NOTE — PROGRESS NOTES
Pt  Denied new symptoms or concerns today  Labs obtained as ordered  Excellent blood return noted  Future appointments reviewed  AVS provided

## 2022-06-07 NOTE — PROGRESS NOTES
Pulmonary Follow Up Note   Sg Scherer 74 y o  female MRN: [id number]  6/8/2022      Assessment and Plan:    Acute submassive pulmonary embolism   - likely provoked in the setting of malignancy, obesity, and fairly sedentary lifestyle    - on eliquis and will need this at least for the duration of active malignancy or November 2022 (6 months) ONLY IF malignancy in remission   - monitor for bleeding with thrombocytopenia   - LE duplex with left side subacute post tibial clot  -2D echo 5/18 with EF 75%, grade 1 DD, RV possibly mildly dilated, LA atrium dilated, PASp 35-40mmHg   - no need to repeat 2D echo as long as she has no new or worsening symptoms   - increase activity level as tolerated   - educated patient what to look for while on eliquis such as cuts, blood in urine/stool and go to the ER if she hits her head     Morbid Obesity   - complicates all aspects of care   - has been losing a lot of weight     Endometrial Cancer   - on chemotherapy - carboplatin and paclitaxel (on cycle 2 of 6)     Insomnia   - unable to fall asleep due to a lot of anxiety   - recommend better sleep hygiene by optimizing room without TV, dark room, no lights/noise   - may benefit from CBT referral next visit- the patient and sister have agreed     1  Endometrial cancer ([address] )    2  Acute saddle pulmonary embolism with acute cor pulmonale (HCC)    3  Morbid obesity with BMI of 45 0-49 9, [facility])        The patient is from [facility] and would prefer to follow up closer to her home, therefore, will have patient scheduled in [facility] office for further follow up  Return in about 6 months (around 12/8/2022)  History of Present Illness   HPI:  Sg Steward is a 74 y o  female with PMHx Ckd Stage 3, recently diagnosed endometrial cancer on chemotherapy, recently hospitalized from 5/17-5/19 for an acute submassive PE for which she was on a heparin gtt and then transitioned to Eliquis  She is here for a hospital follow up  She was initially oxygen at the hospital and was taken off  She continues to have some SOB but it's not all the time  She denies any cough, fevers, chest pain, hemoptysis  She doesn't have a great appetite but her sister has been encouraging her to eat more  She doesn't walk very much at baseline  Review of Systems   Respiratory: Positive for shortness of breath  All other systems reviewed and are negative  Historical Information   Past Medical History:   Diagnosis Date    Arthritis     osteo    Chronic kidney disease     Colon polyp     Diverticula of colon     Hypertension     Obesity     Rhinitis      Past Surgical History:   Procedure Laterality Date    APPENDECTOMY      BREAST BIOPSY Left 1977    benign    BREAST SURGERY Left     biopsy    CHOLECYSTECTOMY      COLONOSCOPY      CYSTOSCOPY N/A 3/31/2022    Procedure: CYSTOSCOPY;  Surgeon: Lexii Nguyen MD;  [facility]: [facility];  Service: Gynecology Oncology    HERNIA REPAIR Right     inguinal    HYSTERECTOMY      IR PORT PLACEMENT  5/9/2022    JOINT REPLACEMENT Right     Knee    VA COLONOSCOPY FLX DX W/COLLJ SPEC WHEN PFRMD N/A 2/21/2017    Procedure: COLONOSCOPY with polypectomy and hemo clip marjan ;  Surgeon: Janice Cespedes MD;  Location: [facility];   Service: Gastroenterology    VA LAPAROSCOPY W TOT HYSTERECTUTERUS <=250 Ar [address] TUBE/OVARY N/A 3/31/2022    Procedure: ROBOTIC HYSTERECTOMY, BILATERAL SALPINGO-OOPHORECTOMY, STAGING PERITONEAL AND OMENTAL BIOPSIES, BILATERAL PELVIC SENTINEL LYMPH NODE BIOPSIES;  Surgeon: Lexii Nguyen MD;  Location: [facility];  Service: Gynecology Oncology     Family History   Problem Relation Age of Onset    Heart disease Mother     Prostate cancer Father 80    Diabetes Sister     No Known Problems Maternal Grandmother     No Known Problems Maternal Grandfather     No Known Problems Paternal Grandmother     No Known Problems Paternal Grandfather     Hypertension Sister    Nadir Transient ischemic attack Sister     No Known Problems Sister     No Known Problems Maternal Aunt     No Known Problems Paternal Aunt     No Known Problems Paternal Aunt     No Known Problems Paternal Aunt     Diabetes Brother     Heart disease Brother     Stomach cancer Other     Thyroid disease Other      Social History     Tobacco Use   Smoking Status Never Smoker   Smokeless Tobacco Never Used       Meds/Allergies     Current Outpatient Medications:     acetaminophen (TYLENOL) 650 mg CR tablet, Take 1 tablet (650 mg total) by mouth every 6 (six) hours as needed for mild pain, Disp: 30 tablet, Rfl: 0    amLODIPine (NORVASC) 10 mg tablet, Take 10 mg by mouth daily, Disp: , Rfl:     apixaban (Eliquis) 5 mg, Take 2 tablets (10 mg total) by mouth 2 (two) times a day for 7 days, THEN 1 tablet (5 mg total) 2 (two) times a day for 23 days  , Disp: 74 tablet, Rfl: 0    denosumab (Prolia) 60 mg/mL, Inject 1 mL under the skin every 6 (six) months, Disp: , Rfl:     hydrochlorothiazide (HYDRODIURIL) 12 5 mg tablet, Take 12 5 mg by mouth daily, Disp: , Rfl:     lidocaine-prilocaine (EMLA) cream, Apply to PORT site 30 min prior to labs or chemotherapy, Disp: 30 g, Rfl: 1    LORazepam (ATIVAN) 1 mg tablet, Take 1 tablet (1 mg total) by mouth every 8 (eight) hours as needed for anxiety (nausea or anxiety), Disp: 30 tablet, Rfl: 0    omeprazole (PriLOSEC) 40 MG capsule, Take 20 mg by mouth daily  , Disp: , Rfl:     ondansetron (ZOFRAN) 8 mg tablet, Take 1 tablet (8 mg total) by mouth every 8 (eight) hours as needed for nausea or vomiting, Disp: 20 tablet, Rfl: 1    fluticasone (FLONASE) 50 mcg/act nasal spray, 2 sprays into each nostril daily as needed  , Disp: , Rfl:   No Known Allergies    Vitals: Blood pressure 124/68, pulse 78, temperature 98 6 °F (37 °C), temperature source Tympanic, resp  rate 18, height 5' 3" (1 6 m), weight 117 kg (259 lb), SpO2 99 %, not currently breastfeeding   Body mass index is 45 88 kg/m²  Oxygen Therapy  SpO2: 99 %  Oxygen Therapy: None (Room air)      Physical Exam  Physical Exam  Vitals and nursing note reviewed  Constitutional:       General: She is not in acute distress  Appearance: She is well-developed  HENT:      Head: Normocephalic and atraumatic  Eyes:      Conjunctiva/sclera: Conjunctivae normal    Cardiovascular:      Rate and Rhythm: Normal rate and regular rhythm  Heart sounds: No murmur heard  Pulmonary:      Effort: Pulmonary effort is normal  No respiratory distress  Breath sounds: Normal breath sounds  Abdominal:      Palpations: Abdomen is soft  Tenderness: There is no abdominal tenderness  Musculoskeletal:         General: Normal range of motion  Cervical back: Neck supple  Skin:     General: Skin is warm and dry  Neurological:      General: No focal deficit present  Mental Status: She is alert and oriented to person, place, and time  Labs: I have personally reviewed pertinent lab results  Lab Results   Component Value Date    WBC 3 10 (L) 06/06/2022    HGB 11 4 (L) 06/06/2022    HCT 35 5 06/06/2022    MCV 99 (H) 06/06/2022    PLT 92 (L) 06/06/2022     Lab Results   Component Value Date    CALCIUM 7 9 (L) 06/06/2022    K 4 1 06/06/2022    CO2 26 06/06/2022     06/06/2022    BUN 19 06/06/2022    CREATININE 1 48 (H) 06/06/2022     No results found for: IGE  Lab Results   Component Value Date    ALT 42 06/06/2022    AST 31 06/06/2022    ALKPHOS 69 06/06/2022         Imaging and other studies: I have personally reviewed pertinent reports  and I have personally reviewed pertinent films in PACS    Pulmonary function testing: none     EKG, Pathology, and Other Studies: I have personally reviewed pertinent reports     and I have personally reviewed pertinent films in PACS      Orie Meigs, MD   Pulmonary and Critical Care Fellow  [facility]

## 2022-06-08 ENCOUNTER — OFFICE VISIT (OUTPATIENT)
Dept: PULMONOLOGY | Facility: CLINIC | Age: 72
End: 2022-06-08
Payer: COMMERCIAL

## 2022-06-08 VITALS
BODY MASS INDEX: 45.89 KG/M2 | DIASTOLIC BLOOD PRESSURE: 68 MMHG | WEIGHT: 259 LBS | HEIGHT: 63 IN | OXYGEN SATURATION: 99 % | HEART RATE: 78 BPM | RESPIRATION RATE: 18 BRPM | TEMPERATURE: 98.6 F | SYSTOLIC BLOOD PRESSURE: 124 MMHG

## 2022-06-08 DIAGNOSIS — I26.02 ACUTE SADDLE PULMONARY EMBOLISM WITH ACUTE COR PULMONALE (HCC): ICD-10-CM

## 2022-06-08 DIAGNOSIS — E66.01 MORBID OBESITY WITH BMI OF 45.0-49.9, ADULT (HCC): ICD-10-CM

## 2022-06-08 DIAGNOSIS — C54.1 ENDOMETRIAL CANCER (HCC): Primary | ICD-10-CM

## 2022-06-08 DIAGNOSIS — I26.09 ACUTE PULMONARY EMBOLISM WITH ACUTE COR PULMONALE, UNSPECIFIED PULMONARY EMBOLISM TYPE (HCC): ICD-10-CM

## 2022-06-08 PROBLEM — G47.00 INSOMNIA: Status: ACTIVE | Noted: 2022-06-08

## 2022-06-08 PROCEDURE — 99214 OFFICE O/P EST MOD 30 MIN: CPT | Performed by: INTERNAL MEDICINE

## 2022-06-13 ENCOUNTER — HOSPITAL ENCOUNTER (OUTPATIENT)
Dept: INFUSION CENTER | Facility: CLINIC | Age: 72
Discharge: HOME | End: 2022-06-13
Payer: COMMERCIAL

## 2022-06-13 DIAGNOSIS — I26.99 BILATERAL PULMONARY EMBOLISM (HCC): ICD-10-CM

## 2022-06-13 DIAGNOSIS — Z45.2 ENCOUNTER FOR CENTRAL LINE CARE: Primary | ICD-10-CM

## 2022-06-13 DIAGNOSIS — C54.1 ENDOMETRIAL CANCER (HCC): ICD-10-CM

## 2022-06-13 LAB
ALBUMIN SERPL BCP-MCNC: 3.2 G/DL (ref 3.5–5)
ALP SERPL-CCNC: 77 U/L (ref 46–116)
ALT SERPL W P-5'-P-CCNC: 40 U/L (ref 12–78)
ANION GAP SERPL CALCULATED.3IONS-SCNC: 9 MMOL/L (ref 4–13)
AST SERPL W P-5'-P-CCNC: 21 U/L (ref 5–45)
BASOPHILS # BLD AUTO: 0.02 THOUSANDS/ΜL (ref 0–0.1)
BASOPHILS NFR BLD AUTO: 1 % (ref 0–1)
BILIRUB SERPL-MCNC: 0.26 MG/DL (ref 0.2–1)
BUN SERPL-MCNC: 12 MG/DL (ref 5–25)
CALCIUM ALBUM COR SERPL-MCNC: 8.5 MG/DL (ref 8.3–10.1)
CALCIUM SERPL-MCNC: 7.9 MG/DL (ref 8.3–10.1)
CHLORIDE SERPL-SCNC: 103 MMOL/L (ref 100–108)
CO2 SERPL-SCNC: 27 MMOL/L (ref 21–32)
CREAT SERPL-MCNC: 1.39 MG/DL (ref 0.6–1.3)
EOSINOPHIL # BLD AUTO: 0.06 THOUSAND/ΜL (ref 0–0.61)
EOSINOPHIL NFR BLD AUTO: 2 % (ref 0–6)
ERYTHROCYTE [DISTWIDTH] IN BLOOD BY AUTOMATED COUNT: 14.5 % (ref 11.6–15.1)
GFR SERPL CREATININE-BSD FRML MDRD: 37 ML/MIN/1.73SQ M
GLUCOSE SERPL-MCNC: 106 MG/DL (ref 65–140)
HCT VFR BLD AUTO: 34.3 % (ref 34.8–46.1)
HGB BLD-MCNC: 10.8 G/DL (ref 11.5–15.4)
IMM GRANULOCYTES # BLD AUTO: 0.01 THOUSAND/UL (ref 0–0.2)
IMM GRANULOCYTES NFR BLD AUTO: 0 % (ref 0–2)
LYMPHOCYTES # BLD AUTO: 1.56 THOUSANDS/ΜL (ref 0.6–4.47)
LYMPHOCYTES NFR BLD AUTO: 63 % (ref 14–44)
MAGNESIUM SERPL-MCNC: 2.2 MG/DL (ref 1.6–2.6)
MCH RBC QN AUTO: 31.2 PG (ref 26.8–34.3)
MCHC RBC AUTO-ENTMCNC: 31.5 G/DL (ref 31.4–37.4)
MCV RBC AUTO: 99 FL (ref 82–98)
MONOCYTES # BLD AUTO: 0.29 THOUSAND/ΜL (ref 0.17–1.22)
MONOCYTES NFR BLD AUTO: 12 % (ref 4–12)
NEUTROPHILS # BLD AUTO: 0.54 THOUSANDS/ΜL (ref 1.85–7.62)
NEUTS SEG NFR BLD AUTO: 22 % (ref 43–75)
NRBC BLD AUTO-RTO: 0 /100 WBCS
PLATELET # BLD AUTO: 138 THOUSANDS/UL (ref 149–390)
PMV BLD AUTO: 10.6 FL (ref 8.9–12.7)
POTASSIUM SERPL-SCNC: 3.5 MMOL/L (ref 3.5–5.3)
PROT SERPL-MCNC: 7.1 G/DL (ref 6.4–8.2)
RBC # BLD AUTO: 3.46 MILLION/UL (ref 3.81–5.12)
SODIUM SERPL-SCNC: 139 MMOL/L (ref 136–145)
WBC # BLD AUTO: 2.48 THOUSAND/UL (ref 4.31–10.16)

## 2022-06-13 PROCEDURE — 85025 COMPLETE CBC W/AUTO DIFF WBC: CPT

## 2022-06-13 PROCEDURE — 80053 COMPREHEN METABOLIC PANEL: CPT

## 2022-06-13 PROCEDURE — 83735 ASSAY OF MAGNESIUM: CPT

## 2022-06-13 NOTE — PROGRESS NOTES
Labs collected via port a cath  Port flushed per protocol  Pt is aware all future appointments  AVS provided

## 2022-06-20 ENCOUNTER — HOSPITAL ENCOUNTER (OUTPATIENT)
Dept: INFUSION CENTER | Facility: CLINIC | Age: 72
Discharge: HOME | End: 2022-06-20
Payer: COMMERCIAL

## 2022-06-20 DIAGNOSIS — Z45.2 ENCOUNTER FOR CENTRAL LINE CARE: ICD-10-CM

## 2022-06-20 DIAGNOSIS — C54.1 ENDOMETRIAL CANCER (HCC): Primary | ICD-10-CM

## 2022-06-20 LAB
ALBUMIN SERPL BCP-MCNC: 3 G/DL (ref 3.5–5)
ALP SERPL-CCNC: 70 U/L (ref 46–116)
ALT SERPL W P-5'-P-CCNC: 22 U/L (ref 12–78)
ANION GAP SERPL CALCULATED.3IONS-SCNC: 9 MMOL/L (ref 4–13)
AST SERPL W P-5'-P-CCNC: 17 U/L (ref 5–45)
BASOPHILS # BLD AUTO: 0.03 THOUSANDS/ΜL (ref 0–0.1)
BASOPHILS NFR BLD AUTO: 1 % (ref 0–1)
BILIRUB SERPL-MCNC: 0.23 MG/DL (ref 0.2–1)
BUN SERPL-MCNC: 22 MG/DL (ref 5–25)
CALCIUM ALBUM COR SERPL-MCNC: 9.6 MG/DL (ref 8.3–10.1)
CALCIUM SERPL-MCNC: 8.8 MG/DL (ref 8.3–10.1)
CHLORIDE SERPL-SCNC: 104 MMOL/L (ref 100–108)
CO2 SERPL-SCNC: 26 MMOL/L (ref 21–32)
CREAT SERPL-MCNC: 1.4 MG/DL (ref 0.6–1.3)
EOSINOPHIL # BLD AUTO: 0.05 THOUSAND/ΜL (ref 0–0.61)
EOSINOPHIL NFR BLD AUTO: 1 % (ref 0–6)
ERYTHROCYTE [DISTWIDTH] IN BLOOD BY AUTOMATED COUNT: 14.8 % (ref 11.6–15.1)
GFR SERPL CREATININE-BSD FRML MDRD: 37 ML/MIN/1.73SQ M
GLUCOSE SERPL-MCNC: 116 MG/DL (ref 65–140)
HCT VFR BLD AUTO: 34.5 % (ref 34.8–46.1)
HGB BLD-MCNC: 11.1 G/DL (ref 11.5–15.4)
IMM GRANULOCYTES # BLD AUTO: 0.01 THOUSAND/UL (ref 0–0.2)
IMM GRANULOCYTES NFR BLD AUTO: 0 % (ref 0–2)
LYMPHOCYTES # BLD AUTO: 1.79 THOUSANDS/ΜL (ref 0.6–4.47)
LYMPHOCYTES NFR BLD AUTO: 41 % (ref 14–44)
MAGNESIUM SERPL-MCNC: 1.6 MG/DL (ref 1.6–2.6)
MCH RBC QN AUTO: 31.9 PG (ref 26.8–34.3)
MCHC RBC AUTO-ENTMCNC: 32.2 G/DL (ref 31.4–37.4)
MCV RBC AUTO: 99 FL (ref 82–98)
MONOCYTES # BLD AUTO: 0.46 THOUSAND/ΜL (ref 0.17–1.22)
MONOCYTES NFR BLD AUTO: 11 % (ref 4–12)
NEUTROPHILS # BLD AUTO: 1.98 THOUSANDS/ΜL (ref 1.85–7.62)
NEUTS SEG NFR BLD AUTO: 46 % (ref 43–75)
NRBC BLD AUTO-RTO: 0 /100 WBCS
PLATELET # BLD AUTO: 198 THOUSANDS/UL (ref 149–390)
PMV BLD AUTO: 10.3 FL (ref 8.9–12.7)
POTASSIUM SERPL-SCNC: 3.9 MMOL/L (ref 3.5–5.3)
PROT SERPL-MCNC: 7.1 G/DL (ref 6.4–8.2)
RBC # BLD AUTO: 3.48 MILLION/UL (ref 3.81–5.12)
SODIUM SERPL-SCNC: 139 MMOL/L (ref 136–145)
WBC # BLD AUTO: 4.32 THOUSAND/UL (ref 4.31–10.16)

## 2022-06-20 PROCEDURE — 85025 COMPLETE CBC W/AUTO DIFF WBC: CPT

## 2022-06-20 PROCEDURE — 80053 COMPREHEN METABOLIC PANEL: CPT

## 2022-06-20 PROCEDURE — 83735 ASSAY OF MAGNESIUM: CPT

## 2022-06-21 ENCOUNTER — OFFICE VISIT (OUTPATIENT)
Dept: GYNECOLOGIC ONCOLOGY | Facility: CLINIC | Age: 72
End: 2022-06-21

## 2022-06-21 VITALS
BODY MASS INDEX: 44.83 KG/M2 | TEMPERATURE: 97 F | HEIGHT: 63 IN | RESPIRATION RATE: 14 BRPM | OXYGEN SATURATION: 98 % | HEART RATE: 68 BPM | WEIGHT: 253 LBS | DIASTOLIC BLOOD PRESSURE: 70 MMHG | SYSTOLIC BLOOD PRESSURE: 126 MMHG

## 2022-06-21 DIAGNOSIS — C54.1 ENDOMETRIAL CANCER (HCC): Primary | ICD-10-CM

## 2022-06-21 PROCEDURE — 99024 POSTOP FOLLOW-UP VISIT: CPT | Performed by: NURSE PRACTITIONER

## 2022-06-21 RX ORDER — SODIUM CHLORIDE 9 MG/ML
20 INJECTION, SOLUTION INTRAVENOUS ONCE
Status: CANCELLED | OUTPATIENT
Start: 2022-06-22

## 2022-06-21 RX ORDER — PALONOSETRON 0.05 MG/ML
0.25 INJECTION, SOLUTION INTRAVENOUS ONCE
Status: CANCELLED | OUTPATIENT
Start: 2022-06-22

## 2022-06-22 ENCOUNTER — HOSPITAL ENCOUNTER (OUTPATIENT)
Dept: INFUSION CENTER | Facility: CLINIC | Age: 72
Discharge: HOME | End: 2022-06-22
Payer: COMMERCIAL

## 2022-06-22 VITALS
TEMPERATURE: 97 F | BODY MASS INDEX: 48.58 KG/M2 | WEIGHT: 264 LBS | DIASTOLIC BLOOD PRESSURE: 79 MMHG | SYSTOLIC BLOOD PRESSURE: 122 MMHG | HEIGHT: 62 IN | HEART RATE: 71 BPM | RESPIRATION RATE: 18 BRPM

## 2022-06-22 DIAGNOSIS — C54.1 ENDOMETRIAL CANCER (HCC): Primary | ICD-10-CM

## 2022-06-22 PROCEDURE — 96415 CHEMO IV INFUSION ADDL HR: CPT

## 2022-06-22 PROCEDURE — 96413 CHEMO IV INFUSION 1 HR: CPT

## 2022-06-22 PROCEDURE — 96375 TX/PRO/DX INJ NEW DRUG ADDON: CPT

## 2022-06-22 PROCEDURE — 96367 TX/PROPH/DG ADDL SEQ IV INF: CPT

## 2022-06-22 PROCEDURE — 96417 CHEMO IV INFUS EACH ADDL SEQ: CPT

## 2022-06-22 RX ORDER — SODIUM CHLORIDE 9 MG/ML
20 INJECTION, SOLUTION INTRAVENOUS ONCE
Status: COMPLETED | OUTPATIENT
Start: 2022-06-22 | End: 2022-06-22

## 2022-06-22 RX ORDER — PALONOSETRON 0.05 MG/ML
0.25 INJECTION, SOLUTION INTRAVENOUS ONCE
Status: COMPLETED | OUTPATIENT
Start: 2022-06-22 | End: 2022-06-22

## 2022-06-22 RX ADMIN — CARBOPLATIN 340.5 MG: 10 INJECTION, SOLUTION INTRAVENOUS at 13:44

## 2022-06-22 RX ADMIN — DEXAMETHASONE SODIUM PHOSPHATE 20 MG: 100 INJECTION INTRAMUSCULAR; INTRAVENOUS at 08:41

## 2022-06-22 RX ADMIN — PACLITAXEL 289.2 MG: 6 INJECTION, SOLUTION, CONCENTRATE INTRAVENOUS at 10:41

## 2022-06-22 RX ADMIN — FOSAPREPITANT 150 MG: 150 INJECTION, POWDER, LYOPHILIZED, FOR SOLUTION INTRAVENOUS at 10:09

## 2022-06-22 RX ADMIN — SODIUM CHLORIDE 20 ML/HR: 0.9 INJECTION, SOLUTION INTRAVENOUS at 08:38

## 2022-06-22 RX ADMIN — FAMOTIDINE 20 MG: 10 INJECTION INTRAVENOUS at 09:46

## 2022-06-22 RX ADMIN — PALONOSETRON 0.25 MG: 0.05 INJECTION, SOLUTION INTRAVENOUS at 08:39

## 2022-06-22 RX ADMIN — DIPHENHYDRAMINE HYDROCHLORIDE 25 MG: 50 INJECTION, SOLUTION INTRAMUSCULAR; INTRAVENOUS at 09:16

## 2022-06-22 NOTE — ASSESSMENT & PLAN NOTE
42-year-old with stage IIIC1 endometrial cancer currently undergoing adjuvant chemotherapy treatment with Taxol 135mg/m2 and Carboplatin AUC5  She presents for pre-cycle 3 visit accompanied by her sister  She has no concerns  She is feeling well  Her labs from 6/20 were reviewed and are WNL for treatment tomorrow  Her PS is 0  Patient will proceed with treatment tomorrow without dose modification  She will RTO as per her chemotherapy calendar

## 2022-06-22 NOTE — PROGRESS NOTES
Patient arrived to the unit and denied complications  She tolerated her treatment well without adverse reaction

## 2022-06-22 NOTE — PROGRESS NOTES
Assessment/Plan:    Problem List Items Addressed This Visit        Genitourinary    Endometrial cancer ([facility] ) - Primary     75-year-old with stage IIIC1 endometrial cancer currently undergoing adjuvant chemotherapy treatment with Taxol 135mg/m2 and Carboplatin AUC5  She presents for pre-cycle 3 visit accompanied by her sister  She has no concerns  She is feeling well  Her labs from 6/20 were reviewed and are WNL for treatment tomorrow  Her PS is 0  Patient will proceed with treatment tomorrow without dose modification  She will RTO as per her chemotherapy calendar  CHIEF COMPLAINT: pre-chemotherapy evaluation      Problem:  Cancer Staging  Endometrial cancer [facility])  Staging form: Corpus Uteri - Carcinoma, AJCC 8th Edition  - Pathologic stage from 3/31/2022: FIGO Stage IIIC1 - Signed by Arsenio Polanco MD on 4/19/2022        Previous therapy:  Oncology History   Endometrial cancer ([facility] )   3/15/2022 Initial Diagnosis    Endometrial cancer ([place] [address] )     3/31/2022 -  Cancer Staged    Staging form: Corpus Uteri - Carcinoma, AJCC 8th Edition  - Pathologic stage from 3/31/2022: FIGO Stage IIIC1 - Signed by Arsenio Polanco MD on 4/19/2022  Histopathologic type: Clear cell adenocarcinoma, NOS  Stage prefix: Initial diagnosis  Histologic grade (G): G3  Histologic grading system: 3 grade system  Residual tumor (R): R0 - None  Pelvic fátima status: Positive  Number of pelvic nodes positive from dissection: 1  Number of pelvic nodes examined during dissection: 2  Lymph node metastasis: Present  Omentectomy performed: Yes  Morcellation performed: No       3/31/2022 Surgery    Robotic hysterectomy, bilateral salpingo oophorectomy, bilateral pelvic sentinel lymph node biopsies, pelvic peritoneal/omental biopsies  Final pathology demonstrated clear cell carcinoma, 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%)  Negative cervix  Negative parametria  1/2 sentinel pelvic lymph nodes positive for malignancy  Negative staging biopsies  Stage IIIC1  No evidence of DNA mismatch repair protein loss  5/11/2022 -  Chemotherapy    palonosetron (ALOXI), 0 25 mg, Intravenous, Once, 3 of 6 cycles  Administration: 0 25 mg (5/11/2022), 0 25 mg (6/1/2022)  fosaprepitant (EMEND) IVPB, 150 mg, Intravenous, Once, 3 of 6 cycles  Administration: 150 mg (5/11/2022), 150 mg (6/1/2022)  CARBOplatin (PARAPLATIN) IVPB (GOG AUC DOSING), 352 5 mg, Intravenous, Once, 3 of 6 cycles  Administration: 352 5 mg (5/11/2022), 377 5 mg (6/1/2022)  PACLItaxel (TAXOL) chemo IVPB, 135 mg/m2 = 290 4 mg (77 1 % of original dose 175 mg/m2), Intravenous, Once, 3 of 6 cycles  Dose modification: 135 mg/m2 (original dose 175 mg/m2, Cycle 1, Reason: Other (Must fill in a comment), Comment: prescribed starting dose)  Administration: 290 4 mg (5/11/2022), 290 4 mg (6/1/2022)           Patient ID: Ta Davila is a 74 y o  female     This is a 74 y o  female who presents to the office for pre-chemotherapy evaluation  She has been tolerating chemotherapy well and is without acute complaints  She has been afebrile  She denies nausea or vomiting  Her appetite is appropriate  She is voiding and moving her bowels without difficulty  She denies abdominal or pelvic pain  The patient is without vaginal bleeding or discharge  She denies chemotherapy-induced neuropathy  She is ambulatory  The following portions of the patient's history were reviewed and updated as appropriate: allergies, current medications, past family history, past medical history, past social history, past surgical history and problem list     Review of Systems   Constitutional: Negative for activity change, appetite change, chills, fatigue, fever and unexpected weight change  HENT: Negative for mouth sores  Eyes: Negative  Respiratory: Negative for cough, chest tightness, shortness of breath and wheezing  Cardiovascular: Negative for chest pain, palpitations and leg swelling     Gastrointestinal: Negative for abdominal distention, abdominal pain, anal bleeding, blood in stool, constipation, diarrhea, nausea and vomiting  Endocrine: Negative  Genitourinary: Negative for difficulty urinating, dysuria, flank pain, frequency, hematuria, pelvic pain, urgency, vaginal bleeding, vaginal discharge and vaginal pain  Musculoskeletal: Negative for arthralgias and myalgias  Skin: Negative for color change, pallor and rash  Neurological: Negative for dizziness, weakness, numbness and headaches  Hematological: Negative  Psychiatric/Behavioral: The patient is not nervous/anxious  Current Outpatient Medications   Medication Sig Dispense Refill    acetaminophen (TYLENOL) 650 mg CR tablet Take 1 tablet (650 mg total) by mouth every 6 (six) hours as needed for mild pain 30 tablet 0    amLODIPine (NORVASC) 10 mg tablet Take 10 mg by mouth daily      apixaban (Eliquis) 5 mg Take 1 tablet PO BID 60 tablet 2    denosumab (Prolia) 60 mg/mL Inject 1 mL under the skin every 6 (six) months      hydrochlorothiazide (HYDRODIURIL) 12 5 mg tablet Take 12 5 mg by mouth daily      lidocaine-prilocaine (EMLA) cream Apply to PORT site 30 min prior to labs or chemotherapy 30 g 1    omeprazole (PriLOSEC) 40 MG capsule Take 20 mg by mouth daily      fluticasone (FLONASE) 50 mcg/act nasal spray 2 sprays into each nostril daily as needed        LORazepam (ATIVAN) 1 mg tablet Take 1 tablet (1 mg total) by mouth every 8 (eight) hours as needed for anxiety (nausea or anxiety) (Patient not taking: Reported on 6/21/2022) 30 tablet 0    ondansetron (ZOFRAN) 8 mg tablet Take 1 tablet (8 mg total) by mouth every 8 (eight) hours as needed for nausea or vomiting (Patient not taking: Reported on 6/21/2022) 20 tablet 1     No current facility-administered medications for this visit       Facility-Administered Medications Ordered in Other Visits   Medication Dose Route Frequency Provider Last Rate Last Admin    CARBOplatin (PARAPLATIN) 340 5 mg in sodium chloride 0 9 % 250 mL IVPB  340 5 mg Intravenous Once Georgina Coburn MD        PACLitaxel (TAXOL) 289 2 mg in sodium chloride 0 9 % 500 mL chemo IVPB  135 mg/m2 (Treatment Plan Recorded) Intravenous Once Georgina Coburn  7 mL/hr at 06/22/22 1041 289 2 mg at 06/22/22 1041           Objective:    Blood pressure 126/70, pulse 68, temperature (!) 97 °F (36 1 °C), temperature source Temporal, resp  rate 14, height 5' 3" (1 6 m), weight 115 kg (253 lb), SpO2 98 %, not currently breastfeeding  Body mass index is 44 82 kg/m²  Body surface area is 2 14 meters squared  Physical Exam  Vitals reviewed  Constitutional:       General: She is not in acute distress  Appearance: Normal appearance  She is obese  She is not ill-appearing  HENT:      Head: Normocephalic and atraumatic  Mouth/Throat:      Mouth: Mucous membranes are moist    Eyes:      General: No scleral icterus  Right eye: No discharge  Left eye: No discharge  Conjunctiva/sclera: Conjunctivae normal    Pulmonary:      Effort: Pulmonary effort is normal    Musculoskeletal:      Right lower leg: No edema  Left lower leg: No edema  Skin:     General: Skin is warm and dry  Coloration: Skin is not jaundiced  Findings: No rash  Neurological:      General: No focal deficit present  Mental Status: She is alert and oriented to person, place, and time  Cranial Nerves: No cranial nerve deficit  Sensory: No sensory deficit  Motor: No weakness  Gait: Gait normal    Psychiatric:         Mood and Affect: Mood normal          Behavior: Behavior normal          Thought Content:  Thought content normal          Judgment: Judgment normal          Lab Results   Component Value Date     5 1 03/10/2022     Lab Results   Component Value Date    K 3 9 06/20/2022     06/20/2022    CO2 26 06/20/2022    BUN 22 06/20/2022    CREATININE 1 40 (H) 06/20/2022    GLUF 109 (H) 05/16/2022    CALCIUM 8 8 06/20/2022    CORRECTEDCA 9 6 06/20/2022    AST 17 06/20/2022    ALT 22 06/20/2022    ALKPHOS 70 06/20/2022    EGFR 37 06/20/2022     Lab Results   Component Value Date    WBC 4 32 06/20/2022    HGB 11 1 (L) 06/20/2022    HCT 34 5 (L) 06/20/2022    MCV 99 (H) 06/20/2022     06/20/2022     Lab Results   Component Value Date    NEUTROABS 1 98 06/20/2022        Trend:  Lab Results   Component Value Date     5 1 03/10/2022

## 2022-06-27 ENCOUNTER — HOSPITAL ENCOUNTER (OUTPATIENT)
Dept: INFUSION CENTER | Facility: CLINIC | Age: 72
Discharge: HOME | End: 2022-06-27
Payer: COMMERCIAL

## 2022-06-27 DIAGNOSIS — C54.1 ENDOMETRIAL CANCER (HCC): Primary | ICD-10-CM

## 2022-06-27 DIAGNOSIS — Z45.2 ENCOUNTER FOR CENTRAL LINE CARE: ICD-10-CM

## 2022-06-27 LAB
ALBUMIN SERPL BCP-MCNC: 3.2 G/DL (ref 3.5–5)
ALP SERPL-CCNC: 56 U/L (ref 46–116)
ALT SERPL W P-5'-P-CCNC: 33 U/L (ref 12–78)
ANION GAP SERPL CALCULATED.3IONS-SCNC: 8 MMOL/L (ref 4–13)
AST SERPL W P-5'-P-CCNC: 28 U/L (ref 5–45)
BASOPHILS # BLD MANUAL: 0.03 THOUSAND/UL (ref 0–0.1)
BASOPHILS NFR MAR MANUAL: 1 % (ref 0–1)
BILIRUB SERPL-MCNC: 0.57 MG/DL (ref 0.2–1)
BUN SERPL-MCNC: 21 MG/DL (ref 5–25)
CALCIUM ALBUM COR SERPL-MCNC: 9.1 MG/DL (ref 8.3–10.1)
CALCIUM SERPL-MCNC: 8.5 MG/DL (ref 8.3–10.1)
CHLORIDE SERPL-SCNC: 103 MMOL/L (ref 100–108)
CO2 SERPL-SCNC: 28 MMOL/L (ref 21–32)
CREAT SERPL-MCNC: 1.22 MG/DL (ref 0.6–1.3)
EOSINOPHIL # BLD MANUAL: 0 THOUSAND/UL (ref 0–0.4)
EOSINOPHIL NFR BLD MANUAL: 0 % (ref 0–6)
ERYTHROCYTE [DISTWIDTH] IN BLOOD BY AUTOMATED COUNT: 14.8 % (ref 11.6–15.1)
GFR SERPL CREATININE-BSD FRML MDRD: 43 ML/MIN/1.73SQ M
GLUCOSE SERPL-MCNC: 139 MG/DL (ref 65–140)
HCT VFR BLD AUTO: 34.2 % (ref 34.8–46.1)
HGB BLD-MCNC: 11 G/DL (ref 11.5–15.4)
LYMPHOCYTES # BLD AUTO: 1.33 THOUSAND/UL (ref 0.6–4.47)
LYMPHOCYTES # BLD AUTO: 46 % (ref 14–44)
MAGNESIUM SERPL-MCNC: 1.6 MG/DL (ref 1.6–2.6)
MCH RBC QN AUTO: 32.5 PG (ref 26.8–34.3)
MCHC RBC AUTO-ENTMCNC: 32.2 G/DL (ref 31.4–37.4)
MCV RBC AUTO: 101 FL (ref 82–98)
MONOCYTES # BLD AUTO: 0.03 THOUSAND/UL (ref 0–1.22)
MONOCYTES NFR BLD: 1 % (ref 4–12)
NEUTROPHILS # BLD MANUAL: 1.5 THOUSAND/UL (ref 1.85–7.62)
NEUTS BAND NFR BLD MANUAL: 1 % (ref 0–8)
NEUTS SEG NFR BLD AUTO: 51 % (ref 43–75)
PLATELET # BLD AUTO: 110 THOUSANDS/UL (ref 149–390)
PLATELET BLD QL SMEAR: ABNORMAL
PMV BLD AUTO: 12.1 FL (ref 8.9–12.7)
POTASSIUM SERPL-SCNC: 3.6 MMOL/L (ref 3.5–5.3)
PROT SERPL-MCNC: 7 G/DL (ref 6.4–8.2)
RBC # BLD AUTO: 3.38 MILLION/UL (ref 3.81–5.12)
RBC MORPH BLD: NORMAL
SODIUM SERPL-SCNC: 139 MMOL/L (ref 136–145)
WBC # BLD AUTO: 2.89 THOUSAND/UL (ref 4.31–10.16)

## 2022-06-27 PROCEDURE — 85027 COMPLETE CBC AUTOMATED: CPT

## 2022-06-27 PROCEDURE — 85007 BL SMEAR W/DIFF WBC COUNT: CPT

## 2022-06-27 PROCEDURE — 80053 COMPREHEN METABOLIC PANEL: CPT

## 2022-06-27 PROCEDURE — 83735 ASSAY OF MAGNESIUM: CPT

## 2022-06-27 NOTE — PROGRESS NOTES
Pt here for central labs  Labs drawn via port a cath without difficulty  Port flushed per protocol  Pt was provided with AVS and is aware of future appts

## 2022-07-05 ENCOUNTER — HOSPITAL ENCOUNTER (OUTPATIENT)
Dept: INFUSION CENTER | Facility: CLINIC | Age: 72
Discharge: HOME | End: 2022-07-05
Payer: COMMERCIAL

## 2022-07-05 DIAGNOSIS — C54.1 ENDOMETRIAL CANCER (HCC): Primary | ICD-10-CM

## 2022-07-05 DIAGNOSIS — Z45.2 ENCOUNTER FOR CENTRAL LINE CARE: ICD-10-CM

## 2022-07-05 DIAGNOSIS — T45.1X5A CHEMOTHERAPY INDUCED NEUTROPENIA: Primary | ICD-10-CM

## 2022-07-05 DIAGNOSIS — D70.1 CHEMOTHERAPY INDUCED NEUTROPENIA: Primary | ICD-10-CM

## 2022-07-05 LAB
ALBUMIN SERPL BCP-MCNC: 3 G/DL (ref 3.5–5)
ALP SERPL-CCNC: 56 U/L (ref 46–116)
ALT SERPL W P-5'-P-CCNC: 23 U/L (ref 12–78)
ANION GAP SERPL CALCULATED.3IONS-SCNC: 8 MMOL/L (ref 4–13)
AST SERPL W P-5'-P-CCNC: 21 U/L (ref 5–45)
BASOPHILS # BLD AUTO: 0.01 THOUSANDS/ΜL (ref 0–0.1)
BASOPHILS NFR BLD AUTO: 0 % (ref 0–1)
BILIRUB SERPL-MCNC: 0.23 MG/DL (ref 0.2–1)
BUN SERPL-MCNC: 14 MG/DL (ref 5–25)
CALCIUM ALBUM COR SERPL-MCNC: 8.8 MG/DL (ref 8.3–10.1)
CALCIUM SERPL-MCNC: 8 MG/DL (ref 8.3–10.1)
CHLORIDE SERPL-SCNC: 108 MMOL/L (ref 100–108)
CO2 SERPL-SCNC: 29 MMOL/L (ref 21–32)
CREAT SERPL-MCNC: 1.33 MG/DL (ref 0.6–1.3)
EOSINOPHIL # BLD AUTO: 0.01 THOUSAND/ΜL (ref 0–0.61)
EOSINOPHIL NFR BLD AUTO: 0 % (ref 0–6)
ERYTHROCYTE [DISTWIDTH] IN BLOOD BY AUTOMATED COUNT: 15.1 % (ref 11.6–15.1)
GFR SERPL CREATININE-BSD FRML MDRD: 39 ML/MIN/1.73SQ M
GLUCOSE SERPL-MCNC: 97 MG/DL (ref 65–140)
HCT VFR BLD AUTO: 32.2 % (ref 34.8–46.1)
HGB BLD-MCNC: 10.4 G/DL (ref 11.5–15.4)
IMM GRANULOCYTES # BLD AUTO: 0.01 THOUSAND/UL (ref 0–0.2)
IMM GRANULOCYTES NFR BLD AUTO: 0 % (ref 0–2)
LYMPHOCYTES # BLD AUTO: 1.59 THOUSANDS/ΜL (ref 0.6–4.47)
LYMPHOCYTES NFR BLD AUTO: 69 % (ref 14–44)
MAGNESIUM SERPL-MCNC: 2.1 MG/DL (ref 1.6–2.6)
MCH RBC QN AUTO: 31.9 PG (ref 26.8–34.3)
MCHC RBC AUTO-ENTMCNC: 32.3 G/DL (ref 31.4–37.4)
MCV RBC AUTO: 99 FL (ref 82–98)
MONOCYTES # BLD AUTO: 0.38 THOUSAND/ΜL (ref 0.17–1.22)
MONOCYTES NFR BLD AUTO: 16 % (ref 4–12)
NEUTROPHILS # BLD AUTO: 0.36 THOUSANDS/ΜL (ref 1.85–7.62)
NEUTS SEG NFR BLD AUTO: 15 % (ref 43–75)
NRBC BLD AUTO-RTO: 0 /100 WBCS
PLATELET # BLD AUTO: 176 THOUSANDS/UL (ref 149–390)
PMV BLD AUTO: 10.8 FL (ref 8.9–12.7)
POTASSIUM SERPL-SCNC: 3.7 MMOL/L (ref 3.5–5.3)
PROT SERPL-MCNC: 6.9 G/DL (ref 6.4–8.2)
RBC # BLD AUTO: 3.26 MILLION/UL (ref 3.81–5.12)
SODIUM SERPL-SCNC: 145 MMOL/L (ref 136–145)
WBC # BLD AUTO: 2.36 THOUSAND/UL (ref 4.31–10.16)

## 2022-07-05 PROCEDURE — 85025 COMPLETE CBC W/AUTO DIFF WBC: CPT

## 2022-07-05 PROCEDURE — 80053 COMPREHEN METABOLIC PANEL: CPT

## 2022-07-05 PROCEDURE — 83735 ASSAY OF MAGNESIUM: CPT

## 2022-07-06 ENCOUNTER — HOSPITAL ENCOUNTER (OUTPATIENT)
Dept: INFUSION CENTER | Facility: CLINIC | Age: 72
Discharge: HOME | End: 2022-07-06
Payer: COMMERCIAL

## 2022-07-06 DIAGNOSIS — D70.1 CHEMOTHERAPY INDUCED NEUTROPENIA (HCC): Primary | ICD-10-CM

## 2022-07-06 DIAGNOSIS — C54.1 ENDOMETRIAL CANCER (HCC): ICD-10-CM

## 2022-07-06 DIAGNOSIS — T45.1X5A CHEMOTHERAPY INDUCED NEUTROPENIA (HCC): Primary | ICD-10-CM

## 2022-07-06 DIAGNOSIS — Z45.2 ENCOUNTER FOR CENTRAL LINE CARE: ICD-10-CM

## 2022-07-06 LAB
BASOPHILS # BLD AUTO: 0.02 THOUSANDS/ΜL (ref 0–0.1)
BASOPHILS NFR BLD AUTO: 1 % (ref 0–1)
EOSINOPHIL # BLD AUTO: 0.02 THOUSAND/ΜL (ref 0–0.61)
EOSINOPHIL NFR BLD AUTO: 1 % (ref 0–6)
ERYTHROCYTE [DISTWIDTH] IN BLOOD BY AUTOMATED COUNT: 15.4 % (ref 11.6–15.1)
HCT VFR BLD AUTO: 32.8 % (ref 34.8–46.1)
HGB BLD-MCNC: 10.1 G/DL (ref 11.5–15.4)
IMM GRANULOCYTES # BLD AUTO: 0.02 THOUSAND/UL (ref 0–0.2)
IMM GRANULOCYTES NFR BLD AUTO: 1 % (ref 0–2)
LYMPHOCYTES # BLD AUTO: 1.95 THOUSANDS/ΜL (ref 0.6–4.47)
LYMPHOCYTES NFR BLD AUTO: 66 % (ref 14–44)
MCH RBC QN AUTO: 31.3 PG (ref 26.8–34.3)
MCHC RBC AUTO-ENTMCNC: 30.8 G/DL (ref 31.4–37.4)
MCV RBC AUTO: 102 FL (ref 82–98)
MONOCYTES # BLD AUTO: 0.44 THOUSAND/ΜL (ref 0.17–1.22)
MONOCYTES NFR BLD AUTO: 15 % (ref 4–12)
NEUTROPHILS # BLD AUTO: 0.46 THOUSANDS/ΜL (ref 1.85–7.62)
NEUTS SEG NFR BLD AUTO: 16 % (ref 43–75)
NRBC BLD AUTO-RTO: 0 /100 WBCS
PLATELET # BLD AUTO: 194 THOUSANDS/UL (ref 149–390)
PMV BLD AUTO: 10.9 FL (ref 8.9–12.7)
RBC # BLD AUTO: 3.23 MILLION/UL (ref 3.81–5.12)
WBC # BLD AUTO: 2.91 THOUSAND/UL (ref 4.31–10.16)

## 2022-07-06 PROCEDURE — 85025 COMPLETE CBC W/AUTO DIFF WBC: CPT

## 2022-07-06 NOTE — PROGRESS NOTES
Pt arrived to unit without complaint  CBC drawn and sent  AVS declined, but aware of future appts  Pt left unit in stable condition

## 2022-07-08 ENCOUNTER — TELEPHONE (OUTPATIENT)
Dept: GYNECOLOGIC ONCOLOGY | Facility: CLINIC | Age: 72
End: 2022-07-08

## 2022-07-08 NOTE — TELEPHONE ENCOUNTER
LMOM for patient earlier today offering virtual visit today instead of in-office visit on Tuesday  Will await return call  Otherwise, will keep in-person appt for Tues or can be virtual then also

## 2022-07-11 ENCOUNTER — HOSPITAL ENCOUNTER (OUTPATIENT)
Dept: INFUSION CENTER | Facility: CLINIC | Age: 72
Discharge: HOME | End: 2022-07-11
Payer: COMMERCIAL

## 2022-07-11 DIAGNOSIS — Z45.2 ENCOUNTER FOR CENTRAL LINE CARE: ICD-10-CM

## 2022-07-11 DIAGNOSIS — C54.1 ENDOMETRIAL CANCER (HCC): Primary | ICD-10-CM

## 2022-07-11 LAB
ALBUMIN SERPL BCP-MCNC: 3 G/DL (ref 3.5–5)
ALP SERPL-CCNC: 58 U/L (ref 46–116)
ALT SERPL W P-5'-P-CCNC: 21 U/L (ref 12–78)
ANION GAP SERPL CALCULATED.3IONS-SCNC: 7 MMOL/L (ref 4–13)
AST SERPL W P-5'-P-CCNC: 13 U/L (ref 5–45)
BASOPHILS # BLD AUTO: 0.02 THOUSANDS/ΜL (ref 0–0.1)
BASOPHILS NFR BLD AUTO: 1 % (ref 0–1)
BILIRUB SERPL-MCNC: 0.25 MG/DL (ref 0.2–1)
BUN SERPL-MCNC: 13 MG/DL (ref 5–25)
CALCIUM ALBUM COR SERPL-MCNC: 8.7 MG/DL (ref 8.3–10.1)
CALCIUM SERPL-MCNC: 7.9 MG/DL (ref 8.3–10.1)
CHLORIDE SERPL-SCNC: 107 MMOL/L (ref 100–108)
CO2 SERPL-SCNC: 29 MMOL/L (ref 21–32)
CREAT SERPL-MCNC: 1.35 MG/DL (ref 0.6–1.3)
EOSINOPHIL # BLD AUTO: 0.01 THOUSAND/ΜL (ref 0–0.61)
EOSINOPHIL NFR BLD AUTO: 0 % (ref 0–6)
ERYTHROCYTE [DISTWIDTH] IN BLOOD BY AUTOMATED COUNT: 15.9 % (ref 11.6–15.1)
GFR SERPL CREATININE-BSD FRML MDRD: 38 ML/MIN/1.73SQ M
GLUCOSE SERPL-MCNC: 106 MG/DL (ref 65–140)
HCT VFR BLD AUTO: 33.5 % (ref 34.8–46.1)
HGB BLD-MCNC: 10.5 G/DL (ref 11.5–15.4)
IMM GRANULOCYTES # BLD AUTO: 0.01 THOUSAND/UL (ref 0–0.2)
IMM GRANULOCYTES NFR BLD AUTO: 0 % (ref 0–2)
LYMPHOCYTES # BLD AUTO: 1.59 THOUSANDS/ΜL (ref 0.6–4.47)
LYMPHOCYTES NFR BLD AUTO: 39 % (ref 14–44)
MAGNESIUM SERPL-MCNC: 2.4 MG/DL (ref 1.6–2.6)
MCH RBC QN AUTO: 31.8 PG (ref 26.8–34.3)
MCHC RBC AUTO-ENTMCNC: 31.3 G/DL (ref 31.4–37.4)
MCV RBC AUTO: 102 FL (ref 82–98)
MONOCYTES # BLD AUTO: 0.45 THOUSAND/ΜL (ref 0.17–1.22)
MONOCYTES NFR BLD AUTO: 11 % (ref 4–12)
NEUTROPHILS # BLD AUTO: 1.99 THOUSANDS/ΜL (ref 1.85–7.62)
NEUTS SEG NFR BLD AUTO: 49 % (ref 43–75)
NRBC BLD AUTO-RTO: 0 /100 WBCS
PLATELET # BLD AUTO: 176 THOUSANDS/UL (ref 149–390)
PMV BLD AUTO: 11 FL (ref 8.9–12.7)
POTASSIUM SERPL-SCNC: 3.5 MMOL/L (ref 3.5–5.3)
PROT SERPL-MCNC: 7.1 G/DL (ref 6.4–8.2)
RBC # BLD AUTO: 3.3 MILLION/UL (ref 3.81–5.12)
SODIUM SERPL-SCNC: 143 MMOL/L (ref 136–145)
WBC # BLD AUTO: 4.07 THOUSAND/UL (ref 4.31–10.16)

## 2022-07-11 PROCEDURE — 80053 COMPREHEN METABOLIC PANEL: CPT

## 2022-07-11 PROCEDURE — 85025 COMPLETE CBC W/AUTO DIFF WBC: CPT

## 2022-07-11 PROCEDURE — 83735 ASSAY OF MAGNESIUM: CPT

## 2022-07-11 NOTE — PROGRESS NOTES
Pt without complaint  Pt tolerated port flush and central labs drawn from port per [facility] protocol  Pt declines AVS today 
Quality 155 (Denominator): Falls Plan Of Care: Plan of Care not Documented, Reason not Otherwise Specified
Detail Level: Detailed
Quality 111:Pneumonia Vaccination Status For Older Adults: Pneumococcal Vaccination Previously Received
Quality 131: Pain Assessment And Follow-Up: Pain assessment using a standardized tool is documented as negative, no follow-up plan required
Quality 265: Biopsy Follow-Up: Biopsy results reviewed, communicated, tracked, and documented
Quality 154 Part A: Falls: Risk Assessment (Should Be Reported With Measure 155.): Risk Assessment for Falls not Completed for Medical Reasons: Confined to bed or chair
Quality 154 Part B: Falls: Risk Screening (Should Be Reported With Measure 155.): Patient screened for future fall risk; documentation of no falls in the past year or only one fall without injury in the past year
Quality 226: Preventive Care And Screening: Tobacco Use: Screening And Cessation Intervention: Patient screened for tobacco use and is an ex/non-smoker
Quality 402: Tobacco Use And Help With Quitting Among Adolescents: Patient screened for tobacco and is an ex-smoker
Quality 110: Preventive Care And Screening: Influenza Immunization: Influenza Immunization Administered during Influenza season

## 2022-07-12 ENCOUNTER — OFFICE VISIT (OUTPATIENT)
Dept: GYNECOLOGIC ONCOLOGY | Facility: CLINIC | Age: 72
End: 2022-07-12
Payer: COMMERCIAL

## 2022-07-12 VITALS
HEART RATE: 92 BPM | HEIGHT: 63 IN | RESPIRATION RATE: 22 BRPM | BODY MASS INDEX: 46.46 KG/M2 | WEIGHT: 262.2 LBS | TEMPERATURE: 99 F | DIASTOLIC BLOOD PRESSURE: 64 MMHG | SYSTOLIC BLOOD PRESSURE: 111 MMHG

## 2022-07-12 DIAGNOSIS — C54.1 ENDOMETRIAL CANCER (HCC): Primary | ICD-10-CM

## 2022-07-12 DIAGNOSIS — D70.1 CHEMOTHERAPY INDUCED NEUTROPENIA (HCC): ICD-10-CM

## 2022-07-12 DIAGNOSIS — T45.1X5A CHEMOTHERAPY INDUCED NEUTROPENIA (HCC): ICD-10-CM

## 2022-07-12 PROCEDURE — 99214 OFFICE O/P EST MOD 30 MIN: CPT | Performed by: NURSE PRACTITIONER

## 2022-07-12 RX ORDER — SODIUM CHLORIDE 9 MG/ML
20 INJECTION, SOLUTION INTRAVENOUS ONCE
Status: CANCELLED | OUTPATIENT
Start: 2022-07-13

## 2022-07-12 RX ORDER — PALONOSETRON 0.05 MG/ML
0.25 INJECTION, SOLUTION INTRAVENOUS ONCE
Status: CANCELLED | OUTPATIENT
Start: 2022-07-13

## 2022-07-12 NOTE — ASSESSMENT & PLAN NOTE
75-year-old with stage IIIC1 endometrial cancer currently undergoing adjuvant chemotherapy treatment with Taxol 135mg/m2 and Carboplatin AUC5  She developed chemotherapy-induced neutropenia with cycle 3, with ANC darell 0 36  Her counts recovered on their own  She otherwise has no clinical concerns  Her PS is 0  Patient will proceed with cycle 4 without dose modification, but with the addition of Ziextenzo due to neutropenia  She will RTO as per her chemotherapy calendar

## 2022-07-12 NOTE — PROGRESS NOTES
Assessment/Plan:    Problem List Items Addressed This Visit        Genitourinary    Endometrial cancer ([facility] [address] ) - Primary     64-year-old with stage IIIC1 endometrial cancer currently undergoing adjuvant chemotherapy treatment with Taxol 135mg/m2 and Carboplatin AUC5  She developed chemotherapy-induced neutropenia with cycle 3, with ANC darell 0 36  Her counts recovered on their own  She otherwise has no clinical concerns  Her PS is 0  Patient will proceed with cycle 4 without dose modification, but with the addition of Ziextenzo due to neutropenia  She will RTO as per her chemotherapy calendar  Other    Chemotherapy induced neutropenia (HCC)     ANC darell 0 36  Her ANC has now recovered without the use of GSFs  Will add Ziextenzo to her upcoming treatment cycle and subsequent cycles  CHIEF COMPLAINT: Pre-chemo evaluation      Problem:  Cancer Staging  Endometrial cancer [facility])  Staging form: Corpus Uteri - Carcinoma, AJCC 8th Edition  - Pathologic stage from 3/31/2022: FIGO Stage IIIC1 - Signed by Kimi Lane MD on 4/19/2022        Previous therapy:  Oncology History   Endometrial cancer ([facility] )   3/15/2022 Initial Diagnosis    Endometrial cancer ([facility] )     3/31/2022 -  Cancer Staged    Staging form: Corpus Uteri - Carcinoma, AJCC 8th Edition  - Pathologic stage from 3/31/2022:  FIGO Stage IIIC1 - Signed by Kimi Lane MD on 4/19/2022  Histopathologic type: Clear cell adenocarcinoma, NOS  Stage prefix: Initial diagnosis  Histologic grade (G): G3  Histologic grading system: 3 grade system  Residual tumor (R): R0 - None  Pelvic fátima status: Positive  Number of pelvic nodes positive from dissection: 1  Number of pelvic nodes examined during dissection: 2  Lymph node metastasis: Present  Omentectomy performed: Yes  Morcellation performed: No       3/31/2022 Surgery    Robotic hysterectomy, bilateral salpingo oophorectomy, bilateral pelvic sentinel lymph node biopsies, pelvic peritoneal/omental biopsies  Final pathology demonstrated clear cell carcinoma, 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%)  Negative cervix  Negative parametria  1/2 sentinel pelvic lymph nodes positive for malignancy  Negative staging biopsies  Stage IIIC1  No evidence of DNA mismatch repair protein loss  5/11/2022 -  Chemotherapy    palonosetron (ALOXI), 0 25 mg, Intravenous, Once, 3 of 6 cycles  Administration: 0 25 mg (5/11/2022), 0 25 mg (6/1/2022), 0 25 mg (6/22/2022)  Pegfilgrastim-bmez (Shakira Cull), 6 mg, Subcutaneous, Once, 0 of 3 cycles  fosaprepitant (EMEND) IVPB, 150 mg, Intravenous, Once, 3 of 6 cycles  Administration: 150 mg (5/11/2022), 150 mg (6/1/2022), 150 mg (6/22/2022)  CARBOplatin (PARAPLATIN) IVPB (GOG AUC DOSING), 352 5 mg, Intravenous, Once, 3 of 6 cycles  Administration: 352 5 mg (5/11/2022), 377 5 mg (6/1/2022), 340 5 mg (6/22/2022)  PACLItaxel (TAXOL) chemo IVPB, 135 mg/m2 = 290 4 mg (77 1 % of original dose 175 mg/m2), Intravenous, Once, 3 of 6 cycles  Dose modification: 135 mg/m2 (original dose 175 mg/m2, Cycle 1, Reason: Other (Must fill in a comment), Comment: prescribed starting dose)  Administration: 290 4 mg (5/11/2022), 290 4 mg (6/1/2022), 289 2 mg (6/22/2022)           Patient ID: Radha Alonso is a 74 y o  female     Rever has no new concerns since her last visit  She has been afebrile  She denies nausea or vomiting  Her appetite is at baseline  Her bowels and bladder are functioning normally  She denies abdominal or pelvic pain  She is without vaginal bleeding or discharge  She is ambulatory  The following portions of the patient's history were reviewed and updated as appropriate: allergies, current medications, past family history, past medical history, past social history, past surgical history and problem list     Review of Systems   Constitutional: Negative for activity change, appetite change, chills, fatigue, fever and unexpected weight change     HENT: Negative for mouth sores     Eyes: Negative  Respiratory: Negative for cough, chest tightness, shortness of breath and wheezing  Cardiovascular: Negative for chest pain, palpitations and leg swelling  Gastrointestinal: Negative for abdominal distention, abdominal pain, anal bleeding, blood in stool, constipation, diarrhea, nausea and vomiting  Endocrine: Negative  Genitourinary: Negative for difficulty urinating, dysuria, flank pain, frequency, hematuria, pelvic pain, urgency, vaginal bleeding, vaginal discharge and vaginal pain  Musculoskeletal: Negative for arthralgias and myalgias  Skin: Negative for color change, pallor and rash  Neurological: Negative for dizziness, weakness, numbness and headaches  Hematological: Negative  Psychiatric/Behavioral: The patient is not nervous/anxious          Current Outpatient Medications   Medication Sig Dispense Refill    acetaminophen (TYLENOL) 650 mg CR tablet Take 1 tablet (650 mg total) by mouth every 6 (six) hours as needed for mild pain 30 tablet 0    amLODIPine (NORVASC) 10 mg tablet Take 10 mg by mouth daily      apixaban (Eliquis) 5 mg Take 1 tablet PO BID 60 tablet 2    denosumab (Prolia) 60 mg/mL Inject 1 mL under the skin every 6 (six) months      hydrochlorothiazide (HYDRODIURIL) 12 5 mg tablet Take 12 5 mg by mouth daily      lidocaine-prilocaine (EMLA) cream Apply to PORT site 30 min prior to labs or chemotherapy 30 g 1    omeprazole (PriLOSEC) 40 MG capsule Take 20 mg by mouth daily      fluticasone (FLONASE) 50 mcg/act nasal spray 2 sprays into each nostril daily as needed        LORazepam (ATIVAN) 1 mg tablet Take 1 tablet (1 mg total) by mouth every 8 (eight) hours as needed for anxiety (nausea or anxiety) (Patient not taking: No sig reported) 30 tablet 0    ondansetron (ZOFRAN) 8 mg tablet Take 1 tablet (8 mg total) by mouth every 8 (eight) hours as needed for nausea or vomiting (Patient not taking: No sig reported) 20 tablet 1     No current facility-administered medications for this visit  Objective:    Blood pressure 111/64, pulse 92, temperature 99 °F (37 2 °C), temperature source Tympanic, resp  rate 22, height 5' 3" (1 6 m), weight 119 kg (262 lb 3 2 oz), not currently breastfeeding  Body mass index is 46 45 kg/m²  Body surface area is 2 17 meters squared  Physical Exam  Vitals reviewed  Constitutional:       General: She is not in acute distress  Appearance: Normal appearance  She is obese  She is not ill-appearing  HENT:      Head: Normocephalic and atraumatic  Mouth/Throat:      Mouth: Mucous membranes are moist    Eyes:      General: No scleral icterus  Right eye: No discharge  Left eye: No discharge  Conjunctiva/sclera: Conjunctivae normal    Pulmonary:      Effort: Pulmonary effort is normal    Musculoskeletal:      Right lower leg: No edema  Left lower leg: No edema  Skin:     General: Skin is warm and dry  Coloration: Skin is not jaundiced  Findings: No rash  Neurological:      General: No focal deficit present  Mental Status: She is alert and oriented to person, place, and time  Cranial Nerves: No cranial nerve deficit  Sensory: No sensory deficit  Motor: No weakness  Gait: Gait normal    Psychiatric:         Mood and Affect: Mood normal          Behavior: Behavior normal          Thought Content:  Thought content normal          Judgment: Judgment normal          Lab Results   Component Value Date     5 1 03/10/2022     Lab Results   Component Value Date    K 3 5 07/11/2022     07/11/2022    CO2 29 07/11/2022    BUN 13 07/11/2022    CREATININE 1 35 (H) 07/11/2022    GLUF 109 (H) 05/16/2022    CALCIUM 7 9 (L) 07/11/2022    CORRECTEDCA 8 7 07/11/2022    AST 13 07/11/2022    ALT 21 07/11/2022    ALKPHOS 58 07/11/2022    EGFR 38 07/11/2022     Lab Results   Component Value Date    WBC 4 07 (L) 07/11/2022    HGB 10 5 (L) 07/11/2022    HCT 33 5 (L) 07/11/2022     (H) 07/11/2022     07/11/2022     Lab Results   Component Value Date    NEUTROABS 1 99 07/11/2022        Trend:  Lab Results   Component Value Date     5 1 03/10/2022

## 2022-07-12 NOTE — ASSESSMENT & PLAN NOTE
ANC darell 0 36  Her ANC has now recovered without the use of GSFs  Will add Ziextenzo to her upcoming treatment cycle and subsequent cycles

## 2022-07-13 ENCOUNTER — HOSPITAL ENCOUNTER (OUTPATIENT)
Dept: INFUSION CENTER | Facility: CLINIC | Age: 72
Discharge: HOME | End: 2022-07-13
Payer: COMMERCIAL

## 2022-07-13 VITALS
TEMPERATURE: 96.4 F | WEIGHT: 253.53 LBS | SYSTOLIC BLOOD PRESSURE: 113 MMHG | BODY MASS INDEX: 44.92 KG/M2 | HEIGHT: 63 IN | HEART RATE: 85 BPM | RESPIRATION RATE: 16 BRPM | DIASTOLIC BLOOD PRESSURE: 70 MMHG

## 2022-07-13 DIAGNOSIS — C54.1 ENDOMETRIAL CANCER (HCC): Primary | ICD-10-CM

## 2022-07-13 PROCEDURE — 96375 TX/PRO/DX INJ NEW DRUG ADDON: CPT

## 2022-07-13 PROCEDURE — 96415 CHEMO IV INFUSION ADDL HR: CPT

## 2022-07-13 PROCEDURE — 96367 TX/PROPH/DG ADDL SEQ IV INF: CPT

## 2022-07-13 PROCEDURE — 96413 CHEMO IV INFUSION 1 HR: CPT

## 2022-07-13 PROCEDURE — 96417 CHEMO IV INFUS EACH ADDL SEQ: CPT

## 2022-07-13 RX ORDER — SODIUM CHLORIDE 9 MG/ML
20 INJECTION, SOLUTION INTRAVENOUS ONCE
Status: COMPLETED | OUTPATIENT
Start: 2022-07-13 | End: 2022-07-13

## 2022-07-13 RX ORDER — PALONOSETRON 0.05 MG/ML
0.25 INJECTION, SOLUTION INTRAVENOUS ONCE
Status: COMPLETED | OUTPATIENT
Start: 2022-07-13 | End: 2022-07-13

## 2022-07-13 RX ADMIN — DEXAMETHASONE SODIUM PHOSPHATE 20 MG: 10 INJECTION, SOLUTION INTRAMUSCULAR; INTRAVENOUS at 08:35

## 2022-07-13 RX ADMIN — DIPHENHYDRAMINE HYDROCHLORIDE 25 MG: 50 INJECTION, SOLUTION INTRAMUSCULAR; INTRAVENOUS at 08:56

## 2022-07-13 RX ADMIN — PACLITAXEL 292.8 MG: 6 INJECTION, SOLUTION, CONCENTRATE INTRAVENOUS at 10:16

## 2022-07-13 RX ADMIN — FOSAPREPITANT 150 MG: 150 INJECTION, POWDER, LYOPHILIZED, FOR SOLUTION INTRAVENOUS at 09:42

## 2022-07-13 RX ADMIN — PALONOSETRON 0.25 MG: 0.05 INJECTION, SOLUTION INTRAVENOUS at 08:40

## 2022-07-13 RX ADMIN — CARBOPLATIN 353 MG: 10 INJECTION, SOLUTION INTRAVENOUS at 13:13

## 2022-07-13 RX ADMIN — SODIUM CHLORIDE 20 ML/HR: 0.9 INJECTION, SOLUTION INTRAVENOUS at 08:20

## 2022-07-13 RX ADMIN — FAMOTIDINE 20 MG: 10 INJECTION INTRAVENOUS at 09:20

## 2022-07-13 NOTE — PROGRESS NOTES
Pt  Denies new symptoms or concerns today  Labs reviewed  Taxol and Carboplatin ordered for infusion today

## 2022-07-13 NOTE — PROGRESS NOTES
Pt  Tolerated chemotherapy today without adverse event  Pt  Discharged to home in stable condition  Future appointments reviewed  Pt  Will return tomorrow for  Lex DE ANDA provided

## 2022-07-14 ENCOUNTER — HOSPITAL ENCOUNTER (OUTPATIENT)
Dept: INFUSION CENTER | Facility: CLINIC | Age: 72
Discharge: HOME | End: 2022-07-14
Payer: COMMERCIAL

## 2022-07-14 DIAGNOSIS — C54.1 ENDOMETRIAL CANCER (HCC): Primary | ICD-10-CM

## 2022-07-14 PROCEDURE — 96372 THER/PROPH/DIAG INJ SC/IM: CPT

## 2022-07-14 RX ADMIN — PEGFILGRASTIM-BMEZ 6 MG: 6 INJECTION SUBCUTANEOUS at 14:25

## 2022-07-14 NOTE — PROGRESS NOTES
Patient arrived at infusion center for ziextenzo injection  Received SQ in left arm  Patient requested that this appointment be moved up as early as it can as this is a difficult time for her sister to bring her  Due to administration needing to be approx 24 hours after chemo completes, did move appointment to 1330 and explained that we try to keep it as close to 24 hours as we can  Patient expressed understanding and appreciated from the time being moved  Left infusion center in baseline condition

## 2022-07-14 NOTE — PLAN OF CARE
Problem: SAFETY ADULT  Goal: Patient will remain free of falls  Description: INTERVENTIONS:  - Educate patient/family on patient safety including physical limitations  - Instruct patient to call for assistance with activity   - Keep Call bell within reach  Outcome: Progressing

## 2022-07-18 ENCOUNTER — HOSPITAL ENCOUNTER (OUTPATIENT)
Dept: INFUSION CENTER | Facility: CLINIC | Age: 72
Discharge: HOME | End: 2022-07-18
Payer: COMMERCIAL

## 2022-07-18 DIAGNOSIS — Z45.2 ENCOUNTER FOR CENTRAL LINE CARE: ICD-10-CM

## 2022-07-18 DIAGNOSIS — C54.1 ENDOMETRIAL CANCER (HCC): Primary | ICD-10-CM

## 2022-07-18 LAB
ALBUMIN SERPL BCP-MCNC: 3.2 G/DL (ref 3.5–5)
ALP SERPL-CCNC: 61 U/L (ref 46–116)
ALT SERPL W P-5'-P-CCNC: 28 U/L (ref 12–78)
ANION GAP SERPL CALCULATED.3IONS-SCNC: 11 MMOL/L (ref 4–13)
AST SERPL W P-5'-P-CCNC: 26 U/L (ref 5–45)
BASOPHILS # BLD MANUAL: 0 THOUSAND/UL (ref 0–0.1)
BASOPHILS NFR MAR MANUAL: 0 % (ref 0–1)
BILIRUB SERPL-MCNC: 0.62 MG/DL (ref 0.2–1)
BUN SERPL-MCNC: 20 MG/DL (ref 5–25)
CALCIUM ALBUM COR SERPL-MCNC: 9.2 MG/DL (ref 8.3–10.1)
CALCIUM SERPL-MCNC: 8.6 MG/DL (ref 8.3–10.1)
CHLORIDE SERPL-SCNC: 102 MMOL/L (ref 96–108)
CO2 SERPL-SCNC: 28 MMOL/L (ref 21–32)
CREAT SERPL-MCNC: 1.43 MG/DL (ref 0.6–1.3)
EOSINOPHIL # BLD MANUAL: 0.04 THOUSAND/UL (ref 0–0.4)
EOSINOPHIL NFR BLD MANUAL: 1 % (ref 0–6)
ERYTHROCYTE [DISTWIDTH] IN BLOOD BY AUTOMATED COUNT: 15.2 % (ref 11.6–15.1)
GFR SERPL CREATININE-BSD FRML MDRD: 36 ML/MIN/1.73SQ M
GLUCOSE SERPL-MCNC: 127 MG/DL (ref 65–140)
HCT VFR BLD AUTO: 32.8 % (ref 34.8–46.1)
HGB BLD-MCNC: 10.8 G/DL (ref 11.5–15.4)
LYMPHOCYTES # BLD AUTO: 0.87 THOUSAND/UL (ref 0.6–4.47)
LYMPHOCYTES # BLD AUTO: 21 % (ref 14–44)
MAGNESIUM SERPL-MCNC: 2 MG/DL (ref 1.6–2.6)
MCH RBC QN AUTO: 32.3 PG (ref 26.8–34.3)
MCHC RBC AUTO-ENTMCNC: 32.9 G/DL (ref 31.4–37.4)
MCV RBC AUTO: 98 FL (ref 82–98)
MONOCYTES # BLD AUTO: 0 THOUSAND/UL (ref 0–1.22)
MONOCYTES NFR BLD: 0 % (ref 4–12)
NEUTROPHILS # BLD MANUAL: 3.22 THOUSAND/UL (ref 1.85–7.62)
NEUTS BAND NFR BLD MANUAL: 1 % (ref 0–8)
NEUTS SEG NFR BLD AUTO: 77 % (ref 43–75)
PELGER HUET CELLS BLD QL SMEAR: PRESENT
PLATELET # BLD AUTO: 79 THOUSANDS/UL (ref 149–390)
PLATELET BLD QL SMEAR: ABNORMAL
PMV BLD AUTO: 12.5 FL (ref 8.9–12.7)
POTASSIUM SERPL-SCNC: 3.3 MMOL/L (ref 3.5–5.3)
PROT SERPL-MCNC: 7.2 G/DL (ref 6.4–8.4)
RBC # BLD AUTO: 3.34 MILLION/UL (ref 3.81–5.12)
RBC MORPH BLD: NORMAL
SODIUM SERPL-SCNC: 141 MMOL/L (ref 135–147)
WBC # BLD AUTO: 4.13 THOUSAND/UL (ref 4.31–10.16)

## 2022-07-18 PROCEDURE — 85007 BL SMEAR W/DIFF WBC COUNT: CPT

## 2022-07-18 PROCEDURE — 83735 ASSAY OF MAGNESIUM: CPT

## 2022-07-18 PROCEDURE — 80053 COMPREHEN METABOLIC PANEL: CPT

## 2022-07-18 PROCEDURE — 85027 COMPLETE CBC AUTOMATED: CPT

## 2022-07-25 ENCOUNTER — HOSPITAL ENCOUNTER (OUTPATIENT)
Dept: INFUSION CENTER | Facility: CLINIC | Age: 72
Discharge: HOME | End: 2022-07-25
Payer: COMMERCIAL

## 2022-07-25 VITALS — TEMPERATURE: 97.7 F

## 2022-07-25 DIAGNOSIS — Z45.2 ENCOUNTER FOR CENTRAL LINE CARE: ICD-10-CM

## 2022-07-25 DIAGNOSIS — C54.1 ENDOMETRIAL CANCER (HCC): Primary | ICD-10-CM

## 2022-07-25 LAB
ALBUMIN SERPL BCP-MCNC: 3.1 G/DL (ref 3.5–5)
ALP SERPL-CCNC: 59 U/L (ref 46–116)
ALT SERPL W P-5'-P-CCNC: 26 U/L (ref 12–78)
ANION GAP SERPL CALCULATED.3IONS-SCNC: 14 MMOL/L (ref 4–13)
AST SERPL W P-5'-P-CCNC: 16 U/L (ref 5–45)
BASOPHILS # BLD AUTO: 0.03 THOUSANDS/ΜL (ref 0–0.1)
BASOPHILS NFR BLD AUTO: 1 % (ref 0–1)
BILIRUB SERPL-MCNC: 0.25 MG/DL (ref 0.2–1)
BUN SERPL-MCNC: 12 MG/DL (ref 5–25)
CALCIUM ALBUM COR SERPL-MCNC: 8.7 MG/DL (ref 8.3–10.1)
CALCIUM SERPL-MCNC: 8 MG/DL (ref 8.3–10.1)
CHLORIDE SERPL-SCNC: 106 MMOL/L (ref 96–108)
CO2 SERPL-SCNC: 26 MMOL/L (ref 21–32)
CREAT SERPL-MCNC: 1.43 MG/DL (ref 0.6–1.3)
EOSINOPHIL # BLD AUTO: 0.03 THOUSAND/ΜL (ref 0–0.61)
EOSINOPHIL NFR BLD AUTO: 1 % (ref 0–6)
ERYTHROCYTE [DISTWIDTH] IN BLOOD BY AUTOMATED COUNT: 17.1 % (ref 11.6–15.1)
GFR SERPL CREATININE-BSD FRML MDRD: 36 ML/MIN/1.73SQ M
GLUCOSE SERPL-MCNC: 105 MG/DL (ref 65–140)
HCT VFR BLD AUTO: 32 % (ref 34.8–46.1)
HGB BLD-MCNC: 10 G/DL (ref 11.5–15.4)
IMM GRANULOCYTES # BLD AUTO: 0.04 THOUSAND/UL (ref 0–0.2)
IMM GRANULOCYTES NFR BLD AUTO: 1 % (ref 0–2)
LYMPHOCYTES # BLD AUTO: 1.73 THOUSANDS/ΜL (ref 0.6–4.47)
LYMPHOCYTES NFR BLD AUTO: 42 % (ref 14–44)
MAGNESIUM SERPL-MCNC: 2 MG/DL (ref 1.6–2.6)
MCH RBC QN AUTO: 31.9 PG (ref 26.8–34.3)
MCHC RBC AUTO-ENTMCNC: 31.3 G/DL (ref 31.4–37.4)
MCV RBC AUTO: 102 FL (ref 82–98)
MONOCYTES # BLD AUTO: 0.38 THOUSAND/ΜL (ref 0.17–1.22)
MONOCYTES NFR BLD AUTO: 9 % (ref 4–12)
NEUTROPHILS # BLD AUTO: 1.87 THOUSANDS/ΜL (ref 1.85–7.62)
NEUTS SEG NFR BLD AUTO: 46 % (ref 43–75)
NRBC BLD AUTO-RTO: 0 /100 WBCS
PLATELET # BLD AUTO: 143 THOUSANDS/UL (ref 149–390)
PMV BLD AUTO: 11.6 FL (ref 8.9–12.7)
POTASSIUM SERPL-SCNC: 3.1 MMOL/L (ref 3.5–5.3)
PROT SERPL-MCNC: 7 G/DL (ref 6.4–8.4)
RBC # BLD AUTO: 3.13 MILLION/UL (ref 3.81–5.12)
SODIUM SERPL-SCNC: 146 MMOL/L (ref 135–147)
WBC # BLD AUTO: 4.08 THOUSAND/UL (ref 4.31–10.16)

## 2022-07-25 PROCEDURE — 80053 COMPREHEN METABOLIC PANEL: CPT

## 2022-07-25 PROCEDURE — 83735 ASSAY OF MAGNESIUM: CPT

## 2022-07-25 PROCEDURE — 85025 COMPLETE CBC W/AUTO DIFF WBC: CPT

## 2022-07-25 NOTE — PROGRESS NOTES
Patient arrived to unit without complaint  Patient had central labs drawn and port flushed per [facility] protocol without incident  AVS provided and patient aware of next appointment  Patient left in stable condition

## 2022-08-01 ENCOUNTER — HOSPITAL ENCOUNTER (OUTPATIENT)
Dept: INFUSION CENTER | Facility: CLINIC | Age: 72
Discharge: HOME | End: 2022-08-01
Payer: COMMERCIAL

## 2022-08-01 DIAGNOSIS — Z45.2 ENCOUNTER FOR CENTRAL LINE CARE: Primary | ICD-10-CM

## 2022-08-01 DIAGNOSIS — C54.1 ENDOMETRIAL CANCER (HCC): ICD-10-CM

## 2022-08-01 LAB
ALBUMIN SERPL BCP-MCNC: 3 G/DL (ref 3.5–5)
ALP SERPL-CCNC: 51 U/L (ref 46–116)
ALT SERPL W P-5'-P-CCNC: 15 U/L (ref 12–78)
ANION GAP SERPL CALCULATED.3IONS-SCNC: 13 MMOL/L (ref 4–13)
AST SERPL W P-5'-P-CCNC: 21 U/L (ref 5–45)
BASOPHILS # BLD AUTO: 0.02 THOUSANDS/ΜL (ref 0–0.1)
BASOPHILS NFR BLD AUTO: 1 % (ref 0–1)
BILIRUB SERPL-MCNC: 0.3 MG/DL (ref 0.2–1)
BUN SERPL-MCNC: 14 MG/DL (ref 5–25)
CALCIUM ALBUM COR SERPL-MCNC: 9.4 MG/DL (ref 8.3–10.1)
CALCIUM SERPL-MCNC: 8.6 MG/DL (ref 8.3–10.1)
CHLORIDE SERPL-SCNC: 105 MMOL/L (ref 96–108)
CO2 SERPL-SCNC: 24 MMOL/L (ref 21–32)
CREAT SERPL-MCNC: 1.45 MG/DL (ref 0.6–1.3)
EOSINOPHIL # BLD AUTO: 0.03 THOUSAND/ΜL (ref 0–0.61)
EOSINOPHIL NFR BLD AUTO: 1 % (ref 0–6)
ERYTHROCYTE [DISTWIDTH] IN BLOOD BY AUTOMATED COUNT: 17.8 % (ref 11.6–15.1)
GFR SERPL CREATININE-BSD FRML MDRD: 35 ML/MIN/1.73SQ M
GLUCOSE SERPL-MCNC: 109 MG/DL (ref 65–140)
HCT VFR BLD AUTO: 32.6 % (ref 34.8–46.1)
HGB BLD-MCNC: 10 G/DL (ref 11.5–15.4)
IMM GRANULOCYTES # BLD AUTO: 0.01 THOUSAND/UL (ref 0–0.2)
IMM GRANULOCYTES NFR BLD AUTO: 0 % (ref 0–2)
LYMPHOCYTES # BLD AUTO: 1.35 THOUSANDS/ΜL (ref 0.6–4.47)
LYMPHOCYTES NFR BLD AUTO: 46 % (ref 14–44)
MAGNESIUM SERPL-MCNC: 2 MG/DL (ref 1.6–2.6)
MCH RBC QN AUTO: 31.8 PG (ref 26.8–34.3)
MCHC RBC AUTO-ENTMCNC: 30.7 G/DL (ref 31.4–37.4)
MCV RBC AUTO: 104 FL (ref 82–98)
MONOCYTES # BLD AUTO: 0.36 THOUSAND/ΜL (ref 0.17–1.22)
MONOCYTES NFR BLD AUTO: 12 % (ref 4–12)
NEUTROPHILS # BLD AUTO: 1.15 THOUSANDS/ΜL (ref 1.85–7.62)
NEUTS SEG NFR BLD AUTO: 40 % (ref 43–75)
NRBC BLD AUTO-RTO: 0 /100 WBCS
PLATELET # BLD AUTO: 202 THOUSANDS/UL (ref 149–390)
PMV BLD AUTO: 10.8 FL (ref 8.9–12.7)
POTASSIUM SERPL-SCNC: 3.4 MMOL/L (ref 3.5–5.3)
PROT SERPL-MCNC: 6.8 G/DL (ref 6.4–8.4)
RBC # BLD AUTO: 3.14 MILLION/UL (ref 3.81–5.12)
SODIUM SERPL-SCNC: 142 MMOL/L (ref 135–147)
WBC # BLD AUTO: 2.92 THOUSAND/UL (ref 4.31–10.16)

## 2022-08-01 PROCEDURE — 80053 COMPREHEN METABOLIC PANEL: CPT

## 2022-08-01 PROCEDURE — 83735 ASSAY OF MAGNESIUM: CPT

## 2022-08-01 PROCEDURE — 85025 COMPLETE CBC W/AUTO DIFF WBC: CPT

## 2022-08-02 ENCOUNTER — HOSPITAL ENCOUNTER (OUTPATIENT)
Dept: INFUSION CENTER | Facility: CLINIC | Age: 72
Discharge: HOME | End: 2022-08-02
Payer: COMMERCIAL

## 2022-08-02 ENCOUNTER — TELEMEDICINE (OUTPATIENT)
Dept: GYNECOLOGIC ONCOLOGY | Facility: CLINIC | Age: 72
End: 2022-08-02
Payer: COMMERCIAL

## 2022-08-02 DIAGNOSIS — C54.1 ENDOMETRIAL CANCER (HCC): Primary | ICD-10-CM

## 2022-08-02 DIAGNOSIS — C54.1 ENDOMETRIAL CANCER (HCC): ICD-10-CM

## 2022-08-02 DIAGNOSIS — Z45.2 ENCOUNTER FOR CENTRAL LINE CARE: Primary | ICD-10-CM

## 2022-08-02 LAB
BASOPHILS # BLD AUTO: 0.02 THOUSANDS/ΜL (ref 0–0.1)
BASOPHILS NFR BLD AUTO: 1 % (ref 0–1)
EOSINOPHIL # BLD AUTO: 0.02 THOUSAND/ΜL (ref 0–0.61)
EOSINOPHIL NFR BLD AUTO: 1 % (ref 0–6)
ERYTHROCYTE [DISTWIDTH] IN BLOOD BY AUTOMATED COUNT: 17.8 % (ref 11.6–15.1)
HCT VFR BLD AUTO: 32 % (ref 34.8–46.1)
HGB BLD-MCNC: 10.1 G/DL (ref 11.5–15.4)
IMM GRANULOCYTES # BLD AUTO: 0.01 THOUSAND/UL (ref 0–0.2)
IMM GRANULOCYTES NFR BLD AUTO: 0 % (ref 0–2)
LYMPHOCYTES # BLD AUTO: 1.82 THOUSANDS/ΜL (ref 0.6–4.47)
LYMPHOCYTES NFR BLD AUTO: 51 % (ref 14–44)
MCH RBC QN AUTO: 32.6 PG (ref 26.8–34.3)
MCHC RBC AUTO-ENTMCNC: 31.6 G/DL (ref 31.4–37.4)
MCV RBC AUTO: 103 FL (ref 82–98)
MONOCYTES # BLD AUTO: 0.37 THOUSAND/ΜL (ref 0.17–1.22)
MONOCYTES NFR BLD AUTO: 10 % (ref 4–12)
NEUTROPHILS # BLD AUTO: 1.32 THOUSANDS/ΜL (ref 1.85–7.62)
NEUTS SEG NFR BLD AUTO: 37 % (ref 43–75)
NRBC BLD AUTO-RTO: 0 /100 WBCS
PLATELET # BLD AUTO: 179 THOUSANDS/UL (ref 149–390)
PMV BLD AUTO: 10.4 FL (ref 8.9–12.7)
RBC # BLD AUTO: 3.1 MILLION/UL (ref 3.81–5.12)
WBC # BLD AUTO: 3.56 THOUSAND/UL (ref 4.31–10.16)

## 2022-08-02 PROCEDURE — 99213 OFFICE O/P EST LOW 20 MIN: CPT | Performed by: NURSE PRACTITIONER

## 2022-08-02 PROCEDURE — 85025 COMPLETE CBC W/AUTO DIFF WBC: CPT

## 2022-08-02 RX ORDER — PALONOSETRON 0.05 MG/ML
0.25 INJECTION, SOLUTION INTRAVENOUS ONCE
Status: CANCELLED | OUTPATIENT
Start: 2022-08-03

## 2022-08-02 RX ORDER — SODIUM CHLORIDE 9 MG/ML
20 INJECTION, SOLUTION INTRAVENOUS ONCE
Status: CANCELLED | OUTPATIENT
Start: 2022-08-03

## 2022-08-02 NOTE — ASSESSMENT & PLAN NOTE
64-year-old with stage IIIC1 endometrial cancer currently undergoing adjuvant chemotherapy treatment with Taxol 135mg/m2 and Carboplatin AUC 5  She is feeling well  She is mildly neutropenic despite Ziextenzo  Her PS is 0  Patient will have labs repeated today to re-check [address]  Plan for treatment tomorrow if stable or improved

## 2022-08-02 NOTE — PROGRESS NOTES
Labs collected via port a cath  Port flushed per protocol  Pt is aware of all future appointments   Declined AVS

## 2022-08-02 NOTE — PROGRESS NOTES
Virtual Regular Visit    Verification of patient location:    Patient is located in the following state in which I hold an active license [place]      Assessment/Plan:    Problem List Items Addressed This Visit        Genitourinary    Endometrial cancer ([facility] [address] ) - Primary     78-year-old with stage IIIC1 endometrial cancer currently undergoing adjuvant chemotherapy treatment with Taxol 135mg/m2 and Carboplatin AUC 5  She is feeling well  She is mildly neutropenic despite Ziextenzo  Her PS is 0  Patient will have labs repeated today to re-check [address]  Plan for treatment tomorrow if stable or improved  Relevant Orders    CBC and differential               Reason for visit is pre-chemotherapy evaluation  Chief Complaint   Patient presents with    Virtual Regular Visit        Encounter provider RUBEN Good    Provider located at [address]  [place]  [address] [phone]      Recent Visits  No visits were found meeting these conditions  Showing recent visits within past 7 days and meeting all other requirements  Today's Visits  Date Type Provider Dept   08/02/22 Telemedicine Yenny Good [address]  today's visits and meeting all other requirements  Future Appointments  No visits were found meeting these conditions  Showing future appointments within next 150 days and meeting all other requirements       The patient was identified by name and date of birth  Sg Bowerssharri was informed that this is a telemedicine visit and that the visit is being conducted through Telephone  My office door was closed  No one else was in the room  She acknowledged consent and understanding of privacy and security of the video platform  The patient has agreed to participate and understands they can discontinue the visit at any time  Patient is aware this is a billable service         Oncology History Endometrial cancer ([facility] [address] )   3/15/2022 Initial Diagnosis    Endometrial cancer ([facility] [address] )     3/31/2022 -  Cancer Staged    Staging form: Corpus Uteri - Carcinoma, AJCC 8th Edition  - Pathologic stage from 3/31/2022: FIGO Stage IIIC1 - Signed by Neville Velez MD on 4/19/2022  Histopathologic type: Clear cell adenocarcinoma, NOS  Stage prefix: Initial diagnosis  Histologic grade (G): G3  Histologic grading system: 3 grade system  Residual tumor (R): R0 - None  Pelvic fátima status: Positive  Number of pelvic nodes positive from dissection: 1  Number of pelvic nodes examined during dissection: 2  Lymph node metastasis: Present  Omentectomy performed: Yes  Morcellation performed: No       3/31/2022 Surgery    Robotic hysterectomy, bilateral salpingo oophorectomy, bilateral pelvic sentinel lymph node biopsies, pelvic peritoneal/omental biopsies  Final pathology demonstrated clear cell carcinoma, 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%)  Negative cervix  Negative parametria  1/2 sentinel pelvic lymph nodes positive for malignancy  Negative staging biopsies  Stage IIIC1  No evidence of DNA mismatch repair protein loss       5/11/2022 -  Chemotherapy    palonosetron (ALOXI), 0 25 mg, Intravenous, Once, 4 of 6 cycles  Administration: 0 25 mg (5/11/2022), 0 25 mg (6/1/2022), 0 25 mg (6/22/2022), 0 25 mg (7/13/2022)  Pegfilgrastim-bmez (Juliet Abdiel), 6 mg, Subcutaneous, Once, 1 of 3 cycles  Administration: 6 mg (7/14/2022)  fosaprepitant (EMEND) IVPB, 150 mg, Intravenous, Once, 4 of 6 cycles  Administration: 150 mg (5/11/2022), 150 mg (6/1/2022), 150 mg (6/22/2022), 150 mg (7/13/2022)  CARBOplatin (PARAPLATIN) IVPB (GOG AUC DOSING), 352 5 mg, Intravenous, Once, 4 of 6 cycles  Administration: 352 5 mg (5/11/2022), 377 5 mg (6/1/2022), 340 5 mg (6/22/2022), 353 mg (7/13/2022)  PACLItaxel (TAXOL) chemo IVPB, 135 mg/m2 = 290 4 mg (77 1 % of original dose 175 mg/m2), Intravenous, Once, 4 of 6 cycles  Dose modification: 135 mg/m2 (original dose 175 mg/m2, Cycle 1, Reason: Other (Must fill in a comment), Comment: prescribed starting dose)  Administration: 290 4 mg (5/11/2022), 290 4 mg (6/1/2022), 289 2 mg (6/22/2022), 292 8 mg (7/13/2022)          Past Surgical History:   Procedure Laterality Date    APPENDECTOMY      BREAST BIOPSY Left 1977    benign    BREAST SURGERY Left     biopsy    CHOLECYSTECTOMY      COLONOSCOPY      CYSTOSCOPY N/A 3/31/2022    Procedure: Gil Conde;  Surgeon: Ashly Goodrich MD;  [facility]: [facility];  Service: Gynecology Oncology    HERNIA REPAIR Right     inguinal    HYSTERECTOMY      IR PORT PLACEMENT  5/9/2022    JOINT REPLACEMENT Right     Knee    PA COLONOSCOPY FLX DX W/COLLJ SPEC WHEN PFRMD N/A 2/21/2017    Procedure: COLONOSCOPY with polypectomy and hemo clip marjan ;  Surgeon: Valerie Rodgers MD;  Location: [facility]; Service: Gastroenterology    PA LAPAROSCOPY W TOT HYSTERECTUTERUS <=250 Jae Heater TUBE/OVARY N/A 3/31/2022    Procedure: ROBOTIC HYSTERECTOMY, BILATERAL SALPINGO-OOPHORECTOMY, STAGING PERITONEAL AND OMENTAL BIOPSIES, BILATERAL PELVIC SENTINEL LYMPH NODE BIOPSIES;  Surgeon: Ashly Goodrich MD;  Location: [facility];  Service: Gynecology Oncology       Subjective  Sg Farfan is a 74 y o  female       Patient has no interval concerns  Denies symptoms of recurrent disease, including nausea/vomiting, constipation/diarrhea, abdominal pain, pelvic pain, changes in bladder function, and vaginal bleeding/discharge  Endorses a normal appetite and is without SOB, CP, and bloating  She is ambulatory and independent at home         Past Medical History:   Diagnosis Date    Arthritis     osteo    Chronic kidney disease     Colon polyp     Diverticula of colon     Hypertension     Obesity     Rhinitis        Past Surgical History:   Procedure Laterality Date    APPENDECTOMY      BREAST BIOPSY Left 1977    benign    BREAST SURGERY Left     biopsy    CHOLECYSTECTOMY      COLONOSCOPY      CYSTOSCOPY N/A 3/31/2022    Procedure: CYSTOSCOPY;  Surgeon: Yulia Martinez MD;  [facility];  Service: Gynecology Oncology    HERNIA REPAIR Right     inguinal    HYSTERECTOMY      IR PORT PLACEMENT  5/9/2022    JOINT REPLACEMENT Right     Knee    MO COLONOSCOPY FLX DX W/COLLJ SPEC WHEN PFRMD N/A 2/21/2017    Procedure: COLONOSCOPY with polypectomy and hemo clip marjan ;  Surgeon: Albaro Cao MD;  [facility]: [facility];   Service: Gastroenterology    MO LAPAROSCOPY W TOT HYSTERECTUTERUS <=250 Hedy Luis TUBE/OVARY N/A 3/31/2022    Procedure: ROBOTIC HYSTERECTOMY, BILATERAL SALPINGO-OOPHORECTOMY, STAGING PERITONEAL AND OMENTAL BIOPSIES, BILATERAL PELVIC SENTINEL LYMPH NODE BIOPSIES;  Surgeon: Yulia Martinez MD;  [facility];  Service: Gynecology Oncology       Current Outpatient Medications   Medication Sig Dispense Refill    acetaminophen (TYLENOL) 650 mg CR tablet Take 1 tablet (650 mg total) by mouth every 6 (six) hours as needed for mild pain 30 tablet 0    amLODIPine (NORVASC) 10 mg tablet Take 10 mg by mouth daily      apixaban (Eliquis) 5 mg Take 1 tablet PO BID 60 tablet 2    denosumab (Prolia) 60 mg/mL Inject 1 mL under the skin every 6 (six) months      fluticasone (FLONASE) 50 mcg/act nasal spray 2 sprays into each nostril daily as needed        hydrochlorothiazide (HYDRODIURIL) 12 5 mg tablet Take 12 5 mg by mouth daily      lidocaine-prilocaine (EMLA) cream Apply to PORT site 30 min prior to labs or chemotherapy 30 g 1    LORazepam (ATIVAN) 1 mg tablet Take 1 tablet (1 mg total) by mouth every 8 (eight) hours as needed for anxiety (nausea or anxiety) (Patient not taking: No sig reported) 30 tablet 0    omeprazole (PriLOSEC) 40 MG capsule Take 20 mg by mouth daily      ondansetron (ZOFRAN) 8 mg tablet Take 1 tablet (8 mg total) by mouth every 8 (eight) hours as needed for nausea or vomiting (Patient not taking: No sig reported) 20 tablet 1     No current facility-administered medications for this visit  No Known Allergies    Review of Systems   Constitutional: Positive for fatigue  Negative for activity change, appetite change, chills, fever and unexpected weight change  HENT: Negative for mouth sores  Eyes: Negative  Respiratory: Negative for cough, chest tightness, shortness of breath and wheezing  Cardiovascular: Negative for chest pain, palpitations and leg swelling  Gastrointestinal: Negative for abdominal distention, abdominal pain, anal bleeding, blood in stool, constipation, diarrhea, nausea and vomiting  Endocrine: Negative  Genitourinary: Negative for difficulty urinating, dysuria, flank pain, frequency, hematuria, pelvic pain, urgency, vaginal bleeding, vaginal discharge and vaginal pain  Musculoskeletal: Negative for arthralgias and myalgias  Skin: Negative for color change, pallor and rash  Neurological: Negative for dizziness, weakness, numbness and headaches  Hematological: Negative  Psychiatric/Behavioral: The patient is not nervous/anxious  I spent 10 minutes directly with the patient during this visit    VIRTUAL VISIT DISCLAIMER      Sg Scherer verbally agrees to participate in [employer]  Pt is aware that [employer] could be limited without vital signs or the ability to perform a full hands-on physical exam  Sg Scherer understands she or the provider may request at any time to terminate the video visit and request the patient to seek care or treatment in person

## 2022-08-03 ENCOUNTER — HOSPITAL ENCOUNTER (OUTPATIENT)
Dept: INFUSION CENTER | Facility: CLINIC | Age: 72
Discharge: HOME | End: 2022-08-03
Payer: COMMERCIAL

## 2022-08-03 VITALS
BODY MASS INDEX: 46.37 KG/M2 | RESPIRATION RATE: 16 BRPM | HEART RATE: 69 BPM | SYSTOLIC BLOOD PRESSURE: 92 MMHG | DIASTOLIC BLOOD PRESSURE: 65 MMHG | HEIGHT: 63 IN | WEIGHT: 261.69 LBS | TEMPERATURE: 97.8 F

## 2022-08-03 DIAGNOSIS — C54.1 ENDOMETRIAL CANCER (HCC): Primary | ICD-10-CM

## 2022-08-03 PROCEDURE — 96417 CHEMO IV INFUS EACH ADDL SEQ: CPT

## 2022-08-03 PROCEDURE — 96367 TX/PROPH/DG ADDL SEQ IV INF: CPT

## 2022-08-03 PROCEDURE — 96415 CHEMO IV INFUSION ADDL HR: CPT

## 2022-08-03 PROCEDURE — 96375 TX/PRO/DX INJ NEW DRUG ADDON: CPT

## 2022-08-03 PROCEDURE — 96413 CHEMO IV INFUSION 1 HR: CPT

## 2022-08-03 RX ORDER — PALONOSETRON 0.05 MG/ML
0.25 INJECTION, SOLUTION INTRAVENOUS ONCE
Status: COMPLETED | OUTPATIENT
Start: 2022-08-03 | End: 2022-08-03

## 2022-08-03 RX ORDER — SODIUM CHLORIDE 9 MG/ML
20 INJECTION, SOLUTION INTRAVENOUS ONCE
Status: COMPLETED | OUTPATIENT
Start: 2022-08-03 | End: 2022-08-03

## 2022-08-03 RX ADMIN — FOSAPREPITANT 150 MG: 150 INJECTION, POWDER, LYOPHILIZED, FOR SOLUTION INTRAVENOUS at 09:53

## 2022-08-03 RX ADMIN — DIPHENHYDRAMINE HYDROCHLORIDE 25 MG: 50 INJECTION, SOLUTION INTRAMUSCULAR; INTRAVENOUS at 09:05

## 2022-08-03 RX ADMIN — SODIUM CHLORIDE 20 ML/HR: 0.9 INJECTION, SOLUTION INTRAVENOUS at 08:25

## 2022-08-03 RX ADMIN — CARBOPLATIN 266.4 MG: 10 INJECTION, SOLUTION INTRAVENOUS at 13:38

## 2022-08-03 RX ADMIN — PALONOSETRON 0.25 MG: 0.25 INJECTION, SOLUTION INTRAVENOUS at 09:03

## 2022-08-03 RX ADMIN — PACLITAXEL 289.2 MG: 6 INJECTION, SOLUTION, CONCENTRATE INTRAVENOUS at 10:35

## 2022-08-03 RX ADMIN — FAMOTIDINE 20 MG: 10 INJECTION INTRAVENOUS at 09:29

## 2022-08-03 RX ADMIN — DEXAMETHASONE SODIUM PHOSPHATE 20 MG: 10 INJECTION, SOLUTION INTRAMUSCULAR; INTRAVENOUS at 08:38

## 2022-08-03 NOTE — PROGRESS NOTES
Pt  Verbalized dizziness when rising from chair in waiting room  BP 92/65  Pt  Is taking Norvasc 10mg daily  Pt  Instructed to follow up with PCP as they are the BP ordering physician  ANC  Improving 1 32  RN notified [facility]  Carboplatin AUC decreased to 4  Italia Hemphill, Pharmacist  Made aware

## 2022-08-03 NOTE — ADDENDUM NOTE
Encounter addended by: Juan Ayala Pharmacist on: 8/3/2022 8:37 AM   Actions taken: [employer] created or edited

## 2022-08-03 NOTE — PROGRESS NOTES
Pt  Tolerated Taxol and Carboplatin without adverse event  Pt  Denied further dizziness with ambulation  Future appointments reviewed  AVS provided

## 2022-08-03 NOTE — PROGRESS NOTES
Patient with persistent neutropenia despite GSF support  Will dose-reduce carboplatin to AUC 4 with cycle 5  Ok to proceed with treatment on 8/3/22

## 2022-08-04 ENCOUNTER — HOSPITAL ENCOUNTER (OUTPATIENT)
Dept: INFUSION CENTER | Facility: CLINIC | Age: 72
Discharge: HOME | End: 2022-08-04
Payer: COMMERCIAL

## 2022-08-04 DIAGNOSIS — C54.1 ENDOMETRIAL CANCER (HCC): Primary | ICD-10-CM

## 2022-08-04 PROCEDURE — 96372 THER/PROPH/DIAG INJ SC/IM: CPT

## 2022-08-04 RX ADMIN — PEGFILGRASTIM-BMEZ 6 MG: 6 INJECTION SUBCUTANEOUS at 14:26

## 2022-08-04 NOTE — PROGRESS NOTES
Pt arrived to unit without complaint  Pt tolerated Ziextenzo injection in left arm without incident  AVS declined, but aware of future appts  Pt left unit in stable condition

## 2022-08-08 ENCOUNTER — HOSPITAL ENCOUNTER (OUTPATIENT)
Dept: INFUSION CENTER | Facility: CLINIC | Age: 72
Discharge: HOME | End: 2022-08-08
Payer: COMMERCIAL

## 2022-08-08 DIAGNOSIS — Z45.2 ENCOUNTER FOR CENTRAL LINE CARE: ICD-10-CM

## 2022-08-08 DIAGNOSIS — C54.1 ENDOMETRIAL CANCER (HCC): Primary | ICD-10-CM

## 2022-08-08 LAB
ALBUMIN SERPL BCP-MCNC: 3 G/DL (ref 3.5–5)
ALP SERPL-CCNC: 61 U/L (ref 46–116)
ALT SERPL W P-5'-P-CCNC: 39 U/L (ref 12–78)
ANION GAP SERPL CALCULATED.3IONS-SCNC: 9 MMOL/L (ref 4–13)
AST SERPL W P-5'-P-CCNC: 28 U/L (ref 5–45)
BASOPHILS # BLD MANUAL: 0 THOUSAND/UL (ref 0–0.1)
BASOPHILS NFR MAR MANUAL: 0 % (ref 0–1)
BILIRUB SERPL-MCNC: 0.64 MG/DL (ref 0.2–1)
BUN SERPL-MCNC: 17 MG/DL (ref 5–25)
CALCIUM ALBUM COR SERPL-MCNC: 9 MG/DL (ref 8.3–10.1)
CALCIUM SERPL-MCNC: 8.2 MG/DL (ref 8.3–10.1)
CHLORIDE SERPL-SCNC: 103 MMOL/L (ref 96–108)
CO2 SERPL-SCNC: 27 MMOL/L (ref 21–32)
CREAT SERPL-MCNC: 1.27 MG/DL (ref 0.6–1.3)
EOSINOPHIL # BLD MANUAL: 0.19 THOUSAND/UL (ref 0–0.4)
EOSINOPHIL NFR BLD MANUAL: 4 % (ref 0–6)
ERYTHROCYTE [DISTWIDTH] IN BLOOD BY AUTOMATED COUNT: 17.2 % (ref 11.6–15.1)
GFR SERPL CREATININE-BSD FRML MDRD: 41 ML/MIN/1.73SQ M
GLUCOSE SERPL-MCNC: 146 MG/DL (ref 65–140)
HCT VFR BLD AUTO: 30.2 % (ref 34.8–46.1)
HGB BLD-MCNC: 9.5 G/DL (ref 11.5–15.4)
LYMPHOCYTES # BLD AUTO: 1.23 THOUSAND/UL (ref 0.6–4.47)
LYMPHOCYTES # BLD AUTO: 26 % (ref 14–44)
MAGNESIUM SERPL-MCNC: 1.8 MG/DL (ref 1.6–2.6)
MCH RBC QN AUTO: 32.5 PG (ref 26.8–34.3)
MCHC RBC AUTO-ENTMCNC: 31.5 G/DL (ref 31.4–37.4)
MCV RBC AUTO: 103 FL (ref 82–98)
METAMYELOCYTES NFR BLD MANUAL: 2 % (ref 0–1)
MONOCYTES # BLD AUTO: 0.14 THOUSAND/UL (ref 0–1.22)
MONOCYTES NFR BLD: 3 % (ref 4–12)
MYELOCYTES NFR BLD MANUAL: 1 % (ref 0–1)
NEUTROPHILS # BLD MANUAL: 3.03 THOUSAND/UL (ref 1.85–7.62)
NEUTS BAND NFR BLD MANUAL: 9 % (ref 0–8)
NEUTS SEG NFR BLD AUTO: 55 % (ref 43–75)
NRBC BLD AUTO-RTO: 1 /100 WBC (ref 0–2)
PLATELET # BLD AUTO: 81 THOUSANDS/UL (ref 149–390)
PLATELET BLD QL SMEAR: ABNORMAL
PMV BLD AUTO: 11.9 FL (ref 8.9–12.7)
POTASSIUM SERPL-SCNC: 3.2 MMOL/L (ref 3.5–5.3)
PROT SERPL-MCNC: 6.5 G/DL (ref 6.4–8.4)
RBC # BLD AUTO: 2.92 MILLION/UL (ref 3.81–5.12)
RBC MORPH BLD: NORMAL
SODIUM SERPL-SCNC: 139 MMOL/L (ref 135–147)
WBC # BLD AUTO: 4.73 THOUSAND/UL (ref 4.31–10.16)

## 2022-08-08 PROCEDURE — 80053 COMPREHEN METABOLIC PANEL: CPT

## 2022-08-08 PROCEDURE — 85007 BL SMEAR W/DIFF WBC COUNT: CPT

## 2022-08-08 PROCEDURE — 83735 ASSAY OF MAGNESIUM: CPT

## 2022-08-08 PROCEDURE — 85027 COMPLETE CBC AUTOMATED: CPT

## 2022-08-15 ENCOUNTER — HOSPITAL ENCOUNTER (OUTPATIENT)
Dept: INFUSION CENTER | Facility: CLINIC | Age: 72
Discharge: HOME | End: 2022-08-15
Payer: COMMERCIAL

## 2022-08-15 DIAGNOSIS — C54.1 ENDOMETRIAL CANCER (HCC): ICD-10-CM

## 2022-08-15 DIAGNOSIS — Z45.2 ENCOUNTER FOR CENTRAL LINE CARE: Primary | ICD-10-CM

## 2022-08-15 LAB
ALBUMIN SERPL BCP-MCNC: 3.1 G/DL (ref 3.5–5)
ALP SERPL-CCNC: 66 U/L (ref 46–116)
ALT SERPL W P-5'-P-CCNC: 22 U/L (ref 12–78)
ANION GAP SERPL CALCULATED.3IONS-SCNC: 10 MMOL/L (ref 4–13)
AST SERPL W P-5'-P-CCNC: 17 U/L (ref 5–45)
BASOPHILS # BLD AUTO: 0.02 THOUSANDS/ΜL (ref 0–0.1)
BASOPHILS NFR BLD AUTO: 1 % (ref 0–1)
BILIRUB SERPL-MCNC: 0.24 MG/DL (ref 0.2–1)
BUN SERPL-MCNC: 15 MG/DL (ref 5–25)
CALCIUM ALBUM COR SERPL-MCNC: 9.2 MG/DL (ref 8.3–10.1)
CALCIUM SERPL-MCNC: 8.5 MG/DL (ref 8.3–10.1)
CHLORIDE SERPL-SCNC: 102 MMOL/L (ref 96–108)
CO2 SERPL-SCNC: 27 MMOL/L (ref 21–32)
CREAT SERPL-MCNC: 1.4 MG/DL (ref 0.6–1.3)
EOSINOPHIL # BLD AUTO: 0.02 THOUSAND/ΜL (ref 0–0.61)
EOSINOPHIL NFR BLD AUTO: 1 % (ref 0–6)
ERYTHROCYTE [DISTWIDTH] IN BLOOD BY AUTOMATED COUNT: 17.7 % (ref 11.6–15.1)
GFR SERPL CREATININE-BSD FRML MDRD: 37 ML/MIN/1.73SQ M
GLUCOSE SERPL-MCNC: 96 MG/DL (ref 65–140)
HCT VFR BLD AUTO: 30.5 % (ref 34.8–46.1)
HGB BLD-MCNC: 9.5 G/DL (ref 11.5–15.4)
IMM GRANULOCYTES # BLD AUTO: 0.04 THOUSAND/UL (ref 0–0.2)
IMM GRANULOCYTES NFR BLD AUTO: 1 % (ref 0–2)
LYMPHOCYTES # BLD AUTO: 1.67 THOUSANDS/ΜL (ref 0.6–4.47)
LYMPHOCYTES NFR BLD AUTO: 40 % (ref 14–44)
MAGNESIUM SERPL-MCNC: 2 MG/DL (ref 1.6–2.6)
MCH RBC QN AUTO: 32.5 PG (ref 26.8–34.3)
MCHC RBC AUTO-ENTMCNC: 31.1 G/DL (ref 31.4–37.4)
MCV RBC AUTO: 105 FL (ref 82–98)
MONOCYTES # BLD AUTO: 0.35 THOUSAND/ΜL (ref 0.17–1.22)
MONOCYTES NFR BLD AUTO: 9 % (ref 4–12)
NEUTROPHILS # BLD AUTO: 2.03 THOUSANDS/ΜL (ref 1.85–7.62)
NEUTS SEG NFR BLD AUTO: 48 % (ref 43–75)
NRBC BLD AUTO-RTO: 0 /100 WBCS
PLATELET # BLD AUTO: 144 THOUSANDS/UL (ref 149–390)
PMV BLD AUTO: 11.3 FL (ref 8.9–12.7)
POTASSIUM SERPL-SCNC: 3.7 MMOL/L (ref 3.5–5.3)
PROT SERPL-MCNC: 7 G/DL (ref 6.4–8.4)
RBC # BLD AUTO: 2.92 MILLION/UL (ref 3.81–5.12)
SODIUM SERPL-SCNC: 139 MMOL/L (ref 135–147)
WBC # BLD AUTO: 4.13 THOUSAND/UL (ref 4.31–10.16)

## 2022-08-15 PROCEDURE — 83735 ASSAY OF MAGNESIUM: CPT

## 2022-08-15 PROCEDURE — 85025 COMPLETE CBC W/AUTO DIFF WBC: CPT

## 2022-08-15 PROCEDURE — 80053 COMPREHEN METABOLIC PANEL: CPT

## 2022-08-15 NOTE — PLAN OF CARE
Problem: INFECTION - ADULT  Goal: Absence or prevention of progression during hospitalization  Description: INTERVENTIONS:  - Assess and monitor for signs and symptoms of infection  - Monitor lab/diagnostic results  - Monitor all insertion sites, i e  indwelling lines, tubes, and drains  - Monitor endotracheal if appropriate and nasal secretions for changes in amount and color  - [place] appropriate cooling/warming therapies per order  - Administer medications as ordered  - Instruct and encourage patient and family to use good hand hygiene technique  - Identify and instruct in appropriate isolation precautions for identified infection/condition  Outcome: Progressing

## 2022-08-22 ENCOUNTER — HOSPITAL ENCOUNTER (OUTPATIENT)
Dept: INFUSION CENTER | Facility: CLINIC | Age: 72
Discharge: HOME | End: 2022-08-22
Payer: COMMERCIAL

## 2022-08-22 DIAGNOSIS — C54.1 ENDOMETRIAL CANCER (HCC): Primary | ICD-10-CM

## 2022-08-22 DIAGNOSIS — Z45.2 ENCOUNTER FOR CENTRAL LINE CARE: ICD-10-CM

## 2022-08-22 LAB
ALBUMIN SERPL BCP-MCNC: 3.1 G/DL (ref 3.5–5)
ALP SERPL-CCNC: 53 U/L (ref 46–116)
ALT SERPL W P-5'-P-CCNC: 16 U/L (ref 12–78)
ANION GAP SERPL CALCULATED.3IONS-SCNC: 12 MMOL/L (ref 4–13)
AST SERPL W P-5'-P-CCNC: 20 U/L (ref 5–45)
BASOPHILS # BLD AUTO: 0.03 THOUSANDS/ΜL (ref 0–0.1)
BASOPHILS NFR BLD AUTO: 1 % (ref 0–1)
BILIRUB SERPL-MCNC: 0.42 MG/DL (ref 0.2–1)
BUN SERPL-MCNC: 19 MG/DL (ref 5–25)
CALCIUM ALBUM COR SERPL-MCNC: 9.4 MG/DL (ref 8.3–10.1)
CALCIUM SERPL-MCNC: 8.7 MG/DL (ref 8.3–10.1)
CHLORIDE SERPL-SCNC: 105 MMOL/L (ref 96–108)
CO2 SERPL-SCNC: 24 MMOL/L (ref 21–32)
CREAT SERPL-MCNC: 1.48 MG/DL (ref 0.6–1.3)
EOSINOPHIL # BLD AUTO: 0.02 THOUSAND/ΜL (ref 0–0.61)
EOSINOPHIL NFR BLD AUTO: 1 % (ref 0–6)
ERYTHROCYTE [DISTWIDTH] IN BLOOD BY AUTOMATED COUNT: 18 % (ref 11.6–15.1)
GFR SERPL CREATININE-BSD FRML MDRD: 34 ML/MIN/1.73SQ M
GLUCOSE SERPL-MCNC: 123 MG/DL (ref 65–140)
HCT VFR BLD AUTO: 32.1 % (ref 34.8–46.1)
HGB BLD-MCNC: 10 G/DL (ref 11.5–15.4)
IMM GRANULOCYTES # BLD AUTO: 0.01 THOUSAND/UL (ref 0–0.2)
IMM GRANULOCYTES NFR BLD AUTO: 0 % (ref 0–2)
LYMPHOCYTES # BLD AUTO: 1.45 THOUSANDS/ΜL (ref 0.6–4.47)
LYMPHOCYTES NFR BLD AUTO: 39 % (ref 14–44)
MAGNESIUM SERPL-MCNC: 1.8 MG/DL (ref 1.6–2.6)
MCH RBC QN AUTO: 33.2 PG (ref 26.8–34.3)
MCHC RBC AUTO-ENTMCNC: 31.2 G/DL (ref 31.4–37.4)
MCV RBC AUTO: 107 FL (ref 82–98)
MONOCYTES # BLD AUTO: 0.38 THOUSAND/ΜL (ref 0.17–1.22)
MONOCYTES NFR BLD AUTO: 10 % (ref 4–12)
NEUTROPHILS # BLD AUTO: 1.87 THOUSANDS/ΜL (ref 1.85–7.62)
NEUTS SEG NFR BLD AUTO: 49 % (ref 43–75)
NRBC BLD AUTO-RTO: 0 /100 WBCS
PLATELET # BLD AUTO: 188 THOUSANDS/UL (ref 149–390)
PMV BLD AUTO: 10.4 FL (ref 8.9–12.7)
POTASSIUM SERPL-SCNC: 3.4 MMOL/L (ref 3.5–5.3)
PROT SERPL-MCNC: 7.1 G/DL (ref 6.4–8.4)
RBC # BLD AUTO: 3.01 MILLION/UL (ref 3.81–5.12)
SODIUM SERPL-SCNC: 141 MMOL/L (ref 135–147)
WBC # BLD AUTO: 3.76 THOUSAND/UL (ref 4.31–10.16)

## 2022-08-22 PROCEDURE — 85025 COMPLETE CBC W/AUTO DIFF WBC: CPT

## 2022-08-22 PROCEDURE — 80053 COMPREHEN METABOLIC PANEL: CPT

## 2022-08-22 PROCEDURE — 83735 ASSAY OF MAGNESIUM: CPT

## 2022-08-22 NOTE — PROGRESS NOTES
Pt  Denied new symptoms or concerns today  Labs obtained as ordered via port a cath and sent to \[facility]\"" lab  Excellent blood return noted  Future appointments reviewed  AVS declined

## 2022-08-23 ENCOUNTER — OFFICE VISIT (OUTPATIENT)
Dept: GYNECOLOGIC ONCOLOGY | Facility: CLINIC | Age: 72
End: 2022-08-23
Payer: COMMERCIAL

## 2022-08-23 ENCOUNTER — TELEPHONE (OUTPATIENT)
Dept: PULMONOLOGY | Facility: CLINIC | Age: 72
End: 2022-08-23

## 2022-08-23 VITALS
HEART RATE: 67 BPM | BODY MASS INDEX: 48.66 KG/M2 | OXYGEN SATURATION: 99 % | HEIGHT: 63 IN | SYSTOLIC BLOOD PRESSURE: 112 MMHG | RESPIRATION RATE: 17 BRPM | WEIGHT: 274.6 LBS | DIASTOLIC BLOOD PRESSURE: 68 MMHG | TEMPERATURE: 98.1 F

## 2022-08-23 DIAGNOSIS — D70.1 CHEMOTHERAPY INDUCED NEUTROPENIA (HCC): ICD-10-CM

## 2022-08-23 DIAGNOSIS — T45.1X5A CHEMOTHERAPY INDUCED NEUTROPENIA (HCC): ICD-10-CM

## 2022-08-23 DIAGNOSIS — C54.1 ENDOMETRIAL CANCER (HCC): Primary | ICD-10-CM

## 2022-08-23 PROCEDURE — 99214 OFFICE O/P EST MOD 30 MIN: CPT | Performed by: NURSE PRACTITIONER

## 2022-08-23 RX ORDER — PALONOSETRON 0.05 MG/ML
0.25 INJECTION, SOLUTION INTRAVENOUS ONCE
Status: CANCELLED | OUTPATIENT
Start: 2022-08-24

## 2022-08-23 RX ORDER — SODIUM CHLORIDE 9 MG/ML
20 INJECTION, SOLUTION INTRAVENOUS ONCE
Status: CANCELLED | OUTPATIENT
Start: 2022-08-24

## 2022-08-23 NOTE — PROGRESS NOTES
Assessment/Plan:    Problem List Items Addressed This Visit        Genitourinary    Endometrial cancer ([facility] [address] ) - Primary     58-year-old with stage IIIC1 endometrial cancer currently undergoing adjuvant chemotherapy treatment with Taxol 135mg/m2 and Carboplatin AUC 4  She is receiving Nallely Brod for bone marrow support  She has no complaints other than intermittent dizziness  Her PS is 0  Patient will proceed with treatment scheduled for tomorrow  Offered additional fluids with this cycle, which she declines for now  She will RTO with a pre-visit CT scan  Ordered w/o IV contrast due to stage 3 CKD  Will also refer to radiation oncology at this time for consideration of adjuvant radiotherapy  Relevant Orders    CT chest abdomen pelvis wo contrast    Ambulatory referral to Radiation Oncology       Other    Chemotherapy induced neutropenia (HCC)            CHIEF COMPLAINT:       Problem:  Cancer Staging  Endometrial cancer [facility])  Staging form: Corpus Uteri - Carcinoma, AJCC 8th Edition  - Pathologic stage from 3/31/2022: FIGO Stage IIIC1 - Signed by Celine Burdick MD on 4/19/2022        Previous therapy:  Oncology History   Endometrial cancer ([facility] )   3/15/2022 Initial Diagnosis    Endometrial cancer ([facility] )     3/31/2022 -  Cancer Staged    Staging form: Corpus Uteri - Carcinoma, AJCC 8th Edition  - Pathologic stage from 3/31/2022:  FIGO Stage IIIC1 - Signed by Celine Burdick MD on 4/19/2022  Histopathologic type: Clear cell adenocarcinoma, NOS  Stage prefix: Initial diagnosis  Histologic grade (G): G3  Histologic grading system: 3 grade system  Residual tumor (R): R0 - None  Pelvic fátima status: Positive  Number of pelvic nodes positive from dissection: 1  Number of pelvic nodes examined during dissection: 2  Lymph node metastasis: Present  Omentectomy performed: Yes  Morcellation performed: No       3/31/2022 Surgery    Robotic hysterectomy, bilateral salpingo oophorectomy, bilateral pelvic sentinel lymph node biopsies, pelvic peritoneal/omental biopsies  Final pathology demonstrated clear cell carcinoma, 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%)  Negative cervix  Negative parametria  1/2 sentinel pelvic lymph nodes positive for malignancy  Negative staging biopsies  Stage IIIC1  No evidence of DNA mismatch repair protein loss  5/11/2022 -  Chemotherapy    palonosetron (ALOXI), 0 25 mg, Intravenous, Once, 5 of 6 cycles  Administration: 0 25 mg (5/11/2022), 0 25 mg (6/1/2022), 0 25 mg (6/22/2022), 0 25 mg (7/13/2022), 0 25 mg (8/3/2022)  Pegfilgrastim-bmez (ZIEXTENZO), 6 mg, Subcutaneous, Once, 2 of 3 cycles  Administration: 6 mg (7/14/2022), 6 mg (8/4/2022)  fosaprepitant (EMEND) IVPB, 150 mg, Intravenous, Once, 5 of 6 cycles  Administration: 150 mg (5/11/2022), 150 mg (6/1/2022), 150 mg (6/22/2022), 150 mg (7/13/2022), 150 mg (8/3/2022)  CARBOplatin (PARAPLATIN) IVPB (GOG AUC DOSING), 352 5 mg, Intravenous, Once, 5 of 6 cycles  Administration: 352 5 mg (5/11/2022), 377 5 mg (6/1/2022), 340 5 mg (6/22/2022), 353 mg (7/13/2022), 266 4 mg (8/3/2022)  PACLItaxel (TAXOL) chemo IVPB, 135 mg/m2 = 290 4 mg (77 1 % of original dose 175 mg/m2), Intravenous, Once, 5 of 6 cycles  Dose modification: 135 mg/m2 (original dose 175 mg/m2, Cycle 1, Reason: Other (Must fill in a comment), Comment: prescribed starting dose)  Administration: 290 4 mg (5/11/2022), 290 4 mg (6/1/2022), 289 2 mg (6/22/2022), 292 8 mg (7/13/2022), 289 2 mg (8/3/2022)           Patient ID: [email or web address] Payer is a 74 y o  female     Rever has no new concerns since her last visit  She has intermittent episodes of dizziness where she needs to sit down to rest  This has more so been occurring in the last week or so  She denies nausea or vomiting  Her appetite is appropriate, and she reports normal fluid intake at home  Normal bowel and bladder function  She denies abdominal or pelvic pain  She is without vaginal bleeding or discharge  She is ambulatory          The following portions of the patient's history were reviewed and updated as appropriate: allergies, current medications, past family history, past medical history, past social history, past surgical history and problem list     Review of Systems   Constitutional: Negative for activity change, appetite change, chills, fatigue, fever and unexpected weight change  HENT: Negative for mouth sores  Eyes: Negative  Respiratory: Negative for cough, chest tightness, shortness of breath and wheezing  Cardiovascular: Negative for chest pain, palpitations and leg swelling  Gastrointestinal: Negative for abdominal distention, abdominal pain, anal bleeding, blood in stool, constipation, diarrhea, nausea and vomiting  Endocrine: Negative  Genitourinary: Negative for difficulty urinating, dysuria, flank pain, frequency, hematuria, pelvic pain, urgency, vaginal bleeding, vaginal discharge and vaginal pain  Musculoskeletal: Negative for arthralgias and myalgias  Skin: Negative for color change, pallor and rash  Neurological: Positive for dizziness and light-headedness  Negative for weakness, numbness and headaches  Hematological: Negative  Psychiatric/Behavioral: The patient is not nervous/anxious          Current Outpatient Medications   Medication Sig Dispense Refill    acetaminophen (TYLENOL) 650 mg CR tablet Take 1 tablet (650 mg total) by mouth every 6 (six) hours as needed for mild pain 30 tablet 0    amLODIPine (NORVASC) 10 mg tablet Take 10 mg by mouth daily      apixaban (Eliquis) 5 mg Take 1 tablet PO BID 60 tablet 2    denosumab (Prolia) 60 mg/mL Inject 1 mL under the skin every 6 (six) months      hydrochlorothiazide (HYDRODIURIL) 12 5 mg tablet Take 12 5 mg by mouth daily      lidocaine-prilocaine (EMLA) cream Apply to PORT site 30 min prior to labs or chemotherapy 30 g 1    omeprazole (PriLOSEC) 40 MG capsule Take 20 mg by mouth daily      fluticasone (FLONASE) 50 mcg/act nasal spray 2 sprays into each nostril daily as needed        LORazepam (ATIVAN) 1 mg tablet Take 1 tablet (1 mg total) by mouth every 8 (eight) hours as needed for anxiety (nausea or anxiety) (Patient not taking: No sig reported) 30 tablet 0    ondansetron (ZOFRAN) 8 mg tablet Take 1 tablet (8 mg total) by mouth every 8 (eight) hours as needed for nausea or vomiting (Patient not taking: No sig reported) 20 tablet 1     No current facility-administered medications for this visit  Objective:    Blood pressure 112/68, pulse 67, temperature 98 1 °F (36 7 °C), temperature source Tympanic, resp  rate 17, height 5' 3" (1 6 m), weight 125 kg (274 lb 9 6 oz), SpO2 99 %, not currently breastfeeding  Body mass index is 48 64 kg/m²  Body surface area is 2 22 meters squared  Physical Exam  Vitals reviewed  Constitutional:       General: She is not in acute distress  Appearance: Normal appearance  She is obese  She is not ill-appearing  HENT:      Head: Normocephalic and atraumatic  Mouth/Throat:      Mouth: Mucous membranes are moist    Eyes:      General: No scleral icterus  Right eye: No discharge  Left eye: No discharge  Conjunctiva/sclera: Conjunctivae normal    Pulmonary:      Effort: Pulmonary effort is normal    Musculoskeletal:      Right lower leg: No edema  Left lower leg: No edema  Skin:     General: Skin is warm and dry  Coloration: Skin is not jaundiced  Findings: No rash  Neurological:      General: No focal deficit present  Mental Status: She is alert and oriented to person, place, and time  Cranial Nerves: No cranial nerve deficit  Sensory: No sensory deficit  Motor: No weakness  Gait: Gait normal    Psychiatric:         Mood and Affect: Mood normal          Behavior: Behavior normal          Thought Content:  Thought content normal          Judgment: Judgment normal          Lab Results   Component Value Date     5 1 03/10/2022 Lab Results   Component Value Date    K 3 4 (L) 08/22/2022     08/22/2022    CO2 24 08/22/2022    BUN 19 08/22/2022    CREATININE 1 48 (H) 08/22/2022    GLUF 109 (H) 05/16/2022    CALCIUM 8 7 08/22/2022    CORRECTEDCA 9 4 08/22/2022    AST 20 08/22/2022    ALT 16 08/22/2022    ALKPHOS 53 08/22/2022    EGFR 34 08/22/2022     Lab Results   Component Value Date    WBC 3 76 (L) 08/22/2022    HGB 10 0 (L) 08/22/2022    HCT 32 1 (L) 08/22/2022     (H) 08/22/2022     08/22/2022     Lab Results   Component Value Date    NEUTROABS 1 87 08/22/2022        Trend:  Lab Results   Component Value Date     5 1 03/10/2022

## 2022-08-23 NOTE — TELEPHONE ENCOUNTER
Lvm for pt to schedule a 6m f/u @ our [address] office with Dr Robert Chakraborty or PATRICIA in December

## 2022-08-23 NOTE — ASSESSMENT & PLAN NOTE
20-year-old with stage IIIC1 endometrial cancer currently undergoing adjuvant chemotherapy treatment with Taxol 135mg/m2 and Carboplatin AUC 4  She is receiving Amara Marco for bone marrow support  She has no complaints other than intermittent dizziness  Her PS is 0  Patient will proceed with treatment scheduled for tomorrow  Offered additional fluids with this cycle, which she declines for now  She will RTO with a pre-visit CT scan  Ordered w/o IV contrast due to stage 3 CKD  Will also refer to radiation oncology at this time for consideration of adjuvant radiotherapy

## 2022-08-24 ENCOUNTER — HOSPITAL ENCOUNTER (OUTPATIENT)
Dept: INFUSION CENTER | Facility: CLINIC | Age: 72
Discharge: HOME | End: 2022-08-24
Payer: COMMERCIAL

## 2022-08-24 VITALS
TEMPERATURE: 96.5 F | HEART RATE: 75 BPM | WEIGHT: 257.94 LBS | DIASTOLIC BLOOD PRESSURE: 71 MMHG | RESPIRATION RATE: 18 BRPM | BODY MASS INDEX: 45.7 KG/M2 | SYSTOLIC BLOOD PRESSURE: 115 MMHG | HEIGHT: 63 IN

## 2022-08-24 DIAGNOSIS — C54.1 ENDOMETRIAL CANCER (HCC): Primary | ICD-10-CM

## 2022-08-24 PROCEDURE — 96417 CHEMO IV INFUS EACH ADDL SEQ: CPT

## 2022-08-24 PROCEDURE — 96375 TX/PRO/DX INJ NEW DRUG ADDON: CPT

## 2022-08-24 PROCEDURE — 96413 CHEMO IV INFUSION 1 HR: CPT

## 2022-08-24 PROCEDURE — 96415 CHEMO IV INFUSION ADDL HR: CPT

## 2022-08-24 PROCEDURE — 96367 TX/PROPH/DG ADDL SEQ IV INF: CPT

## 2022-08-24 RX ORDER — SODIUM CHLORIDE 9 MG/ML
20 INJECTION, SOLUTION INTRAVENOUS ONCE
Status: COMPLETED | OUTPATIENT
Start: 2022-08-24 | End: 2022-08-24

## 2022-08-24 RX ORDER — PALONOSETRON 0.05 MG/ML
0.25 INJECTION, SOLUTION INTRAVENOUS ONCE
Status: COMPLETED | OUTPATIENT
Start: 2022-08-24 | End: 2022-08-24

## 2022-08-24 RX ADMIN — CARBOPLATIN 271.6 MG: 10 INJECTION, SOLUTION INTRAVENOUS at 13:31

## 2022-08-24 RX ADMIN — FAMOTIDINE 20 MG: 10 INJECTION INTRAVENOUS at 09:16

## 2022-08-24 RX ADMIN — DEXAMETHASONE SODIUM PHOSPHATE 20 MG: 10 INJECTION, SOLUTION INTRAMUSCULAR; INTRAVENOUS at 08:30

## 2022-08-24 RX ADMIN — DIPHENHYDRAMINE HYDROCHLORIDE 25 MG: 50 INJECTION, SOLUTION INTRAMUSCULAR; INTRAVENOUS at 08:53

## 2022-08-24 RX ADMIN — PALONOSETRON 0.25 MG: 0.05 INJECTION, SOLUTION INTRAVENOUS at 08:30

## 2022-08-24 RX ADMIN — SODIUM CHLORIDE 20 ML/HR: 0.9 INJECTION, SOLUTION INTRAVENOUS at 08:29

## 2022-08-24 RX ADMIN — PACLITAXEL 300 MG: 6 INJECTION, SOLUTION, CONCENTRATE INTRAVENOUS at 10:19

## 2022-08-24 RX ADMIN — FOSAPREPITANT 150 MG: 150 INJECTION, POWDER, LYOPHILIZED, FOR SOLUTION INTRAVENOUS at 09:34

## 2022-08-24 NOTE — PROGRESS NOTES
Pt arrived to appointment without any concerns  Tolerating chemotherapy infusion without any adverse effects  AVS given to patient  Aware of upcoming appointments

## 2022-08-25 ENCOUNTER — HOSPITAL ENCOUNTER (OUTPATIENT)
Dept: INFUSION CENTER | Facility: CLINIC | Age: 72
Discharge: HOME | End: 2022-08-25
Payer: COMMERCIAL

## 2022-08-25 VITALS — TEMPERATURE: 97.5 F

## 2022-08-25 DIAGNOSIS — C54.1 ENDOMETRIAL CANCER (HCC): Primary | ICD-10-CM

## 2022-08-25 DIAGNOSIS — T45.1X5A CHEMOTHERAPY INDUCED NEUTROPENIA (HCC): ICD-10-CM

## 2022-08-25 DIAGNOSIS — D70.1 CHEMOTHERAPY INDUCED NEUTROPENIA (HCC): ICD-10-CM

## 2022-08-25 PROCEDURE — 96372 THER/PROPH/DIAG INJ SC/IM: CPT

## 2022-08-25 RX ADMIN — PEGFILGRASTIM-BMEZ 6 MG: 6 INJECTION SUBCUTANEOUS at 15:12

## 2022-08-25 NOTE — PROGRESS NOTES
Pt  Denies new symptoms or concerns today  Afebrile  Zietenzo 6 mg given SQ in STEFFANY  Pt  Tolerated well  Future appointments reviewed for labs  x 3 weeks  Lab appts clarified with Sánchez MIRANDA  There are no further appointments scheduled for chemotherapy at this time  AVS declined

## 2022-08-29 ENCOUNTER — HOSPITAL ENCOUNTER (OUTPATIENT)
Dept: INFUSION CENTER | Facility: CLINIC | Age: 72
Discharge: HOME | End: 2022-08-29
Payer: COMMERCIAL

## 2022-08-29 VITALS — TEMPERATURE: 96.9 F

## 2022-08-29 DIAGNOSIS — Z45.2 ENCOUNTER FOR CENTRAL LINE CARE: ICD-10-CM

## 2022-08-29 DIAGNOSIS — C54.1 ENDOMETRIAL CANCER (HCC): Primary | ICD-10-CM

## 2022-08-29 LAB
ALBUMIN SERPL BCP-MCNC: 3 G/DL (ref 3.5–5)
ALP SERPL-CCNC: 67 U/L (ref 46–116)
ALT SERPL W P-5'-P-CCNC: 54 U/L (ref 12–78)
ANION GAP SERPL CALCULATED.3IONS-SCNC: 12 MMOL/L (ref 4–13)
ANISOCYTOSIS BLD QL SMEAR: PRESENT
AST SERPL W P-5'-P-CCNC: 59 U/L (ref 5–45)
BASOPHILS # BLD MANUAL: 0 THOUSAND/UL (ref 0–0.1)
BASOPHILS NFR MAR MANUAL: 0 % (ref 0–1)
BILIRUB SERPL-MCNC: 0.73 MG/DL (ref 0.2–1)
BUN SERPL-MCNC: 22 MG/DL (ref 5–25)
CALCIUM ALBUM COR SERPL-MCNC: 9.4 MG/DL (ref 8.3–10.1)
CALCIUM SERPL-MCNC: 8.6 MG/DL (ref 8.3–10.1)
CHLORIDE SERPL-SCNC: 104 MMOL/L (ref 96–108)
CO2 SERPL-SCNC: 26 MMOL/L (ref 21–32)
CREAT SERPL-MCNC: 1.34 MG/DL (ref 0.6–1.3)
EOSINOPHIL # BLD MANUAL: 0.07 THOUSAND/UL (ref 0–0.4)
EOSINOPHIL NFR BLD MANUAL: 1 % (ref 0–6)
ERYTHROCYTE [DISTWIDTH] IN BLOOD BY AUTOMATED COUNT: 17.1 % (ref 11.6–15.1)
GFR SERPL CREATININE-BSD FRML MDRD: 39 ML/MIN/1.73SQ M
GLUCOSE SERPL-MCNC: 134 MG/DL (ref 65–140)
HCT VFR BLD AUTO: 30.6 % (ref 34.8–46.1)
HGB BLD-MCNC: 9.6 G/DL (ref 11.5–15.4)
LG PLATELETS BLD QL SMEAR: PRESENT
LYMPHOCYTES # BLD AUTO: 1.13 THOUSAND/UL (ref 0.6–4.47)
LYMPHOCYTES # BLD AUTO: 16 % (ref 14–44)
MAGNESIUM SERPL-MCNC: 1.6 MG/DL (ref 1.6–2.6)
MCH RBC QN AUTO: 33.6 PG (ref 26.8–34.3)
MCHC RBC AUTO-ENTMCNC: 31.4 G/DL (ref 31.4–37.4)
MCV RBC AUTO: 107 FL (ref 82–98)
MONOCYTES # BLD AUTO: 0.21 THOUSAND/UL (ref 0–1.22)
MONOCYTES NFR BLD: 3 % (ref 4–12)
NEUTROPHILS # BLD MANUAL: 5.67 THOUSAND/UL (ref 1.85–7.62)
NEUTS BAND NFR BLD MANUAL: 9 % (ref 0–8)
NEUTS SEG NFR BLD AUTO: 71 % (ref 43–75)
OVALOCYTES BLD QL SMEAR: PRESENT
PLATELET # BLD AUTO: 74 THOUSANDS/UL (ref 149–390)
PLATELET BLD QL SMEAR: ABNORMAL
PMV BLD AUTO: 12.5 FL (ref 8.9–12.7)
POTASSIUM SERPL-SCNC: 3.8 MMOL/L (ref 3.5–5.3)
PROT SERPL-MCNC: 6.8 G/DL (ref 6.4–8.4)
RBC # BLD AUTO: 2.86 MILLION/UL (ref 3.81–5.12)
SODIUM SERPL-SCNC: 142 MMOL/L (ref 135–147)
WBC # BLD AUTO: 7.09 THOUSAND/UL (ref 4.31–10.16)

## 2022-08-29 PROCEDURE — 85027 COMPLETE CBC AUTOMATED: CPT

## 2022-08-29 PROCEDURE — 85007 BL SMEAR W/DIFF WBC COUNT: CPT

## 2022-08-29 PROCEDURE — 83735 ASSAY OF MAGNESIUM: CPT

## 2022-08-29 PROCEDURE — 80053 COMPREHEN METABOLIC PANEL: CPT

## 2022-08-29 NOTE — PROGRESS NOTES
Pt presents for central labs  Port accessed, positive blood return noted, labs collected per protocol  Pt declined AVS, aware of future appts

## 2022-09-02 ENCOUNTER — TELEPHONE (OUTPATIENT)
Dept: NEPHROLOGY | Facility: HOSPITAL | Age: 72
End: 2022-09-02

## 2022-09-02 DIAGNOSIS — N18.31 STAGE 3A CHRONIC KIDNEY DISEASE (HCC): ICD-10-CM

## 2022-09-02 DIAGNOSIS — I10 ESSENTIAL HYPERTENSION: Primary | ICD-10-CM

## 2022-09-05 DIAGNOSIS — I26.99 BILATERAL PULMONARY EMBOLISM (HCC): ICD-10-CM

## 2022-09-06 ENCOUNTER — HOSPITAL ENCOUNTER (OUTPATIENT)
Dept: INFUSION CENTER | Facility: CLINIC | Age: 72
Discharge: HOME | End: 2022-09-06
Payer: COMMERCIAL

## 2022-09-06 DIAGNOSIS — N18.31 STAGE 3A CHRONIC KIDNEY DISEASE (HCC): ICD-10-CM

## 2022-09-06 DIAGNOSIS — I10 ESSENTIAL HYPERTENSION: ICD-10-CM

## 2022-09-06 DIAGNOSIS — C54.1 ENDOMETRIAL CANCER (HCC): ICD-10-CM

## 2022-09-06 LAB
ALBUMIN SERPL BCP-MCNC: 3.1 G/DL (ref 3.5–5)
ALP SERPL-CCNC: 63 U/L (ref 46–116)
ALT SERPL W P-5'-P-CCNC: 22 U/L (ref 12–78)
ANION GAP SERPL CALCULATED.3IONS-SCNC: 6 MMOL/L (ref 4–13)
AST SERPL W P-5'-P-CCNC: 18 U/L (ref 5–45)
BASOPHILS # BLD AUTO: 0.02 THOUSANDS/ΜL (ref 0–0.1)
BASOPHILS NFR BLD AUTO: 1 % (ref 0–1)
BILIRUB SERPL-MCNC: 0.24 MG/DL (ref 0.2–1)
BUN SERPL-MCNC: 17 MG/DL (ref 5–25)
CALCIUM ALBUM COR SERPL-MCNC: 9.6 MG/DL (ref 8.3–10.1)
CALCIUM SERPL-MCNC: 8.9 MG/DL (ref 8.3–10.1)
CHLORIDE SERPL-SCNC: 104 MMOL/L (ref 96–108)
CO2 SERPL-SCNC: 30 MMOL/L (ref 21–32)
CREAT SERPL-MCNC: 1.25 MG/DL (ref 0.6–1.3)
CREAT UR-MCNC: 228 MG/DL
EOSINOPHIL # BLD AUTO: 0.04 THOUSAND/ΜL (ref 0–0.61)
EOSINOPHIL NFR BLD AUTO: 1 % (ref 0–6)
ERYTHROCYTE [DISTWIDTH] IN BLOOD BY AUTOMATED COUNT: 17.2 % (ref 11.6–15.1)
GFR SERPL CREATININE-BSD FRML MDRD: 42 ML/MIN/1.73SQ M
GLUCOSE SERPL-MCNC: 93 MG/DL (ref 65–140)
HCT VFR BLD AUTO: 29.7 % (ref 34.8–46.1)
HGB BLD-MCNC: 9.4 G/DL (ref 11.5–15.4)
IMM GRANULOCYTES # BLD AUTO: 0.02 THOUSAND/UL (ref 0–0.2)
IMM GRANULOCYTES NFR BLD AUTO: 1 % (ref 0–2)
LYMPHOCYTES # BLD AUTO: 1.62 THOUSANDS/ΜL (ref 0.6–4.47)
LYMPHOCYTES NFR BLD AUTO: 42 % (ref 14–44)
MAGNESIUM SERPL-MCNC: 1.6 MG/DL (ref 1.6–2.6)
MCH RBC QN AUTO: 33.3 PG (ref 26.8–34.3)
MCHC RBC AUTO-ENTMCNC: 31.6 G/DL (ref 31.4–37.4)
MCV RBC AUTO: 105 FL (ref 82–98)
MICROALBUMIN UR-MCNC: 13.7 MG/L (ref 0–20)
MICROALBUMIN/CREAT 24H UR: 6 MG/G CREATININE (ref 0–30)
MONOCYTES # BLD AUTO: 0.31 THOUSAND/ΜL (ref 0.17–1.22)
MONOCYTES NFR BLD AUTO: 8 % (ref 4–12)
NEUTROPHILS # BLD AUTO: 1.88 THOUSANDS/ΜL (ref 1.85–7.62)
NEUTS SEG NFR BLD AUTO: 47 % (ref 43–75)
NRBC BLD AUTO-RTO: 0 /100 WBCS
PHOSPHATE SERPL-MCNC: 3.6 MG/DL (ref 2.3–4.1)
PLATELET # BLD AUTO: 155 THOUSANDS/UL (ref 149–390)
PMV BLD AUTO: 11.3 FL (ref 8.9–12.7)
POTASSIUM SERPL-SCNC: 3.7 MMOL/L (ref 3.5–5.3)
PROT SERPL-MCNC: 7 G/DL (ref 6.4–8.4)
PTH-INTACT SERPL-MCNC: 274.7 PG/ML (ref 18.4–80.1)
RBC # BLD AUTO: 2.82 MILLION/UL (ref 3.81–5.12)
SODIUM SERPL-SCNC: 140 MMOL/L (ref 135–147)
WBC # BLD AUTO: 3.89 THOUSAND/UL (ref 4.31–10.16)

## 2022-09-06 PROCEDURE — 83735 ASSAY OF MAGNESIUM: CPT

## 2022-09-06 PROCEDURE — 82570 ASSAY OF URINE CREATININE: CPT | Performed by: INTERNAL MEDICINE

## 2022-09-06 PROCEDURE — 85025 COMPLETE CBC W/AUTO DIFF WBC: CPT

## 2022-09-06 PROCEDURE — 80053 COMPREHEN METABOLIC PANEL: CPT

## 2022-09-06 PROCEDURE — 84100 ASSAY OF PHOSPHORUS: CPT | Performed by: INTERNAL MEDICINE

## 2022-09-06 PROCEDURE — 83970 ASSAY OF PARATHORMONE: CPT | Performed by: INTERNAL MEDICINE

## 2022-09-06 PROCEDURE — 82043 UR ALBUMIN QUANTITATIVE: CPT | Performed by: INTERNAL MEDICINE

## 2022-09-06 NOTE — PROGRESS NOTES
Patient arrived to appointment without any concerns  Labs drawn from central line without any incident  Denies need for AVS, aware of upcoming appointment  Left unit in stable condition

## 2022-09-07 ENCOUNTER — TELEPHONE (OUTPATIENT)
Dept: NEPHROLOGY | Facility: CLINIC | Age: 72
End: 2022-09-07

## 2022-09-07 NOTE — TELEPHONE ENCOUNTER
Appointment Confirmation   Person confirmed appointment with  If not patient, name of the person Patient sister Marina Canales   Date and time of appointment 9/8   Patient acknowledged and will be at appointment?  yes   Did you advise the patient that they will need a urine sample if they are a new patient? n/a   Did you advise the patient to bring their current medications for verification? (including any OTC) yes   Additional Information

## 2022-09-08 ENCOUNTER — OFFICE VISIT (OUTPATIENT)
Dept: NEPHROLOGY | Facility: CLINIC | Age: 72
End: 2022-09-08
Payer: COMMERCIAL

## 2022-09-08 VITALS
SYSTOLIC BLOOD PRESSURE: 105 MMHG | HEART RATE: 68 BPM | BODY MASS INDEX: 45.36 KG/M2 | HEIGHT: 63 IN | DIASTOLIC BLOOD PRESSURE: 68 MMHG | WEIGHT: 256 LBS

## 2022-09-08 DIAGNOSIS — E21.3 HYPERPARATHYROIDISM (HCC): ICD-10-CM

## 2022-09-08 DIAGNOSIS — E66.01 MORBID OBESITY WITH BMI OF 45.0-49.9, ADULT (HCC): ICD-10-CM

## 2022-09-08 DIAGNOSIS — I10 ESSENTIAL HYPERTENSION: ICD-10-CM

## 2022-09-08 DIAGNOSIS — N18.31 STAGE 3A CHRONIC KIDNEY DISEASE (HCC): Primary | ICD-10-CM

## 2022-09-08 DIAGNOSIS — I26.02 ACUTE SADDLE PULMONARY EMBOLISM WITH ACUTE COR PULMONALE (HCC): ICD-10-CM

## 2022-09-08 PROCEDURE — 99214 OFFICE O/P EST MOD 30 MIN: CPT | Performed by: INTERNAL MEDICINE

## 2022-09-08 NOTE — PATIENT INSTRUCTIONS
As we discussed in the office visit, based on the most recent blood test your kidney function remains stable  One of your test (PTH - parathyroid hormone) was elevated, I want you to have another blood test (vitamin-D level) next time you go to the lab for blood test, will contact you with the results    Your blood pressure today in the office was in the lower side, recommend to discuss with primary care doctor tomorrow, if your blood pressure remains low suggest to decrease amlodipine dose on half    Continue close follow-up with your primary care doctor, oncologist   I would like to see you back in the office in 6 months with repeat blood and urine test  Avoid NSAIDs (no ibuprofen, Motrin, Advil, Aleve, naproxen)  Okay to take Tylenol or paracetamol or acetaminophen as needed for pain

## 2022-09-08 NOTE — LETTER
September 8, 2022     Jane Vazquez, [address] [facility] [place] [address]    Patient: Eloisa Fuchs   YOB: 1950   Date of Visit: 9/8/2022       Dear Dr Bridgette Zuniga: Thank you for referring Sg Scherer to me for evaluation  Below are my notes for this consultation  If you have questions, please do not hesitate to call me  I look forward to following your patient along with you  Sincerely,        Cristofer Mckeon MD        CC: No Recipients  Cristofer Mckeon MD  9/8/2022 12:22 PM  Sign when Signing Visit  OFFICE FOLLOW UP - Nephrology   Sg Scherer 74 y o  female MRN: [id number]       ASSESSMENT and PLAN:  Sg was seen today for follow-up and chronic kidney disease  Diagnoses and all orders for this visit:    Stage 3a chronic kidney disease ([facility] [address] )  -     CBC; Future  -     Renal function panel; Future  -     PTH, intact; Future  -     Protein / creatinine ratio, urine; Future    Essential hypertension    Hyperparathyroidism ([facility] [address] )  -     Vitamin D 25 hydroxy; Future    Morbid obesity with BMI of 45 0-49 9, adult ([facility] [address] )    Acute saddle pulmonary embolism with acute cor pulmonale ([facility] [address] )        This is a 15-year-old lady who returns to the office for CKD follow-up  Patient was initially seen on 03/2022, since last office visit she underwent a total hysterectomy, she was hospitalized few months ago after syncopal episode was found to have PE  1  Chronic kidney disease stage III with previous creatinine around 1 1-1 4 back since 2018 as per Care [facility]  Kidney function fairly stable with a most recent creatinine 1 25 and no proteinuria  CKD suspected secondary to long-term history hypertension, obesity, age-related nephron loss  Patient today returns to the office with her sister  Discussed about importance to stay well-hydrated, follow low-salt diet, I would like to see her back in the office in 6 months with repeat blood and urine test   Avoid NSAIDs      2  Hypertension, blood pressure today in the office in the lower side, patient reports on and off dizziness  She will see her primary care doctor tomorrow, recommend that if her blood pressure remains low consider decrease amlodipine dose on half, will defer to her PCP    3  Postmenopausal bleeding with newly diagnosed high-grade endometrial cancer, status post bilateral salpingo-oophorectomy, hysterectomy on 03/31/2022  Patient complete adjuvant chemotherapy with Taxol and carboplatin  Patient was referred to Radiation Oncology for consideration of adjuvant radiotherapy   Continue close follow-up with gynecology oncology    4  Anemia, monitor H&H, transfusion as needed    5  Morbid obesity    6  Elevated PTH, check vitamin-D level and treat appropriately if needed, follow labs in 6 months      Patient Instructions   As we discussed in the office visit, based on the most recent blood test your kidney function remains stable  One of your test (PTH - parathyroid hormone) was elevated, I want you to have another blood test (vitamin-D level) next time you go to the lab for blood test, will contact you with the results  Your blood pressure today in the office was in the lower side, recommend to discuss with primary care doctor tomorrow, if your blood pressure remains low suggest to decrease amlodipine dose on half    Continue close follow-up with your primary care doctor, oncologist   I would like to see you back in the office in 6 months with repeat blood and urine test  Avoid NSAIDs (no ibuprofen, Motrin, Advil, Aleve, naproxen)  Okay to take Tylenol or paracetamol or acetaminophen as needed for pain        HPI: Sg Mcdonnell is a 74 y o  female who is here for Follow-up and Chronic Kidney Disease    This is a patient history of chronic kidney disease, hypertension, obesity, stage III endometrial cancer status post hysterectomy on 03/2022, anemia, who returns to the office for six-month follow-up      Patient was 1st time seen on 03/202, today returns for follow-up,    Since our last visit, underwent total hysterectomy at the end of March 2022, patient was started on adjuvant chemotherapy with Taxol and carboplatin  Patient was hospitalized on May 2022 after syncopal episode, was found to have a PE, currently on anticoagulation  Patient today returns to the office with her sister Jaci Barrow  Patient currently has no complaints at this time and is feeling well other than on and off dizziness  Patient denies any chest pain, shortness of breath or significant leg swelling  Denies any abdominal pain, no nausea, vomiting, no diarrhea or constipation  Denies any urinary problems, no burning sensation or gross hematuria  The last blood work was done on 09/06/2022, which we have reviewed together    Serum creatinine 1 25, hemoglobin 9 4    ROS:   All the systems were reviewed and were negative except as documented on the HPI  Allergies: Patient has no known allergies      Medications:   Current Outpatient Medications:     acetaminophen (TYLENOL) 650 mg CR tablet, Take 1 tablet (650 mg total) by mouth every 6 (six) hours as needed for mild pain, Disp: 30 tablet, Rfl: 0    amLODIPine (NORVASC) 10 mg tablet, Take 10 mg by mouth daily, Disp: , Rfl:     apixaban (Eliquis) 5 mg, TAKE 1 TABLET BY MOUTH TWICE A DAY, Disp: 60 tablet, Rfl: 2    denosumab (Prolia) 60 mg/mL, Inject 1 mL under the skin every 6 (six) months, Disp: , Rfl:     hydrochlorothiazide (HYDRODIURIL) 12 5 mg tablet, Take 12 5 mg by mouth daily, Disp: , Rfl:     lidocaine-prilocaine (EMLA) cream, Apply to PORT site 30 min prior to labs or chemotherapy, Disp: 30 g, Rfl: 1    omeprazole (PriLOSEC) 40 MG capsule, Take 20 mg by mouth daily, Disp: , Rfl:     fluticasone (FLONASE) 50 mcg/act nasal spray, 2 sprays into each nostril daily as needed   (Patient not taking: Reported on 9/8/2022), Disp: , Rfl:     LORazepam (ATIVAN) 1 mg tablet, Take 1 tablet (1 mg total) by mouth every 8 (eight) hours as needed for anxiety (nausea or anxiety) (Patient not taking: No sig reported), Disp: 30 tablet, Rfl: 0    ondansetron (ZOFRAN) 8 mg tablet, Take 1 tablet (8 mg total) by mouth every 8 (eight) hours as needed for nausea or vomiting (Patient not taking: No sig reported), Disp: 20 tablet, Rfl: 1    Past Medical History:   Diagnosis Date    Arthritis     osteo    Chronic kidney disease     Colon polyp     Diverticula of colon     Hypertension     Obesity     Rhinitis      Past Surgical History:   Procedure Laterality Date    APPENDECTOMY      BREAST BIOPSY Left 1977    benign    BREAST SURGERY Left     biopsy    CHOLECYSTECTOMY      COLONOSCOPY      CYSTOSCOPY N/A 3/31/2022    Procedure: CYSTOSCOPY;  Surgeon: Jean Pierre Pace MD;  Location: [facility];  Service: Gynecology Oncology    HERNIA REPAIR Right     inguinal    HYSTERECTOMY      IR PORT PLACEMENT  5/9/2022    JOINT REPLACEMENT Right     Knee    NC COLONOSCOPY FLX DX W/COLLJ SPEC WHEN PFRMD N/A 2/21/2017    Procedure: COLONOSCOPY with polypectomy and hemo clip marjan ;  Surgeon: Alejandro Ohara MD;  Location: [facility];   Service: Gastroenterology    NC LAPAROSCOPY W TOT HYSTERECTUTERUS <=250 Bonnetta Gazella TUBE/OVARY N/A 3/31/2022    Procedure: ROBOTIC HYSTERECTOMY, BILATERAL SALPINGO-OOPHORECTOMY, STAGING PERITONEAL AND OMENTAL BIOPSIES, BILATERAL PELVIC SENTINEL LYMPH NODE BIOPSIES;  Surgeon: Jean Pierre Pace MD;  Location: [facility];  Service: Gynecology Oncology     Family History   Problem Relation Age of Onset    Heart disease Mother     Prostate cancer Father 80    Diabetes Sister     No Known Problems Maternal Grandmother     No Known Problems Maternal Grandfather     No Known Problems Paternal Grandmother     No Known Problems Paternal Grandfather     Hypertension Sister     Transient ischemic attack Sister     No Known Problems Sister     No Known Problems Maternal Aunt     No Known Problems Paternal Aunt     No Known Problems Paternal Aunt     No Known Problems Paternal Aunt     Diabetes Brother     Heart disease Brother     Stomach cancer Other     Thyroid disease Other       reports that she has never smoked  She has never used smokeless tobacco  She reports current alcohol use of about 1 0 standard drink of alcohol per week  She reports that she does not use drugs  Physical Exam:   Vitals:    09/08/22 1150   BP: 105/68   BP Location: Left arm   Patient Position: Sitting   Cuff Size: Extra-Large   Pulse: 68   Weight: 116 kg (256 lb)   Height: 5' 2 99" (1 6 m)     Body mass index is 45 36 kg/m²      General: cooperative, in not acute distress  Eyes: conjunctivae pink, anicteric sclerae  ENT: lips and mucous membranes moist  Neck: supple, no JVD  Chest: clear breath sounds bilateral, no crackles, ronchus or wheezings  CVS: distinct S1 & S2, normal rate, regular rhythm  Abdomen: obese, non-tender, non-distended, normoactive bowel sounds  Back: no CVA tenderness  Extremities: mild edema of both legs  Skin: no rash  Neuro: awake, alert, oriented      Lab Results:  Results for orders placed or performed during the hospital encounter of 09/06/22   CBC and differential   Result Value Ref Range    WBC 3 89 (L) 4 31 - 10 16 Thousand/uL    RBC 2 82 (L) 3 81 - 5 12 Million/uL    Hemoglobin 9 4 (L) 11 5 - 15 4 g/dL    Hematocrit 29 7 (L) 34 8 - 46 1 %     (H) 82 - 98 fL    MCH 33 3 26 8 - 34 3 pg    MCHC 31 6 31 4 - 37 4 g/dL    RDW 17 2 (H) 11 6 - 15 1 %    MPV 11 3 8 9 - 12 7 fL    Platelets 050 322 - 665 Thousands/uL    nRBC 0 /100 WBCs    Neutrophils Relative 47 43 - 75 %    Immat GRANS % 1 0 - 2 %    Lymphocytes Relative 42 14 - 44 %    Monocytes Relative 8 4 - 12 %    Eosinophils Relative 1 0 - 6 %    Basophils Relative 1 0 - 1 %    Neutrophils Absolute 1 88 1 85 - 7 62 Thousands/µL    Immature Grans Absolute 0 02 0 00 - 0 20 Thousand/uL    Lymphocytes Absolute 1  62 0 60 - 4 47 Thousands/µL    Monocytes Absolute 0 31 0 17 - 1 22 Thousand/µL    Eosinophils Absolute 0 04 0 00 - 0 61 Thousand/µL    Basophils Absolute 0 02 0 00 - 0 10 Thousands/µL   Comprehensive metabolic panel   Result Value Ref Range    Sodium 140 135 - 147 mmol/L    Potassium 3 7 3 5 - 5 3 mmol/L    Chloride 104 96 - 108 mmol/L    CO2 30 21 - 32 mmol/L    ANION GAP 6 4 - 13 mmol/L    BUN 17 5 - 25 mg/dL    Creatinine 1 25 0 60 - 1 30 mg/dL    Glucose 93 65 - 140 mg/dL    Calcium 8 9 8 3 - 10 1 mg/dL    Corrected Calcium 9 6 8 3 - 10 1 mg/dL    AST 18 5 - 45 U/L    ALT 22 12 - 78 U/L    Alkaline Phosphatase 63 46 - 116 U/L    Total Protein 7 0 6 4 - 8 4 g/dL    Albumin 3 1 (L) 3 5 - 5 0 g/dL    Total Bilirubin 0 24 0 20 - 1 00 mg/dL    eGFR 42 ml/min/1 73sq m   Magnesium   Result Value Ref Range    Magnesium 1 6 1 6 - 2 6 mg/dL   PTH, intact   Result Value Ref Range     7 (H) 18 4 - 80 1 pg/mL   Microalbumin / creatinine urine ratio   Result Value Ref Range    Creatinine, Ur 228 0 mg/dL    Microalbum  ,U,Random 13 7 0 0 - 20 0 mg/L    Microalb Creat Ratio 6 0 - 30 mg/g creatinine   Phosphorus   Result Value Ref Range    Phosphorus 3 6 2 3 - 4 1 mg/dL       Results from last 7 days   Lab Units 09/06/22  1108   WBC Thousand/uL 3 89*   HEMOGLOBIN g/dL 9 4*   HEMATOCRIT % 29 7*   PLATELETS Thousands/uL 155   SODIUM mmol/L 140   POTASSIUM mmol/L 3 7   CHLORIDE mmol/L 104   CO2 mmol/L 30   BUN mg/dL 17   CREATININE mg/dL 1 25   CALCIUM mg/dL 8 9   MAGNESIUM mg/dL 1 6   PHOSPHORUS mg/dL 3 6           Portions of the record may have been created with voice recognition software  Occasional wrong word or "sound a like" substitutions may have occurred due to the inherent limitations of voice recognition software  Read the chart carefully and recognize, using context, where substitutions have occurred  If you have any questions, please contact the dictating provider

## 2022-09-08 NOTE — PROGRESS NOTES
OFFICE FOLLOW UP - Nephrology   Sg Mauricio Rock 74 y o  female MRN: [id number]       ASSESSMENT and PLAN:  Sg was seen today for follow-up and chronic kidney disease  Diagnoses and all orders for this visit:    Stage 3a chronic kidney disease ([facility] )  -     CBC; Future  -     Renal function panel; Future  -     PTH, intact; Future  -     Protein / creatinine ratio, urine; Future    Essential hypertension    Hyperparathyroidism ([facility] )  -     Vitamin D 25 hydroxy; Future    Morbid obesity with BMI of 45 0-49 9, adult ([facility] )    Acute saddle pulmonary embolism with acute cor pulmonale ([facility] )        This is a 76-year-old lady who returns to the office for CKD follow-up  Patient was initially seen on 03/2022, since last office visit she underwent a total hysterectomy, she was hospitalized few months ago after syncopal episode was found to have PE  1  Chronic kidney disease stage III with previous creatinine around 1 1-1 4 back since 2018 as per Care everywhere  Kidney function fairly stable with a most recent creatinine 1 25 and no proteinuria  CKD suspected secondary to long-term history hypertension, obesity, age-related nephron loss  Patient today returns to the office with her sister  Discussed about importance to stay well-hydrated, follow low-salt diet, I would like to see her back in the office in 6 months with repeat blood and urine test   Avoid NSAIDs  2  Hypertension, blood pressure today in the office in the lower side, patient reports on and off dizziness  She will see her primary care doctor tomorrow, recommend that if her blood pressure remains low consider decrease amlodipine dose on half, will defer to her PCP    3  Postmenopausal bleeding with newly diagnosed high-grade endometrial cancer, status post bilateral salpingo-oophorectomy, hysterectomy on 03/31/2022  Patient complete adjuvant chemotherapy with Taxol and carboplatin    Patient was referred to Radiation Oncology for consideration of adjuvant radiotherapy   Continue close follow-up with gynecology oncology    4  Anemia, monitor H&H, transfusion as needed    5  Morbid obesity    6  Elevated PTH, check vitamin-D level and treat appropriately if needed, follow labs in 6 months      Patient Instructions   As we discussed in the office visit, based on the most recent blood test your kidney function remains stable  One of your test (PTH - parathyroid hormone) was elevated, I want you to have another blood test (vitamin-D level) next time you go to the lab for blood test, will contact you with the results  Your blood pressure today in the office was in the lower side, recommend to discuss with primary care doctor tomorrow, if your blood pressure remains low suggest to decrease amlodipine dose on half    Continue close follow-up with your primary care doctor, oncologist   I would like to see you back in the office in 6 months with repeat blood and urine test  Avoid NSAIDs (no ibuprofen, Motrin, Advil, Aleve, naproxen)  Okay to take Tylenol or paracetamol or acetaminophen as needed for pain        HPI: Sg Ware is a 74 y o  female who is here for Follow-up and Chronic Kidney Disease    This is a patient history of chronic kidney disease, hypertension, obesity, stage III endometrial cancer status post hysterectomy on 03/2022, anemia, who returns to the office for six-month follow-up  Patient was 1st time seen on 03/202, today returns for follow-up,    Since our last visit, underwent total hysterectomy at the end of March 2022, patient was started on adjuvant chemotherapy with Taxol and carboplatin  Patient was hospitalized on May 2022 after syncopal episode, was found to have a PE, currently on anticoagulation  Patient today returns to the office with her sister Socorro Carr  Patient currently has no complaints at this time and is feeling well other than on and off dizziness     Patient denies any chest pain, shortness of breath or significant leg swelling  Denies any abdominal pain, no nausea, vomiting, no diarrhea or constipation  Denies any urinary problems, no burning sensation or gross hematuria  The last blood work was done on 09/06/2022, which we have reviewed together    Serum creatinine 1 25, hemoglobin 9 4    ROS:   All the systems were reviewed and were negative except as documented on the HPI  Allergies: Patient has no known allergies      Medications:   Current Outpatient Medications:     acetaminophen (TYLENOL) 650 mg CR tablet, Take 1 tablet (650 mg total) by mouth every 6 (six) hours as needed for mild pain, Disp: 30 tablet, Rfl: 0    amLODIPine (NORVASC) 10 mg tablet, Take 10 mg by mouth daily, Disp: , Rfl:     apixaban (Eliquis) 5 mg, TAKE 1 TABLET BY MOUTH TWICE A DAY, Disp: 60 tablet, Rfl: 2    denosumab (Prolia) 60 mg/mL, Inject 1 mL under the skin every 6 (six) months, Disp: , Rfl:     hydrochlorothiazide (HYDRODIURIL) 12 5 mg tablet, Take 12 5 mg by mouth daily, Disp: , Rfl:     lidocaine-prilocaine (EMLA) cream, Apply to PORT site 30 min prior to labs or chemotherapy, Disp: 30 g, Rfl: 1    omeprazole (PriLOSEC) 40 MG capsule, Take 20 mg by mouth daily, Disp: , Rfl:     fluticasone (FLONASE) 50 mcg/act nasal spray, 2 sprays into each nostril daily as needed   (Patient not taking: Reported on 9/8/2022), Disp: , Rfl:     LORazepam (ATIVAN) 1 mg tablet, Take 1 tablet (1 mg total) by mouth every 8 (eight) hours as needed for anxiety (nausea or anxiety) (Patient not taking: No sig reported), Disp: 30 tablet, Rfl: 0    ondansetron (ZOFRAN) 8 mg tablet, Take 1 tablet (8 mg total) by mouth every 8 (eight) hours as needed for nausea or vomiting (Patient not taking: No sig reported), Disp: 20 tablet, Rfl: 1    Past Medical History:   Diagnosis Date    Arthritis     osteo    Chronic kidney disease     Colon polyp     Diverticula of colon     Hypertension     Obesity     Rhinitis      Past Surgical History:   Procedure Laterality Date    APPENDECTOMY      BREAST BIOPSY Left 1977    benign    BREAST SURGERY Left     biopsy    CHOLECYSTECTOMY      COLONOSCOPY      CYSTOSCOPY N/A 3/31/2022    Procedure: CYSTOSCOPY;  Surgeon: Sharon Zaldivar MD;  [facility];  Service: Gynecology Oncology    HERNIA REPAIR Right     inguinal    HYSTERECTOMY      IR PORT PLACEMENT  5/9/2022    JOINT REPLACEMENT Right     Knee    NE COLONOSCOPY FLX DX W/COLLJ SPEC WHEN PFRMD N/A 2/21/2017    Procedure: COLONOSCOPY with polypectomy and hemo clip marjan ;  Surgeon: Sid Zafar MD;  [facility]; Service: Gastroenterology    NE LAPAROSCOPY W TOT HYSTERECTUTERUS <=250 Rajesh Gather TUBE/OVARY N/A 3/31/2022    Procedure: ROBOTIC HYSTERECTOMY, BILATERAL SALPINGO-OOPHORECTOMY, STAGING PERITONEAL AND OMENTAL BIOPSIES, BILATERAL PELVIC SENTINEL LYMPH NODE BIOPSIES;  Surgeon: Sharon Zaldivar MD;  [facility];  Service: Gynecology Oncology     Family History   Problem Relation Age of Onset    Heart disease Mother     Prostate cancer Father 80    Diabetes Sister     No Known Problems Maternal Grandmother     No Known Problems Maternal Grandfather     No Known Problems Paternal Grandmother     No Known Problems Paternal Grandfather     Hypertension Sister     Transient ischemic attack Sister     No Known Problems Sister     No Known Problems Maternal Aunt     No Known Problems Paternal Aunt     No Known Problems Paternal Aunt     No Known Problems Paternal Aunt     Diabetes Brother     Heart disease Brother     Stomach cancer Other     Thyroid disease Other       reports that she has never smoked  She has never used smokeless tobacco  She reports current alcohol use of about 1 0 standard drink of alcohol per week  She reports that she does not use drugs        Physical Exam:   Vitals:    09/08/22 1150   BP: 105/68   BP Location: Left arm   Patient Position: Sitting   Cuff Size: Extra-Large   Pulse: 68   Weight: 116 kg (256 lb)   Height: 5' 2 99" (1 6 m)     Body mass index is 45 36 kg/m²      General: cooperative, in not acute distress  Eyes: conjunctivae pink, anicteric sclerae  ENT: lips and mucous membranes moist  Neck: supple, no JVD  Chest: clear breath sounds bilateral, no crackles, ronchus or wheezings  CVS: distinct S1 & S2, normal rate, regular rhythm  Abdomen: obese, non-tender, non-distended, normoactive bowel sounds  Back: no CVA tenderness  Extremities: mild edema of both legs  Skin: no rash  Neuro: awake, alert, oriented      Lab Results:  Results for orders placed or performed during the hospital encounter of 09/06/22   CBC and differential   Result Value Ref Range    WBC 3 89 (L) 4 31 - 10 16 Thousand/uL    RBC 2 82 (L) 3 81 - 5 12 Million/uL    Hemoglobin 9 4 (L) 11 5 - 15 4 g/dL    Hematocrit 29 7 (L) 34 8 - 46 1 %     (H) 82 - 98 fL    MCH 33 3 26 8 - 34 3 pg    MCHC 31 6 31 4 - 37 4 g/dL    RDW 17 2 (H) 11 6 - 15 1 %    MPV 11 3 8 9 - 12 7 fL    Platelets 941 994 - 750 Thousands/uL    nRBC 0 /100 WBCs    Neutrophils Relative 47 43 - 75 %    Immat GRANS % 1 0 - 2 %    Lymphocytes Relative 42 14 - 44 %    Monocytes Relative 8 4 - 12 %    Eosinophils Relative 1 0 - 6 %    Basophils Relative 1 0 - 1 %    Neutrophils Absolute 1 88 1 85 - 7 62 Thousands/µL    Immature Grans Absolute 0 02 0 00 - 0 20 Thousand/uL    Lymphocytes Absolute 1 62 0 60 - 4 47 Thousands/µL    Monocytes Absolute 0 31 0 17 - 1 22 Thousand/µL    Eosinophils Absolute 0 04 0 00 - 0 61 Thousand/µL    Basophils Absolute 0 02 0 00 - 0 10 Thousands/µL   Comprehensive metabolic panel   Result Value Ref Range    Sodium 140 135 - 147 mmol/L    Potassium 3 7 3 5 - 5 3 mmol/L    Chloride 104 96 - 108 mmol/L    CO2 30 21 - 32 mmol/L    ANION GAP 6 4 - 13 mmol/L    BUN 17 5 - 25 mg/dL    Creatinine 1 25 0 60 - 1 30 mg/dL    Glucose 93 65 - 140 mg/dL    Calcium 8 9 8 3 - 10 1 mg/dL    Corrected Calcium 9 6 8 3 - 10 1 mg/dL    AST 18 5 - 45 U/L    ALT 22 12 - 78 U/L    Alkaline Phosphatase 63 46 - 116 U/L    Total Protein 7 0 6 4 - 8 4 g/dL    Albumin 3 1 (L) 3 5 - 5 0 g/dL    Total Bilirubin 0 24 0 20 - 1 00 mg/dL    eGFR 42 ml/min/1 73sq m   Magnesium   Result Value Ref Range    Magnesium 1 6 1 6 - 2 6 mg/dL   PTH, intact   Result Value Ref Range     7 (H) 18 4 - 80 1 pg/mL   Microalbumin / creatinine urine ratio   Result Value Ref Range    Creatinine, Ur 228 0 mg/dL    Microalbum  ,U,Random 13 7 0 0 - 20 0 mg/L    Microalb Creat Ratio 6 0 - 30 mg/g creatinine   Phosphorus   Result Value Ref Range    Phosphorus 3 6 2 3 - 4 1 mg/dL       Results from last 7 days   Lab Units 09/06/22  1108   WBC Thousand/uL 3 89*   HEMOGLOBIN g/dL 9 4*   HEMATOCRIT % 29 7*   PLATELETS Thousands/uL 155   SODIUM mmol/L 140   POTASSIUM mmol/L 3 7   CHLORIDE mmol/L 104   CO2 mmol/L 30   BUN mg/dL 17   CREATININE mg/dL 1 25   CALCIUM mg/dL 8 9   MAGNESIUM mg/dL 1 6   PHOSPHORUS mg/dL 3 6           Portions of the record may have been created with voice recognition software  Occasional wrong word or "sound a like" substitutions may have occurred due to the inherent limitations of voice recognition software  Read the chart carefully and recognize, using context, where substitutions have occurred  If you have any questions, please contact the dictating provider

## 2022-09-12 ENCOUNTER — HOSPITAL ENCOUNTER (OUTPATIENT)
Dept: INFUSION CENTER | Facility: CLINIC | Age: 72
Discharge: HOME | End: 2022-09-12
Payer: COMMERCIAL

## 2022-09-12 VITALS — DIASTOLIC BLOOD PRESSURE: 62 MMHG | SYSTOLIC BLOOD PRESSURE: 94 MMHG

## 2022-09-12 DIAGNOSIS — Z45.2 ENCOUNTER FOR CENTRAL LINE CARE: ICD-10-CM

## 2022-09-12 DIAGNOSIS — C54.1 ENDOMETRIAL CANCER (HCC): Primary | ICD-10-CM

## 2022-09-12 LAB
ALBUMIN SERPL BCP-MCNC: 3.1 G/DL (ref 3.5–5)
ALP SERPL-CCNC: 53 U/L (ref 46–116)
ALT SERPL W P-5'-P-CCNC: 18 U/L (ref 12–78)
ANION GAP SERPL CALCULATED.3IONS-SCNC: 12 MMOL/L (ref 4–13)
AST SERPL W P-5'-P-CCNC: 15 U/L (ref 5–45)
BASOPHILS # BLD AUTO: 0.02 THOUSANDS/ΜL (ref 0–0.1)
BASOPHILS NFR BLD AUTO: 1 % (ref 0–1)
BILIRUB SERPL-MCNC: 0.43 MG/DL (ref 0.2–1)
BUN SERPL-MCNC: 15 MG/DL (ref 5–25)
CALCIUM ALBUM COR SERPL-MCNC: 9 MG/DL (ref 8.3–10.1)
CALCIUM SERPL-MCNC: 8.3 MG/DL (ref 8.3–10.1)
CHLORIDE SERPL-SCNC: 107 MMOL/L (ref 96–108)
CO2 SERPL-SCNC: 24 MMOL/L (ref 21–32)
CREAT SERPL-MCNC: 1.41 MG/DL (ref 0.6–1.3)
EOSINOPHIL # BLD AUTO: 0.01 THOUSAND/ΜL (ref 0–0.61)
EOSINOPHIL NFR BLD AUTO: 0 % (ref 0–6)
ERYTHROCYTE [DISTWIDTH] IN BLOOD BY AUTOMATED COUNT: 16.9 % (ref 11.6–15.1)
GFR SERPL CREATININE-BSD FRML MDRD: 36 ML/MIN/1.73SQ M
GLUCOSE SERPL-MCNC: 145 MG/DL (ref 65–140)
HCT VFR BLD AUTO: 32.3 % (ref 34.8–46.1)
HGB BLD-MCNC: 10 G/DL (ref 11.5–15.4)
IMM GRANULOCYTES # BLD AUTO: 0.01 THOUSAND/UL (ref 0–0.2)
IMM GRANULOCYTES NFR BLD AUTO: 0 % (ref 0–2)
LYMPHOCYTES # BLD AUTO: 1.43 THOUSANDS/ΜL (ref 0.6–4.47)
LYMPHOCYTES NFR BLD AUTO: 48 % (ref 14–44)
MAGNESIUM SERPL-MCNC: 2.2 MG/DL (ref 1.6–2.6)
MCH RBC QN AUTO: 33.6 PG (ref 26.8–34.3)
MCHC RBC AUTO-ENTMCNC: 31 G/DL (ref 31.4–37.4)
MCV RBC AUTO: 108 FL (ref 82–98)
MONOCYTES # BLD AUTO: 0.37 THOUSAND/ΜL (ref 0.17–1.22)
MONOCYTES NFR BLD AUTO: 12 % (ref 4–12)
NEUTROPHILS # BLD AUTO: 1.19 THOUSANDS/ΜL (ref 1.85–7.62)
NEUTS SEG NFR BLD AUTO: 39 % (ref 43–75)
NRBC BLD AUTO-RTO: 0 /100 WBCS
PLATELET # BLD AUTO: 164 THOUSANDS/UL (ref 149–390)
PMV BLD AUTO: 10.7 FL (ref 8.9–12.7)
POTASSIUM SERPL-SCNC: 3.3 MMOL/L (ref 3.5–5.3)
PROT SERPL-MCNC: 7.2 G/DL (ref 6.4–8.4)
RBC # BLD AUTO: 2.98 MILLION/UL (ref 3.81–5.12)
SODIUM SERPL-SCNC: 143 MMOL/L (ref 135–147)
WBC # BLD AUTO: 3.03 THOUSAND/UL (ref 4.31–10.16)

## 2022-09-12 PROCEDURE — 83735 ASSAY OF MAGNESIUM: CPT

## 2022-09-12 PROCEDURE — 80053 COMPREHEN METABOLIC PANEL: CPT

## 2022-09-12 PROCEDURE — 85025 COMPLETE CBC W/AUTO DIFF WBC: CPT

## 2022-09-12 NOTE — PROGRESS NOTES
Pt presents for central labs  Port accessed, positive blood return noted, labs collected per protocol  Pt requested BP to be checked as she has been having some issues lately, 94/62 today  Denies any symptoms at this time but states sometimes she has dizziness  Encouraged pt to follow up with PCP  Pt provided with AVS, aware of future appts

## 2022-09-13 ENCOUNTER — CLINICAL SUPPORT (OUTPATIENT)
Dept: RADIATION ONCOLOGY | Facility: CLINIC | Age: 72
End: 2022-09-13
Attending: RADIOLOGY
Payer: COMMERCIAL

## 2022-09-13 VITALS
OXYGEN SATURATION: 99 % | WEIGHT: 258 LBS | TEMPERATURE: 96.9 F | RESPIRATION RATE: 18 BRPM | SYSTOLIC BLOOD PRESSURE: 114 MMHG | HEIGHT: 63 IN | DIASTOLIC BLOOD PRESSURE: 76 MMHG | BODY MASS INDEX: 45.71 KG/M2 | HEART RATE: 76 BPM

## 2022-09-13 DIAGNOSIS — C54.1 ENDOMETRIAL CANCER (HCC): ICD-10-CM

## 2022-09-13 DIAGNOSIS — C54.1 ENDOMETRIAL CANCER (HCC): Primary | ICD-10-CM

## 2022-09-13 PROCEDURE — 99211 OFF/OP EST MAY X REQ PHY/QHP: CPT | Performed by: RADIOLOGY

## 2022-09-13 PROCEDURE — G0463 HOSPITAL OUTPT CLINIC VISIT: HCPCS | Performed by: RADIOLOGY

## 2022-09-13 PROCEDURE — 99205 OFFICE O/P NEW HI 60 MIN: CPT | Performed by: RADIOLOGY

## 2022-09-13 NOTE — PROGRESS NOTES
Sg Mon 1950 is a 74 y o  female with stage IIIC1 endometrial cancer  She presents today for radiation oncology consult  Patient presents to Gyn with complaint of postmenopausal bleeding for about 3 weeks in March 2, 2022  3/2/22  Final Diagnosis   A  Endometrium, EMB:  - High-grade endometrial carcinoma, favor clear cell carcinoma  See comment  3/7/22 US pelvis-  -3 7 cm abnormally thickened endometrium with associated vascularity; the differential diagnosis includes but is not limited to endometrial hyperplasia versus neoplasm  Direct tissue sampling is advised  -3 4 x 2 8 x 4 4 cm mid uterine body mass, presumably a fibroid   -Ovaries not visualized, likely obscured by overlying bowel gas  3/17/22 CT C/A/P-  -4 mm left lower lobe calcified granuloma   -Nonpathologically enlarged subcarinal lymph node measuring 1 4 cm   -Nonobstructing 2 mm left lower pole cortical calculus  -Diverticulosis  -Prominent uterus either due to a fibroid or the patient's known endometrial adenocarcinoma      3/31/22  Robotic hysterectomy, bilateral salpingo oophorectomy, bilateral pelvic sentinel lymph node biopsies, pelvic peritoneal/omental biopsies      4/14/22 [facility] -ED - Wound Dehiscence      4/18/22 Multidisciplinary Gynecologic Oncology Tumor Case Review  Diagnosis: 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  The group recommended to consider treatment with chemotherapy, EBRT and vaginal brachytherapy  4/19/22 Gyn/On - Dr Stevie Bustamante  Discussed adjuvant therapy with systemic chemotherpy followed by external beam radiotherapy/vaginal brachytherapy  Recommend Carboplatin AUC 5 and Paclitaxel day 1 of 21 day cycle for 6 cycles followed by restaging CT  Superficial wound dehiscence midline robotic incision site, has been debrided  Cleansed and repacked, plan to reassess prior to initiation of chemotherapy      4/29/22 [facility] - Dr Nixon Nevarez with chemotherapy as previously planned  Superficial separation is now completely leveled with skin healthy appearing granulation tissue  Cover with dry gauze  5/11/22 Chemotherapy started    5/17/22-5/19/22 Admission   Syncope /SOB  Elevated D- dimer and CTA chest positive for bilateral PE  Heparin drip with transition to Eliquis    5/31/22 Gyn/Onc - Dr Angela Zamora  Dyspnea improving  Cleared to receive Cycle 2    7/12/22 Gyn/Onc - Mariah Amaya, RUBEN  Chemotherapy induced neutropenia with cycle 3, counts recovered on their own  Proceed with cycle 4 without dose modification but with additional Cathryne Sicard    8/23/22 Mariah Amaya NP gyn onc-  Ungergoing adjuvant chemotherapy - Taxol/Carboplatin AUC 4 along with Jinny Beckett for bone marrow support  Intermittent dizziness - offered additional fluids but declined  RTO with pre-visit CT scan, no IV contrast due to stage 3 CKD  Refer to radiation oncology for consideration of adjuvant radiotherapy  8/24/22 completed chemo      9/16/22 CT C/A/P  9/20/22 Dr Enma Canales      Oncology History   Endometrial cancer [facility])   3/2/2022 Initial Diagnosis    Endometrial cancer ([facility] )     3/2/2022 Biopsy    Final Diagnosis   A  Endometrium, EMB:  - High-grade endometrial carcinoma, favor clear cell carcinoma  See comment  3/31/2022 -  Cancer Staged    Staging form: Corpus Uteri - Carcinoma, AJCC 8th Edition  - Pathologic stage from 3/31/2022:  FIGO Stage IIIC1 - Signed by Neville Velez MD on 4/19/2022  Histopathologic type: Clear cell adenocarcinoma, NOS  Stage prefix: Initial diagnosis  Histologic grade (G): G3  Histologic grading system: 3 grade system  Residual tumor (R): R0 - None  Pelvic fátima status: Positive  Number of pelvic nodes positive from dissection: 1  Number of pelvic nodes examined during dissection: 2  Lymph node metastasis: Present  Omentectomy performed: Yes  Morcellation performed: No       3/31/2022 Surgery    Robotic hysterectomy, bilateral salpingo oophorectomy, bilateral pelvic sentinel lymph node biopsies, pelvic peritoneal/omental biopsies  Final pathology demonstrated clear cell carcinoma, 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%)  Negative cervix  Negative parametria  1/2 sentinel pelvic lymph nodes positive for malignancy  Negative staging biopsies  Stage IIIC1  No evidence of DNA mismatch repair protein loss  5/11/2022 -  Chemotherapy    palonosetron (ALOXI), 0 25 mg, Intravenous, Once, 6 of 6 cycles  Administration: 0 25 mg (5/11/2022), 0 25 mg (6/1/2022), 0 25 mg (6/22/2022), 0 25 mg (7/13/2022), 0 25 mg (8/3/2022), 0 25 mg (8/24/2022)  Pegfilgrastim-bmez (ZIEXTENZO), 6 mg, Subcutaneous, Once, 3 of 3 cycles  Administration: 6 mg (7/14/2022), 6 mg (8/4/2022), 6 mg (8/25/2022)  fosaprepitant (EMEND) IVPB, 150 mg, Intravenous, Once, 6 of 6 cycles  Administration: 150 mg (5/11/2022), 150 mg (6/1/2022), 150 mg (6/22/2022), 150 mg (7/13/2022), 150 mg (8/3/2022), 150 mg (8/24/2022)  CARBOplatin (PARAPLATIN) IVPB (GOG AUC DOSING), 352 5 mg, Intravenous, Once, 6 of 6 cycles  Administration: 352 5 mg (5/11/2022), 377 5 mg (6/1/2022), 340 5 mg (6/22/2022), 353 mg (7/13/2022), 266 4 mg (8/3/2022), 271 6 mg (8/24/2022)  PACLItaxel (TAXOL) chemo IVPB, 135 mg/m2 = 290 4 mg (77 1 % of original dose 175 mg/m2), Intravenous, Once, 6 of 6 cycles  Dose modification: 135 mg/m2 (original dose 175 mg/m2, Cycle 1, Reason: Other (Must fill in a comment), Comment: prescribed starting dose)  Administration: 290 4 mg (5/11/2022), 290 4 mg (6/1/2022), 289 2 mg (6/22/2022), 292 8 mg (7/13/2022), 289 2 mg (8/3/2022), 300 mg (8/24/2022)         Review of Systems:  Review of Systems   Constitutional: Positive for appetite change (decreased) and unexpected weight change  Referred to dietician   HENT: Negative  Dry throat   Eyes: Negative  Respiratory: Positive for shortness of breath (since chemo, improving)  Cardiovascular: Positive for leg swelling (bilateral ankles)  Gastrointestinal: Negative  Endocrine: Negative  Genitourinary: Negative  Negative for vaginal bleeding, vaginal discharge and vaginal pain  Musculoskeletal: Negative  Skin: Negative  Allergic/Immunologic: Negative  Neurological: Positive for dizziness (since chemo)  Hematological: Bruises/bleeds easily  Psychiatric/Behavioral: Negative  Clinical Trial: no    OB/GYN History:  The patient underwent menarche at 13 years  Menopause Status post  No LMP recorded (lmp unknown)  Patient has had a hysterectomy  Menopause at 43 years  Menopause Reason natural  Hormone replacement therapy: no      1  Para 1  Age at first delivery being 16 years  Nursing: no    Birth control pills: yes    Years used about 10    Pregnancy test needed:  no    Prior Radiation no    Teaching NCI RT packet given    Implantable Devices (Port, Pacemaker, pain stimulator) port    Hip Replacement no    Covid Vaccine Status up to date      Health Maintenance   Topic Date Due    Hepatitis C Screening  Never done   [facility] Annual Wellness Visit (AWV)  Never done    Fall Risk  Never done    Urinary Incontinence Screening  Never done    COVID-19 Vaccine (4 - Booster for Pfizer series) 2022    Breast Cancer Screening: Mammogram  2022    Influenza Vaccine (1) 2022    BMI: Followup Plan  2023    BMI: Adult  2023    Depression Screening  2023    Colorectal Cancer Screening  2025    Osteoporosis Screening  Completed    Pneumococcal Vaccine: 65+ Years  Completed    HIB Vaccine  Aged Out    Hepatitis B Vaccine  Aged Out    IPV Vaccine  Aged Out    Hepatitis A Vaccine  Aged Out    Meningococcal ACWY Vaccine  Aged Out    HPV Vaccine  Aged Out     Past Medical History:   Diagnosis Date    Arthritis     osteo    Chronic kidney disease     Colon polyp     Diverticula of colon     Hypertension     Obesity     Rhinitis      Past Surgical History:   Procedure Laterality Date    APPENDECTOMY      BREAST BIOPSY Left 1977    benign    BREAST SURGERY Left     biopsy    CHOLECYSTECTOMY      COLONOSCOPY      CYSTOSCOPY N/A 03/31/2022    Procedure: CYSTOSCOPY;  Surgeon: Miladys Ayers MD;  [facility];  Service: Gynecology Oncology    HERNIA REPAIR Right     inguinal    HYSTERECTOMY      IR PORT PLACEMENT  05/09/2022    JOINT REPLACEMENT Bilateral     Knee    [place] COLONOSCOPY FLX DX W/COLLJ SPEC WHEN PFRMD N/A 02/21/2017    Procedure: COLONOSCOPY with polypectomy and hemo clip marjan ;  Surgeon: Katalina Bynum MD;  [facility]; Service: Gastroenterology    [place] LAPAROSCOPY W TOT HYSTERECTUTERUS <=250 [place] TUBE/OVARY N/A 03/31/2022    Procedure: ROBOTIC HYSTERECTOMY, BILATERAL SALPINGO-OOPHORECTOMY, STAGING PERITONEAL AND OMENTAL BIOPSIES, BILATERAL PELVIC SENTINEL LYMPH NODE BIOPSIES;  Surgeon: Miladys Ayers MD;  [facility];  Service: Gynecology Oncology     Family History   Problem Relation Age of Onset    Heart disease Mother     Prostate cancer Father 80    Diabetes Sister     No Known Problems Maternal Grandmother     No Known Problems Maternal Grandfather     No Known Problems Paternal Grandmother     No Known Problems Paternal Grandfather     Hypertension Sister     Transient ischemic attack Sister     No Known Problems Sister     No Known Problems Maternal Aunt     No Known Problems Paternal Aunt     No Known Problems Paternal Aunt     No Known Problems Paternal Aunt     Diabetes Brother     Heart disease Brother     Stomach cancer Other     Thyroid disease Other      Social History     Tobacco Use    Smoking status: Never Smoker    Smokeless tobacco: Never Used   Vaping Use    Vaping Use: Never used   Substance Use Topics    Alcohol use:  Yes     Alcohol/week: 1 0 standard drink     Types: 1 Glasses of wine per week     Comment: social-seldom    Drug use: Never        Current Outpatient Medications:   acetaminophen (TYLENOL) 650 mg CR tablet, Take 1 tablet (650 mg total) by mouth every 6 (six) hours as needed for mild pain, Disp: 30 tablet, Rfl: 0    amLODIPine (NORVASC) 10 mg tablet, Take 5 mg by mouth daily, Disp: , Rfl:     apixaban (Eliquis) 5 mg, TAKE 1 TABLET BY MOUTH TWICE A DAY, Disp: 60 tablet, Rfl: 2    denosumab (Prolia) 60 mg/mL, Inject 1 mL under the skin every 6 (six) months, Disp: , Rfl:     hydrochlorothiazide (HYDRODIURIL) 12 5 mg tablet, Take 12 5 mg by mouth daily, Disp: , Rfl:     lidocaine-prilocaine (EMLA) cream, Apply to PORT site 30 min prior to labs or chemotherapy, Disp: 30 g, Rfl: 1    fluticasone (FLONASE) 50 mcg/act nasal spray, 2 sprays into each nostril daily as needed   (Patient not taking: Reported on 9/8/2022), Disp: , Rfl:     LORazepam (ATIVAN) 1 mg tablet, Take 1 tablet (1 mg total) by mouth every 8 (eight) hours as needed for anxiety (nausea or anxiety) (Patient not taking: No sig reported), Disp: 30 tablet, Rfl: 0    omeprazole (PriLOSEC) 40 MG capsule, Take 20 mg by mouth daily (Patient not taking: Reported on 9/13/2022), Disp: , Rfl:     ondansetron (ZOFRAN) 8 mg tablet, Take 1 tablet (8 mg total) by mouth every 8 (eight) hours as needed for nausea or vomiting (Patient not taking: No sig reported), Disp: 20 tablet, Rfl: 1  No Known Allergies   Vitals:    09/13/22 1244   BP: 114/76   Pulse: 76   Resp: 18   Temp: (!) 96 9 °F (36 1 °C)   SpO2: 99%   Weight: 117 kg (258 lb)   Height: 5' 3" (1 6 m)     Pain Score: 0-No pain

## 2022-09-13 NOTE — PROGRESS NOTES
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%), extending to the lower uterine segment myoinvasion, equivocal for lymphovascular invasion, no involvement of the cervical stroma or parametrium  One of 2 pelvic nodes involved with a macro metastatic deposit, intact mismatch repair protein expression, pathologic stage pT1a pN1a cM0, Stage IIIC    May 11 through August 25, 2022:  Received 6 cycles of carboplatin and Taxol chemotherapy    History of present illness:  Ms Goldie Dumas is a 77-year-old postmenopausal woman who presented in February 2022 with symptoms of postmenopausal bleeding  She would an endometrial biopsy on March 2, 2022 that showed high-grade endometrial carcinoma favoring clear cell histology  She would a pelvic ultrasound on March 7, 2022 which showed an abnormally thickened endometrial stripe measuring 3 7 cm and a 3 4 cm mid uterine body mass presumably a fibroid  On March 17, 2022 she would a CT of the chest, abdomen and pelvis without contrast due to chronic kidney disease which showed no significant findings, no evidence of metastatic disease and a prominent uterus  She underwent a robotic assisted hysterectomy and bilateral salpingo-oophorectomy with bilateral pelvic sentinel node biopsies by Dr Suzanne romeo on March 31, 2022    Pathology showed a 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 (41%), Bailey invasive in the lower uterine segment, equivocal for lymphovascular invasion, involving 1 of 2 pelvic sentinel nodes with a macro metastasis, no invasion of the cervix or bilateral parametrial tissue, pathologic stage pT1a pN1a cM0, stage IIIC1  She had a superficial wound dehiscence at the midline robotic incision site that required debridement and packing prior to initiating chemotherapy  She started adjuvant chemotherapy with carboplatin paclitaxel on May 11th  She was admitted to the hospital on May 17 with syncope and shortness of breath and was diagnosed with a bilateral pulmonary embolism, placed on anticoagulation  She then resumed chemotherapy and completed 6 cycles on August 25, 2022  She states that she tolerated chemotherapy relatively well with mild fatigue that she continues to have some dyspnea on exertion, mild intermittent constipation hair loss  She is scheduled for restaging CT scans and visit with Dr Escobar Morales next week  She is referred for information regarding the role of adjuvant radiation in the setting of high-risk endometrial carcinoma histology with positive nodes  Oncology History   Endometrial cancer ([facility] [address] )   3/2/2022 Initial Diagnosis    Endometrial cancer ([facility] [address] )     3/2/2022 Biopsy    Final Diagnosis   A  Endometrium, EMB:  - High-grade endometrial carcinoma, favor clear cell carcinoma  See comment  3/31/2022 -  Cancer Staged    Staging form: Corpus Uteri - Carcinoma, AJCC 8th Edition  - Pathologic stage from 3/31/2022:  FIGO Stage IIIC1 - Signed by Elbert East MD on 4/19/2022  Histopathologic type: Clear cell adenocarcinoma, NOS  Stage prefix: Initial diagnosis  Histologic grade (G): G3  Histologic grading system: 3 grade system  Residual tumor (R): R0 - None  Pelvic fátima status: Positive  Number of pelvic nodes positive from dissection: 1  Number of pelvic nodes examined during dissection: 2  Lymph node metastasis: Present  Omentectomy performed: Yes  Morcellation performed: No       3/31/2022 Surgery    Robotic hysterectomy, bilateral salpingo oophorectomy, bilateral pelvic sentinel lymph node biopsies, pelvic peritoneal/omental biopsies  Final pathology demonstrated clear cell carcinoma, 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%)  Negative cervix  Negative parametria  1/2 sentinel pelvic lymph nodes positive for malignancy  Negative staging biopsies  Stage IIIC1  No evidence of DNA mismatch repair protein loss       5/11/2022 -  Chemotherapy    palonosetron (ALOXI), 0 25 mg, Intravenous, Once, 6 of 6 cycles  Administration: 0 25 mg (5/11/2022), 0 25 mg (6/1/2022), 0 25 mg (6/22/2022), 0 25 mg (7/13/2022), 0 25 mg (8/3/2022), 0 25 mg (8/24/2022)  Pegfilgrastim-bmez (ZIEXTENZO), 6 mg, Subcutaneous, Once, 3 of 3 cycles  Administration: 6 mg (7/14/2022), 6 mg (8/4/2022), 6 mg (8/25/2022)  fosaprepitant (EMEND) IVPB, 150 mg, Intravenous, Once, 6 of 6 cycles  Administration: 150 mg (5/11/2022), 150 mg (6/1/2022), 150 mg (6/22/2022), 150 mg (7/13/2022), 150 mg (8/3/2022), 150 mg (8/24/2022)  CARBOplatin (PARAPLATIN) IVPB (GOG AUC DOSING), 352 5 mg, Intravenous, Once, 6 of 6 cycles  Administration: 352 5 mg (5/11/2022), 377 5 mg (6/1/2022), 340 5 mg (6/22/2022), 353 mg (7/13/2022), 266 4 mg (8/3/2022), 271 6 mg (8/24/2022)  PACLItaxel (TAXOL) chemo IVPB, 135 mg/m2 = 290 4 mg (77 1 % of original dose 175 mg/m2), Intravenous, Once, 6 of 6 cycles  Dose modification: 135 mg/m2 (original dose 175 mg/m2, Cycle 1, Reason: Other (Must fill in a comment), Comment: prescribed starting dose)  Administration: 290 4 mg (5/11/2022), 290 4 mg (6/1/2022), 289 2 mg (6/22/2022), 292 8 mg (7/13/2022), 289 2 mg (8/3/2022), 300 mg (8/24/2022)         Patient Active Problem List   Diagnosis    Essential hypertension    Stage 3a chronic kidney disease ([email or web address]    Acute pulmonary embolism with acute cor pulmonale ([email or web address]    Endometrial cancer ([email or web address] )    Morbid obesity with BMI of 45 0-49 9, adult ([place] [address] )    Encounter for central line care    Insomnia    Chemotherapy induced neutropenia ([place] [address] ) Cancer Staging  Endometrial cancer [facility])  Staging form: Corpus Uteri - Carcinoma, AJCC 8th Edition  - Pathologic stage from 3/31/2022: FIGO Stage IIIC1 - Signed by Ernie Reyes MD on 4/19/2022  Histopathologic type: Clear cell adenocarcinoma, NOS  Stage prefix: Initial diagnosis  Histologic grade (G): G3  Histologic grading system: 3 grade system  Residual tumor (R): R0 - None  Pelvic fátima status: Positive  Number of pelvic nodes positive from dissection: 1  Number of pelvic nodes examined during dissection: 2  Lymph node metastasis: Present  Omentectomy performed: Yes  Morcellation performed: No    Past Medical History:   Diagnosis Date    Arthritis     osteo    Chronic kidney disease     Colon polyp     Diverticula of colon     Hypertension     Obesity     Rhinitis      Social History     Socioeconomic History    Marital status: Single     Spouse name: Not on file    Number of children: Not on file    Years of education: Not on file    Highest education level: Not on file   Occupational History    Not on file   Tobacco Use    Smoking status: Never Smoker    Smokeless tobacco: Never Used   Vaping Use    Vaping Use: Never used   Substance and Sexual Activity    Alcohol use: Yes     Alcohol/week: 1 0 standard drink     Types: 1 Glasses of wine per week     Comment: social-seldom    Drug use: Never    Sexual activity: Not Currently   Other Topics Concern    Not on file   Social History Narrative    Not on file     Social Determinants of Health     Financial Resource Strain: Not on file   Food Insecurity: No Food Insecurity    Worried About Running Out of Food in the Last Year: Never true    David of Food in the Last Year: Never true   Transportation Needs: No Transportation Needs    Lack of Transportation (Medical): No    Lack of Transportation (Non-Medical):  No   Physical Activity: Not on file   Stress: Not on file   Social Connections: Not on file   Intimate Partner Violence: Not on file   Housing Stability: Low Risk     Unable to Pay for Housing in the Last Year: No    Number of Places Lived in the Last Year: 1    Unstable Housing in the Last Year: No      Family History   Problem Relation Age of Onset    Heart disease Mother     Prostate cancer Father 80    Diabetes Sister     No Known Problems Maternal Grandmother     No Known Problems Maternal Grandfather     No Known Problems Paternal Grandmother     No Known Problems Paternal Grandfather     Hypertension Sister     Transient ischemic attack Sister     No Known Problems Sister     No Known Problems Maternal Aunt     No Known Problems Paternal Aunt     No Known Problems Paternal Aunt     No Known Problems Paternal Aunt     Diabetes Brother     Heart disease Brother     Stomach cancer Other     Thyroid disease Other      Past Surgical History:   Procedure Laterality Date    APPENDECTOMY      BREAST BIOPSY Left 1977    benign    BREAST SURGERY Left     biopsy    CHOLECYSTECTOMY      COLONOSCOPY      CYSTOSCOPY N/A 03/31/2022    Procedure: CYSTOSCOPY;  Surgeon: Neville Velez MD;  Location: [facility];  Service: Gynecology Oncology    HERNIA REPAIR Right     inguinal    HYSTERECTOMY      IR PORT PLACEMENT  05/09/2022    JOINT REPLACEMENT Bilateral     Knee    ND COLONOSCOPY FLX DX W/COLLJ SPEC WHEN PFRMD N/A 02/21/2017    Procedure: COLONOSCOPY with polypectomy and hemo clip marjan ;  Surgeon: Sadnra Avina MD;  Location: [facility];   Service: Gastroenterology    ND LAPAROSCOPY W TOT HYSTERECTUTERUS <=250 [address] Piggs TUBE/OVARY N/A 03/31/2022    Procedure: ROBOTIC HYSTERECTOMY, BILATERAL SALPINGO-OOPHORECTOMY, STAGING PERITONEAL AND OMENTAL BIOPSIES, BILATERAL PELVIC SENTINEL LYMPH NODE BIOPSIES;  Surgeon: Neville Velez MD;  Location: [facility];  Service: Gynecology Oncology       Current Outpatient Medications:     acetaminophen (TYLENOL) 650 mg CR tablet, Take 1 tablet (650 mg total) by mouth every 6 (six) hours as needed for mild pain, Disp: 30 tablet, Rfl: 0    amLODIPine (NORVASC) 10 mg tablet, Take 5 mg by mouth daily, Disp: , Rfl:     apixaban (Eliquis) 5 mg, TAKE 1 TABLET BY MOUTH TWICE A DAY, Disp: 60 tablet, Rfl: 2    denosumab (Prolia) 60 mg/mL, Inject 1 mL under the skin every 6 (six) months, Disp: , Rfl:     hydrochlorothiazide (HYDRODIURIL) 12 5 mg tablet, Take 12 5 mg by mouth daily, Disp: , Rfl:     lidocaine-prilocaine (EMLA) cream, Apply to PORT site 30 min prior to labs or chemotherapy, Disp: 30 g, Rfl: 1    fluticasone (FLONASE) 50 mcg/act nasal spray, 2 sprays into each nostril daily as needed   (Patient not taking: Reported on 9/8/2022), Disp: , Rfl:     LORazepam (ATIVAN) 1 mg tablet, Take 1 tablet (1 mg total) by mouth every 8 (eight) hours as needed for anxiety (nausea or anxiety) (Patient not taking: No sig reported), Disp: 30 tablet, Rfl: 0    omeprazole (PriLOSEC) 40 MG capsule, Take 20 mg by mouth daily (Patient not taking: Reported on 9/13/2022), Disp: , Rfl:     ondansetron (ZOFRAN) 8 mg tablet, Take 1 tablet (8 mg total) by mouth every 8 (eight) hours as needed for nausea or vomiting (Patient not taking: No sig reported), Disp: 20 tablet, Rfl: 1  No Known Allergies    Review of Systems:  Constitutional: Positive for appetite change (decreased) and unexpected weight change  Referred to dietician   HENT: Negative  Dry throat   Eyes: Negative  Respiratory: Positive for shortness of breath (since chemo, improving)  Cardiovascular: Positive for leg swelling (bilateral ankles)  Gastrointestinal: Negative  Endocrine: Negative  Genitourinary: Negative  Negative for vaginal bleeding, vaginal discharge and vaginal pain  Musculoskeletal: Negative  Skin: Negative  Allergic/Immunologic: Negative  Neurological: Positive for dizziness (since chemo)  Hematological: Bruises/bleeds easily  Psychiatric/Behavioral: Negative        Physical Exam:  Physical Exam  Vitals and nursing note reviewed  Constitutional:       General: She is not in acute distress  Appearance: Normal appearance  She is not ill-appearing  HENT:      Head: Normocephalic and atraumatic  Nose: No congestion  Mouth/Throat:      Mouth: Mucous membranes are moist    Eyes:      General: No scleral icterus  Extraocular Movements: Extraocular movements intact  Conjunctiva/sclera: Conjunctivae normal       Pupils: Pupils are equal, round, and reactive to light  Pulmonary:      Effort: Pulmonary effort is normal  No respiratory distress  Abdominal:      General: Abdomen is flat  There is no distension  Palpations: Abdomen is soft  Tenderness: There is no abdominal tenderness  Genitourinary:     Comments: Pelvic exam deferred today  Musculoskeletal:      Cervical back: Normal range of motion  Lymphadenopathy:      Cervical: No cervical adenopathy  Skin:     General: Skin is warm and dry  Findings: No rash  Neurological:      General: No focal deficit present  Mental Status: She is alert and oriented to person, place, and time  Cranial Nerves: No cranial nerve deficit  Sensory: No sensory deficit  Motor: No weakness  Psychiatric:         Mood and Affect: Mood normal          Thought Content:  Thought content normal          Judgment: Judgment normal      LABS:    CBC  Diff   Lab Results   Component Value Date/Time    WBC 3 03 (L) 09/12/2022 11:00 AM    HGB 10 0 (L) 09/12/2022 11:00 AM    HCT 32 3 (L) 09/12/2022 11:00 AM    RBC 2 98 (L) 09/12/2022 11:00 AM     (H) 09/12/2022 11:00 AM    MCHC 31 0 (L) 09/12/2022 11:00 AM    MCH 33 6 09/12/2022 11:00 AM    RDW 16 9 (H) 09/12/2022 11:00 AM    MPV 10 7 09/12/2022 11:00 AM    Lab Results   Component Value Date/Time    LYMPHSABS 1 43 09/12/2022 11:00 AM    EOSABS 0 01 09/12/2022 11:00 AM    MONOSABS 0 37 09/12/2022 11:00 AM    BASOSABS 0 02 09/12/2022 11:00 AM Basic Metabolic Profile    Lab Results   Component Value Date/Time    SODIUM 143 09/12/2022 11:00 AM    CO2 24 09/12/2022 11:00 AM    Lab Results   Component Value Date/Time    BUN 15 09/12/2022 11:00 AM    CREATININE 1 41 (H) 09/12/2022 11:00 AM          Discussion/Summary:    Ms Ariane Bruno is a 77-year-old postmenopausal woman who presented with postmenopausal bleeding in early 2002  Biopsy showed high-grade endometrial carcinoma  She underwent a robotic hysterectomy, BSO and pelvic sentinel node biopsies by Dr Escobar Morales on March 31, 2022  Pathology showed a clear cell carcinoma invading 41% of the myometrium with equivocal lymphovascular invasion and a positive pelvic node, pathologic stage IIIC  She had a wound dehiscence postoperatively that was treated  She started adjuvant chemotherapy with carboplatin and Taxol in May and experienced bilateral pulmonary embolism requiring hospitalization and anticoagulation after her 1st cycle  She recovered from this and continued to complete 5 additional cycles of chemotherapy, finishing all planned cycles on August 25, 2022  She is referred by Dr Escobar Morales for information regarding the role of adjuvant radiation therapy for her stage III high-risk endometrial carcinoma  I explained that radiation is used to reduce the risk of pelvic and vaginal recurrence in the setting of high-risk features  These include high-risk clear cell carcinoma histology, positive pelvic nodes and possible lymphovascular invasion  Therefore standard of care recommendation is for pelvic radiation with an optional vaginal brachytherapy boost   I described the procedure involved in radiation to the pelvis to include target volumes of the pelvic lymph nodes and upper vagina  I described the potential acute and long-term side effects which may include loose bowels or diarrhea, dysuria or urinary frequency, fatigue, possible cytopenias    I recommend a vaginal brachytherapy boost to the upper 3rd of the vaginal wall in addition with the additional possible side effects of vaginal wall irritation, adhesions or stenosis  The patient has never pertinent questions which were answered to her satisfaction  She is in agreement with proceeding with pelvic radiation and vaginal brachytherapy boost   Informed consent was reviewed by me and signed by the patient today  CT simulation was scheduled to follow her restaging CT scan later this week

## 2022-09-14 ENCOUNTER — TELEPHONE (OUTPATIENT)
Dept: NUTRITION | Facility: CLINIC | Age: 72
End: 2022-09-14

## 2022-09-14 NOTE — TELEPHONE ENCOUNTER
Received notification by Delaney Noonan , on 9/13/22 that pt has triggered for oncology nutrition care (reason for referral: Malnutrition Screening Tool (MST) Triggers: scored a 3 indicating 14-23# (6 4-10 5 kg) recent wt loss and is eating poorly due to a decreased appetite  (Date of MST: 9/13/22))  Contacted Sg today to establish care  No answer  Left voice message with the reason for today's call and this RDs contact information asking that Rever call back as able/desired

## 2022-09-15 PROCEDURE — 77263 THER RADIOLOGY TX PLNG CPLX: CPT | Performed by: RADIOLOGY

## 2022-09-16 ENCOUNTER — HOSPITAL ENCOUNTER (OUTPATIENT)
Dept: CT IMAGING | Facility: HOSPITAL | Age: 72
Discharge: HOME | End: 2022-09-16
Payer: COMMERCIAL

## 2022-09-16 DIAGNOSIS — C54.1 ENDOMETRIAL CANCER (HCC): ICD-10-CM

## 2022-09-16 PROCEDURE — 71250 CT THORAX DX C-: CPT

## 2022-09-16 PROCEDURE — 74176 CT ABD & PELVIS W/O CONTRAST: CPT

## 2022-09-19 NOTE — TELEPHONE ENCOUNTER
Second attempt made today to reach Sg to establish care and discuss her nutrition  Spoke with Sg and introduced self  Explained reason for today's call  Discussed oncology nutrition services available (options for in-person and phone consultation) and the benefits of meeting for a consultation  She states she is not interested in scheduling an appointment with [facility] at this time, but would appreciate a call next month to touch base and possibly schedule an appointment there   Will reach out to Sg in October per her request

## 2022-09-20 ENCOUNTER — OFFICE VISIT (OUTPATIENT)
Dept: GYNECOLOGIC ONCOLOGY | Facility: CLINIC | Age: 72
End: 2022-09-20
Payer: COMMERCIAL

## 2022-09-20 VITALS
BODY MASS INDEX: 45.36 KG/M2 | SYSTOLIC BLOOD PRESSURE: 110 MMHG | HEIGHT: 63 IN | TEMPERATURE: 98.5 F | WEIGHT: 256 LBS | DIASTOLIC BLOOD PRESSURE: 64 MMHG

## 2022-09-20 DIAGNOSIS — C54.1 ENDOMETRIAL CANCER (HCC): Primary | ICD-10-CM

## 2022-09-20 DIAGNOSIS — I26.99 OTHER ACUTE PULMONARY EMBOLISM WITHOUT ACUTE COR PULMONALE (HCC): ICD-10-CM

## 2022-09-20 PROCEDURE — 99214 OFFICE O/P EST MOD 30 MIN: CPT | Performed by: OBSTETRICS & GYNECOLOGY

## 2022-09-20 RX ORDER — OMEPRAZOLE 20 MG/1
CAPSULE, DELAYED RELEASE ORAL
COMMUNITY
Start: 2022-09-12

## 2022-09-20 RX ORDER — AMLODIPINE BESYLATE 5 MG/1
TABLET ORAL
COMMUNITY
Start: 2022-09-12

## 2022-09-20 NOTE — ASSESSMENT & PLAN NOTE
Diagnosed in May of 2022  She is tolerating Eliquis well  She will need treatment until at least November 2022  Will discuss potential discontinuation of anticoagulation depending on disease status at her next visit

## 2022-09-20 NOTE — PATIENT INSTRUCTIONS
Continue radiotherapy as directed  Continue Eliquis  Keep follow-up appointment in 3 months  Contact me if you have any new symptoms

## 2022-09-20 NOTE — PROGRESS NOTES
Assessment/Plan:    Problem List Items Addressed This Visit        Cardiovascular and Mediastinum    Acute pulmonary embolism without acute cor pulmonale (HCC)     Diagnosed in May of 2022  She is tolerating Eliquis well  She will need treatment until at least November 2022  Will discuss potential discontinuation of anticoagulation depending on disease status at her next visit  Genitourinary    Endometrial cancer ([facility] [address] ) - Primary     Patient successfully completed 6 cycles of chemotherapy and tolerated well  Post treatment CT demonstrates no evidence of measurable disease  She has been referred to and evaluated by radiation oncologists who have appropriately recommended pelvic radiotherapy with consideration of vaginal brachytherapy  She has consented and agreed to proceed  I plan to see her back in 3 months for routine surveillance  She knows to contact us if she has any questions or problems  CHIEF COMPLAINT:   Follow-up after completion of treatment with chemotherapy for stage IIIC1 endometrial cancer    Problem:  Cancer Staging  Endometrial cancer [facility])  Staging form: Corpus Uteri - Carcinoma, AJCC 8th Edition  - Pathologic stage from 3/31/2022: FIGO Stage IIIC1 - Signed by Celine Burdick MD on 4/19/2022        Previous therapy:  Oncology History   Endometrial cancer ([facility] [address] )   3/2/2022 Initial Diagnosis    Endometrial cancer ([facility] [address] )     3/2/2022 Biopsy    Final Diagnosis   A  Endometrium, EMB:  - High-grade endometrial carcinoma, favor clear cell carcinoma  See comment  3/31/2022 -  Cancer Staged    Staging form: Corpus Uteri - Carcinoma, AJCC 8th Edition  - Pathologic stage from 3/31/2022:  FIGO Stage IIIC1 - Signed by Celine Burdick MD on 4/19/2022  Histopathologic type: Clear cell adenocarcinoma, NOS  Stage prefix: Initial diagnosis  Histologic grade (G): G3  Histologic grading system: 3 grade system  Residual tumor (R): R0 - None  Pelvic fátima status: Positive  Number of pelvic nodes positive from dissection: 1  Number of pelvic nodes examined during dissection: 2  Lymph node metastasis: Present  Omentectomy performed: Yes  Morcellation performed: No       3/31/2022 Surgery    Robotic hysterectomy, bilateral salpingo oophorectomy, bilateral pelvic sentinel lymph node biopsies, pelvic peritoneal/omental biopsies  Final pathology demonstrated clear cell carcinoma, 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%)  Negative cervix  Negative parametria  1/2 sentinel pelvic lymph nodes positive for malignancy  Negative staging biopsies  Stage IIIC1  No evidence of DNA mismatch repair protein loss       5/11/2022 -  Chemotherapy    palonosetron (ALOXI), 0 25 mg, Intravenous, Once, 6 of 6 cycles  Administration: 0 25 mg (5/11/2022), 0 25 mg (6/1/2022), 0 25 mg (6/22/2022), 0 25 mg (7/13/2022), 0 25 mg (8/3/2022), 0 25 mg (8/24/2022)  Pegfilgrastim-bmez (ZIEXTENZO), 6 mg, Subcutaneous, Once, 3 of 3 cycles  Administration: 6 mg (7/14/2022), 6 mg (8/4/2022), 6 mg (8/25/2022)  fosaprepitant (EMEND) IVPB, 150 mg, Intravenous, Once, 6 of 6 cycles  Administration: 150 mg (5/11/2022), 150 mg (6/1/2022), 150 mg (6/22/2022), 150 mg (7/13/2022), 150 mg (8/3/2022), 150 mg (8/24/2022)  CARBOplatin (PARAPLATIN) IVPB (GO AUC DOSING), 352 5 mg, Intravenous, Once, 6 of 6 cycles  Administration: 352 5 mg (5/11/2022), 377 5 mg (6/1/2022), 340 5 mg (6/22/2022), 353 mg (7/13/2022), 266 4 mg (8/3/2022), 271 6 mg (8/24/2022)  PACLItaxel (TAXOL) chemo IVPB, 135 mg/m2 = 290 4 mg (77 1 % of original dose 175 mg/m2), Intravenous, Once, 6 of 6 cycles  Dose modification: 135 mg/m2 (original dose 175 mg/m2, Cycle 1, Reason: Other (Must fill in a comment), Comment: prescribed starting dose)  Administration: 290 4 mg (5/11/2022), 290 4 mg (6/1/2022), 289 2 mg (6/22/2022), 292 8 mg (7/13/2022), 289 2 mg (8/3/2022), 300 mg (8/24/2022)           Patient ID: Ismael Lomax is a 74 y o  female  HPI  Patient with stage IIIC1 endometrial cancer  She has now to received 6 cycles of chemotherapy with carboplatin and Taxol  Tolerated well  Post treatment CT scan demonstrates no evidence of measurable disease  Of note, she was diagnosed with a pulmonary embolism in May  She is on Eliquis and tolerating well  Denies shortness of breath  Denies cough  Denies vaginal bleeding, drainage or discharge  Normal bowel bladder function  Normal appetite  No other complaints  She has seen Radiation Oncology and is being stimulated to start treatment with whole pelvic radiotherapy plus vaginal brachytherapy  Presents for follow-up  The following portions of the patient's history were reviewed and updated as appropriate: allergies, current medications, past family history, past medical history, past social history, past surgical history and problem list     Review of Systems  As per HPI  Twelve point review of systems otherwise unremarkable    Current Outpatient Medications   Medication Sig Dispense Refill    acetaminophen (TYLENOL) 650 mg CR tablet Take 1 tablet (650 mg total) by mouth every 6 (six) hours as needed for mild pain 30 tablet 0    amLODIPine (NORVASC) 5 mg tablet       apixaban (Eliquis) 5 mg TAKE 1 TABLET BY MOUTH TWICE A DAY 60 tablet 2    denosumab (Prolia) 60 mg/mL Inject 1 mL under the skin every 6 (six) months      fluticasone (FLONASE) 50 mcg/act nasal spray 2 sprays into each nostril daily as needed   (Patient not taking: Reported on 9/8/2022)      hydrochlorothiazide (HYDRODIURIL) 12 5 mg tablet Take 12 5 mg by mouth daily      lidocaine-prilocaine (EMLA) cream Apply to PORT site 30 min prior to labs or chemotherapy 30 g 1    LORazepam (ATIVAN) 1 mg tablet Take 1 tablet (1 mg total) by mouth every 8 (eight) hours as needed for anxiety (nausea or anxiety) (Patient not taking: No sig reported) 30 tablet 0    omeprazole (PriLOSEC) 20 mg delayed release capsule       ondansetron (ZOFRAN) 8 mg tablet Take 1 tablet (8 mg total) by mouth every 8 (eight) hours as needed for nausea or vomiting (Patient not taking: No sig reported) 20 tablet 1     No current facility-administered medications for this visit  Objective:    Blood pressure 110/64, temperature 98 5 °F (36 9 °C), temperature source Tympanic, height 5' 2 99" (1 6 m), weight 116 kg (256 lb), not currently breastfeeding  Body mass index is 45 36 kg/m²  Body surface area is 2 15 meters squared  Physical Exam  Vitals reviewed  Exam conducted with a chaperone present  Constitutional:       Appearance: Normal appearance  She is not ill-appearing or toxic-appearing  Eyes:      General: No scleral icterus  Right eye: No discharge  Left eye: No discharge  Conjunctiva/sclera: Conjunctivae normal    Cardiovascular:      Rate and Rhythm: Normal rate and regular rhythm  Heart sounds: Normal heart sounds  No murmur heard  Pulmonary:      Effort: Pulmonary effort is normal  No respiratory distress  Breath sounds: No stridor  No rhonchi  Abdominal:      General: There is no distension  Palpations: There is no mass  Tenderness: There is no abdominal tenderness  There is no guarding  Hernia: No hernia is present  Genitourinary:     Comments: Normal external female genitalia  Normal Bartholin's and [place]'s glands  Normal urethral meatus and no evidence of urethral discharge or masses  Bladder without fullness mass or tenderness  Vagina without lesion or discharge  No significant pelvic organ prolapse noted  Cervix and uterus are surgically absent  Bimanual exam demonstrates no evidence of pelvic masses  Anus without fissure of lesion  Musculoskeletal:      Right lower leg: No edema  Left lower leg: No edema  Neurological:      Mental Status: She is alert           Lab Results   Component Value Date     5 1 03/10/2022     Lab Results   Component Value Date    K 3 3 (L) 09/12/2022     09/12/2022 CO2 24 09/12/2022    BUN 15 09/12/2022    CREATININE 1 41 (H) 09/12/2022    GLUF 109 (H) 05/16/2022    CALCIUM 8 3 09/12/2022    CORRECTEDCA 9 0 09/12/2022    AST 15 09/12/2022    ALT 18 09/12/2022    ALKPHOS 53 09/12/2022    EGFR 36 09/12/2022     Lab Results   Component Value Date    WBC 3 03 (L) 09/12/2022    HGB 10 0 (L) 09/12/2022    HCT 32 3 (L) 09/12/2022     (H) 09/12/2022     09/12/2022     Lab Results   Component Value Date    NEUTROABS 1 19 (L) 09/12/2022        Trend:  Lab Results   Component Value Date     5 1 03/10/2022     Sharon Zaldivar MD, [place], FACS  9/20/2022  11:53 AM

## 2022-09-20 NOTE — ASSESSMENT & PLAN NOTE
Patient successfully completed 6 cycles of chemotherapy and tolerated well  Post treatment CT demonstrates no evidence of measurable disease  She has been referred to and evaluated by radiation oncologists who have appropriately recommended pelvic radiotherapy with consideration of vaginal brachytherapy  She has consented and agreed to proceed  I plan to see her back in 3 months for routine surveillance  She knows to contact us if she has any questions or problems

## 2022-09-22 ENCOUNTER — APPOINTMENT (OUTPATIENT)
Dept: RADIATION ONCOLOGY | Facility: CLINIC | Age: 72
End: 2022-09-22
Attending: RADIOLOGY
Payer: COMMERCIAL

## 2022-09-22 PROCEDURE — 77334 RADIATION TREATMENT AID(S): CPT | Performed by: RADIOLOGY

## 2022-09-22 PROCEDURE — 77470 SPECIAL RADIATION TREATMENT: CPT | Performed by: RADIOLOGY

## 2022-09-30 PROCEDURE — 77301 RADIOTHERAPY DOSE PLAN IMRT: CPT | Performed by: RADIOLOGY

## 2022-09-30 PROCEDURE — 77338 DESIGN MLC DEVICE FOR IMRT: CPT | Performed by: RADIOLOGY

## 2022-09-30 PROCEDURE — 77300 RADIATION THERAPY DOSE PLAN: CPT | Performed by: RADIOLOGY

## 2022-10-04 ENCOUNTER — APPOINTMENT (OUTPATIENT)
Dept: RADIATION ONCOLOGY | Facility: CLINIC | Age: 72
End: 2022-10-04
Attending: RADIOLOGY

## 2022-10-06 DIAGNOSIS — C54.1 ENDOMETRIAL CANCER (HCC): Primary | ICD-10-CM

## 2022-10-07 NOTE — TELEPHONE ENCOUNTER
Reached out to Rever today as discussed last phone call  Checked in to see how she was doing since starting treatment  She states she's been eating much better than she was when she was on chemo (currently just receiving radiation)  Offered to schedule RD appointment and discussed benefits of meeting with RD during treatment  She stated she would like to hold off for now, but will call RD if she has any questions/concerns, or if she would like to schedule an appt  RD contact information provided

## 2022-10-17 NOTE — PLAN OF CARE
Problem: SAFETY ADULT  Goal: Patient will remain free of falls  Description: INTERVENTIONS:  - Educate patient/family on patient safety including physical limitations  - Instruct patient to call for assistance with activity   - Consult OT/PT to assist with strengthening/mobility   - Keep Call bell within reach  - Keep bed low and locked with side rails adjusted as appropriate  - Keep care items and personal belongings within reach  - Initiate and maintain comfort rounds  - Make Fall Risk Sign visible to staff  -- Apply yellow socks and bracelet for high fall risk patients  - Consider moving patient to room near nurses station  Outcome: Progressing
Therapy

## 2022-10-20 DIAGNOSIS — C54.1 ENDOMETRIAL CANCER (HCC): Primary | ICD-10-CM

## 2022-10-20 LAB
BASOPHILS # BLD AUTO: 19 CELLS/UL (ref 0–200)
BASOPHILS NFR BLD AUTO: 0.8 %
EOSINOPHIL # BLD AUTO: 218 CELLS/UL (ref 15–500)
EOSINOPHIL NFR BLD AUTO: 9.1 %
ERYTHROCYTE [DISTWIDTH] IN BLOOD BY AUTOMATED COUNT: 13.3 % (ref 11–15)
HCT VFR BLD AUTO: 32.8 % (ref 35–45)
HGB BLD-MCNC: 11 G/DL (ref 11.7–15.5)
LYMPHOCYTES # BLD AUTO: 595 CELLS/UL (ref 850–3900)
LYMPHOCYTES NFR BLD AUTO: 24.8 %
MCH RBC QN AUTO: 34.3 PG (ref 27–33)
MCHC RBC AUTO-ENTMCNC: 33.5 G/DL (ref 32–36)
MCV RBC AUTO: 102.2 FL (ref 80–100)
MONOCYTES # BLD AUTO: 228 CELLS/UL (ref 200–950)
MONOCYTES NFR BLD AUTO: 9.5 %
NEUTROPHILS # BLD AUTO: 1339 CELLS/UL (ref 1500–7800)
NEUTROPHILS NFR BLD AUTO: 55.8 %
PLATELET # BLD AUTO: 101 THOUSAND/UL (ref 140–400)
PMV BLD REES-ECKER: 11.1 FL (ref 7.5–12.5)
RBC # BLD AUTO: 3.21 MILLION/UL (ref 3.8–5.1)
SERVICE CMNT-IMP: ABNORMAL
WBC # BLD AUTO: 2.4 THOUSAND/UL (ref 3.8–10.8)

## 2022-10-27 ENCOUNTER — TRANSCRIBE ORDERS (OUTPATIENT)
Dept: RADIATION ONCOLOGY | Facility: CLINIC | Age: 72
End: 2022-10-27

## 2022-10-27 DIAGNOSIS — C54.1 ENDOMETRIAL CANCER (HCC): Primary | ICD-10-CM

## 2022-11-01 ENCOUNTER — APPOINTMENT (OUTPATIENT)
Dept: RADIATION ONCOLOGY | Facility: CLINIC | Age: 72
End: 2022-11-01
Attending: RADIOLOGY

## 2022-11-02 ENCOUNTER — APPOINTMENT (OUTPATIENT)
Dept: RADIATION ONCOLOGY | Facility: HOSPITAL | Age: 72
End: 2022-11-02
Attending: RADIOLOGY

## 2022-11-02 ENCOUNTER — APPOINTMENT (OUTPATIENT)
Dept: RADIATION ONCOLOGY | Facility: CLINIC | Age: 72
End: 2022-11-02
Attending: RADIOLOGY

## 2022-11-02 LAB
APPEARANCE UR: ABNORMAL
BACTERIA UR QL AUTO: ABNORMAL /HPF
BILIRUB UR QL STRIP: NEGATIVE
COLOR UR: ABNORMAL
GLUCOSE UR QL STRIP: NEGATIVE
HGB UR QL STRIP: ABNORMAL
HYALINE CASTS #/AREA URNS LPF: ABNORMAL /LPF
KETONES UR QL STRIP: ABNORMAL
LEUKOCYTE ESTERASE UR QL STRIP: ABNORMAL
NITRITE UR QL STRIP: NEGATIVE
PH UR STRIP: ABNORMAL [PH] (ref 5–8)
PROT UR QL STRIP: ABNORMAL
RBC #/AREA URNS HPF: ABNORMAL /HPF
SP GR UR STRIP: 1.03 (ref 1–1.03)
SQUAMOUS #/AREA URNS HPF: ABNORMAL /HPF
WBC #/AREA URNS HPF: ABNORMAL /HPF

## 2022-11-03 ENCOUNTER — APPOINTMENT (OUTPATIENT)
Dept: RADIATION ONCOLOGY | Facility: CLINIC | Age: 72
End: 2022-11-03
Attending: RADIOLOGY

## 2022-11-04 ENCOUNTER — APPOINTMENT (OUTPATIENT)
Dept: RADIATION ONCOLOGY | Facility: CLINIC | Age: 72
End: 2022-11-04

## 2022-11-04 ENCOUNTER — APPOINTMENT (OUTPATIENT)
Dept: RADIATION ONCOLOGY | Facility: CLINIC | Age: 72
End: 2022-11-04
Attending: RADIOLOGY

## 2022-11-07 ENCOUNTER — APPOINTMENT (OUTPATIENT)
Dept: RADIATION ONCOLOGY | Facility: HOSPITAL | Age: 72
End: 2022-11-07
Attending: RADIOLOGY

## 2022-11-07 ENCOUNTER — APPOINTMENT (OUTPATIENT)
Dept: RADIATION ONCOLOGY | Facility: CLINIC | Age: 72
End: 2022-11-07
Attending: RADIOLOGY

## 2022-11-08 ENCOUNTER — APPOINTMENT (OUTPATIENT)
Dept: RADIATION ONCOLOGY | Facility: CLINIC | Age: 72
End: 2022-11-08

## 2022-11-08 ENCOUNTER — HOSPITAL ENCOUNTER (OUTPATIENT)
Dept: MAMMOGRAPHY | Facility: MEDICAL CENTER | Age: 72
Discharge: HOME | End: 2022-11-08

## 2022-11-08 VITALS — HEIGHT: 63 IN | WEIGHT: 255.73 LBS | BODY MASS INDEX: 45.31 KG/M2

## 2022-11-08 DIAGNOSIS — Z12.31 ENCOUNTER FOR SCREENING MAMMOGRAM FOR MALIGNANT NEOPLASM OF BREAST: ICD-10-CM

## 2022-11-08 DIAGNOSIS — C54.1 ENDOMETRIAL CANCER (HCC): Primary | ICD-10-CM

## 2022-11-08 RX ORDER — SULFAMETHOXAZOLE AND TRIMETHOPRIM 800; 160 MG/1; MG/1
1 TABLET ORAL EVERY 12 HOURS SCHEDULED
Qty: 14 TABLET | Refills: 0 | Status: SHIPPED | OUTPATIENT
Start: 2022-11-08 | End: 2022-11-15

## 2022-11-10 ENCOUNTER — APPOINTMENT (OUTPATIENT)
Dept: RADIATION ONCOLOGY | Facility: CLINIC | Age: 72
End: 2022-11-10

## 2022-11-16 ENCOUNTER — APPOINTMENT (OUTPATIENT)
Dept: RADIATION ONCOLOGY | Facility: HOSPITAL | Age: 72
End: 2022-11-16
Attending: RADIOLOGY

## 2022-11-16 ENCOUNTER — APPOINTMENT (OUTPATIENT)
Dept: RADIATION ONCOLOGY | Facility: HOSPITAL | Age: 72
End: 2022-11-16

## 2022-11-21 ENCOUNTER — APPOINTMENT (OUTPATIENT)
Dept: RADIATION ONCOLOGY | Facility: HOSPITAL | Age: 72
End: 2022-11-21
Attending: RADIOLOGY

## 2022-11-23 ENCOUNTER — APPOINTMENT (OUTPATIENT)
Dept: RADIATION ONCOLOGY | Facility: HOSPITAL | Age: 72
End: 2022-11-23
Attending: RADIOLOGY

## 2022-11-30 ENCOUNTER — APPOINTMENT (OUTPATIENT)
Dept: RADIATION ONCOLOGY | Facility: HOSPITAL | Age: 72
End: 2022-11-30

## 2022-12-03 DIAGNOSIS — I26.99 BILATERAL PULMONARY EMBOLISM (HCC): ICD-10-CM

## 2022-12-20 ENCOUNTER — OFFICE VISIT (OUTPATIENT)
Dept: GYNECOLOGIC ONCOLOGY | Facility: CLINIC | Age: 72
End: 2022-12-20

## 2022-12-20 VITALS
WEIGHT: 246 LBS | BODY MASS INDEX: 43.59 KG/M2 | HEIGHT: 63 IN | SYSTOLIC BLOOD PRESSURE: 130 MMHG | TEMPERATURE: 97.7 F | DIASTOLIC BLOOD PRESSURE: 78 MMHG | OXYGEN SATURATION: 97 % | HEART RATE: 78 BPM

## 2022-12-20 DIAGNOSIS — I26.99 OTHER ACUTE PULMONARY EMBOLISM WITHOUT ACUTE COR PULMONALE (HCC): ICD-10-CM

## 2022-12-20 DIAGNOSIS — C54.1 ENDOMETRIAL CANCER (HCC): Primary | ICD-10-CM

## 2022-12-20 RX ORDER — AMLODIPINE BESYLATE 2.5 MG/1
TABLET ORAL DAILY
COMMUNITY
Start: 2022-11-28

## 2022-12-20 NOTE — PROGRESS NOTES
Assessment/Plan:    Problem List Items Addressed This Visit        Cardiovascular and Mediastinum    Acute pulmonary embolism without acute cor pulmonale (HCC)     Diagnosed in May 2022  Patient will d/c Eliquis at this time as it has been >6 months and she is currently KELLY  D/w Dr Ishan Lomax who is in agreement  Genitourinary    Endometrial cancer ([facility] ) - Primary     78-year-old with a history of stage IIIC1 endometrial cancer  She completed chemotherapy in August 2022 and radiation in fall 2022  She is feeling well and has no concerns  Her pelvic exam is normal  Her  was low at the time of diagnosis and is not a good marker  Her PS is 0  Patient will return to the office in 3 months for her next surveillance visit  She will call in the interim with any new concerns  CHIEF COMPLAINT: Endometrial cancer surveillance      Subjective:     Problem:  Cancer Staging   Endometrial cancer [facility])  Staging form: Corpus Uteri - Carcinoma, AJCC 8th Edition  - Pathologic stage from 3/31/2022: FIGO Stage IIIC1 - Signed by Dulcie Hamman, MD on 4/19/2022      Previous therapy:  Oncology History   Endometrial cancer ([facility] )   3/2/2022 Initial Diagnosis    Endometrial cancer ([place] [address] )     3/2/2022 Biopsy    Final Diagnosis   A  Endometrium, EMB:  - High-grade endometrial carcinoma, favor clear cell carcinoma  See comment  3/31/2022 -  Cancer Staged    Staging form: Corpus Uteri - Carcinoma, AJCC 8th Edition  - Pathologic stage from 3/31/2022:  FIGO Stage IIIC1 - Signed by Dulcie Hamman, MD on 4/19/2022  Histopathologic type: Clear cell adenocarcinoma, NOS  Stage prefix: Initial diagnosis  Histologic grade (G): G3  Histologic grading system: 3 grade system  Residual tumor (R): R0 - None  Pelvic fátima status: Positive  Number of pelvic nodes positive from dissection: 1  Number of pelvic nodes examined during dissection: 2  Lymph node metastasis: Present  Omentectomy performed: Yes  Morcellation performed: No       3/31/2022 Surgery    Robotic hysterectomy, bilateral salpingo oophorectomy, bilateral pelvic sentinel lymph node biopsies, pelvic peritoneal/omental biopsies  Final pathology demonstrated clear cell carcinoma, 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%)  Negative cervix  Negative parametria  1/2 sentinel pelvic lymph nodes positive for malignancy  Negative staging biopsies  Stage IIIC1  No evidence of DNA mismatch repair protein loss  5/11/2022 - 8/25/2022 Chemotherapy    palonosetron (ALOXI), 0 25 mg, Intravenous, Once, 6 of 6 cycles  Administration: 0 25 mg (5/11/2022), 0 25 mg (6/1/2022), 0 25 mg (6/22/2022), 0 25 mg (7/13/2022), 0 25 mg (8/3/2022), 0 25 mg (8/24/2022)  Pegfilgrastim-bmez (ZIEXTENZO), 6 mg, Subcutaneous, Once, 3 of 3 cycles  Administration: 6 mg (7/14/2022), 6 mg (8/4/2022), 6 mg (8/25/2022)  fosaprepitant (EMEND) IVPB, 150 mg, Intravenous, Once, 6 of 6 cycles  Administration: 150 mg (5/11/2022), 150 mg (6/1/2022), 150 mg (6/22/2022), 150 mg (7/13/2022), 150 mg (8/3/2022), 150 mg (8/24/2022)  CARBOplatin (PARAPLATIN) IVPB (GO AUC DOSING), 352 5 mg, Intravenous, Once, 6 of 6 cycles  Administration: 352 5 mg (5/11/2022), 377 5 mg (6/1/2022), 340 5 mg (6/22/2022), 353 mg (7/13/2022), 266 4 mg (8/3/2022), 271 6 mg (8/24/2022)  PACLItaxel (TAXOL) chemo IVPB, 135 mg/m2 = 290 4 mg (77 1 % of original dose 175 mg/m2), Intravenous, Once, 6 of 6 cycles  Dose modification: 135 mg/m2 (original dose 175 mg/m2, Cycle 1, Reason: Other (Must fill in a comment), Comment: prescribed starting dose)  Administration: 290 4 mg (5/11/2022), 290 4 mg (6/1/2022), 289 2 mg (6/22/2022), 292 8 mg (7/13/2022), 289 2 mg (8/3/2022), 300 mg (8/24/2022)           Patient ID: Esmer Jeong is a 74 y o  female     Rever has been well in the interim and is without acute complaints  She denies nausea or vomiting  Her appetite is appropriate  She is voiding and moving her bowels without difficulty   She denies abdominal or pelvic pain  The patient is without vaginal bleeding or discharge  She is ambulatory  Review of Systems   Constitutional: Negative for chills, fatigue, fever and unexpected weight change  HENT: Negative for nosebleeds  Eyes: Negative  Respiratory: Negative for cough, chest tightness, shortness of breath and wheezing  Cardiovascular: Negative for chest pain, palpitations and leg swelling  Gastrointestinal: Negative for abdominal distention, abdominal pain, anal bleeding, blood in stool, constipation, diarrhea, nausea, rectal pain and vomiting  Endocrine: Negative  Genitourinary: Negative for difficulty urinating, dysuria, frequency, hematuria, pelvic pain, urgency, vaginal bleeding, vaginal discharge and vaginal pain  Musculoskeletal: Negative for arthralgias and joint swelling  Skin: Negative for color change, pallor and rash  Neurological: Negative for dizziness, weakness, light-headedness, numbness and headaches  Hematological: Negative  Psychiatric/Behavioral: Negative          Current Outpatient Medications   Medication Sig Dispense Refill   • acetaminophen (TYLENOL) 650 mg CR tablet Take 1 tablet (650 mg total) by mouth every 6 (six) hours as needed for mild pain 30 tablet 0   • amLODIPine (NORVASC) 2 5 mg tablet      • apixaban (Eliquis) 5 mg TAKE 1 TABLET BY MOUTH TWICE A DAY 60 tablet 2   • denosumab (Prolia) 60 mg/mL Inject 1 mL under the skin every 6 (six) months     • hydrochlorothiazide (HYDRODIURIL) 12 5 mg tablet Take 12 5 mg by mouth daily     • lidocaine-prilocaine (EMLA) cream Apply to PORT site 30 min prior to labs or chemotherapy 30 g 1   • omeprazole (PriLOSEC) 20 mg delayed release capsule      • amLODIPine (NORVASC) 5 mg tablet  (Patient not taking: Reported on 12/20/2022)     • fluticasone (FLONASE) 50 mcg/act nasal spray 2 sprays into each nostril daily as needed   (Patient not taking: Reported on 9/8/2022)     • LORazepam (ATIVAN) 1 mg tablet Take 1 tablet (1 mg total) by mouth every 8 (eight) hours as needed for anxiety (nausea or anxiety) (Patient not taking: Reported on 2022) 30 tablet 0   • ondansetron (ZOFRAN) 8 mg tablet Take 1 tablet (8 mg total) by mouth every 8 (eight) hours as needed for nausea or vomiting (Patient not taking: Reported on 2022) 20 tablet 1     No current facility-administered medications for this visit  No Known Allergies    Past Medical History:   Diagnosis Date   • Arthritis     osteo   • Chronic kidney disease    • Colon polyp    • Diverticula of colon    • Hypertension    • Obesity    • Rhinitis        Past Surgical History:   Procedure Laterality Date   • APPENDECTOMY     • BREAST BIOPSY Left     benign   • BREAST SURGERY Left     biopsy   • CHOLECYSTECTOMY     • COLONOSCOPY     • CYSTOSCOPY N/A 2022    Procedure: CYSTOSCOPY;  Surgeon: Shaniqua Hart MD;  Location: [facility];  Service: Gynecology Oncology   • HERNIA REPAIR Right     inguinal   • HYSTERECTOMY     • IR PORT PLACEMENT  2022   • JOINT REPLACEMENT Bilateral     Knee   • RI COLONOSCOPY FLX DX W/COLLJ SPEC WHEN PFRMD N/A 2017    Procedure: COLONOSCOPY with polypectomy and hemo clip marjan ;  Surgeon: Becky So MD;  Location: [facility];   Service: Gastroenterology   • RI LAPAROSCOPY W TOT HYSTERECTUTERUS <=250 GRAM  W TUBE/OVARY N/A 2022    Procedure: ROBOTIC HYSTERECTOMY, BILATERAL SALPINGO-OOPHORECTOMY, STAGING PERITONEAL AND OMENTAL BIOPSIES, BILATERAL PELVIC SENTINEL LYMPH NODE BIOPSIES;  Surgeon: Shaniqua Hart MD;  Location: [facility];  Service: Gynecology Oncology       OB History        1    Para   1    Term   1            AB        Living   1       SAB        IAB        Ectopic        Multiple        Live Births   1                 Family History   Problem Relation Age of Onset   • Heart disease Mother    • Prostate cancer Father 80   • Diabetes Sister    • Hypertension Sister • Transient ischemic attack Sister    • No Known Problems Sister    • No Known Problems Maternal Grandmother    • No Known Problems Maternal Grandfather    • No Known Problems Paternal Grandmother    • No Known Problems Paternal Grandfather    • Diabetes Brother    • Heart disease Brother    • No Known Problems Maternal Aunt    • No Known Problems Paternal Aunt    • No Known Problems Paternal Aunt    • No Known Problems Paternal Aunt    • Stomach cancer Other 45   • Thyroid disease Other        The following portions of the patient's history were reviewed and updated as appropriate: allergies, current medications, past family history, past medical history, past social history, past surgical history and problem list       Objective:    Blood pressure 130/78, pulse 78, temperature 97 7 °F (36 5 °C), temperature source Tympanic, height 5' 2 99" (1 6 m), weight 112 kg (246 lb), SpO2 97 %, not currently breastfeeding  Body mass index is 43 59 kg/m²  Physical Exam  Vitals reviewed  Exam conducted with a chaperone present  Constitutional:       General: She is not in acute distress  Appearance: Normal appearance  She is obese  She is not ill-appearing  HENT:      Head: Normocephalic and atraumatic  Mouth/Throat:      Mouth: Mucous membranes are moist    Eyes:      General:         Right eye: No discharge  Left eye: No discharge  Conjunctiva/sclera: Conjunctivae normal    Pulmonary:      Effort: Pulmonary effort is normal    Abdominal:      Palpations: Abdomen is soft  There is no mass  Tenderness: There is no abdominal tenderness  Hernia: No hernia is present  Genitourinary:     Comments: The external female genitalia is normal  The bartholin's, uretheral and skenes glands are normal  The urethral meatus is normal (midline with no lesions)  Anus without fissure or lesion  Speculum exam reveals a grossly normal vagina  No masses, lesions,discharge or bleeding   No significant cystocele or rectocele noted  Bimanual exam notes a surgical absent cervix, uterus and adnexal structures  No masses or fullness  Bladder is without fullness, mass or tenderness  Musculoskeletal:      Right lower leg: No edema  Left lower leg: No edema  Skin:     General: Skin is warm and dry  Coloration: Skin is not jaundiced  Findings: No rash  Neurological:      General: No focal deficit present  Mental Status: She is alert and oriented to person, place, and time  Cranial Nerves: No cranial nerve deficit  Sensory: No sensory deficit  Motor: No weakness  Gait: Gait normal    Psychiatric:         Mood and Affect: Mood normal          Behavior: Behavior normal          Thought Content:  Thought content normal          Judgment: Judgment normal            Lab Results   Component Value Date     5 1 03/10/2022     Lab Results   Component Value Date    WBC 2 4 (L) 10/19/2022    HGB 11 0 (L) 10/19/2022    HCT 32 8 (L) 10/19/2022     2 (H) 10/19/2022     (L) 10/19/2022     Lab Results   Component Value Date    K 3 3 (L) 09/12/2022     09/12/2022    CO2 24 09/12/2022    BUN 15 09/12/2022    CREATININE 1 41 (H) 09/12/2022    GLUF 109 (H) 05/16/2022    CALCIUM 8 3 09/12/2022    CORRECTEDCA 9 0 09/12/2022    AST 15 09/12/2022    ALT 18 09/12/2022    ALKPHOS 53 09/12/2022    EGFR 36 09/12/2022        Trend:  Lab Results   Component Value Date     5 1 03/10/2022

## 2022-12-20 NOTE — ASSESSMENT & PLAN NOTE
15-year-old with a history of stage IIIC1 endometrial cancer  She completed chemotherapy in August 2022 and radiation in fall 2022  She is feeling well and has no concerns  Her pelvic exam is normal  Her  was low at the time of diagnosis and is not a good marker  Her PS is 0  Patient will return to the office in 3 months for her next surveillance visit  She will call in the interim with any new concerns

## 2022-12-20 NOTE — ASSESSMENT & PLAN NOTE
Diagnosed in May 2022  Patient will d/c Eliquis at this time as it has been >6 months and she is currently KELLY  D/w Dr Aime Samayoa who is in agreement

## 2022-12-20 NOTE — PATIENT INSTRUCTIONS
1  Return to the office in 3 months for continued surveillance     2  Contact the office in the interim if you develop any any new or concerning symptoms, including vaginal bleeding, discharge, pain, etc

## 2022-12-29 ENCOUNTER — RADIATION ONCOLOGY FOLLOW-UP (OUTPATIENT)
Dept: RADIATION ONCOLOGY | Facility: CLINIC | Age: 72
End: 2022-12-29
Attending: RADIOLOGY

## 2022-12-29 ENCOUNTER — CLINICAL SUPPORT (OUTPATIENT)
Dept: RADIATION ONCOLOGY | Facility: CLINIC | Age: 72
End: 2022-12-29
Attending: RADIOLOGY

## 2022-12-29 VITALS
HEART RATE: 85 BPM | RESPIRATION RATE: 18 BRPM | HEIGHT: 63 IN | TEMPERATURE: 97.5 F | OXYGEN SATURATION: 95 % | DIASTOLIC BLOOD PRESSURE: 69 MMHG | WEIGHT: 246.69 LBS | SYSTOLIC BLOOD PRESSURE: 107 MMHG | BODY MASS INDEX: 43.71 KG/M2

## 2022-12-29 DIAGNOSIS — C54.1 ENDOMETRIAL CANCER (HCC): Primary | ICD-10-CM

## 2022-12-29 NOTE — PROGRESS NOTES
Sg Abernathy  1950  [id number]    Radiation Oncology Follow Up    Ms Sol Crook is a 58-year-old postmenopausal woman who presented in February 2022 with symptoms of postmenopausal bleeding  She would an endometrial biopsy on March 2, 2022 that showed high-grade endometrial carcinoma favoring clear cell histology  She would a pelvic ultrasound on March 7, 2022 which showed an abnormally thickened endometrial stripe measuring 3 7 cm and a 3 4 cm mid uterine body mass presumably a fibroid  On March 17, 2022 she would a CT of the chest, abdomen and pelvis without contrast due to chronic kidney disease which showed no significant findings, no evidence of metastatic disease and a prominent uterus  She underwent a robotic assisted hysterectomy and bilateral salpingo-oophorectomy with bilateral pelvic sentinel node biopsies by Dr Morteza Gutierrez volume on March 31, 2022  Pathology showed a 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 (41%), Bailey invasive in the lower uterine segment, equivocal for lymphovascular invasion, involving 1 of 2 pelvic sentinel nodes with a macro metastasis, no invasion of the cervix or bilateral parametrial tissue, pathologic stage pT1a pN1a cM0, stage IIIC1  She had a superficial wound dehiscence at the midline robotic incision site that required debridement and packing prior to initiating chemotherapy  She started adjuvant chemotherapy with carboplatin paclitaxel on May 11th  She was admitted to the hospital on May 17 with syncope and shortness of breath and was diagnosed with a bilateral pulmonary embolism, placed on anticoagulation  She then resumed chemotherapy and completed 6 cycles on August 25, 2022  She states that she tolerated chemotherapy relatively well with mild fatigue that she continues to have some dyspnea on exertion, mild intermittent constipation hair loss       She subsequently underwent pelvic radiation to 4500 cGy in 25 fractions beginning on October 4, 2022  She also received 2 vaginal brachytherapy boost doses to the upper vagina for total dose of 1200 cGy in 5 fractions prescribed to the surface  She overall tolerated radiation quite well  She had some mild fatigue and decreased appetite  She had no diarrhea but complained of intermittent constipation  She had no skin reaction, vaginal discharge or bleeding  She was diagnosed with a urinary tract infection midway through pelvic radiation which was treated with antibiotics  She states that she is doing well now 1 month postoperatively  Her constipation has resolved and she continues to  deny any diarrhea  She has no dysuria, hematuria and her urinary symptoms have resolved  She denies any vaginal discharge or bleeding  Her appetite remains suppressed although she is eating regular meals  She states that over the past year she has lost 25 pounds mainly due to poor appetite  Eliquis was discontinued by GYN oncology last week and she reports improvement in chronic symptoms of dizziness subsequently  Oncology History   Endometrial cancer ([facility] [address] )   3/2/2022 Initial Diagnosis    Endometrial cancer ([facility] [address] )     3/2/2022 Biopsy    Final Diagnosis   A  Endometrium, EMB:  - High-grade endometrial carcinoma, favor clear cell carcinoma  See comment  3/31/2022 -  Cancer Staged    Staging form: Corpus Uteri - Carcinoma, AJCC 8th Edition  - Pathologic stage from 3/31/2022:  FIGO Stage IIIC1 - Signed by Sahra Zelaya MD on 4/19/2022  Histopathologic type: Clear cell adenocarcinoma, NOS  Stage prefix: Initial diagnosis  Histologic grade (G): G3  Histologic grading system: 3 grade system  Residual tumor (R): R0 - None  Pelvic fátima status: Positive  Number of pelvic nodes positive from dissection: 1  Number of pelvic nodes examined during dissection: 2  Lymph node metastasis: Present  Omentectomy performed: Yes  Morcellation performed: No       3/31/2022 Surgery    Robotic hysterectomy, bilateral salpingo oophorectomy, bilateral pelvic sentinel lymph node biopsies, pelvic peritoneal/omental biopsies  Final pathology demonstrated clear cell carcinoma, 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%)  Negative cervix  Negative parametria  1/2 sentinel pelvic lymph nodes positive for malignancy  Negative staging biopsies  Stage IIIC1  No evidence of DNA mismatch repair protein loss       5/11/2022 - 8/25/2022 Chemotherapy    palonosetron (ALOXI), 0 25 mg, Intravenous, Once, 6 of 6 cycles  Administration: 0 25 mg (5/11/2022), 0 25 mg (6/1/2022), 0 25 mg (6/22/2022), 0 25 mg (7/13/2022), 0 25 mg (8/3/2022), 0 25 mg (8/24/2022)  Pegfilgrastim-bmez (Eulis Óscar), 6 mg, Subcutaneous, Once, 3 of 3 cycles  Administration: 6 mg (7/14/2022), 6 mg (8/4/2022), 6 mg (8/25/2022)  fosaprepitant (EMEND) IVPB, 150 mg, Intravenous, Once, 6 of 6 cycles  Administration: 150 mg (5/11/2022), 150 mg (6/1/2022), 150 mg (6/22/2022), 150 mg (7/13/2022), 150 mg (8/3/2022), 150 mg (8/24/2022)  CARBOplatin (PARAPLATIN) IVPB (GO AUC DOSING), 352 5 mg, Intravenous, Once, 6 of 6 cycles  Administration: 352 5 mg (5/11/2022), 377 5 mg (6/1/2022), 340 5 mg (6/22/2022), 353 mg (7/13/2022), 266 4 mg (8/3/2022), 271 6 mg (8/24/2022)  PACLItaxel (TAXOL) chemo IVPB, 135 mg/m2 = 290 4 mg (77 1 % of original dose 175 mg/m2), Intravenous, Once, 6 of 6 cycles  Dose modification: 135 mg/m2 (original dose 175 mg/m2, Cycle 1, Reason: Other (Must fill in a comment), Comment: prescribed starting dose)  Administration: 290 4 mg (5/11/2022), 290 4 mg (6/1/2022), 289 2 mg (6/22/2022), 292 8 mg (7/13/2022), 289 2 mg (8/3/2022), 300 mg (8/24/2022)     10/4/2022 - 11/21/2022 Radiation    Plan ID Energy Fractions Dose per Fraction (cGy) Dose Correction (cGy) Total Dose Delivered (cGy) Elapsed Days   Vag Cylinder  2 / 2 600 0 1,200 5   Pelvis 6 MV 25 180  4500 34            Patient Active Problem List   Diagnosis   • Essential hypertension   • Stage 3a chronic kidney disease ([facility] [address] )   • Acute pulmonary embolism without acute cor pulmonale (HCC)   • Endometrial cancer (HCC)   • Morbid obesity with BMI of 45 0-49 9, adult ([facility] [address] )   • Encounter for central line care   • Insomnia   • Chemotherapy induced neutropenia (HCC)    Cancer Staging   Endometrial cancer [facility])  Staging form: Corpus Uteri - Carcinoma, AJCC 8th Edition  - Pathologic stage from 3/31/2022: FIGO Stage IIIC1 - Signed by Luis Crook MD on 4/19/2022  Histopathologic type: Clear cell adenocarcinoma, NOS  Stage prefix: Initial diagnosis  Histologic grade (G): G3  Histologic grading system: 3 grade system  Residual tumor (R): R0 - None  Pelvic fátima status: Positive  Number of pelvic nodes positive from dissection: 1  Number of pelvic nodes examined during dissection: 2  Lymph node metastasis: Present  Omentectomy performed: Yes  Morcellation performed: No    Past Medical History:   Diagnosis Date   • Arthritis     osteo   • Chronic kidney disease    • Colon polyp    • Diverticula of colon    • Hypertension    • Obesity    • Rhinitis      Social History     Socioeconomic History   • Marital status: Single     Spouse name: Not on file   • Number of children: Not on file   • Years of education: Not on file   • Highest education level: Not on file   Occupational History   • Not on file   Tobacco Use   • Smoking status: Never   • Smokeless tobacco: Never   Vaping Use   • Vaping Use: Never used   Substance and Sexual Activity   • Alcohol use:  Yes     Alcohol/week: 1 0 standard drink     Types: 1 Glasses of wine per week     Comment: social-seldom   • Drug use: Never   • Sexual activity: Not Currently   Other Topics Concern   • Not on file   Social History Narrative   • Not on file     Social Determinants of Health     Financial Resource Strain: Not on file   Food Insecurity: No Food Insecurity   • Worried About Running Out of Food in the Last Year: Never true   • Ran Out of Food in the Last Year: Never true   Transportation Needs: No Transportation Needs   • Lack of Transportation (Medical): No   • Lack of Transportation (Non-Medical): No   Physical Activity: Not on file   Stress: Not on file   Social Connections: Not on file   Intimate Partner Violence: Not on file   Housing Stability: Low Risk    • Unable to Pay for Housing in the Last Year: No   • Number of Places Lived in the Last Year: 1   • Unstable Housing in the Last Year: No     Family History   Problem Relation Age of Onset   • Heart disease Mother    • Prostate cancer Father 80   • Diabetes Sister    • Hypertension Sister    • Transient ischemic attack Sister    • No Known Problems Sister    • No Known Problems Maternal Grandmother    • No Known Problems Maternal Grandfather    • No Known Problems Paternal Grandmother    • No Known Problems Paternal Grandfather    • Diabetes Brother    • Heart disease Brother    • No Known Problems Maternal Aunt    • No Known Problems Paternal Aunt    • No Known Problems Paternal Aunt    • No Known Problems Paternal Aunt    • Stomach cancer Other 45   • Thyroid disease Other      Past Surgical History:   Procedure Laterality Date   • APPENDECTOMY     • BREAST BIOPSY Left 1977    benign   • BREAST SURGERY Left     biopsy   • CHOLECYSTECTOMY     • COLONOSCOPY     • CYSTOSCOPY N/A 03/31/2022    Procedure: CYSTOSCOPY;  Surgeon: Luzma Jimenez MD;  Location:  MAIN OR;  Service: Gynecology Oncology   • HERNIA REPAIR Right     inguinal   • HYSTERECTOMY     • IR PORT PLACEMENT  05/09/2022   • JOINT REPLACEMENT Bilateral     Knee   • NH COLONOSCOPY FLX DX W/COLLJ SPEC WHEN PFRMD N/A 02/21/2017    Procedure: COLONOSCOPY with polypectomy and hemo clip marjan ;  Surgeon: Radhika Sifuentes MD;  Location: [facility];   Service: Gastroenterology   • NH LAPS TOTAL HYSTERECT 250 GM/< W/RMVL TUBE/OVARY N/A 03/31/2022    Procedure: ROBOTIC HYSTERECTOMY, BILATERAL SALPINGO-OOPHORECTOMY, STAGING PERITONEAL AND OMENTAL BIOPSIES, BILATERAL PELVIC SENTINEL LYMPH NODE BIOPSIES;  Surgeon: Jasson Mazariegos MD;  Location: [facility];  Service: Gynecology Oncology       Current Outpatient Medications:   •  acetaminophen (TYLENOL) 650 mg CR tablet, Take 1 tablet (650 mg total) by mouth every 6 (six) hours as needed for mild pain, Disp: 30 tablet, Rfl: 0  •  amLODIPine (NORVASC) 2 5 mg tablet, Take by mouth daily, Disp: , Rfl:   •  denosumab (Prolia) 60 mg/mL, Inject 1 mL under the skin every 6 (six) months, Disp: , Rfl:   •  hydrochlorothiazide (HYDRODIURIL) 12 5 mg tablet, Take 12 5 mg by mouth daily, Disp: , Rfl:   •  omeprazole (PriLOSEC) 20 mg delayed release capsule, Take by mouth daily, Disp: , Rfl:   •  amLODIPine (NORVASC) 5 mg tablet, , Disp: , Rfl:   •  apixaban (Eliquis) 5 mg, TAKE 1 TABLET BY MOUTH TWICE A DAY (Patient not taking: Reported on 12/29/2022), Disp: 60 tablet, Rfl: 2  •  fluticasone (FLONASE) 50 mcg/act nasal spray, 2 sprays into each nostril daily as needed   (Patient not taking: Reported on 9/8/2022), Disp: , Rfl:   •  lidocaine-prilocaine (EMLA) cream, Apply to PORT site 30 min prior to labs or chemotherapy (Patient not taking: Reported on 12/29/2022), Disp: 30 g, Rfl: 1  •  LORazepam (ATIVAN) 1 mg tablet, Take 1 tablet (1 mg total) by mouth every 8 (eight) hours as needed for anxiety (nausea or anxiety) (Patient not taking: Reported on 6/21/2022), Disp: 30 tablet, Rfl: 0  •  ondansetron (ZOFRAN) 8 mg tablet, Take 1 tablet (8 mg total) by mouth every 8 (eight) hours as needed for nausea or vomiting (Patient not taking: Reported on 6/21/2022), Disp: 20 tablet, Rfl: 1  No Known Allergies    Review of Systems:  Constitutional: Negative  HENT: Negative  Eyes: Negative  Respiratory: Positive for shortness of breath (with exertion)  Cardiovascular: Negative  Gastrointestinal: Negative  Endocrine: Negative  Genitourinary: Negative  Negative for vaginal bleeding, vaginal discharge and vaginal pain  Musculoskeletal: Negative      Skin: Negative  Allergic/Immunologic: Negative  Neurological: Positive for dizziness (occasional)  Hematological: Negative  Psychiatric/Behavioral: Negative        Physical Exam:  Physical Exam  Vitals and nursing note reviewed  Constitutional:       General: She is not in acute distress  HENT:      Head: Normocephalic  Nose: No congestion  Eyes:      General: No scleral icterus  Extraocular Movements: Extraocular movements intact  Pupils: Pupils are equal, round, and reactive to light  Pulmonary:      Effort: Pulmonary effort is normal  No respiratory distress  Abdominal:      General: Abdomen is flat  There is no distension  Tenderness: There is no abdominal tenderness  Genitourinary:     Comments: Patient was seen in GYN oncology on December 20, 2022 and a thorough completely normal exam was reported  Therefore pelvic examination was deferred at the patient's request today  Musculoskeletal:         General: Normal range of motion  Cervical back: Normal range of motion  Right lower leg: No edema  Left lower leg: No edema  Lymphadenopathy:      Cervical: No cervical adenopathy  Skin:     General: Skin is warm  Coloration: Skin is not jaundiced  Findings: No rash  Neurological:      General: No focal deficit present  Mental Status: She is alert  Psychiatric:         Mood and Affect: Mood normal          Thought Content:  Thought content normal          Judgment: Judgment normal            LABS:    CBC  Diff   Lab Results   Component Value Date/Time    WBC 2 4 (L) 10/19/2022 10:13 AM    WBC 3 03 (L) 09/12/2022 11:00 AM    HGB 11 0 (L) 10/19/2022 10:13 AM    HGB 10 0 (L) 09/12/2022 11:00 AM    HCT 32 8 (L) 10/19/2022 10:13 AM    HCT 32 3 (L) 09/12/2022 11:00 AM    RBC 3 21 (L) 10/19/2022 10:13 AM    RBC 2 98 (L) 09/12/2022 11:00 AM     2 (H) 10/19/2022 10:13 AM     (H) 09/12/2022 11:00 AM    MCHC 33 5 10/19/2022 10:13 AM    MCHC 31 0 (L) 09/12/2022 11:00 AM    MCH 34 3 (H) 10/19/2022 10:13 AM    MCH 33 6 09/12/2022 11:00 AM    RDW 13 3 10/19/2022 10:13 AM    RDW 16 9 (H) 09/12/2022 11:00 AM    MPV 10 7 09/12/2022 11:00 AM    Lab Results   Component Value Date/Time    LYMPHSABS 595 (L) 10/19/2022 10:13 AM    LYMPHSABS 1 43 09/12/2022 11:00 AM    EOSABS 218 10/19/2022 10:13 AM    EOSABS 0 01 09/12/2022 11:00 AM    MONOSABS 228 10/19/2022 10:13 AM    MONOSABS 0 37 09/12/2022 11:00 AM    BASOSABS 19 10/19/2022 10:13 AM    BASOSABS 0 02 09/12/2022 11:00 AM        Basic Metabolic Profile    Lab Results   Component Value Date/Time    SODIUM 143 09/12/2022 11:00 AM    CO2 24 09/12/2022 11:00 AM    Lab Results   Component Value Date/Time    BUN 15 09/12/2022 11:00 AM    CREATININE 1 41 (H) 09/12/2022 11:00 AM        Discussion/Summary: Ms Margaret Ozuna presents for 1 month posttreatment follow-up today  She received pelvic radiation and vaginal brachytherapy boost for stage IIIc endometrial carcinoma  She had chemotherapy prior to radiation  She tolerated both chemotherapy and radiation remarkably well  She has no significant residual toxicity of radiation including no significant gastrointestinal or genitourinary complaints today  Her main issue remains fair appetite  She will be seen by GYN oncology in 3 months and we will see her for routine follow-up in 6 months

## 2022-12-29 NOTE — PROGRESS NOTES
Sg Thakkar 1950 is a 74 y o  female with stage IIIC endometrial cancer  She completed radiation 11/21/22  She returns today for her first follow-up  She was treated with intensity modulated radiation to the whole pelvis to 4500 cGy followed by a vaginal brachytherapy boost to the upper half of the vagina consisting of 2 fractions of 600 cGy each prescribed to the surface of the cylinder  She tolerated pelvic radiation remarkably well with some minimal loose bowels relieved with Imodium  12/20/22 Anne Montes NP- Pelvic exam normal  No concerns  D/c Eliquis as it has been over 6 months  F/u in 3 months      3/16/22 Dr Lula Acosta      Follow up visit     Oncology History   Endometrial cancer [facility])   3/2/2022 Initial Diagnosis    Endometrial cancer ([facility] [address] )     3/2/2022 Biopsy    Final Diagnosis   A  Endometrium, EMB:  - High-grade endometrial carcinoma, favor clear cell carcinoma  See comment  3/31/2022 -  Cancer Staged    Staging form: Corpus Uteri - Carcinoma, AJCC 8th Edition  - Pathologic stage from 3/31/2022: FIGO Stage IIIC1 - Signed by Eloisa Lawrence MD on 4/19/2022  Histopathologic type: Clear cell adenocarcinoma, NOS  Stage prefix: Initial diagnosis  Histologic grade (G): G3  Histologic grading system: 3 grade system  Residual tumor (R): R0 - None  Pelvic fátima status: Positive  Number of pelvic nodes positive from dissection: 1  Number of pelvic nodes examined during dissection: 2  Lymph node metastasis: Present  Omentectomy performed: Yes  Morcellation performed: No       3/31/2022 Surgery    Robotic hysterectomy, bilateral salpingo oophorectomy, bilateral pelvic sentinel lymph node biopsies, pelvic peritoneal/omental biopsies  Final pathology demonstrated clear cell carcinoma, 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%)  Negative cervix  Negative parametria  1/2 sentinel pelvic lymph nodes positive for malignancy  Negative staging biopsies  Stage IIIC1    No evidence of DNA mismatch repair protein loss  5/11/2022 - 8/25/2022 Chemotherapy    palonosetron (ALOXI), 0 25 mg, Intravenous, Once, 6 of 6 cycles  Administration: 0 25 mg (5/11/2022), 0 25 mg (6/1/2022), 0 25 mg (6/22/2022), 0 25 mg (7/13/2022), 0 25 mg (8/3/2022), 0 25 mg (8/24/2022)  Pegfilgrastim-bmez (Raine Antonio), 6 mg, Subcutaneous, Once, 3 of 3 cycles  Administration: 6 mg (7/14/2022), 6 mg (8/4/2022), 6 mg (8/25/2022)  fosaprepitant (EMEND) IVPB, 150 mg, Intravenous, Once, 6 of 6 cycles  Administration: 150 mg (5/11/2022), 150 mg (6/1/2022), 150 mg (6/22/2022), 150 mg (7/13/2022), 150 mg (8/3/2022), 150 mg (8/24/2022)  CARBOplatin (PARAPLATIN) IVPB ([facility] AUC DOSING), 352 5 mg, Intravenous, Once, 6 of 6 cycles  Administration: 352 5 mg (5/11/2022), 377 5 mg (6/1/2022), 340 5 mg (6/22/2022), 353 mg (7/13/2022), 266 4 mg (8/3/2022), 271 6 mg (8/24/2022)  PACLItaxel (TAXOL) chemo IVPB, 135 mg/m2 = 290 4 mg (77 1 % of original dose 175 mg/m2), Intravenous, Once, 6 of 6 cycles  Dose modification: 135 mg/m2 (original dose 175 mg/m2, Cycle 1, Reason: Other (Must fill in a comment), Comment: prescribed starting dose)  Administration: 290 4 mg (5/11/2022), 290 4 mg (6/1/2022), 289 2 mg (6/22/2022), 292 8 mg (7/13/2022), 289 2 mg (8/3/2022), 300 mg (8/24/2022)     10/4/2022 - 11/21/2022 Radiation    Plan ID Energy Fractions Dose per Fraction (cGy) Dose Correction (cGy) Total Dose Delivered (cGy) Elapsed Days   Vag Cylinder  2 / 2 600 0 1,200 5   Pelvis 6 MV 25 180  4500 34            Review of Systems:  Review of Systems   Constitutional: Negative  HENT: Negative  Eyes: Negative  Respiratory: Positive for shortness of breath (with exertion)  Cardiovascular: Negative  Gastrointestinal: Negative  Endocrine: Negative  Genitourinary: Negative  Negative for vaginal bleeding, vaginal discharge and vaginal pain  Musculoskeletal: Negative  Skin: Negative  Allergic/Immunologic: Negative      Neurological: Positive for dizziness (occasional)  Hematological: Negative  Psychiatric/Behavioral: Negative          Clinical Trial: no      Health Maintenance   Topic Date Due   • Hepatitis C Screening  Never done   • Medicare Annual Wellness Visit (AWV)  Never done   • Hepatitis B Vaccine (1 of 3 - 3-dose series) Never done   • Fall Risk  Never done   • Urinary Incontinence Screening  Never done   • COVID-19 Vaccine (5 - Booster for Pfizer series) 08/05/2022   • Influenza Vaccine (1) 09/01/2022   • BMI: Followup Plan  05/03/2023   • Breast Cancer Screening: Mammogram  11/08/2023   • BMI: Adult  12/20/2023   • Depression Screening  12/29/2023   • Colorectal Cancer Screening  03/20/2025   • Osteoporosis Screening  Completed   • Pneumococcal Vaccine: 65+ Years  Completed   • HIB Vaccine  Aged Out   • IPV Vaccine  Aged Out   • Hepatitis A Vaccine  Aged Out   • Meningococcal ACWY Vaccine  Aged Out   • HPV Vaccine  Aged Out     Patient Active Problem List   Diagnosis   • Essential hypertension   • Stage 3a chronic kidney disease ([facility] [address] )   • Acute pulmonary embolism without acute cor pulmonale ([facility] [address] )   • Endometrial cancer ([facility] [address] )   • Morbid obesity with BMI of 45 0-49 9, adult ([facility] [address] )   • Encounter for central line care   • Insomnia   • Chemotherapy induced neutropenia ([facility] [address] )     Past Medical History:   Diagnosis Date   • Arthritis     osteo   • Chronic kidney disease    • Colon polyp    • Diverticula of colon    • Hypertension    • Obesity    • Rhinitis      Past Surgical History:   Procedure Laterality Date   • APPENDECTOMY     • BREAST BIOPSY Left 1977    benign   • BREAST SURGERY Left     biopsy   • CHOLECYSTECTOMY     • COLONOSCOPY     • CYSTOSCOPY N/A 03/31/2022    Procedure: CYSTOSCOPY;  Surgeon: Merari Lakhani MD;  Location: [facility];  Service: Gynecology Oncology   • HERNIA REPAIR Right     inguinal   • HYSTERECTOMY     • IR PORT PLACEMENT  05/09/2022   • JOINT REPLACEMENT Bilateral     Knee   • ID COLONOSCOPY FLX DX W/COLLJ SPEC WHEN PFRMD N/A 02/21/2017    Procedure: COLONOSCOPY with polypectomy and hemo clip marjan ;  Surgeon: Vesta Hollins MD;  Location: [facility]; Service: Gastroenterology   • CT LAPS TOTAL HYSTERECT 250 GM/< W/RMVL TUBE/OVARY N/A 03/31/2022    Procedure: ROBOTIC HYSTERECTOMY, BILATERAL SALPINGO-OOPHORECTOMY, STAGING PERITONEAL AND OMENTAL BIOPSIES, BILATERAL PELVIC SENTINEL LYMPH NODE BIOPSIES;  Surgeon: Cristin Rodriguez MD;  Location:  MAIN OR;  Service: Gynecology Oncology     Family History   Problem Relation Age of Onset   • Heart disease Mother    • Prostate cancer Father 80   • Diabetes Sister    • Hypertension Sister    • Transient ischemic attack Sister    • No Known Problems Sister    • No Known Problems Maternal Grandmother    • No Known Problems Maternal Grandfather    • No Known Problems Paternal Grandmother    • No Known Problems Paternal Grandfather    • Diabetes Brother    • Heart disease Brother    • No Known Problems Maternal Aunt    • No Known Problems Paternal Aunt    • No Known Problems Paternal Aunt    • No Known Problems Paternal Aunt    • Stomach cancer Other 45   • Thyroid disease Other      Social History     Socioeconomic History   • Marital status: Single     Spouse name: Not on file   • Number of children: Not on file   • Years of education: Not on file   • Highest education level: Not on file   Occupational History   • Not on file   Tobacco Use   • Smoking status: Never   • Smokeless tobacco: Never   Vaping Use   • Vaping Use: Never used   Substance and Sexual Activity   • Alcohol use:  Yes     Alcohol/week: 1 0 standard drink     Types: 1 Glasses of wine per week     Comment: social-seldom   • Drug use: Never   • Sexual activity: Not Currently   Other Topics Concern   • Not on file   Social History Narrative   • Not on file     Social Determinants of Health     Financial Resource Strain: Not on file   Food Insecurity: No Food Insecurity   • Worried About Running Out of Food in the Last Year: Never true   • Ran Out of Food in the Last Year: Never true   Transportation Needs: No Transportation Needs   • Lack of Transportation (Medical): No   • Lack of Transportation (Non-Medical):  No   Physical Activity: Not on file   Stress: Not on file   Social Connections: Not on file   Intimate Partner Violence: Not on file   Housing Stability: Low Risk    • Unable to Pay for Housing in the Last Year: No   • Number of Places Lived in the Last Year: 1   • Unstable Housing in the Last Year: No       Current Outpatient Medications:   •  acetaminophen (TYLENOL) 650 mg CR tablet, Take 1 tablet (650 mg total) by mouth every 6 (six) hours as needed for mild pain, Disp: 30 tablet, Rfl: 0  •  amLODIPine (NORVASC) 2 5 mg tablet, Take by mouth daily, Disp: , Rfl:   •  denosumab (Prolia) 60 mg/mL, Inject 1 mL under the skin every 6 (six) months, Disp: , Rfl:   •  hydrochlorothiazide (HYDRODIURIL) 12 5 mg tablet, Take 12 5 mg by mouth daily, Disp: , Rfl:   •  omeprazole (PriLOSEC) 20 mg delayed release capsule, Take by mouth daily, Disp: , Rfl:   •  amLODIPine (NORVASC) 5 mg tablet, , Disp: , Rfl:   •  apixaban (Eliquis) 5 mg, TAKE 1 TABLET BY MOUTH TWICE A DAY (Patient not taking: Reported on 12/29/2022), Disp: 60 tablet, Rfl: 2  •  fluticasone (FLONASE) 50 mcg/act nasal spray, 2 sprays into each nostril daily as needed   (Patient not taking: Reported on 9/8/2022), Disp: , Rfl:   •  lidocaine-prilocaine (EMLA) cream, Apply to PORT site 30 min prior to labs or chemotherapy (Patient not taking: Reported on 12/29/2022), Disp: 30 g, Rfl: 1  •  LORazepam (ATIVAN) 1 mg tablet, Take 1 tablet (1 mg total) by mouth every 8 (eight) hours as needed for anxiety (nausea or anxiety) (Patient not taking: Reported on 6/21/2022), Disp: 30 tablet, Rfl: 0  •  ondansetron (ZOFRAN) 8 mg tablet, Take 1 tablet (8 mg total) by mouth every 8 (eight) hours as needed for nausea or vomiting (Patient not taking: Reported on 6/21/2022), Disp: 20 tablet, Rfl: 1  No Known Allergies  Vitals:    12/29/22 1353   BP: 107/69   Pulse: 85   Resp: 18   Temp: 97 5 °F (36 4 °C)   SpO2: 95%   Weight: 112 kg (246 lb 11 1 oz)   Height: 5' 2 99" (1 6 m)      Pain Score: 0-No pain

## 2023-03-13 ENCOUNTER — TELEPHONE (OUTPATIENT)
Dept: NEPHROLOGY | Facility: CLINIC | Age: 73
End: 2023-03-13

## 2023-03-13 NOTE — TELEPHONE ENCOUNTER
Called patient and spoke with Curtis Gage to remained to please have blood and urine work done before the follow up appointment

## 2023-03-15 ENCOUNTER — TELEPHONE (OUTPATIENT)
Dept: NEPHROLOGY | Facility: CLINIC | Age: 73
End: 2023-03-15

## 2023-03-15 NOTE — TELEPHONE ENCOUNTER
Called patient to remained to please have blood and urine work done before the follow up appointment  Patient said will do labs on Friday

## 2023-03-16 ENCOUNTER — ONCOLOGY SURVIVORSHIP (OUTPATIENT)
Dept: GYNECOLOGIC ONCOLOGY | Facility: CLINIC | Age: 73
End: 2023-03-16

## 2023-03-16 VITALS
SYSTOLIC BLOOD PRESSURE: 124 MMHG | WEIGHT: 247 LBS | HEIGHT: 63 IN | DIASTOLIC BLOOD PRESSURE: 80 MMHG | BODY MASS INDEX: 43.77 KG/M2 | TEMPERATURE: 99.2 F

## 2023-03-16 DIAGNOSIS — Z85.42 HISTORY OF ENDOMETRIAL CANCER: ICD-10-CM

## 2023-03-16 DIAGNOSIS — Z85.42 ENCOUNTER FOR FOLLOW-UP SURVEILLANCE OF ENDOMETRIAL CANCER: Primary | ICD-10-CM

## 2023-03-16 DIAGNOSIS — M81.0 OSTEOPOROSIS WITHOUT CURRENT PATHOLOGICAL FRACTURE, UNSPECIFIED OSTEOPOROSIS TYPE: ICD-10-CM

## 2023-03-16 DIAGNOSIS — Z08 ENCOUNTER FOR FOLLOW-UP SURVEILLANCE OF ENDOMETRIAL CANCER: Primary | ICD-10-CM

## 2023-03-16 NOTE — PROGRESS NOTES
Assessment/Plan:    Problem List Items Addressed This Visit        Musculoskeletal and Integument    Osteoporosis without current pathological fracture     DXA due in May 2023  Order placed  Patient to take at least 1200 mg of calcium and 1000 IU vitamin D daily  Relevant Orders    DXA bone density spine hip and pelvis       Other    Encounter for follow-up surveillance of endometrial cancer - Primary     75-year-old with a history of stage IIIC1 endometrial cancer  She completed chemotherapy in August 2022 and radiation in fall 2022  No interval concerns  Her  was low at the time of diagnosis and is not a good marker  Her pelvic exam is normal  Her PS is 0  Given her advanced disease at diagnosis and inadequacy of  as a marker for recurrence, we will obtain CT c/a/p  Patient will return to the office in 3 months for her next surveillance visit  She will call in the interim with any new concerns  Relevant Orders    Ambulatory Referral to Oncology Social Worker    CT chest abdomen pelvis w contrast    BUN    Creatinine, serum    History of endometrial cancer    Relevant Orders    Ambulatory Referral to Oncology Social Worker    CT chest abdomen pelvis w contrast    BUN    Creatinine, serum         REASON FOR VISIT:   Survivorship/Surveillance Visit    Problem:  Cancer Staging   Encounter for follow-up surveillance of endometrial cancer  Staging form: Corpus Uteri - Carcinoma, AJCC 8th Edition  - Pathologic stage from 3/31/2022: FIGO Stage IIIC1 - Signed by Laurie Chappell MD on 4/19/2022        Previous therapy:  Oncology History   Encounter for follow-up surveillance of endometrial cancer   3/2/2022 Initial Diagnosis    Endometrial cancer ([facility] [address] )     3/2/2022 Biopsy    Final Diagnosis   A  Endometrium, EMB:  - High-grade endometrial carcinoma, favor clear cell carcinoma  See comment          3/31/2022 -  Cancer Staged    Staging form: Corpus Uteri - Carcinoma, AJCC 8th Edition  - Pathologic stage from 3/31/2022: FIGO Stage IIIC1 - Signed by Merari Lakhani MD on 4/19/2022  Histopathologic type: Clear cell adenocarcinoma, NOS  Stage prefix: Initial diagnosis  Histologic grade (G): G3  Histologic grading system: 3 grade system  Residual tumor (R): R0 - None  Pelvic fátima status: Positive  Number of pelvic nodes positive from dissection: 1  Number of pelvic nodes examined during dissection: 2  Lymph node metastasis: Present  Omentectomy performed: Yes  Morcellation performed: No       3/31/2022 Surgery    Robotic hysterectomy, bilateral salpingo oophorectomy, bilateral pelvic sentinel lymph node biopsies, pelvic peritoneal/omental biopsies  Final pathology demonstrated clear cell carcinoma, 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%)  Negative cervix  Negative parametria  1/2 sentinel pelvic lymph nodes positive for malignancy  Negative staging biopsies  Stage IIIC1  No evidence of DNA mismatch repair protein loss       5/11/2022 - 8/25/2022 Chemotherapy    palonosetron (ALOXI), 0 25 mg, Intravenous, Once, 6 of 6 cycles  Administration: 0 25 mg (5/11/2022), 0 25 mg (6/1/2022), 0 25 mg (6/22/2022), 0 25 mg (7/13/2022), 0 25 mg (8/3/2022), 0 25 mg (8/24/2022)  Pegfilgrastim-bmez (ZIEXTENZO), 6 mg, Subcutaneous, Once, 3 of 3 cycles  Administration: 6 mg (7/14/2022), 6 mg (8/4/2022), 6 mg (8/25/2022)  fosaprepitant (EMEND) IVPB, 150 mg, Intravenous, Once, 6 of 6 cycles  Administration: 150 mg (5/11/2022), 150 mg (6/1/2022), 150 mg (6/22/2022), 150 mg (7/13/2022), 150 mg (8/3/2022), 150 mg (8/24/2022)  CARBOplatin (PARAPLATIN) IVPB ([facility] AUC DOSING), 352 5 mg, Intravenous, Once, 6 of 6 cycles  Administration: 352 5 mg (5/11/2022), 377 5 mg (6/1/2022), 340 5 mg (6/22/2022), 353 mg (7/13/2022), 266 4 mg (8/3/2022), 271 6 mg (8/24/2022)  PACLItaxel (TAXOL) chemo IVPB, 135 mg/m2 = 290 4 mg (77 1 % of original dose 175 mg/m2), Intravenous, Once, 6 of 6 cycles  Dose modification: 135 mg/m2 (original dose 175 mg/m2, Cycle 1, Reason: Other (Must fill in a comment), Comment: prescribed starting dose)  Administration: 290 4 mg (5/11/2022), 290 4 mg (6/1/2022), 289 2 mg (6/22/2022), 292 8 mg (7/13/2022), 289 2 mg (8/3/2022), 300 mg (8/24/2022)     10/4/2022 - 11/21/2022 Radiation    Plan ID Energy Fractions Dose per Fraction (cGy) Dose Correction (cGy) Total Dose Delivered (cGy) Elapsed Days   Vag Cylinder  2 / 2 600 0 1,200 5   Pelvis 6 MV 25 180  4500 34              Patient ID: Sue Covert is a 74 y o  female     Patient has been well in the interim and is without acute complaints  She denies nausea or vomiting  Her appetite is appropriate  She is voiding and moving her bowels without difficulty  She denies abdominal or pelvic pain  The patient is without vaginal bleeding or discharge  She is ambulatory  Review of Systems   Constitutional: Negative for chills, fatigue, fever and unexpected weight change  HENT: Negative for nosebleeds  Eyes: Negative  Respiratory: Negative for cough, chest tightness, shortness of breath and wheezing  Cardiovascular: Negative for chest pain, palpitations and leg swelling  Gastrointestinal: Negative for abdominal distention, abdominal pain, anal bleeding, blood in stool, constipation, diarrhea, nausea, rectal pain and vomiting  Endocrine: Negative  Genitourinary: Negative for difficulty urinating, dysuria, frequency, hematuria, pelvic pain, urgency, vaginal bleeding, vaginal discharge and vaginal pain  Musculoskeletal: Negative for arthralgias and joint swelling  Skin: Negative for color change, pallor and rash  Neurological: Negative for dizziness, weakness, light-headedness, numbness and headaches  Hematological: Negative  Psychiatric/Behavioral: Negative  Objective:    Blood pressure 124/80, temperature 99 2 °F (37 3 °C), temperature source Tympanic, height 5' 2 99" (1 6 m), weight 112 kg (247 lb), not currently breastfeeding    Body mass index is 43 77 kg/m²  Body surface area is 2 11 meters squared  Physical Exam  Vitals reviewed  Exam conducted with a chaperone present  Constitutional:       General: She is not in acute distress  Appearance: Normal appearance  She is obese  She is not ill-appearing  HENT:      Head: Normocephalic and atraumatic  Mouth/Throat:      Mouth: Mucous membranes are moist    Eyes:      General:         Right eye: No discharge  Left eye: No discharge  Conjunctiva/sclera: Conjunctivae normal    Pulmonary:      Effort: Pulmonary effort is normal    Abdominal:      Palpations: Abdomen is soft  There is no mass  Tenderness: There is no abdominal tenderness  Hernia: No hernia is present  Genitourinary:     Comments: The external female genitalia is normal  The bartholin's, uretheral and skenes glands are normal  The urethral meatus is normal (midline with no lesions)  Anus without fissure or lesion  Speculum exam reveals a grossly normal vagina  No masses, lesions,discharge or bleeding  No significant cystocele or rectocele noted  Bimanual exam notes a surgical absent cervix, uterus and adnexal structures  No masses or fullness  Bladder is without fullness, mass or tenderness  Musculoskeletal:      Right lower leg: No edema  Left lower leg: No edema  Skin:     General: Skin is warm and dry  Coloration: Skin is not jaundiced  Findings: No rash  Neurological:      General: No focal deficit present  Mental Status: She is alert and oriented to person, place, and time  Cranial Nerves: No cranial nerve deficit  Sensory: No sensory deficit  Motor: No weakness  Gait: Gait normal    Psychiatric:         Mood and Affect: Mood normal          Behavior: Behavior normal          Thought Content:  Thought content normal          Judgment: Judgment normal              Discussed symptoms related to disease recurrence, Yes    Evaluated for late effects related to cancer treatment, Yes    Screening current for breast cancer, Yes, describe: mammo completed 11/8/22, due 11/8/23     Screening current for colon cancer, Yes, colonoscopy completed 3/20/20  Due 3/20/25  Management of obesity addressed, Yes  Reviewed diet/exercise recommendations  Screening current for osteoporosis, Yes, describe: DXA done 5/25/21, showing osteoporosis  Due this May  Will place order      Oncology Treatment Summary reviewed with patient and copy provided, Yes    Referral placed for psychosocial evaluation/screening to oncology social work Yes

## 2023-03-16 NOTE — ASSESSMENT & PLAN NOTE
DXA due in May 2023  Order placed  Patient to take at least 1200 mg of calcium and 1000 IU vitamin D daily

## 2023-03-17 ENCOUNTER — PATIENT OUTREACH (OUTPATIENT)
Dept: CASE MANAGEMENT | Facility: OTHER | Age: 73
End: 2023-03-17

## 2023-03-17 ENCOUNTER — TELEPHONE (OUTPATIENT)
Dept: NEPHROLOGY | Facility: CLINIC | Age: 73
End: 2023-03-17

## 2023-03-18 LAB
APPEARANCE UR: CLEAR
BACTERIA UR QL AUTO: ABNORMAL /HPF
BASOPHILS # BLD AUTO: 9 CELLS/UL (ref 0–200)
BASOPHILS NFR BLD AUTO: 0.4 %
BILIRUB UR QL STRIP: NEGATIVE
COLOR UR: ABNORMAL
EOSINOPHIL # BLD AUTO: 99 CELLS/UL (ref 15–500)
EOSINOPHIL NFR BLD AUTO: 4.3 %
ERYTHROCYTE [DISTWIDTH] IN BLOOD BY AUTOMATED COUNT: 12.9 % (ref 11–15)
GLUCOSE UR QL STRIP: NEGATIVE
HCT VFR BLD AUTO: 32.7 % (ref 35–45)
HGB BLD-MCNC: 10.7 G/DL (ref 11.7–15.5)
HGB UR QL STRIP: NEGATIVE
HYALINE CASTS #/AREA URNS LPF: ABNORMAL /LPF
KETONES UR QL STRIP: ABNORMAL
LEUKOCYTE ESTERASE UR QL STRIP: NEGATIVE
LYMPHOCYTES # BLD AUTO: 497 CELLS/UL (ref 850–3900)
LYMPHOCYTES NFR BLD AUTO: 21.6 %
MCH RBC QN AUTO: 33.3 PG (ref 27–33)
MCHC RBC AUTO-ENTMCNC: 32.7 G/DL (ref 32–36)
MCV RBC AUTO: 101.9 FL (ref 80–100)
MONOCYTES # BLD AUTO: 219 CELLS/UL (ref 200–950)
MONOCYTES NFR BLD AUTO: 9.5 %
NEUTROPHILS # BLD AUTO: 1477 CELLS/UL (ref 1500–7800)
NEUTROPHILS NFR BLD AUTO: 64.2 %
NITRITE UR QL STRIP: NEGATIVE
PH UR STRIP: 6 [PH] (ref 5–8)
PLATELET # BLD AUTO: 142 THOUSAND/UL (ref 140–400)
PMV BLD REES-ECKER: 10.6 FL (ref 7.5–12.5)
PROT UR QL STRIP: ABNORMAL
RBC # BLD AUTO: 3.21 MILLION/UL (ref 3.8–5.1)
RBC #/AREA URNS HPF: ABNORMAL /HPF
SP GR UR STRIP: 1.03 (ref 1–1.03)
SQUAMOUS #/AREA URNS HPF: ABNORMAL /HPF
WBC # BLD AUTO: 2.3 THOUSAND/UL (ref 3.8–10.8)
WBC #/AREA URNS HPF: ABNORMAL /HPF

## 2023-03-20 ENCOUNTER — OFFICE VISIT (OUTPATIENT)
Dept: NEPHROLOGY | Facility: CLINIC | Age: 73
End: 2023-03-20

## 2023-03-20 VITALS
BODY MASS INDEX: 43.77 KG/M2 | HEIGHT: 63 IN | SYSTOLIC BLOOD PRESSURE: 109 MMHG | WEIGHT: 247 LBS | DIASTOLIC BLOOD PRESSURE: 64 MMHG | OXYGEN SATURATION: 99 % | HEART RATE: 60 BPM

## 2023-03-20 DIAGNOSIS — M81.0 OSTEOPOROSIS WITHOUT CURRENT PATHOLOGICAL FRACTURE, UNSPECIFIED OSTEOPOROSIS TYPE: ICD-10-CM

## 2023-03-20 DIAGNOSIS — I10 ESSENTIAL HYPERTENSION: ICD-10-CM

## 2023-03-20 DIAGNOSIS — T45.1X5A CHEMOTHERAPY INDUCED NEUTROPENIA (HCC): ICD-10-CM

## 2023-03-20 DIAGNOSIS — N18.31 STAGE 3A CHRONIC KIDNEY DISEASE (HCC): Primary | ICD-10-CM

## 2023-03-20 DIAGNOSIS — I26.02 ACUTE SADDLE PULMONARY EMBOLISM WITH ACUTE COR PULMONALE (HCC): ICD-10-CM

## 2023-03-20 DIAGNOSIS — Z85.42 HISTORY OF ENDOMETRIAL CANCER: ICD-10-CM

## 2023-03-20 DIAGNOSIS — E66.01 MORBID OBESITY WITH BMI OF 45.0-49.9, ADULT (HCC): ICD-10-CM

## 2023-03-20 DIAGNOSIS — N25.81 SECONDARY HYPERPARATHYROIDISM (HCC): ICD-10-CM

## 2023-03-20 DIAGNOSIS — D70.1 CHEMOTHERAPY INDUCED NEUTROPENIA (HCC): ICD-10-CM

## 2023-03-20 DIAGNOSIS — I26.99 OTHER ACUTE PULMONARY EMBOLISM WITHOUT ACUTE COR PULMONALE (HCC): ICD-10-CM

## 2023-03-20 NOTE — LETTER
March 20, 2023     Ervin Chandler, [address]  [facility] [id number]    Patient: Sg Mcdonnell   YOB: 1950   Date of Visit: 3/20/2023       Dear Dr Ralph Pineda: Thank you for referring Sg Scherer to me for evaluation  Below are my notes for this consultation  If you have questions, please do not hesitate to call me  I look forward to following your patient along with you  Sincerely,        Elissa Shin MD        CC: No Recipients  Elissa Shin MD  3/20/2023 11:55 AM  Sign when Signing Visit  OFFICE FOLLOW UP - Nephrology   Sg Scherer 74 y o  female MRN: [id number]       ASSESSMENT and PLAN:  Sg was seen today for follow-up and chronic kidney disease  Diagnoses and all orders for this visit:    Stage 3a chronic kidney disease ([address] )  -     Renal function panel; Future  -     PTH, intact; Future  -     Protein, Total w/Creat, Random Urine; Future  -     Vitamin D 25 hydroxy; Future  -     Renal function panel  -     PTH, intact  -     Protein, Total w/Creat, Random Urine  -     Vitamin D 25 hydroxy    Morbid obesity with BMI of 45 0-49 9, adult (HCC)    Essential hypertension    History of endometrial cancer    Chemotherapy induced neutropenia (HCC)    Other acute pulmonary embolism without acute cor pulmonale (HCC)    Osteoporosis without current pathological fracture, unspecified osteoporosis type    Secondary hyperparathyroidism ([address] )  -     PTH, intact; Future  -     Vitamin D 25 hydroxy; Future  -     PTH, intact  -     Vitamin D 25 hydroxy    Acute saddle pulmonary embolism with acute cor pulmonale (HCC)        This is a 58-year-old lady who returns to the office for CKD follow-up  Patient was initially seen on 03/2022, s/p total hysterectomy 03/2022, she was hospitalized few months ago after syncopal episode was found to have PE on 05/2022  Last time she was seen was on 9/2022, today returns for 6-month follow-up with her sister    1   Chronic kidney disease stage III with previous creatinine around 1 1-1 4 back since 2018 as per Care everywhere  No recent kidney function test since 9/2022  CKD suspected secondary to long-term history hypertension, obesity, age-related nephron loss  Discussed with patient and with her sister, plan to have repeat blood and urine test now, if kidney function remains stable I would like to see her back in the office in 6 months  Once again discussed about importance to avoid NSAIDs, stay well-hydrated  2  Hypertension, blood pressure today in the office in the lower side, currently on amlodipine 2 5 mg daily and hydrochlorothiazide 12 5 mg daily  Recommend to stop taking amlodipine  Continue with hydrochlorothiazide and continue close follow-up with primary care doctor  3  Postmenopausal bleeding with high-grade endometrial cancer, status post bilateral salpingo-oophorectomy, hysterectomy on 03/31/2022  Patient completed adjuvant chemotherapy with Taxol and carboplatin in 8/2022, completed radiation in fall 2022  Continue close follow-up with gynecology oncology, plan for repeat CAT scan    4  Anemia multifactorial in the setting of previous chemotherapy, monitor H&H, transfusion as needed    5  Morbid obesity advised to lose weight    6  Elevated PTH, secondary hyperparathyroidism, plan to repeat labs now including vitamin D  Patient Instructions   As we discussed in the office visit, I would like you to go for repeat blood and urine test, we will contact you with the results  If your kidney function remains stable I would like to see you back in the office in 6 months  Remember to follow low-salt diet  Remember to stay well-hydrated  No changes in your medication at this moment  Remember to avoid NSAIDs (no ibuprofen, Motrin, Advil, Aleve, naproxen)  Okay to take Tylenol or paracetamol or acetaminophen as needed for pain    Continue close follow-up with your primary care doctor, gynecologist oncologist, radiation oncologist        HPI: Jose Levy is a 74 y o  female who is here for Follow-up and Chronic Kidney Disease    This is a patient history of chronic kidney disease, hypertension, obesity, stage III endometrial cancer status post hysterectomy on 03/2022, anemia, who returns to the office for six-month follow-up  Patient was 1st time seen on 03/202, today returns for follow-up,    Since our last visit, underwent total hysterectomy at the end of March 2022, patient was started on adjuvant chemotherapy with Taxol and carboplatin  Patient was hospitalized on May 2022 after syncopal episode, was found to have a PE, currently on anticoagulation  Patient today returns to the office with her sister Ayan Lunsford  Patient currently has no complaints at this time and is feeling well other than on and off dizziness  Patient denies any chest pain, shortness of breath or significant leg swelling  Denies any abdominal pain, no nausea, vomiting, no diarrhea or constipation  Denies any urinary problems, no burning sensation or gross hematuria  The last blood work was done on 9/12/2022, which we have reviewed together  Serum creatinine 1 41   on 9/6/2022      ROS: All the systems were reviewed and were negative except as documented on the H&P  Allergies: Patient has no known allergies      Medications:   Current Outpatient Medications:   •  acetaminophen (TYLENOL) 650 mg CR tablet, Take 1 tablet (650 mg total) by mouth every 6 (six) hours as needed for mild pain, Disp: 30 tablet, Rfl: 0  •  amLODIPine (NORVASC) 2 5 mg tablet, Take by mouth daily, Disp: , Rfl:   •  denosumab (Prolia) 60 mg/mL, Inject 1 mL under the skin every 6 (six) months, Disp: , Rfl:   •  hydrochlorothiazide (HYDRODIURIL) 12 5 mg tablet, Take 12 5 mg by mouth daily, Disp: , Rfl:   •  omeprazole (PriLOSEC) 20 mg delayed release capsule, Take by mouth daily, Disp: , Rfl:   •  amLODIPine (NORVASC) 5 mg tablet, , Disp: , Rfl:   •  apixaban (Eliquis) 5 mg, TAKE 1 TABLET BY MOUTH TWICE A DAY (Patient not taking: Reported on 12/29/2022), Disp: 60 tablet, Rfl: 2  •  fluticasone (FLONASE) 50 mcg/act nasal spray, 2 sprays into each nostril daily as needed   (Patient not taking: Reported on 9/8/2022), Disp: , Rfl:   •  lidocaine-prilocaine (EMLA) cream, Apply to PORT site 30 min prior to labs or chemotherapy (Patient not taking: Reported on 12/29/2022), Disp: 30 g, Rfl: 1  •  LORazepam (ATIVAN) 1 mg tablet, Take 1 tablet (1 mg total) by mouth every 8 (eight) hours as needed for anxiety (nausea or anxiety) (Patient not taking: Reported on 6/21/2022), Disp: 30 tablet, Rfl: 0  •  ondansetron (ZOFRAN) 8 mg tablet, Take 1 tablet (8 mg total) by mouth every 8 (eight) hours as needed for nausea or vomiting (Patient not taking: Reported on 6/21/2022), Disp: 20 tablet, Rfl: 1    Past Medical History:   Diagnosis Date   • Arthritis     osteo   • Chronic kidney disease    • Colon polyp    • Diverticula of colon    • Hypertension    • Obesity    • Rhinitis      Past Surgical History:   Procedure Laterality Date   • APPENDECTOMY     • BREAST BIOPSY Left 1977    benign   • BREAST SURGERY Left     biopsy   • CHOLECYSTECTOMY     • COLONOSCOPY     • CYSTOSCOPY N/A 03/31/2022    Procedure: Johnny Newton;  Surgeon: Danilo Fernandez MD;  [facility];  Service: Gynecology Oncology   • HERNIA REPAIR Right     inguinal   • HYSTERECTOMY     • IR PORT PLACEMENT  05/09/2022   • JOINT REPLACEMENT Bilateral     Knee   • OH COLONOSCOPY FLX DX W/COLLJ SPEC WHEN PFRMD N/A 02/21/2017    Procedure: COLONOSCOPY with polypectomy and hemo clip marjan ;  Surgeon: Frankie Clark MD;  [facility]: [facility];   Service: Gastroenterology   • OH LAPS TOTAL HYSTERECT 250 GM/< W/RMVL TUBE/OVARY N/A 03/31/2022    Procedure: ROBOTIC HYSTERECTOMY, BILATERAL SALPINGO-OOPHORECTOMY, STAGING PERITONEAL AND OMENTAL BIOPSIES, BILATERAL PELVIC SENTINEL LYMPH NODE BIOPSIES; Surgeon: Georgie Reed MD;  [facility];  Service: Gynecology Oncology     Family History   Problem Relation Age of Onset   • Heart disease Mother    • Prostate cancer Father 80   • Diabetes Sister    • Hypertension Sister    • Transient ischemic attack Sister    • No Known Problems Sister    • No Known Problems Maternal Grandmother    • No Known Problems Maternal Grandfather    • No Known Problems Paternal Grandmother    • No Known Problems Paternal Grandfather    • Diabetes Brother    • Heart disease Brother    • No Known Problems Maternal Aunt    • No Known Problems Paternal Aunt    • No Known Problems Paternal Aunt    • No Known Problems Paternal Aunt    • Stomach cancer Other 45   • Thyroid disease Other       reports that she has never smoked  She has never used smokeless tobacco  She reports current alcohol use of about 1 0 standard drink per week  She reports that she does not use drugs  Physical Exam:   Vitals:    03/20/23 1121 03/20/23 1150   BP: 100/60 109/64   BP Location: Left arm Right arm   Patient Position: Sitting Sitting   Cuff Size: Adult Large   Pulse: 60    SpO2: 99%    Weight: 112 kg (247 lb)    Height: 5' 2 99" (1 6 m)      Body mass index is 43 77 kg/m²      General: morbid obese, conscious, cooperative, in not acute distress  Eyes: conjunctivae pink, anicteric sclerae  ENT: lips and mucous membranes moist  Neck: supple, no JVD  Chest: clear breath sounds bilateral, no crackles, ronchus or wheezings  CVS: distinct S1 & S2, normal rate, regular rhythm  Abdomen: obese, non-tender, non-distended, normoactive bowel sounds  Back: no CVA tenderness  Extremities: no edema of both legs  Skin: no rash  Neuro: awake, alert, oriented          Lab Results:  Results for orders placed or performed in visit on 03/16/23   Urinalysis with microscopic   Result Value Ref Range    Color UA DARK YELLOW YELLOW    Urine Appearance CLEAR CLEAR    Specific Gravity 1 026 1 001 - 1 035    Ph 6 0 5 0 - 8 0    Glucose, Urine NEGATIVE NEGATIVE    Bilirubin, Urine NEGATIVE NEGATIVE    Ketone, Urine TRACE (A) NEGATIVE    Blood, Urine NEGATIVE NEGATIVE    Protein, Urine TRACE (A) NEGATIVE    SL AMB NITRITES URINE, QUAL  NEGATIVE NEGATIVE    Leukocyte Esterase NEGATIVE NEGATIVE    SL AMB WBC, URINE NONE SEEN < OR = 5 /HPF    RBC, Urine 0-2 < OR = 2 /HPF    Squamous Epithelial Cells 10-20 (A) < OR = 5 /HPF    Bacteria, UA NONE SEEN NONE SEEN /HPF    Hyaline Casts 20-40 (A) NONE SEEN /LPF    Comment(s)     CBC and differential   Result Value Ref Range    White Blood Cell Count 2 3 (L) 3 8 - 10 8 Thousand/uL    Red Blood Cell Count 3 21 (L) 3 80 - 5 10 Million/uL    Hemoglobin 10 7 (L) 11 7 - 15 5 g/dL    HCT 32 7 (L) 35 0 - 45 0 %     9 (H) 80 0 - 100 0 fL    MCH 33 3 (H) 27 0 - 33 0 pg    MCHC 32 7 32 0 - 36 0 g/dL    RDW 12 9 11 0 - 15 0 %    Platelet Count 702 026 - 400 Thousand/uL    SL AMB MPV 10 6 7 5 - 12 5 fL    Neutrophils (Absolute) 1,477 (L) 1,500 - 7,800 cells/uL    Lymphocytes (Absolute) 497 (L) 850 - 3,900 cells/uL    Monocytes (Absolute) 219 200 - 950 cells/uL    Eosinophils (Absolute) 99 15 - 500 cells/uL    Basophils ABS 9 0 - 200 cells/uL    Neutrophils 64 2 %    Lymphocytes 21 6 %    Monocytes 9 5 %    Eosinophils 4 3 %    Basophils PCT 0 4 %       Results from last 7 days   Lab Units 03/16/23  0824   WHITE BLOOD CELL COUNT  Thousand/uL 2 3*   HEMOGLOBIN  g/dL 10 7*   HEMATOCRIT  % 32 7*   PLATELETS  Thousand/uL 142           Portions of the record may have been created with voice recognition software  Occasional wrong word or "sound a like" substitutions may have occurred due to the inherent limitations of voice recognition software  Read the chart carefully and recognize, using context, where substitutions have occurred  If you have any questions, please contact the dictating provider

## 2023-03-20 NOTE — PATIENT INSTRUCTIONS
As we discussed in the office visit, I would like you to go for repeat blood and urine test, we will contact you with the results  Your blood pressure today in the office is low, recommend to stop taking amlodipine  If your kidney function remains stable I would like to see you back in the office in 6 months  Remember to follow low-salt diet  Remember to stay well-hydrated  No changes in your medication at this moment  Remember to avoid NSAIDs (no ibuprofen, Motrin, Advil, Aleve, naproxen)  Okay to take Tylenol or paracetamol or acetaminophen as needed for pain    Continue close follow-up with your primary care doctor, gynecologist oncologist, radiation oncologist

## 2023-03-20 NOTE — PROGRESS NOTES
OFFICE FOLLOW UP - Nephrology   Sg Scherer 74 y o  female MRN: [id number]       ASSESSMENT and PLAN:  Sg was seen today for follow-up and chronic kidney disease  Diagnoses and all orders for this visit:    Stage 3a chronic kidney disease ([facility] [address] )  -     Renal function panel; Future  -     PTH, intact; Future  -     Protein, Total w/Creat, Random Urine; Future  -     Vitamin D 25 hydroxy; Future  -     Renal function panel  -     PTH, intact  -     Protein, Total w/Creat, Random Urine  -     Vitamin D 25 hydroxy    Morbid obesity with BMI of 45 0-49 9, adult (HCC)    Essential hypertension    History of endometrial cancer    Chemotherapy induced neutropenia (HCC)    Other acute pulmonary embolism without acute cor pulmonale (HCC)    Osteoporosis without current pathological fracture, unspecified osteoporosis type    Secondary hyperparathyroidism ([facility] [address] )  -     PTH, intact; Future  -     Vitamin D 25 hydroxy; Future  -     PTH, intact  -     Vitamin D 25 hydroxy    Acute saddle pulmonary embolism with acute cor pulmonale (HCC)        This is a 75-year-old lady who returns to the office for CKD follow-up  Patient was initially seen on 03/2022, s/p total hysterectomy 03/2022, she was hospitalized few months ago after syncopal episode was found to have PE on 05/2022  Last time she was seen was on 9/2022, today returns for 6-month follow-up with her sister    1  Chronic kidney disease stage III with previous creatinine around 1 1-1 4 back since 2018 as per Care everywhere  No recent kidney function test since 9/2022  CKD suspected secondary to long-term history hypertension, obesity, age-related nephron loss  Discussed with patient and with her sister, plan to have repeat blood and urine test now, if kidney function remains stable I would like to see her back in the office in 6 months  Once again discussed about importance to avoid NSAIDs, stay well-hydrated      2  Hypertension, blood pressure today in the office in the lower side, currently on amlodipine 2 5 mg daily and hydrochlorothiazide 12 5 mg daily  Recommend to stop taking amlodipine  Continue with hydrochlorothiazide and continue close follow-up with primary care doctor  3  Postmenopausal bleeding with high-grade endometrial cancer, status post bilateral salpingo-oophorectomy, hysterectomy on 03/31/2022  Patient completed adjuvant chemotherapy with Taxol and carboplatin in 8/2022, completed radiation in fall 2022  Continue close follow-up with gynecology oncology, plan for repeat CAT scan    4  Anemia multifactorial in the setting of previous chemotherapy, monitor H&H, transfusion as needed    5  Morbid obesity, advised to lose weight, she lost 9 pounds since last office visit    6  Elevated PTH, secondary hyperparathyroidism, plan to repeat labs now including vitamin D  Patient Instructions   As we discussed in the office visit, I would like you to go for repeat blood and urine test, we will contact you with the results  If your kidney function remains stable I would like to see you back in the office in 6 months  Remember to follow low-salt diet  Remember to stay well-hydrated  No changes in your medication at this moment  Remember to avoid NSAIDs (no ibuprofen, Motrin, Advil, Aleve, naproxen)  Okay to take Tylenol or paracetamol or acetaminophen as needed for pain  Continue close follow-up with your primary care doctor, gynecologist oncologist, radiation oncologist        HPI: Radha Alonso is a 74 y o  female who is here for Follow-up and Chronic Kidney Disease    This is a patient history of chronic kidney disease, hypertension, obesity, stage III endometrial cancer status post hysterectomy on 03/2022, anemia, who returns to the office for six-month follow-up      Patient was 1st time seen on 03/202, today returns for follow-up,    Since our last visit, underwent total hysterectomy at the end of March 2022, patient was started on adjuvant chemotherapy with Taxol and carboplatin  Patient was hospitalized on May 2022 after syncopal episode, was found to have a PE, currently on anticoagulation  Patient today returns to the office with her sister Glenn Siddiqui  Patient currently has no complaints at this time and is feeling well other than on and off dizziness  Patient denies any chest pain, shortness of breath or significant leg swelling  Denies any abdominal pain, no nausea, vomiting, no diarrhea or constipation  Denies any urinary problems, no burning sensation or gross hematuria  She lost 9 pounds since last office visit    The last blood work was done on 9/12/2022, which we have reviewed together  Serum creatinine 1 41   on 9/6/2022      ROS: All the systems were reviewed and were negative except as documented on the H&P  Allergies: Patient has no known allergies      Medications:   Current Outpatient Medications:   •  acetaminophen (TYLENOL) 650 mg CR tablet, Take 1 tablet (650 mg total) by mouth every 6 (six) hours as needed for mild pain, Disp: 30 tablet, Rfl: 0  •  amLODIPine (NORVASC) 2 5 mg tablet, Take by mouth daily, Disp: , Rfl:   •  denosumab (Prolia) 60 mg/mL, Inject 1 mL under the skin every 6 (six) months, Disp: , Rfl:   •  hydrochlorothiazide (HYDRODIURIL) 12 5 mg tablet, Take 12 5 mg by mouth daily, Disp: , Rfl:   •  omeprazole (PriLOSEC) 20 mg delayed release capsule, Take by mouth daily, Disp: , Rfl:   •  amLODIPine (NORVASC) 5 mg tablet, , Disp: , Rfl:   •  apixaban (Eliquis) 5 mg, TAKE 1 TABLET BY MOUTH TWICE A DAY (Patient not taking: Reported on 12/29/2022), Disp: 60 tablet, Rfl: 2  •  fluticasone (FLONASE) 50 mcg/act nasal spray, 2 sprays into each nostril daily as needed   (Patient not taking: Reported on 9/8/2022), Disp: , Rfl:   •  lidocaine-prilocaine (EMLA) cream, Apply to PORT site 30 min prior to labs or chemotherapy (Patient not taking: Reported on 12/29/2022), Disp: 30 g, Rfl: 1  • LORazepam (ATIVAN) 1 mg tablet, Take 1 tablet (1 mg total) by mouth every 8 (eight) hours as needed for anxiety (nausea or anxiety) (Patient not taking: Reported on 6/21/2022), Disp: 30 tablet, Rfl: 0  •  ondansetron (ZOFRAN) 8 mg tablet, Take 1 tablet (8 mg total) by mouth every 8 (eight) hours as needed for nausea or vomiting (Patient not taking: Reported on 6/21/2022), Disp: 20 tablet, Rfl: 1    Past Medical History:   Diagnosis Date   • Arthritis     osteo   • Chronic kidney disease    • Colon polyp    • Diverticula of colon    • Hypertension    • Obesity    • Rhinitis      Past Surgical History:   Procedure Laterality Date   • APPENDECTOMY     • BREAST BIOPSY Left 1977    benign   • BREAST SURGERY Left     biopsy   • CHOLECYSTECTOMY     • COLONOSCOPY     • CYSTOSCOPY N/A 03/31/2022    Procedure: Radhika Joe;  Surgeon: Esther Espinal MD;  [facility];  Service: Gynecology Oncology   • HERNIA REPAIR Right     inguinal   • HYSTERECTOMY     • IR PORT PLACEMENT  05/09/2022   • JOINT REPLACEMENT Bilateral     Knee   • MS COLONOSCOPY FLX DX W/COLLJ SPEC WHEN PFRMD N/A 02/21/2017    Procedure: COLONOSCOPY with polypectomy and hemo clip marjan ;  Surgeon: Shoshana Daniels MD;  [facility]: [facility];   Service: Gastroenterology   • MS LAPS TOTAL HYSTERECT 250 GM/< W/RMVL TUBE/OVARY N/A 03/31/2022    Procedure: ROBOTIC HYSTERECTOMY, BILATERAL SALPINGO-OOPHORECTOMY, STAGING PERITONEAL AND OMENTAL BIOPSIES, BILATERAL PELVIC SENTINEL LYMPH NODE BIOPSIES;  Surgeon: Esther Espinal MD;  [facility];  Service: Gynecology Oncology     Family History   Problem Relation Age of Onset   • Heart disease Mother    • Prostate cancer Father 80   • Diabetes Sister    • Hypertension Sister    • Transient ischemic attack Sister    • No Known Problems Sister    • No Known Problems Maternal Grandmother    • No Known Problems Maternal Grandfather    • No Known Problems Paternal Grandmother    • No Known Problems Paternal Grandfather    • Diabetes Brother    • Heart disease Brother    • No Known Problems Maternal Aunt    • No Known Problems Paternal Aunt    • No Known Problems Paternal Aunt    • No Known Problems Paternal Aunt    • Stomach cancer Other 45   • Thyroid disease Other       reports that she has never smoked  She has never used smokeless tobacco  She reports current alcohol use of about 1 0 standard drink per week  She reports that she does not use drugs  Physical Exam:   Vitals:    03/20/23 1121 03/20/23 1150   BP: 100/60 109/64   BP Location: Left arm Right arm   Patient Position: Sitting Sitting   Cuff Size: Adult Large   Pulse: 60    SpO2: 99%    Weight: 112 kg (247 lb)    Height: 5' 2 99" (1 6 m)      Body mass index is 43 77 kg/m²  General: morbid obese, conscious, cooperative, in not acute distress  Eyes: conjunctivae pink, anicteric sclerae  ENT: lips and mucous membranes moist  Neck: supple, no JVD  Chest: clear breath sounds bilateral, no crackles, ronchus or wheezings  CVS: distinct S1 & S2, normal rate, regular rhythm  Abdomen: obese, non-tender, non-distended, normoactive bowel sounds  Back: no CVA tenderness  Extremities: no edema of both legs  Skin: no rash  Neuro: awake, alert, oriented          Lab Results:  Results for orders placed or performed in visit on 03/16/23   Urinalysis with microscopic   Result Value Ref Range    Color UA DARK YELLOW YELLOW    Urine Appearance CLEAR CLEAR    Specific Gravity 1 026 1 001 - 1 035    Ph 6 0 5 0 - 8 0    Glucose, Urine NEGATIVE NEGATIVE    Bilirubin, Urine NEGATIVE NEGATIVE    Ketone, Urine TRACE (A) NEGATIVE    Blood, Urine NEGATIVE NEGATIVE    Protein, Urine TRACE (A) NEGATIVE    SL AMB NITRITES URINE, QUAL   NEGATIVE NEGATIVE    Leukocyte Esterase NEGATIVE NEGATIVE    SL AMB WBC, URINE NONE SEEN < OR = 5 /HPF    RBC, Urine 0-2 < OR = 2 /HPF    Squamous Epithelial Cells 10-20 (A) < OR = 5 /HPF    Bacteria, UA NONE SEEN NONE SEEN /HPF    Hyaline Casts 20-40 (A) NONE SEEN /LPF    Comment(s)     CBC and differential   Result Value Ref Range    White Blood Cell Count 2 3 (L) 3 8 - 10 8 Thousand/uL    Red Blood Cell Count 3 21 (L) 3 80 - 5 10 Million/uL    Hemoglobin 10 7 (L) 11 7 - 15 5 g/dL    HCT 32 7 (L) 35 0 - 45 0 %     9 (H) 80 0 - 100 0 fL    MCH 33 3 (H) 27 0 - 33 0 pg    MCHC 32 7 32 0 - 36 0 g/dL    RDW 12 9 11 0 - 15 0 %    Platelet Count 142 893 - 400 Thousand/uL    SL AMB MPV 10 6 7 5 - 12 5 fL    Neutrophils (Absolute) 1,477 (L) 1,500 - 7,800 cells/uL    Lymphocytes (Absolute) 497 (L) 850 - 3,900 cells/uL    Monocytes (Absolute) 219 200 - 950 cells/uL    Eosinophils (Absolute) 99 15 - 500 cells/uL    Basophils ABS 9 0 - 200 cells/uL    Neutrophils 64 2 %    Lymphocytes 21 6 %    Monocytes 9 5 %    Eosinophils 4 3 %    Basophils PCT 0 4 %       Results from last 7 days   Lab Units 03/16/23  0824   WHITE BLOOD CELL COUNT  Thousand/uL 2 3*   HEMOGLOBIN  g/dL 10 7*   HEMATOCRIT  % 32 7*   PLATELETS  Thousand/uL 142           Portions of the record may have been created with voice recognition software  Occasional wrong word or "sound a like" substitutions may have occurred due to the inherent limitations of voice recognition software  Read the chart carefully and recognize, using context, where substitutions have occurred  If you have any questions, please contact the dictating provider

## 2023-03-22 LAB
25(OH)D3 SERPL-MCNC: 33 NG/ML (ref 30–100)
ALBUMIN SERPL-MCNC: 3.5 G/DL (ref 3.6–5.1)
BUN SERPL-MCNC: 25 MG/DL (ref 7–25)
BUN/CREAT SERPL: 18 (CALC) (ref 6–22)
CALCIUM SERPL-MCNC: 9.1 MG/DL (ref 8.6–10.4)
CHLORIDE SERPL-SCNC: 105 MMOL/L (ref 98–110)
CO2 SERPL-SCNC: 31 MMOL/L (ref 20–32)
CREAT SERPL-MCNC: 1.42 MG/DL (ref 0.6–1)
CREAT UR-MCNC: 428 MG/DL (ref 20–275)
GFR/BSA.PRED SERPLBLD CYS-BASED-ARV: 39 ML/MIN/1.73M2
GLUCOSE SERPL-MCNC: 93 MG/DL (ref 65–99)
PHOSPHATE SERPL-MCNC: 4.2 MG/DL (ref 2.1–4.3)
POTASSIUM SERPL-SCNC: 3.8 MMOL/L (ref 3.5–5.3)
PROT UR-MCNC: 26 MG/DL (ref 5–24)
PROT/CREAT UR: 0.06 MG/MG CREAT (ref 0.02–0.18)
PROT/CREAT UR: 61 MG/G CREAT (ref 24–184)
PTH-INTACT SERPL-MCNC: 83 PG/ML (ref 16–77)
SODIUM SERPL-SCNC: 142 MMOL/L (ref 135–146)

## 2023-03-23 ENCOUNTER — TELEPHONE (OUTPATIENT)
Dept: NEPHROLOGY | Facility: CLINIC | Age: 73
End: 2023-03-23

## 2023-03-23 DIAGNOSIS — N18.31 STAGE 3A CHRONIC KIDNEY DISEASE (HCC): Primary | ICD-10-CM

## 2023-03-23 NOTE — TELEPHONE ENCOUNTER
Blood test reviewed, kidney function overall is stable with a most recent creatinine of 1 4, no proteinuria, PTH slight elevated but acceptable given chronic kidney disease, vitamin D level within normal limits  Called patient and spoke with her regarding recent blood test showing stable kidney function  Plan to follow-up in 6 months as scheduled with repeat labs (orders placed)  Please mail orders to patient    Thanks,

## 2023-03-27 ENCOUNTER — HOSPITAL ENCOUNTER (OUTPATIENT)
Dept: CT IMAGING | Facility: HOSPITAL | Age: 73
Discharge: HOME | End: 2023-03-27

## 2023-03-27 DIAGNOSIS — Z85.42 HISTORY OF ENDOMETRIAL CANCER: ICD-10-CM

## 2023-03-27 DIAGNOSIS — Z85.42 ENCOUNTER FOR FOLLOW-UP SURVEILLANCE OF ENDOMETRIAL CANCER: ICD-10-CM

## 2023-03-27 DIAGNOSIS — Z08 ENCOUNTER FOR FOLLOW-UP SURVEILLANCE OF ENDOMETRIAL CANCER: ICD-10-CM

## 2023-03-27 RX ADMIN — IOHEXOL 100 ML: 350 INJECTION, SOLUTION INTRAVENOUS at 16:18

## 2023-03-29 ENCOUNTER — PATIENT OUTREACH (OUTPATIENT)
Dept: CASE MANAGEMENT | Facility: OTHER | Age: 73
End: 2023-03-29

## 2023-04-25 ENCOUNTER — TELEPHONE (OUTPATIENT)
Dept: GYNECOLOGIC ONCOLOGY | Facility: CLINIC | Age: 73
End: 2023-04-25

## 2023-04-25 NOTE — TELEPHONE ENCOUNTER
Called and informed patient that her appointment time on 6/20/23 has changed from 10:15am to 11:30am  Patient stated she understood and was in agreement

## 2023-05-26 ENCOUNTER — HOSPITAL ENCOUNTER (OUTPATIENT)
Dept: BONE DENSITY | Facility: MEDICAL CENTER | Age: 73
Discharge: HOME | End: 2023-05-26

## 2023-05-26 DIAGNOSIS — M81.0 OSTEOPOROSIS WITHOUT CURRENT PATHOLOGICAL FRACTURE, UNSPECIFIED OSTEOPOROSIS TYPE: ICD-10-CM

## 2023-06-20 ENCOUNTER — OFFICE VISIT (OUTPATIENT)
Dept: GYNECOLOGIC ONCOLOGY | Facility: CLINIC | Age: 73
End: 2023-06-20

## 2023-06-20 VITALS
RESPIRATION RATE: 16 BRPM | HEIGHT: 63 IN | DIASTOLIC BLOOD PRESSURE: 80 MMHG | WEIGHT: 249.6 LBS | OXYGEN SATURATION: 98 % | TEMPERATURE: 97.4 F | BODY MASS INDEX: 44.23 KG/M2 | SYSTOLIC BLOOD PRESSURE: 120 MMHG | HEART RATE: 81 BPM

## 2023-06-20 DIAGNOSIS — Z85.42 HISTORY OF ENDOMETRIAL CANCER: ICD-10-CM

## 2023-06-20 DIAGNOSIS — Z08 ENCOUNTER FOR FOLLOW-UP SURVEILLANCE OF ENDOMETRIAL CANCER: Primary | ICD-10-CM

## 2023-06-20 DIAGNOSIS — Z85.42 ENCOUNTER FOR FOLLOW-UP SURVEILLANCE OF ENDOMETRIAL CANCER: Primary | ICD-10-CM

## 2023-06-20 DIAGNOSIS — M81.0 OSTEOPOROSIS WITHOUT CURRENT PATHOLOGICAL FRACTURE, UNSPECIFIED OSTEOPOROSIS TYPE: ICD-10-CM

## 2023-06-20 NOTE — PROGRESS NOTES
Assessment/Plan:    Problem List Items Addressed This Visit        Musculoskeletal and Integument    Osteoporosis without current pathological fracture     DXA performed in May 2023 shows osteoporosis  Report reviewed by me  There have been improvements in bone mineral density noted in lumbar spine and left femoral neck  Patient has been on Prolia  Plan to continue this, although we will defer management to PCP  Other    Encounter for follow-up surveillance of endometrial cancer - Primary     57-year-old with a history of stage IIIC1 endometrial cancer  She completed chemotherapy in August 2022 and radiation in November 2022  She has no interval concerns to report Her  was low at the time of diagnosis and is not a good marker  Her pelvic exam is normal  Her PS is 0     CT a/p performed in March 2023 was without evidence of disease  Will consider PRN in the future  Patient will return to the office in 3 months for her next surveillance visit  She was educated on warning signs of cancer recurrence and when to call the office  History of endometrial cancer           CHIEF COMPLAINT: Endometrial cancer surveillance      Subjective:     Problem:  Cancer Staging   Encounter for follow-up surveillance of endometrial cancer  Staging form: Corpus Uteri - Carcinoma, AJCC 8th Edition  - Pathologic stage from 3/31/2022: FIGO Stage IIIC1 - Signed by Ramonita Salcedo MD on 4/19/2022      Previous therapy:  Oncology History   Encounter for follow-up surveillance of endometrial cancer   3/2/2022 Initial Diagnosis    Endometrial cancer ([facility] [address] )     3/2/2022 Biopsy    Final Diagnosis   A  Endometrium, EMB:  - High-grade endometrial carcinoma, favor clear cell carcinoma  See comment  3/31/2022 -  Cancer Staged    Staging form: Corpus Uteri - Carcinoma, AJCC 8th Edition  - Pathologic stage from 3/31/2022:  FIGO Stage IIIC1 - Signed by Ramonita Salcedo MD on 4/19/2022  Histopathologic type: Clear cell adenocarcinoma, NOS  Stage prefix: Initial diagnosis  Histologic grade (G): G3  Histologic grading system: 3 grade system  Residual tumor (R): R0 - None  Pelvic fátima status: Positive  Number of pelvic nodes positive from dissection: 1  Number of pelvic nodes examined during dissection: 2  Lymph node metastasis: Present  Omentectomy performed: Yes  Morcellation performed: No       3/31/2022 Surgery    Robotic hysterectomy, bilateral salpingo oophorectomy, bilateral pelvic sentinel lymph node biopsies, pelvic peritoneal/omental biopsies  Final pathology demonstrated clear cell carcinoma, 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%)  Negative cervix  Negative parametria  1/2 sentinel pelvic lymph nodes positive for malignancy  Negative staging biopsies  Stage IIIC1  No evidence of DNA mismatch repair protein loss       5/11/2022 - 8/25/2022 Chemotherapy    palonosetron (ALOXI), 0 25 mg, Intravenous, Once, 6 of 6 cycles  Administration: 0 25 mg (5/11/2022), 0 25 mg (6/1/2022), 0 25 mg (6/22/2022), 0 25 mg (7/13/2022), 0 25 mg (8/3/2022), 0 25 mg (8/24/2022)  Pegfilgrastim-bmez (ZIEXTENZO), 6 mg, Subcutaneous, Once, 3 of 3 cycles  Administration: 6 mg (7/14/2022), 6 mg (8/4/2022), 6 mg (8/25/2022)  fosaprepitant (EMEND) IVPB, 150 mg, Intravenous, Once, 6 of 6 cycles  Administration: 150 mg (5/11/2022), 150 mg (6/1/2022), 150 mg (6/22/2022), 150 mg (7/13/2022), 150 mg (8/3/2022), 150 mg (8/24/2022)  CARBOplatin (PARAPLATIN) IVPB (GOG AUC DOSING), 352 5 mg, Intravenous, Once, 6 of 6 cycles  Administration: 352 5 mg (5/11/2022), 377 5 mg (6/1/2022), 340 5 mg (6/22/2022), 353 mg (7/13/2022), 266 4 mg (8/3/2022), 271 6 mg (8/24/2022)  PACLItaxel (TAXOL) chemo IVPB, 135 mg/m2 = 290 4 mg (77 1 % of original dose 175 mg/m2), Intravenous, Once, 6 of 6 cycles  Dose modification: 135 mg/m2 (original dose 175 mg/m2, Cycle 1, Reason: Other (Must fill in a comment), Comment: prescribed starting dose)  Administration: 290 4 mg (5/11/2022), 290 4 mg (6/1/2022), 289 2 mg (6/22/2022), 292 8 mg (7/13/2022), 289 2 mg (8/3/2022), 300 mg (8/24/2022)     10/4/2022 - 11/21/2022 Radiation    Plan ID Energy Fractions Dose per Fraction (cGy) Dose Correction (cGy) Total Dose Delivered (cGy) Elapsed Days   Vag Cylinder  2 / 2 600 0 1,200 5   Pelvis 6 MV 25 180  4500 34              Patient ID: Abdulkadir Muir is a 74 y o  female     Patient has no interval concerns  Denies symptoms of recurrent disease, including nausea/vomiting, constipation/diarrhea, abdominal pain, pelvic pain, changes in bladder function, and vaginal bleeding/discharge  Endorses a normal appetite and is without SOB, CP, and bloating  She is ambulatory and independent at home  Review of Systems   Constitutional: Negative for chills, fatigue, fever and unexpected weight change  HENT: Negative for nosebleeds  Eyes: Negative  Respiratory: Negative for cough, chest tightness, shortness of breath and wheezing  Cardiovascular: Negative for chest pain, palpitations and leg swelling  Gastrointestinal: Negative for abdominal distention, abdominal pain, anal bleeding, blood in stool, constipation, diarrhea, nausea, rectal pain and vomiting  Endocrine: Negative  Genitourinary: Negative for difficulty urinating, dysuria, frequency, hematuria, pelvic pain, urgency, vaginal bleeding, vaginal discharge and vaginal pain  Musculoskeletal: Negative for arthralgias and joint swelling  Skin: Negative for color change, pallor and rash  Neurological: Negative for dizziness, weakness, light-headedness, numbness and headaches  Hematological: Negative  Psychiatric/Behavioral: Negative          Current Outpatient Medications   Medication Sig Dispense Refill   • acetaminophen (TYLENOL) 650 mg CR tablet Take 1 tablet (650 mg total) by mouth every 6 (six) hours as needed for mild pain 30 tablet 0   • denosumab (Prolia) 60 mg/mL Inject 1 mL under the skin every 6 (six) months     • hydrochlorothiazide (HYDRODIURIL) 12 5 mg tablet Take 12 5 mg by mouth daily     • omeprazole (PriLOSEC) 20 mg delayed release capsule Take by mouth daily     • amLODIPine (NORVASC) 5 mg tablet  (Patient not taking: Reported on 12/20/2022)     • apixaban (Eliquis) 5 mg TAKE 1 TABLET BY MOUTH TWICE A DAY (Patient not taking: Reported on 12/29/2022) 60 tablet 2   • fluticasone (FLONASE) 50 mcg/act nasal spray 2 sprays into each nostril daily as needed   (Patient not taking: Reported on 9/8/2022)     • lidocaine-prilocaine (EMLA) cream Apply to PORT site 30 min prior to labs or chemotherapy (Patient not taking: Reported on 12/29/2022) 30 g 1   • LORazepam (ATIVAN) 1 mg tablet Take 1 tablet (1 mg total) by mouth every 8 (eight) hours as needed for anxiety (nausea or anxiety) (Patient not taking: Reported on 6/20/2023) 30 tablet 0   • ondansetron (ZOFRAN) 8 mg tablet Take 1 tablet (8 mg total) by mouth every 8 (eight) hours as needed for nausea or vomiting (Patient not taking: Reported on 6/21/2022) 20 tablet 1     No current facility-administered medications for this visit  No Known Allergies    Past Medical History:   Diagnosis Date   • Arthritis     osteo   • Chronic kidney disease    • Colon polyp    • Diverticula of colon    • Hypertension    • Obesity    • Rhinitis        Past Surgical History:   Procedure Laterality Date   • APPENDECTOMY     • BREAST BIOPSY Left 1977    benign   • BREAST SURGERY Left     biopsy   • CHOLECYSTECTOMY     • COLONOSCOPY     • CYSTOSCOPY N/A 03/31/2022    Procedure: CYSTOSCOPY;  Surgeon: Pratima Pineda MD;  Location:  MAIN OR;  Service: Gynecology Oncology   • HERNIA REPAIR Right     inguinal   • HYSTERECTOMY     • IR PORT PLACEMENT  05/09/2022   • JOINT REPLACEMENT Bilateral     Knee   • LA COLONOSCOPY FLX DX W/COLLJ SPEC WHEN PFRMD N/A 02/21/2017    Procedure: COLONOSCOPY with polypectomy and hemo clip marjan ;  Surgeon: Maribell Aguilar MD;  Location: [facility];   Service: "Gastroenterology   • SC LAPS TOTAL HYSTERECT 250 GM/< W/RMVL TUBE/OVARY N/A 2022    Procedure: ROBOTIC HYSTERECTOMY, BILATERAL SALPINGO-OOPHORECTOMY, STAGING PERITONEAL AND OMENTAL BIOPSIES, BILATERAL PELVIC SENTINEL LYMPH NODE BIOPSIES;  Surgeon: Joel Lambert MD;  Location: [facility] OR;  Service: Gynecology Oncology       OB History        1    Para   1    Term   1            AB        Living   1       SAB        IAB        Ectopic        Multiple        Live Births   1                 Family History   Problem Relation Age of Onset   • Heart disease Mother    • Prostate cancer Father 80   • Diabetes Sister    • Hypertension Sister    • Transient ischemic attack Sister    • No Known Problems Sister    • No Known Problems Maternal Grandmother    • No Known Problems Maternal Grandfather    • No Known Problems Paternal Grandmother    • No Known Problems Paternal Grandfather    • Diabetes Brother    • Heart disease Brother    • No Known Problems Maternal Aunt    • No Known Problems Paternal Aunt    • No Known Problems Paternal Aunt    • No Known Problems Paternal Aunt    • Stomach cancer Other 45   • Thyroid disease Other        The following portions of the patient's history were reviewed and updated as appropriate: allergies, current medications, past family history, past medical history, past social history, past surgical history and problem list       Objective:    Blood pressure 120/80, pulse 81, temperature (!) 97 4 °F (36 3 °C), temperature source Temporal, resp  rate 16, height 5' 2 99\" (1 6 m), weight 113 kg (249 lb 9 6 oz), SpO2 98 %, not currently breastfeeding  Body mass index is 44 23 kg/m²  Physical Exam  Vitals reviewed  Exam conducted with a chaperone present  Constitutional:       General: She is not in acute distress  Appearance: Normal appearance  She is obese  She is not ill-appearing  HENT:      Head: Normocephalic and atraumatic        Mouth/Throat:      Mouth: " Mucous membranes are moist    Eyes:      General:         Right eye: No discharge  Left eye: No discharge  Conjunctiva/sclera: Conjunctivae normal    Pulmonary:      Effort: Pulmonary effort is normal    Abdominal:      Palpations: Abdomen is soft  There is no mass  Tenderness: There is no abdominal tenderness  Hernia: No hernia is present  Genitourinary:     Comments: The external female genitalia is normal  The bartholin's, uretheral and skenes glands are normal  The urethral meatus is normal (midline with no lesions)  Anus without fissure or lesion  Speculum exam reveals a grossly normal vagina  No masses, lesions,discharge or bleeding  No significant cystocele or rectocele noted  Bimanual exam notes a surgical absent cervix, uterus and adnexal structures  No masses or fullness  Bladder is without fullness, mass or tenderness  Musculoskeletal:      Right lower leg: No edema  Left lower leg: No edema  Skin:     General: Skin is warm and dry  Coloration: Skin is not jaundiced  Findings: No rash  Neurological:      General: No focal deficit present  Mental Status: She is alert and oriented to person, place, and time  Cranial Nerves: No cranial nerve deficit  Sensory: No sensory deficit  Motor: No weakness  Gait: Gait normal    Psychiatric:         Mood and Affect: Mood normal          Behavior: Behavior normal          Thought Content:  Thought content normal          Judgment: Judgment normal            Lab Results   Component Value Date     5 1 03/10/2022     Lab Results   Component Value Date    WBC 2 3 (L) 03/16/2023    HGB 10 7 (L) 03/16/2023    HCT 32 7 (L) 03/16/2023     9 (H) 03/16/2023     03/16/2023     Lab Results   Component Value Date    K 3 8 03/21/2023     03/21/2023    CO2 31 03/21/2023    BUN 25 03/21/2023    CREATININE 1 42 (H) 03/21/2023    GLUF 109 (H) 05/16/2022    CALCIUM 9 1 03/21/2023    CORRECTEDCA 9 0 09/12/2022    AST 15 09/12/2022    ALT 18 09/12/2022    ALKPHOS 53 09/12/2022    EGFR 39 (L) 03/21/2023        Trend:  Lab Results   Component Value Date     5 1 03/10/2022

## 2023-06-20 NOTE — ASSESSMENT & PLAN NOTE
DXA performed in May 2023 shows osteoporosis  Report reviewed by me  There have been improvements in bone mineral density noted in lumbar spine and left femoral neck  Patient has been on Prolia  Plan to continue this, although we will defer management to PCP

## 2023-06-20 NOTE — ASSESSMENT & PLAN NOTE
75-year-old with a history of stage IIIC1 endometrial cancer  She completed chemotherapy in August 2022 and radiation in November 2022  She has no interval concerns to report Her  was low at the time of diagnosis and is not a good marker  Her pelvic exam is normal  Her PS is 0     CT a/p performed in March 2023 was without evidence of disease  Will consider PRN in the future  Patient will return to the office in 3 months for her next surveillance visit  She was educated on warning signs of cancer recurrence and when to call the office

## 2023-07-10 PROBLEM — Z01.419 WELL WOMAN EXAM: Status: RESOLVED | Noted: 2023-07-10 | Resolved: 2023-07-10

## 2023-07-10 PROBLEM — Z01.419 WELL WOMAN EXAM: Status: ACTIVE | Noted: 2023-07-10

## 2023-07-10 NOTE — PROGRESS NOTES
Subjective      Sg Ennis is a 74 y.o. female who presents for annual exam.  Last Pap smear NA. S/P hysterectomy and chemotherapy for endometrial cancer 3/31/22. Doing well. Last mammogram 22 birads 2. CRC screening 3/10/20 colonoscopy. Due for repeat 3/2025. DEXA 23 osteoporosis. On prolia. The patient has no complaints today. The patient is not currently sexually active. The patient is not taking hormone replacement therapy. Patient denies post-menopausal vaginal bleeding. . The patient wears seatbelts: yes. The patient participates in regular exercise: no. The patient reports that there is not domestic violence in her life. Menstrual History:  OB History        1    Para   1    Term   1            AB        Living   1       SAB        IAB        Ectopic        Multiple        Live Births   1                Menarche age: 13  No LMP recorded (lmp unknown). Patient has had a hysterectomy. The following portions of the patient's history were reviewed and updated as appropriate: allergies, current medications, past family history, past medical history, past social history, past surgical history and problem list.    Review of Systems  Pertinent items are noted in HPI. Objective      /78   Ht 5' 2" (1.575 m)   Wt 111 kg (245 lb)   LMP  (LMP Unknown)   BMI 44.81 kg/m²     General:   alert and oriented, in no acute distress and morbidly obese   Heart: regular rate and rhythm, S1, S2 normal, no murmur, click, rub or gallop   Lungs: clear to auscultation bilaterally   Abdomen: soft, non-tender, without masses or organomegaly   Vulva: normal, Bartholin's, Urethra, [place]'s normal   Vagina: normal mucosa, normal discharge, no palpable nodules   Cervix: surgically absent   Uterus: surgically absent non- tender   Adnexa: surgically absent non- tender    Breast:  breasts appear normal, no suspicious masses, no skin or nipple changes or axillary nodes. Assessment/Plan     Diagnoses and all orders for this visit:    Well woman exam with routine gynecological exam    History of endometrial cancer    Encounter for screening mammogram for breast cancer  -     Mammo screening bilateral w 3d & cad; Future      Encouraged to keep follow up with GYN/ ONC as scheduled 9/2023  Encouraged healthy diet, exercise and lifestyle  Encouraged follow-up with PCP and specialists as needed  Encouraged supplementation with calcium, vitamin-D and strength training exercises for good bone health  Will call/ [employer] message with results  VBI-   BMI Counseling: Body mass index is 44.81 kg/m². The BMI is above normal. Nutrition recommendations include reducing portion sizes, decreasing overall calorie intake and 3-5 servings of fruits/vegetables daily. Exercise recommendations include exercising 3-5 times per week, joining a gym and strength training exercises.

## 2023-07-12 ENCOUNTER — ANNUAL EXAM (OUTPATIENT)
Dept: OBGYN CLINIC | Facility: MEDICAL CENTER | Age: 73
End: 2023-07-12
Payer: COMMERCIAL

## 2023-07-12 VITALS
WEIGHT: 245 LBS | SYSTOLIC BLOOD PRESSURE: 122 MMHG | DIASTOLIC BLOOD PRESSURE: 78 MMHG | HEIGHT: 62 IN | BODY MASS INDEX: 45.08 KG/M2

## 2023-07-12 DIAGNOSIS — Z12.31 ENCOUNTER FOR SCREENING MAMMOGRAM FOR BREAST CANCER: ICD-10-CM

## 2023-07-12 DIAGNOSIS — Z01.419 WELL WOMAN EXAM WITH ROUTINE GYNECOLOGICAL EXAM: Primary | ICD-10-CM

## 2023-07-12 DIAGNOSIS — Z85.42 HISTORY OF ENDOMETRIAL CANCER: ICD-10-CM

## 2023-07-12 PROCEDURE — G0101 CA SCREEN;PELVIC/BREAST EXAM: HCPCS | Performed by: NURSE PRACTITIONER

## 2023-09-20 LAB
ALBUMIN SERPL-MCNC: 3.7 G/DL (ref 3.6–5.1)
BASOPHILS # BLD AUTO: 21 CELLS/UL (ref 0–200)
BASOPHILS NFR BLD AUTO: 0.7 %
BUN SERPL-MCNC: 22 MG/DL (ref 7–25)
BUN/CREAT SERPL: 18 (CALC) (ref 6–22)
CALCIUM SERPL-MCNC: 8.2 MG/DL (ref 8.6–10.4)
CALCIUM SERPL-MCNC: 8.2 MG/DL (ref 8.6–10.4)
CHLORIDE SERPL-SCNC: 108 MMOL/L (ref 98–110)
CO2 SERPL-SCNC: 29 MMOL/L (ref 20–32)
CREAT SERPL-MCNC: 1.25 MG/DL (ref 0.6–1)
CREAT UR-MCNC: 223 MG/DL (ref 20–275)
EOSINOPHIL # BLD AUTO: 69 CELLS/UL (ref 15–500)
EOSINOPHIL NFR BLD AUTO: 2.3 %
ERYTHROCYTE [DISTWIDTH] IN BLOOD BY AUTOMATED COUNT: 13.6 % (ref 11–15)
GFR/BSA.PRED SERPLBLD CYS-BASED-ARV: 45 ML/MIN/1.73M2
GLUCOSE SERPL-MCNC: 94 MG/DL (ref 65–99)
HCT VFR BLD AUTO: 33.4 % (ref 35–45)
HGB BLD-MCNC: 11.2 G/DL (ref 11.7–15.5)
LYMPHOCYTES # BLD AUTO: 849 CELLS/UL (ref 850–3900)
LYMPHOCYTES NFR BLD AUTO: 28.3 %
MCH RBC QN AUTO: 33.5 PG (ref 27–33)
MCHC RBC AUTO-ENTMCNC: 33.5 G/DL (ref 32–36)
MCV RBC AUTO: 100 FL (ref 80–100)
MONOCYTES # BLD AUTO: 237 CELLS/UL (ref 200–950)
MONOCYTES NFR BLD AUTO: 7.9 %
NEUTROPHILS # BLD AUTO: 1824 CELLS/UL (ref 1500–7800)
NEUTROPHILS NFR BLD AUTO: 60.8 %
PHOSPHATE SERPL-MCNC: 2.8 MG/DL (ref 2.1–4.3)
PLATELET # BLD AUTO: 146 THOUSAND/UL (ref 140–400)
PMV BLD REES-ECKER: 11.5 FL (ref 7.5–12.5)
POTASSIUM SERPL-SCNC: 3.6 MMOL/L (ref 3.5–5.3)
PROT UR-MCNC: 18 MG/DL (ref 5–24)
PROT/CREAT UR: 0.08 MG/MG CREAT (ref 0.02–0.18)
PROT/CREAT UR: 81 MG/G CREAT (ref 24–184)
PTH-INTACT SERPL-MCNC: 250 PG/ML (ref 16–77)
RBC # BLD AUTO: 3.34 MILLION/UL (ref 3.8–5.1)
SODIUM SERPL-SCNC: 143 MMOL/L (ref 135–146)
WBC # BLD AUTO: 3 THOUSAND/UL (ref 3.8–10.8)

## 2023-09-25 ENCOUNTER — TELEPHONE (OUTPATIENT)
Dept: NEPHROLOGY | Facility: CLINIC | Age: 73
End: 2023-09-25

## 2023-09-25 NOTE — TELEPHONE ENCOUNTER
Called pt to request she change her appt on 9/26 to be seen in the [facility], due to the closure of the [facility]. Pt agreed to come to the [facility] and was provided with the address. Appt is 9/26 at 1:30pm in [facility] with Dr Jordan Aguirre.

## 2023-09-26 ENCOUNTER — OFFICE VISIT (OUTPATIENT)
Dept: GYNECOLOGIC ONCOLOGY | Facility: CLINIC | Age: 73
End: 2023-09-26
Payer: COMMERCIAL

## 2023-09-26 ENCOUNTER — OFFICE VISIT (OUTPATIENT)
Dept: NEPHROLOGY | Facility: CLINIC | Age: 73
End: 2023-09-26
Payer: COMMERCIAL

## 2023-09-26 VITALS
WEIGHT: 246.4 LBS | TEMPERATURE: 97.3 F | RESPIRATION RATE: 16 BRPM | DIASTOLIC BLOOD PRESSURE: 84 MMHG | BODY MASS INDEX: 45.34 KG/M2 | HEIGHT: 62 IN | SYSTOLIC BLOOD PRESSURE: 140 MMHG | HEART RATE: 105 BPM | OXYGEN SATURATION: 97 %

## 2023-09-26 VITALS
HEART RATE: 77 BPM | SYSTOLIC BLOOD PRESSURE: 98 MMHG | DIASTOLIC BLOOD PRESSURE: 68 MMHG | BODY MASS INDEX: 45.45 KG/M2 | OXYGEN SATURATION: 99 % | WEIGHT: 247 LBS | HEIGHT: 62 IN

## 2023-09-26 DIAGNOSIS — N25.81 SECONDARY HYPERPARATHYROIDISM (HCC): ICD-10-CM

## 2023-09-26 DIAGNOSIS — M81.0 OSTEOPOROSIS WITHOUT CURRENT PATHOLOGICAL FRACTURE, UNSPECIFIED OSTEOPOROSIS TYPE: ICD-10-CM

## 2023-09-26 DIAGNOSIS — I10 ESSENTIAL HYPERTENSION: ICD-10-CM

## 2023-09-26 DIAGNOSIS — N18.31 ANEMIA DUE TO STAGE 3A CHRONIC KIDNEY DISEASE: ICD-10-CM

## 2023-09-26 DIAGNOSIS — Z85.42 ENCOUNTER FOR FOLLOW-UP SURVEILLANCE OF ENDOMETRIAL CANCER: Primary | ICD-10-CM

## 2023-09-26 DIAGNOSIS — Z85.42 HISTORY OF ENDOMETRIAL CANCER: ICD-10-CM

## 2023-09-26 DIAGNOSIS — N25.81 SECONDARY HYPERPARATHYROIDISM OF RENAL ORIGIN (HCC): ICD-10-CM

## 2023-09-26 DIAGNOSIS — D63.1 ANEMIA DUE TO STAGE 3A CHRONIC KIDNEY DISEASE: ICD-10-CM

## 2023-09-26 DIAGNOSIS — Z08 ENCOUNTER FOR FOLLOW-UP SURVEILLANCE OF ENDOMETRIAL CANCER: Primary | ICD-10-CM

## 2023-09-26 DIAGNOSIS — N18.31 STAGE 3A CHRONIC KIDNEY DISEASE (HCC): Primary | ICD-10-CM

## 2023-09-26 DIAGNOSIS — E66.01 MORBID OBESITY WITH BMI OF 45.0-49.9, ADULT (HCC): ICD-10-CM

## 2023-09-26 PROCEDURE — 99214 OFFICE O/P EST MOD 30 MIN: CPT | Performed by: INTERNAL MEDICINE

## 2023-09-26 PROCEDURE — 99213 OFFICE O/P EST LOW 20 MIN: CPT | Performed by: NURSE PRACTITIONER

## 2023-09-26 RX ORDER — CALCITRIOL 0.25 UG/1
0.25 CAPSULE, LIQUID FILLED ORAL 3 TIMES WEEKLY
Qty: 36 CAPSULE | Refills: 2 | Status: SHIPPED | OUTPATIENT
Start: 2023-09-27 | End: 2023-12-26

## 2023-09-26 NOTE — LETTER
September 26, 2023     Leanne Reyna, [address] [place] [id number]    Patient: Sg Morrell   YOB: 1950   Date of Visit: 9/26/2023       Dear Dr. Ana Rosa Ac: Thank you for referring Sg Scherer to me for evaluation. Below are my notes for this consultation. If you have questions, please do not hesitate to call me. I look forward to following your patient along with you. Sincerely,        Jeralene Cabot, MD        CC: No Recipients    Jeralene Cabot, MD  9/26/2023  2:06 PM  Sign when Signing Visit  OFFICE FOLLOW UP - Nephrology   Sg Scherer 74 y.o. female MRN: [id number]       ASSESSMENT and PLAN:  Sg was seen today for follow-up and chronic kidney disease. Diagnoses and all orders for this visit:    Stage 3a chronic kidney disease ([address])  -     CBC; Future  -     Renal function panel; Future  -     PTH, intact; Future  -     Protein / creatinine ratio, urine; Future  -     calcitriol (ROCALTROL) 0.25 mcg capsule; Take 1 capsule (0.25 mcg total) by mouth 3 (three) times a week    Morbid obesity with BMI of 45.0-49.9, adult (HCC)    Essential hypertension    Secondary hyperparathyroidism ([address])    History of endometrial cancer    Anemia due to stage 3a chronic kidney disease     Secondary hyperparathyroidism of renal origin ([address])  -     calcitriol (ROCALTROL) 0.25 mcg capsule; Take 1 capsule (0.25 mcg total) by mouth 3 (three) times a week        This is a 64-year-old lady who returns to the office for CKD follow-up. Patient was initially seen on 03/2022, s/p total hysterectomy 03/2022, she was hospitalized few months ago after syncopal episode was found to have PE on 05/2022. Last time she was seen was on 03/2023, today returns alone for 6-month follow-up. 1. Chronic kidney disease stage III with previous creatinine around 1.1-1.4 back since 2018 as per Care everywhere.   CKD suspected secondary to long-term history hypertension, obesity, age-related nephron loss. Most recent blood test were reviewed with patient, renal function is stable with a creatinine of 1.25 and proteinuria. Patient reported feeling dizzy especially when changing positions over the last few months. Blood pressure today in both arms check and noted be on the lower side. Plan to discontinue HCTZ for now. Discussed about importance to stay well-hydrated, avoid NSAIDs, follow low-salt diet. I would like to see her back in the office in 6 months with repeat labs. 2. Hypertension, blood pressure today in the office in the lower side in both arms, reported feeling dizzy especially when changing position. Plan to discontinue hydrochlorothiazide for now, follow-up with primary care doctor. 3. Postmenopausal bleeding with high-grade endometrial cancer, status post bilateral salpingo-oophorectomy, hysterectomy on 03/31/2022. Patient completed adjuvant chemotherapy with Taxol and carboplatin in 8/2022, completed radiation in fall 2022. Continue close follow-up with gynecology oncology    4. Anemia multifactorial in the setting of previous chemotherapy with a component of chronic kidney disease. Current hemoglobin is low but improving, follow CBC in 6 months    5. Morbid obesity, advised to lose weight     6. Elevated PTH, secondary hyperparathyroidism, vitamin D level between normal limits, plan to start Calcitrol 1 tablet 3 times a week and follow labs in 6 months. Patient Instructions   As we discussed in the office visit and after reviewing recent blood test your kidney function remains stable. Today during office visit your blood pressure was low, you report feeling dizzy especially after changing position, plan to STOP hydrochlorothiazide (HCTZ) for now and follow with your primary care doctor.   Recommend to start active vitamin D called Calcitrol 1 tablet 3 times a week (on Mondays, Wednesdays, Fridays)  I would like to see you back in the office in 6 months with repeat labs. Continue close follow-up with your primary care doctor and your gynecologist oncologist.  Remember to avoid NSAIDs (no ibuprofen, Motrin, Advil, Aleve, naproxen). Okay to take Tylenol or paracetamol or acetaminophen as needed for pain. HPI: Kimmie Gruber is a 74 y.o. female who is here for Follow-up and Chronic Kidney Disease  . This is a patient history of chronic kidney disease, hypertension, obesity, stage III endometrial cancer status post hysterectomy on 03/2022, anemia, who returns to the office for six-month follow-up. Status post total hysterectomy at the end of March 2022, patient was started on adjuvant chemotherapy with Taxol and carboplatin. Patient was hospitalized on May 2022 after syncopal episode, was found to have a PE, was previously on anticoagulation but it was discontinued. Patient was 1st time seen on 03/202, last time patient was seen was on 3/2023, today returns for follow-up    Patient today in general is feeling well other than continue complaining of dizziness especially when changing positions. .   Patient denies any chest pain, shortness of breath or significant leg swelling. Denies any abdominal pain, no nausea, vomiting, no diarrhea or constipation. Denies any urinary problems, no burning sensation or gross hematuria. Reports she lost a couple of pounds    The last blood work was done on 9/19/2023, which we have reviewed together. Serum creatinine 1.25,       ROS: All the systems were reviewed and were negative except as documented on the H&P. Allergies: Patient has no known allergies.     Medications:   Current Outpatient Medications:   •  acetaminophen (TYLENOL) 650 mg CR tablet, Take 1 tablet (650 mg total) by mouth every 6 (six) hours as needed for mild pain, Disp: 30 tablet, Rfl: 0  •  [START ON 9/27/2023] calcitriol (ROCALTROL) 0.25 mcg capsule, Take 1 capsule (0.25 mcg total) by mouth 3 (three) times a week, Disp: 36 capsule, Rfl: 2  •  denosumab (Prolia) 60 mg/mL, Inject 1 mL under the skin every 6 (six) months, Disp: , Rfl:   •  omeprazole (PriLOSEC) 20 mg delayed release capsule, Take by mouth daily, Disp: , Rfl:     Past Medical History:   Diagnosis Date   • Arthritis     osteo   • Chronic kidney disease    • Colon polyp    • Diverticula of colon    • Hypertension    • Obesity    • Rhinitis      Past Surgical History:   Procedure Laterality Date   • APPENDECTOMY     • BREAST BIOPSY Left 1977    benign   • BREAST SURGERY Left     biopsy   • CHOLECYSTECTOMY     • COLONOSCOPY     • CYSTOSCOPY N/A 03/31/2022    Procedure: CYSTOSCOPY;  Surgeon: Natanael Lopez MD;  Location: [facility];  Service: Gynecology Oncology   • HERNIA REPAIR Right     inguinal   • HYSTERECTOMY     • IR PORT PLACEMENT  05/09/2022   • JOINT REPLACEMENT Bilateral     Knee   • NM COLONOSCOPY FLX DX W/COLLJ SPEC WHEN PFRMD N/A 02/21/2017    Procedure: COLONOSCOPY with polypectomy and hemo clip marjan.;  Surgeon: Yao Leonard MD;  Location: [facility];   Service: Gastroenterology   • NM LAPS TOTAL HYSTERECT 250 GM/< W/RMVL TUBE/OVARY N/A 03/31/2022    Procedure: ROBOTIC HYSTERECTOMY, BILATERAL SALPINGO-OOPHORECTOMY, STAGING PERITONEAL AND OMENTAL BIOPSIES, BILATERAL PELVIC SENTINEL LYMPH NODE BIOPSIES;  Surgeon: Natanael Lopez MD;  Location[facility];  Service: Gynecology Oncology     Family History   Problem Relation Age of Onset   • Heart disease Mother    • Prostate cancer Father 80   • Diabetes Sister    • Hypertension Sister    • Transient ischemic attack Sister    • No Known Problems Sister    • Diabetes Brother    • Heart disease Brother    • Kidney disease Son    • No Known Problems Maternal Grandmother    • No Known Problems Maternal Grandfather    • No Known Problems Paternal Grandmother    • No Known Problems Paternal Grandfather    • No Known Problems Maternal Aunt    • No Known Problems Paternal Aunt    • No Known Problems Paternal Aunt • No Known Problems Paternal Aunt    • Stomach cancer Other 45   • Thyroid disease Other    • Cancer Other         unsure type   • Breast cancer Neg Hx    • Colon cancer Neg Hx    • Ovarian cancer Neg Hx       reports that she has never smoked. She has never used smokeless tobacco. She reports current alcohol use of about 1.0 standard drink of alcohol per week. She reports that she does not use drugs. Physical Exam:   Vitals:    09/26/23 1323 09/26/23 1353 09/26/23 1354   BP: 108/70 106/71 98/68   BP Location: Left arm Right arm Right arm   Patient Position: Sitting Sitting Standing   Cuff Size: Adult Large Large   Pulse: 77     SpO2: 99%     Weight: 112 kg (247 lb)     Height: 5' 2" (1.575 m)       Body mass index is 45.18 kg/m².     General: Morbid obese, conscious, cooperative, in not acute distress  Eyes: conjunctivae pink, anicteric sclerae  ENT: lips and mucous membranes moist  Neck: supple, no JVD  Chest: clear breath sounds bilateral, no crackles, ronchus or wheezings  CVS: distinct S1 & S2, normal rate, regular rhythm  Abdomen: Obese, non-tender, non-distended, normoactive bowel sounds  Back: no CVA tenderness  Extremities: no edema of both legs  Skin: no rash  Neuro: awake, alert, oriented          Lab Results:  Results for orders placed or performed in visit on 09/19/23   PTH, Intact and Calcium   Result Value Ref Range    PTH, Intact 250 (H) 16 - 77 pg/mL    Calcium 8.2 (L) 8.6 - 10.4 mg/dL   Renal function panel   Result Value Ref Range    Glucose, Random 94 65 - 99 mg/dL    BUN 22 7 - 25 mg/dL    Creatinine 1.25 (H) 0.60 - 1.00 mg/dL    eGFR 45 (L) > OR = 60 mL/min/1.73m2    SL AMB BUN/CREATININE RATIO 18 6 - 22 (calc)    Sodium 143 135 - 146 mmol/L    Potassium 3.6 3.5 - 5.3 mmol/L    Chloride 108 98 - 110 mmol/L    CO2 29 20 - 32 mmol/L    Calcium 8.2 (L) 8.6 - 10.4 mg/dL    Phosphorus, Serum 2.8 2.1 - 4.3 mg/dL    Albumin 3.7 3.6 - 5.1 g/dL   CBC and differential   Result Value Ref Range White Blood Cell Count 3.0 (L) 3.8 - 10.8 Thousand/uL    Red Blood Cell Count 3.34 (L) 3.80 - 5.10 Million/uL    Hemoglobin 11.2 (L) 11.7 - 15.5 g/dL    HCT 33.4 (L) 35.0 - 45.0 %    .0 80.0 - 100.0 fL    MCH 33.5 (H) 27.0 - 33.0 pg    MCHC 33.5 32.0 - 36.0 g/dL    RDW 13.6 11.0 - 15.0 %    Platelet Count 536 464 - 400 Thousand/uL    SL AMB MPV 11.5 7.5 - 12.5 fL    Neutrophils (Absolute) 1,824 1,500 - 7,800 cells/uL    Lymphocytes (Absolute) 849 (L) 850 - 3,900 cells/uL    Monocytes (Absolute) 237 200 - 950 cells/uL    Eosinophils (Absolute) 69 15 - 500 cells/uL    Basophils ABS 21 0 - 200 cells/uL    Neutrophils 60.8 %    Lymphocytes 28.3 %    Monocytes 7.9 %    Eosinophils 2.3 %    Basophils PCT 0.7 %   Protein, Total w/Creat, Random Urine   Result Value Ref Range    Creatinine, Urine 223 20 - 275 mg/dL    Protein/Creat Ratio 81 24 - 184 mg/g creat    Protein/Creat Ratio 0.081 0.024 - 0.184 mg/mg creat    Total Protein, Urine 18 5 - 24 mg/dL           Portions of the record may have been created with voice recognition software. Occasional wrong word or "sound a like" substitutions may have occurred due to the inherent limitations of voice recognition software. Read the chart carefully and recognize, using context, where substitutions have occurred. If you have any questions, please contact the dictating provider.

## 2023-09-26 NOTE — PROGRESS NOTES
Assessment/Plan:    Problem List Items Addressed This Visit        Cardiovascular and Mediastinum    Essential hypertension     Patient reports orthostatic hypotension. She has dizziness upon standing. She takes HCTZ, but does not check her BP at home on a regular basis. BP is satisfactory today. She has a f/u appt with her PCP on 9/28 and will discuss further with her at that time. Fluids encouraged. She understands to let us know if symptoms are persistent and we can schedule her for IVF in the infusion center, or further evaluate with imaging. Musculoskeletal and Integument    Osteoporosis without current pathological fracture     Patient takes Prolia, as ordered by PCP. Last injection was reportedly in June 2023. Other    Encounter for follow-up surveillance of endometrial cancer - Primary     47-year-old with a history of stage IIIC1 endometrial cancer. She completed chemotherapy in August 2022 and radiation in November 2022. She is feeling well, and denies symptoms of recurrent disease Her  was low at the time of diagnosis and is not a good marker. Her pelvic exam is without abnormality. Her PS is 0. Patient will return to the office in 3 months for her next surveillance visit. She will call the office in the interim with any new concerns or issues. History of endometrial cancer           CHIEF COMPLAINT:       Subjective:     Problem:  Cancer Staging   Encounter for follow-up surveillance of endometrial cancer  Staging form: Corpus Uteri - Carcinoma, AJCC 8th Edition  - Pathologic stage from 3/31/2022: FIGO Stage IIIC1 - Signed by Shane Vaughan MD on 4/19/2022      Previous therapy:  Oncology History   Encounter for follow-up surveillance of endometrial cancer   3/2/2022 Initial Diagnosis    Endometrial cancer ([address])     3/2/2022 Biopsy    Final Diagnosis   A. Endometrium, EMB:  - High-grade endometrial carcinoma, favor clear cell carcinoma. See comment. 3/31/2022 -  Cancer Staged    Staging form: Corpus Uteri - Carcinoma, AJCC 8th Edition  - Pathologic stage from 3/31/2022: FIGO Stage IIIC1 - Signed by Jazz Angela MD on 4/19/2022  Histopathologic type: Clear cell adenocarcinoma, NOS  Stage prefix: Initial diagnosis  Histologic grade (G): G3  Histologic grading system: 3 grade system  Residual tumor (R): R0 - None  Pelvic fátima status: Positive  Number of pelvic nodes positive from dissection: 1  Number of pelvic nodes examined during dissection: 2  Lymph node metastasis: Present  Omentectomy performed: Yes  Morcellation performed: No       3/31/2022 Surgery    Robotic hysterectomy, bilateral salpingo oophorectomy, bilateral pelvic sentinel lymph node biopsies, pelvic peritoneal/omental biopsies. 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 Negative cervix. Negative parametria. 1/2 sentinel pelvic lymph nodes positive for malignancy. Negative staging biopsies. Stage IIIC1. No evidence of DNA mismatch repair protein loss.      5/11/2022 - 8/25/2022 Chemotherapy    palonosetron (ALOXI), 0.25 mg, Intravenous, Once, 6 of 6 cycles  Administration: 0.25 mg (5/11/2022), 0.25 mg (6/1/2022), 0.25 mg (6/22/2022), 0.25 mg (7/13/2022), 0.25 mg (8/3/2022), 0.25 mg (8/24/2022)  Pegfilgrastim-bmez (ZIEXTENZO), 6 mg, Subcutaneous, Once, 3 of 3 cycles  Administration: 6 mg (7/14/2022), 6 mg (8/4/2022), 6 mg (8/25/2022)  fosaprepitant (EMEND) IVPB, 150 mg, Intravenous, Once, 6 of 6 cycles  Administration: 150 mg (5/11/2022), 150 mg (6/1/2022), 150 mg (6/22/2022), 150 mg (7/13/2022), 150 mg (8/3/2022), 150 mg (8/24/2022)  CARBOplatin (PARAPLATIN) IVPB (GOG AUC DOSING), 352.5 mg, Intravenous, Once, 6 of 6 cycles  Administration: 352.5 mg (5/11/2022), 377.5 mg (6/1/2022), 340.5 mg (6/22/2022), 353 mg (7/13/2022), 266.4 mg (8/3/2022), 271.6 mg (8/24/2022)  PACLItaxel (TAXOL) chemo IVPB, 135 mg/m2 = 290.4 mg (77.1 % of original dose 175 mg/m2), Intravenous, Once, 6 of 6 cycles  Dose modification: 135 mg/m2 (original dose 175 mg/m2, Cycle 1, Reason: Other (Must fill in a comment), Comment: prescribed starting dose)  Administration: 290.4 mg (5/11/2022), 290.4 mg (6/1/2022), 289.2 mg (6/22/2022), 292.8 mg (7/13/2022), 289.2 mg (8/3/2022), 300 mg (8/24/2022)     10/4/2022 - 11/21/2022 Radiation    Plan ID Energy Fractions Dose per Fraction (cGy) Dose Correction (cGy) Total Dose Delivered (cGy) Elapsed Days   Vag Cylinder  2 / 2 600 0 1,200 5   Pelvis 6 MV 25 180  4500 34              Patient ID: Genoveva Dover is a 74 y.o. female     Rever has been well in the interim. She notes occasional dizziness, usually always upon standing from a seated or lying position. There are no visual changes or weakness. She denies nausea or vomiting. Her appetite is appropriate. She is voiding and moving her bowels without difficulty. She denies abdominal or pelvic pain. The patient is without vaginal bleeding or discharge. She is ambulatory. Review of Systems   Constitutional: Negative for chills, fatigue, fever and unexpected weight change. HENT: Negative for nosebleeds. Eyes: Negative. Respiratory: Negative for cough, chest tightness, shortness of breath and wheezing. Cardiovascular: Negative for chest pain, palpitations and leg swelling. Gastrointestinal: Negative for abdominal distention, abdominal pain, anal bleeding, blood in stool, constipation, diarrhea, nausea, rectal pain and vomiting. Endocrine: Negative. Genitourinary: Negative for difficulty urinating, dysuria, frequency, hematuria, pelvic pain, urgency, vaginal bleeding, vaginal discharge and vaginal pain. Musculoskeletal: Negative for arthralgias and joint swelling. Skin: Negative for color change, pallor and rash. Neurological: Negative for dizziness, weakness, light-headedness, numbness and headaches. Hematological: Negative. Psychiatric/Behavioral: Negative. Current Outpatient Medications   Medication Sig Dispense Refill   • acetaminophen (TYLENOL) 650 mg CR tablet Take 1 tablet (650 mg total) by mouth every 6 (six) hours as needed for mild pain 30 tablet 0   • denosumab (Prolia) 60 mg/mL Inject 1 mL under the skin every 6 (six) months     • hydrochlorothiazide (HYDRODIURIL) 12.5 mg tablet Take 12.5 mg by mouth daily     • omeprazole (PriLOSEC) 20 mg delayed release capsule Take by mouth daily (Patient not taking: Reported on 2023)       No current facility-administered medications for this visit. No Known Allergies    Past Medical History:   Diagnosis Date   • Arthritis     osteo   • Chronic kidney disease    • Colon polyp    • Diverticula of colon    • Hypertension    • Obesity    • Rhinitis        Past Surgical History:   Procedure Laterality Date   • APPENDECTOMY     • BREAST BIOPSY Left 1977    benign   • BREAST SURGERY Left     biopsy   • CHOLECYSTECTOMY     • COLONOSCOPY     • CYSTOSCOPY N/A 2022    Procedure: CYSTOSCOPY;  Surgeon: Erma Terrell MD;  Location[facility];  Service: Gynecology Oncology   • HERNIA REPAIR Right     inguinal   • HYSTERECTOMY     • IR PORT PLACEMENT  2022   • JOINT REPLACEMENT Bilateral     Knee   • VA COLONOSCOPY FLX DX W/COLLJ SPEC WHEN PFRMD N/A 2017    Procedure: COLONOSCOPY with polypectomy and hemo clip marjan.;  Surgeon: Joss Treadwell MD;  Location: [facility];   Service: Gastroenterology   • VA LAPS TOTAL HYSTERECT 250 GM/< W/RMVL TUBE/OVARY N/A 2022    Procedure: ROBOTIC HYSTERECTOMY, BILATERAL SALPINGO-OOPHORECTOMY, STAGING PERITONEAL AND OMENTAL BIOPSIES, BILATERAL PELVIC SENTINEL LYMPH NODE BIOPSIES;  Surgeon: Erma Terrell MD;  [facility];  Service: Gynecology Oncology       OB History        1    Para   1    Term   1            AB        Living   1       SAB        IAB        Ectopic        Multiple        Live Births   1                 Family History   Problem Relation Age of Onset   • Heart disease Mother    • Prostate cancer Father 80   • Diabetes Sister    • Hypertension Sister    • Transient ischemic attack Sister    • No Known Problems Sister    • Diabetes Brother    • Heart disease Brother    • Kidney disease Son    • No Known Problems Maternal Grandmother    • No Known Problems Maternal Grandfather    • No Known Problems Paternal Grandmother    • No Known Problems Paternal Grandfather    • No Known Problems Maternal Aunt    • No Known Problems Paternal Aunt    • No Known Problems Paternal Aunt    • No Known Problems Paternal Aunt    • Stomach cancer Other 45   • Thyroid disease Other    • Cancer Other         unsure type   • Breast cancer Neg Hx    • Colon cancer Neg Hx    • Ovarian cancer Neg Hx        The following portions of the patient's history were reviewed and updated as appropriate: allergies, current medications, past family history, past medical history, past social history, past surgical history and problem list.      Objective:    Blood pressure 140/84, pulse 105, temperature (!) 97.3 °F (36.3 °C), temperature source Temporal, resp. rate 16, height 5' 2" (1.575 m), weight 112 kg (246 lb 6.4 oz), SpO2 97 %, not currently breastfeeding. Body mass index is 45.07 kg/m². Physical Exam  Vitals reviewed. Exam conducted with a chaperone present. Constitutional:       General: She is not in acute distress. Appearance: Normal appearance. She is obese. She is not ill-appearing. HENT:      Head: Normocephalic and atraumatic. Mouth/Throat:      Mouth: Mucous membranes are moist.   Eyes:      General:         Right eye: No discharge. Left eye: No discharge. Conjunctiva/sclera: Conjunctivae normal.   Pulmonary:      Effort: Pulmonary effort is normal.   Abdominal:      Palpations: Abdomen is soft. There is no mass. Tenderness: There is no abdominal tenderness. Hernia: No hernia is present.    Genitourinary: Comments: The external female genitalia is normal. The bartholin's, uretheral and skenes glands are normal. The urethral meatus is normal (midline with no lesions). Anus without fissure or lesion. Speculum exam reveals a grossly normal vagina. No masses, lesions,discharge or bleeding. No significant cystocele or rectocele noted. Bimanual exam notes a surgical absent cervix, uterus and adnexal structures. No masses or fullness. Bladder is without fullness, mass or tenderness. Musculoskeletal:      Right lower leg: No edema. Left lower leg: No edema. Skin:     General: Skin is warm and dry. Coloration: Skin is not jaundiced. Findings: No rash. Neurological:      General: No focal deficit present. Mental Status: She is alert and oriented to person, place, and time. Cranial Nerves: No cranial nerve deficit. Sensory: No sensory deficit. Motor: No weakness. Gait: Gait normal.   Psychiatric:         Mood and Affect: Mood normal.         Behavior: Behavior normal.         Thought Content:  Thought content normal.         Judgment: Judgment normal.           Lab Results   Component Value Date     5.1 03/10/2022     Lab Results   Component Value Date    WBC 3.0 (L) 09/19/2023    HGB 11.2 (L) 09/19/2023    HCT 33.4 (L) 09/19/2023    .0 09/19/2023     09/19/2023     Lab Results   Component Value Date    K 3.6 09/19/2023     09/19/2023    CO2 29 09/19/2023    BUN 22 09/19/2023    CREATININE 1.25 (H) 09/19/2023    GLUF 109 (H) 05/16/2022    CALCIUM 8.2 (L) 09/19/2023    CALCIUM 8.2 (L) 09/19/2023    CORRECTEDCA 9.0 09/12/2022    AST 15 09/12/2022    ALT 18 09/12/2022    ALKPHOS 53 09/12/2022    EGFR 45 (L) 09/19/2023        Trend:  Lab Results   Component Value Date     5.1 03/10/2022

## 2023-09-26 NOTE — PATIENT INSTRUCTIONS
As we discussed in the office visit and after reviewing recent blood test your kidney function remains stable. Today during office visit your blood pressure was low, you report feeling dizzy especially after changing position, plan to STOP hydrochlorothiazide (HCTZ) for now and follow with your primary care doctor. Recommend to start active vitamin D called Calcitrol 1 tablet 3 times a week (on Mondays, Wednesdays, Fridays)  I would like to see you back in the office in 6 months with repeat labs. Continue close follow-up with your primary care doctor and your gynecologist oncologist.  Remember to avoid NSAIDs (no ibuprofen, Motrin, Advil, Aleve, naproxen). Okay to take Tylenol or paracetamol or acetaminophen as needed for pain.

## 2023-09-26 NOTE — ASSESSMENT & PLAN NOTE
Patient reports orthostatic hypotension. She has dizziness upon standing. She takes HCTZ, but does not check her BP at home on a regular basis. BP is satisfactory today. She has a f/u appt with her PCP on 9/28 and will discuss further with her at that time. Fluids encouraged. She understands to let us know if symptoms are persistent and we can schedule her for IVF in the infusion center, or further evaluate with imaging.

## 2023-09-26 NOTE — ASSESSMENT & PLAN NOTE
69-year-old with a history of stage IIIC1 endometrial cancer. She completed chemotherapy in August 2022 and radiation in November 2022. She is feeling well, and denies symptoms of recurrent disease Her  was low at the time of diagnosis and is not a good marker. Her pelvic exam is without abnormality. Her PS is 0. Patient will return to the office in 3 months for her next surveillance visit. She will call the office in the interim with any new concerns or issues.

## 2023-09-26 NOTE — PROGRESS NOTES
OFFICE FOLLOW UP - Nephrology   Sg Scherer 74 y.o. female MRN: [id number]       ASSESSMENT and PLAN:  Sg was seen today for follow-up and chronic kidney disease. Diagnoses and all orders for this visit:    Stage 3a chronic kidney disease ([address])  -     CBC; Future  -     Renal function panel; Future  -     PTH, intact; Future  -     Protein / creatinine ratio, urine; Future  -     calcitriol (ROCALTROL) 0.25 mcg capsule; Take 1 capsule (0.25 mcg total) by mouth 3 (three) times a week    Morbid obesity with BMI of 45.0-49.9, adult (HCC)    Essential hypertension    Secondary hyperparathyroidism ([address])    History of endometrial cancer    Anemia due to stage 3a chronic kidney disease     Secondary hyperparathyroidism of renal origin ([address])  -     calcitriol (ROCALTROL) 0.25 mcg capsule; Take 1 capsule (0.25 mcg total) by mouth 3 (three) times a week        This is a 66-year-old lady who returns to the office for CKD follow-up. Patient was initially seen on 03/2022, s/p total hysterectomy 03/2022, she was hospitalized few months ago after syncopal episode was found to have PE on 05/2022. Last time she was seen was on 03/2023, today returns alone for 6-month follow-up. 1. Chronic kidney disease stage III with previous creatinine around 1.1-1.4 back since 2018 as per Care everywhere. CKD suspected secondary to long-term history hypertension, obesity, age-related nephron loss. Most recent blood test were reviewed with patient, renal function is stable with a creatinine of 1.25 and proteinuria. Patient reported feeling dizzy especially when changing positions over the last few months. Blood pressure today in both arms check and noted be on the lower side. Plan to discontinue HCTZ for now. Discussed about importance to stay well-hydrated, avoid NSAIDs, follow low-salt diet. I would like to see her back in the office in 6 months with repeat labs.     2. Hypertension, blood pressure today in the office in the lower side in both arms, reported feeling dizzy especially when changing position. Plan to discontinue hydrochlorothiazide for now, follow-up with primary care doctor. 3. Postmenopausal bleeding with high-grade endometrial cancer, status post bilateral salpingo-oophorectomy, hysterectomy on 03/31/2022. Patient completed adjuvant chemotherapy with Taxol and carboplatin in 8/2022, completed radiation in fall 2022. Continue close follow-up with gynecology oncology    4. Anemia multifactorial in the setting of previous chemotherapy with a component of chronic kidney disease. Current hemoglobin is low but improving, follow CBC in 6 months    5. Morbid obesity, advised to lose weight     6. Elevated PTH, secondary hyperparathyroidism, vitamin D level between normal limits, plan to start Calcitrol 1 tablet 3 times a week and follow labs in 6 months. Patient Instructions   As we discussed in the office visit and after reviewing recent blood test your kidney function remains stable. Today during office visit your blood pressure was low, you report feeling dizzy especially after changing position, plan to STOP hydrochlorothiazide (HCTZ) for now and follow with your primary care doctor. Recommend to start active vitamin D called Calcitrol 1 tablet 3 times a week (on Mondays, Wednesdays, Fridays)  I would like to see you back in the office in 6 months with repeat labs. Continue close follow-up with your primary care doctor and your gynecologist oncologist.  Remember to avoid NSAIDs (no ibuprofen, Motrin, Advil, Aleve, naproxen). Okay to take Tylenol or paracetamol or acetaminophen as needed for pain. HPI: Steve Ocasio is a 74 y.o. female who is here for Follow-up and Chronic Kidney Disease  .     This is a patient history of chronic kidney disease, hypertension, obesity, stage III endometrial cancer status post hysterectomy on 03/2022, anemia, who returns to the office for six-month follow-up. Status post total hysterectomy at the end of March 2022, patient was started on adjuvant chemotherapy with Taxol and carboplatin. Patient was hospitalized on May 2022 after syncopal episode, was found to have a PE, was previously on anticoagulation but it was discontinued. Patient was 1st time seen on 03/202, last time patient was seen was on 3/2023, today returns for follow-up    Patient today in general is feeling well other than continue complaining of dizziness especially when changing positions. .   Patient denies any chest pain, shortness of breath or significant leg swelling. Denies any abdominal pain, no nausea, vomiting, no diarrhea or constipation. Denies any urinary problems, no burning sensation or gross hematuria. Reports she lost a couple of pounds    The last blood work was done on 9/19/2023, which we have reviewed together. Serum creatinine 1.25,       ROS: All the systems were reviewed and were negative except as documented on the H&P. Allergies: Patient has no known allergies.     Medications:   Current Outpatient Medications:   •  acetaminophen (TYLENOL) 650 mg CR tablet, Take 1 tablet (650 mg total) by mouth every 6 (six) hours as needed for mild pain, Disp: 30 tablet, Rfl: 0  •  [START ON 9/27/2023] calcitriol (ROCALTROL) 0.25 mcg capsule, Take 1 capsule (0.25 mcg total) by mouth 3 (three) times a week, Disp: 36 capsule, Rfl: 2  •  denosumab (Prolia) 60 mg/mL, Inject 1 mL under the skin every 6 (six) months, Disp: , Rfl:   •  omeprazole (PriLOSEC) 20 mg delayed release capsule, Take by mouth daily, Disp: , Rfl:     Past Medical History:   Diagnosis Date   • Arthritis     osteo   • Chronic kidney disease    • Colon polyp    • Diverticula of colon    • Hypertension    • Obesity    • Rhinitis      Past Surgical History:   Procedure Laterality Date   • APPENDECTOMY     • BREAST BIOPSY Left 1977    benign   • BREAST SURGERY Left     biopsy   • CHOLECYSTECTOMY     • COLONOSCOPY     • CYSTOSCOPY N/A 03/31/2022    Procedure: Marcus Lam;  Surgeon: Nitish Garcia MD;  Location: [facility];  Service: Gynecology Oncology   • HERNIA REPAIR Right     inguinal   • HYSTERECTOMY     • IR PORT PLACEMENT  05/09/2022   • JOINT REPLACEMENT Bilateral     Knee   • SD COLONOSCOPY FLX DX W/COLLJ SPEC WHEN PFRMD N/A 02/21/2017    Procedure: COLONOSCOPY with polypectomy and hemo clip marjan.;  Surgeon: Josey Costello MD;  Location: [facility]; Service: Gastroenterology   • SD LAPS TOTAL HYSTERECT 250 GM/< W/RMVL TUBE/OVARY N/A 03/31/2022    Procedure: ROBOTIC HYSTERECTOMY, BILATERAL SALPINGO-OOPHORECTOMY, STAGING PERITONEAL AND OMENTAL BIOPSIES, BILATERAL PELVIC SENTINEL LYMPH NODE BIOPSIES;  Surgeon: Nitish Garcia MD;  Location: [facility];  Service: Gynecology Oncology     Family History   Problem Relation Age of Onset   • Heart disease Mother    • Prostate cancer Father 80   • Diabetes Sister    • Hypertension Sister    • Transient ischemic attack Sister    • No Known Problems Sister    • Diabetes Brother    • Heart disease Brother    • Kidney disease Son    • No Known Problems Maternal Grandmother    • No Known Problems Maternal Grandfather    • No Known Problems Paternal Grandmother    • No Known Problems Paternal Grandfather    • No Known Problems Maternal Aunt    • No Known Problems Paternal Aunt    • No Known Problems Paternal Aunt    • No Known Problems Paternal Aunt    • Stomach cancer Other 45   • Thyroid disease Other    • Cancer Other         unsure type   • Breast cancer Neg Hx    • Colon cancer Neg Hx    • Ovarian cancer Neg Hx       reports that she has never smoked. She has never used smokeless tobacco. She reports current alcohol use of about 1.0 standard drink of alcohol per week. She reports that she does not use drugs.       Physical Exam:   Vitals:    09/26/23 1323 09/26/23 1353 09/26/23 1354   BP: 108/70 106/71 98/68   BP Location: Left arm Right arm Right arm Patient Position: Sitting Sitting Standing   Cuff Size: Adult Large Large   Pulse: 77     SpO2: 99%     Weight: 112 kg (247 lb)     Height: 5' 2" (1.575 m)       Body mass index is 45.18 kg/m².     General: Morbid obese, conscious, cooperative, in not acute distress  Eyes: conjunctivae pink, anicteric sclerae  ENT: lips and mucous membranes moist  Neck: supple, no JVD  Chest: clear breath sounds bilateral, no crackles, ronchus or wheezings  CVS: distinct S1 & S2, normal rate, regular rhythm  Abdomen: Obese, non-tender, non-distended, normoactive bowel sounds  Back: no CVA tenderness  Extremities: no edema of both legs  Skin: no rash  Neuro: awake, alert, oriented          Lab Results:  Results for orders placed or performed in visit on 09/19/23   PTH, Intact and Calcium   Result Value Ref Range    PTH, Intact 250 (H) 16 - 77 pg/mL    Calcium 8.2 (L) 8.6 - 10.4 mg/dL   Renal function panel   Result Value Ref Range    Glucose, Random 94 65 - 99 mg/dL    BUN 22 7 - 25 mg/dL    Creatinine 1.25 (H) 0.60 - 1.00 mg/dL    eGFR 45 (L) > OR = 60 mL/min/1.73m2    SL AMB BUN/CREATININE RATIO 18 6 - 22 (calc)    Sodium 143 135 - 146 mmol/L    Potassium 3.6 3.5 - 5.3 mmol/L    Chloride 108 98 - 110 mmol/L    CO2 29 20 - 32 mmol/L    Calcium 8.2 (L) 8.6 - 10.4 mg/dL    Phosphorus, Serum 2.8 2.1 - 4.3 mg/dL    Albumin 3.7 3.6 - 5.1 g/dL   CBC and differential   Result Value Ref Range    White Blood Cell Count 3.0 (L) 3.8 - 10.8 Thousand/uL    Red Blood Cell Count 3.34 (L) 3.80 - 5.10 Million/uL    Hemoglobin 11.2 (L) 11.7 - 15.5 g/dL    HCT 33.4 (L) 35.0 - 45.0 %    .0 80.0 - 100.0 fL    MCH 33.5 (H) 27.0 - 33.0 pg    MCHC 33.5 32.0 - 36.0 g/dL    RDW 13.6 11.0 - 15.0 %    Platelet Count 962 792 - 400 Thousand/uL    SL AMB MPV 11.5 7.5 - 12.5 fL    Neutrophils (Absolute) 1,824 1,500 - 7,800 cells/uL    Lymphocytes (Absolute) 849 (L) 850 - 3,900 cells/uL    Monocytes (Absolute) 237 200 - 950 cells/uL    Eosinophils (Absolute) 69 15 - 500 cells/uL    Basophils ABS 21 0 - 200 cells/uL    Neutrophils 60.8 %    Lymphocytes 28.3 %    Monocytes 7.9 %    Eosinophils 2.3 %    Basophils PCT 0.7 %   Protein, Total w/Creat, Random Urine   Result Value Ref Range    Creatinine, Urine 223 20 - 275 mg/dL    Protein/Creat Ratio 81 24 - 184 mg/g creat    Protein/Creat Ratio 0.081 0.024 - 0.184 mg/mg creat    Total Protein, Urine 18 5 - 24 mg/dL           Portions of the record may have been created with voice recognition software. Occasional wrong word or "sound a like" substitutions may have occurred due to the inherent limitations of voice recognition software. Read the chart carefully and recognize, using context, where substitutions have occurred. If you have any questions, please contact the dictating provider.

## 2023-11-09 ENCOUNTER — HOSPITAL ENCOUNTER (OUTPATIENT)
Dept: MAMMOGRAPHY | Facility: MEDICAL CENTER | Age: 73
Discharge: HOME | End: 2023-11-09
Payer: COMMERCIAL

## 2023-11-09 VITALS — WEIGHT: 246.91 LBS | HEIGHT: 62 IN | BODY MASS INDEX: 45.44 KG/M2

## 2023-11-09 DIAGNOSIS — Z12.31 ENCOUNTER FOR SCREENING MAMMOGRAM FOR BREAST CANCER: ICD-10-CM

## 2023-11-09 PROCEDURE — 77063 BREAST TOMOSYNTHESIS BI: CPT

## 2023-11-09 PROCEDURE — 77067 SCR MAMMO BI INCL CAD: CPT

## 2023-12-08 NOTE — PROGRESS NOTES
Biopsychosocial and Barriers Assessment Survivorship     Home/Cell Phone: [phone]  Emergency Contact: Bhavani Mireles- sister- [phone]  Marital Status: Single  Interpretation concerns, speaks another language, preferred language: English  Cultural concerns: none  Ability to read or write: yes    Housin story  Home Setup: 5 steps to enter  Lives With: son and sister  Daily Living Activities: independent  Durable Medical Equipment: none  Ambulation: inependent    Preferred Pharmacy: [facility]  Medication coverage: yes    Employment: retired  Anna Status/Location: [employer] to pay bills: yes  POA/LW/AD: none    Additional Comments:  OSW placed outreach TC to pt this afternoon  OSW introduced self and role  Pt is a very pleasant woman who is in survivorship  She completed a Dt, where she scored a 0/10  She does not report any concerns at this time  She states that she lives with her son and her sister and they are very supportive  Overall she is doing very well  She thanked this writer for calling and checking in on her  hard copy, drawn during this pregnancy

## 2024-01-02 ENCOUNTER — TELEPHONE (OUTPATIENT)
Dept: GYNECOLOGIC ONCOLOGY | Facility: CLINIC | Age: 74
End: 2024-01-02

## 2024-01-02 ENCOUNTER — APPOINTMENT (OUTPATIENT)
Dept: CT IMAGING | Facility: HOSPITAL | Age: 74
DRG: 176 | End: 2024-01-02
Payer: COMMERCIAL

## 2024-01-02 ENCOUNTER — HOSPITAL ENCOUNTER (INPATIENT)
Facility: HOSPITAL | Age: 74
LOS: 1 days | Discharge: HOME | DRG: 176 | End: 2024-01-04
Attending: EMERGENCY MEDICINE | Admitting: INTERNAL MEDICINE
Payer: COMMERCIAL

## 2024-01-02 ENCOUNTER — APPOINTMENT (EMERGENCY)
Dept: RADIOLOGY | Facility: HOSPITAL | Age: 74
DRG: 176 | End: 2024-01-02
Payer: COMMERCIAL

## 2024-01-02 ENCOUNTER — APPOINTMENT (EMERGENCY)
Dept: CT IMAGING | Facility: HOSPITAL | Age: 74
DRG: 176 | End: 2024-01-02
Payer: COMMERCIAL

## 2024-01-02 DIAGNOSIS — R79.89 ELEVATED BRAIN NATRIURETIC PEPTIDE (BNP) LEVEL: ICD-10-CM

## 2024-01-02 DIAGNOSIS — R06.02 SOB (SHORTNESS OF BREATH): Primary | ICD-10-CM

## 2024-01-02 DIAGNOSIS — I26.99 OTHER ACUTE PULMONARY EMBOLISM WITHOUT ACUTE COR PULMONALE (HCC): ICD-10-CM

## 2024-01-02 PROBLEM — R06.09 DYSPNEA ON EXERTION: Status: ACTIVE | Noted: 2024-01-02

## 2024-01-02 LAB
ALBUMIN SERPL BCP-MCNC: 3.4 G/DL (ref 3.5–5)
ALP SERPL-CCNC: 60 U/L (ref 34–104)
ALT SERPL W P-5'-P-CCNC: 11 U/L (ref 7–52)
ANION GAP SERPL CALCULATED.3IONS-SCNC: 4 MMOL/L
AST SERPL W P-5'-P-CCNC: 19 U/L (ref 13–39)
ATRIAL RATE: 66 BPM
ATRIAL RATE: 71 BPM
BASOPHILS # BLD AUTO: 0.02 THOUSANDS/ÂΜL (ref 0–0.1)
BASOPHILS NFR BLD AUTO: 1 % (ref 0–1)
BILIRUB SERPL-MCNC: 0.42 MG/DL (ref 0.2–1)
BNP SERPL-MCNC: 306 PG/ML (ref 0–100)
BUN SERPL-MCNC: 13 MG/DL (ref 5–25)
CALCIUM ALBUM COR SERPL-MCNC: 9 MG/DL (ref 8.3–10.1)
CALCIUM SERPL-MCNC: 8.5 MG/DL (ref 8.4–10.2)
CARDIAC TROPONIN I PNL SERPL HS: 5 NG/L
CHLORIDE SERPL-SCNC: 109 MMOL/L (ref 96–108)
CO2 SERPL-SCNC: 26 MMOL/L (ref 21–32)
CREAT SERPL-MCNC: 1 MG/DL (ref 0.6–1.3)
D DIMER PPP FEU-MCNC: 3.05 UG/ML FEU
EOSINOPHIL # BLD AUTO: 0.09 THOUSAND/ÂΜL (ref 0–0.61)
EOSINOPHIL NFR BLD AUTO: 3 % (ref 0–6)
ERYTHROCYTE [DISTWIDTH] IN BLOOD BY AUTOMATED COUNT: 14.6 % (ref 11.6–15.1)
GFR SERPL CREATININE-BSD FRML MDRD: 55 ML/MIN/1.73SQ M
GLUCOSE SERPL-MCNC: 85 MG/DL (ref 65–140)
HCT VFR BLD AUTO: 35.7 % (ref 34.8–46.1)
HGB BLD-MCNC: 11.2 G/DL (ref 11.5–15.4)
IMM GRANULOCYTES # BLD AUTO: 0.02 THOUSAND/UL (ref 0–0.2)
IMM GRANULOCYTES NFR BLD AUTO: 1 % (ref 0–2)
LYMPHOCYTES # BLD AUTO: 0.98 THOUSANDS/ÂΜL (ref 0.6–4.47)
LYMPHOCYTES NFR BLD AUTO: 29 % (ref 14–44)
MCH RBC QN AUTO: 33.1 PG (ref 26.8–34.3)
MCHC RBC AUTO-ENTMCNC: 31.4 G/DL (ref 31.4–37.4)
MCV RBC AUTO: 106 FL (ref 82–98)
MONOCYTES # BLD AUTO: 0.37 THOUSAND/ÂΜL (ref 0.17–1.22)
MONOCYTES NFR BLD AUTO: 11 % (ref 4–12)
NEUTROPHILS # BLD AUTO: 1.9 THOUSANDS/ÂΜL (ref 1.85–7.62)
NEUTS SEG NFR BLD AUTO: 55 % (ref 43–75)
NRBC BLD AUTO-RTO: 0 /100 WBCS
P AXIS: 74 DEGREES
P AXIS: 77 DEGREES
PLATELET # BLD AUTO: 147 THOUSANDS/UL (ref 149–390)
PMV BLD AUTO: 10.6 FL (ref 8.9–12.7)
POTASSIUM SERPL-SCNC: 4.7 MMOL/L (ref 3.5–5.3)
PR INTERVAL: 156 MS
PR INTERVAL: 170 MS
PROT SERPL-MCNC: 6.5 G/DL (ref 6.4–8.4)
QRS AXIS: 54 DEGREES
QRS AXIS: 64 DEGREES
QRSD INTERVAL: 72 MS
QRSD INTERVAL: 74 MS
QT INTERVAL: 410 MS
QT INTERVAL: 434 MS
QTC INTERVAL: 445 MS
QTC INTERVAL: 454 MS
RBC # BLD AUTO: 3.38 MILLION/UL (ref 3.81–5.12)
SODIUM SERPL-SCNC: 139 MMOL/L (ref 135–147)
T WAVE AXIS: 57 DEGREES
T WAVE AXIS: 63 DEGREES
VENTRICULAR RATE: 66 BPM
VENTRICULAR RATE: 71 BPM
WBC # BLD AUTO: 3.38 THOUSAND/UL (ref 4.31–10.16)

## 2024-01-02 PROCEDURE — 71275 CT ANGIOGRAPHY CHEST: CPT

## 2024-01-02 PROCEDURE — 85379 FIBRIN DEGRADATION QUANT: CPT

## 2024-01-02 PROCEDURE — 93005 ELECTROCARDIOGRAM TRACING: CPT

## 2024-01-02 PROCEDURE — 83880 ASSAY OF NATRIURETIC PEPTIDE: CPT

## 2024-01-02 PROCEDURE — 0241U HB NFCT DS VIR RESP RNA 4 TRGT: CPT | Performed by: INTERNAL MEDICINE

## 2024-01-02 PROCEDURE — 71260 CT THORAX DX C+: CPT

## 2024-01-02 PROCEDURE — 80053 COMPREHEN METABOLIC PANEL: CPT | Performed by: EMERGENCY MEDICINE

## 2024-01-02 PROCEDURE — 99222 1ST HOSP IP/OBS MODERATE 55: CPT

## 2024-01-02 PROCEDURE — 84443 ASSAY THYROID STIM HORMONE: CPT

## 2024-01-02 PROCEDURE — 71045 X-RAY EXAM CHEST 1 VIEW: CPT

## 2024-01-02 PROCEDURE — 99285 EMERGENCY DEPT VISIT HI MDM: CPT

## 2024-01-02 PROCEDURE — 84484 ASSAY OF TROPONIN QUANT: CPT | Performed by: EMERGENCY MEDICINE

## 2024-01-02 PROCEDURE — 36415 COLL VENOUS BLD VENIPUNCTURE: CPT

## 2024-01-02 PROCEDURE — 85025 COMPLETE CBC W/AUTO DIFF WBC: CPT | Performed by: EMERGENCY MEDICINE

## 2024-01-02 PROCEDURE — G1004 CDSM NDSC: HCPCS

## 2024-01-02 RX ORDER — MULTIVITAMIN WITH IRON
TABLET ORAL DAILY
COMMUNITY

## 2024-01-02 RX ORDER — RIBOFLAVIN (VITAMIN B2) 100 MG
TABLET ORAL DAILY
COMMUNITY

## 2024-01-02 RX ORDER — MELATONIN
1000 DAILY
COMMUNITY

## 2024-01-02 RX ORDER — FUROSEMIDE 20 MG/1
20 TABLET ORAL DAILY
Qty: 5 TABLET | Refills: 0 | Status: SHIPPED | OUTPATIENT
Start: 2024-01-02 | End: 2024-01-04

## 2024-01-02 RX ADMIN — IOHEXOL 100 ML: 350 INJECTION, SOLUTION INTRAVENOUS at 20:46

## 2024-01-02 RX ADMIN — IOHEXOL 100 ML: 350 INJECTION, SOLUTION INTRAVENOUS at 15:09

## 2024-01-02 NOTE — ED PROVIDER NOTES
History  Chief Complaint   Patient presents with    Chest Pain     Worsening chest pain and SOB. Sx x1 month, worse today. Sent by pcp     73 YOF with PMH CKD, HTN, endometrial cancer, osteoarthritis, PE on Eliquis, presents today with worsening chest pain and SOB for a few months. Pt reports she becomes very short of breath even with the smallest exertion. Pt reports she was at her PCP today and they were trying to get pt scheduled for outpatient CT scan but could not have it done soon enough for her symptoms so it was recommended she come to the ER. Pt denies leg swelling, abd pain/swelling, nausea, vomiting.         Prior to Admission Medications   Prescriptions Last Dose Informant Patient Reported? Taking?   acetaminophen (TYLENOL) 650 mg CR tablet  Self No No   Sig: Take 1 tablet (650 mg total) by mouth every 6 (six) hours as needed for mild pain   calcitriol (ROCALTROL) 0.25 mcg capsule   No No   Sig: Take 1 capsule (0.25 mcg total) by mouth 3 (three) times a week   denosumab (Prolia) 60 mg/mL  Self Yes No   Sig: Inject 1 mL under the skin every 6 (six) months   omeprazole (PriLOSEC) 20 mg delayed release capsule  Self Yes No   Sig: Take by mouth daily      Facility-Administered Medications: None       Past Medical History:   Diagnosis Date    Arthritis     osteo    Chronic kidney disease     Colon polyp     Diverticula of colon     Endometrial cancer (HCC)     2022    Hypertension     Obesity     Rhinitis        Past Surgical History:   Procedure Laterality Date    APPENDECTOMY      BREAST BIOPSY Left 1977    benign    BREAST SURGERY Left     biopsy    CHOLECYSTECTOMY      COLONOSCOPY      CYSTOSCOPY N/A 03/31/2022    Procedure: CYSTOSCOPY;  Surgeon: Gene Coburn MD;  Location: [facility];  Service: Gynecology Oncology    HERNIA REPAIR Right     inguinal    HYSTERECTOMY      IR PORT PLACEMENT  05/09/2022    JOINT REPLACEMENT Bilateral     Knee    OOPHORECTOMY      NV COLONOSCOPY FLX DX W/COLLJ SPEC WHEN  PFRMD N/A 02/21/2017    Procedure: COLONOSCOPY with polypectomy and hemo clip marjan.;  Surgeon: Ramirez Jones MD;  Location: [facility];  Service: Gastroenterology    MS LAPS TOTAL HYSTERECT 250 GM/< W/RMVL TUBE/OVARY N/A 03/31/2022    Procedure: ROBOTIC HYSTERECTOMY, BILATERAL SALPINGO-OOPHORECTOMY, STAGING PERITONEAL AND OMENTAL BIOPSIES, BILATERAL PELVIC SENTINEL LYMPH NODE BIOPSIES;  Surgeon: Gene Coburn MD;  Location:  [facility];  Service: Gynecology Oncology       Family History   Problem Relation Age of Onset    Heart disease Mother     Prostate cancer Father 90    Diabetes Sister     Hypertension Sister     Transient ischemic attack Sister     No Known Problems Sister     Diabetes Brother     Heart disease Brother     Kidney disease Son     No Known Problems Maternal Grandmother     No Known Problems Maternal Grandfather     No Known Problems Paternal Grandmother     No Known Problems Paternal Grandfather     No Known Problems Maternal Aunt     No Known Problems Paternal Aunt     No Known Problems Paternal Aunt     No Known Problems Paternal Aunt     Stomach cancer Other 38    Thyroid disease Other     Cancer Other         unsure type    Breast cancer Neg Hx     Colon cancer Neg Hx     Ovarian cancer Neg Hx      I have reviewed and agree with the history as documented.    E-Cigarette/Vaping    E-Cigarette Use Never User      E-Cigarette/Vaping Substances    Nicotine No     THC No     CBD No     Flavoring No     Other No     Unknown No      Social History     Tobacco Use    Smoking status: Never    Smokeless tobacco: Never   Vaping Use    Vaping status: Never Used   Substance Use Topics    Alcohol use: Yes     Alcohol/week: 1.0 standard drink of alcohol     Types: 1 Glasses of wine per week     Comment: social-seldom    Drug use: Never       Review of Systems   Constitutional:  Negative for fever.   Respiratory:  Positive for shortness of breath. Negative for cough.    Cardiovascular:  Negative for  chest pain, palpitations and leg swelling.   Gastrointestinal:  Negative for abdominal pain and vomiting.   Skin:  Negative for color change and rash.   All other systems reviewed and are negative.      Physical Exam  Physical Exam  Vitals and nursing note reviewed.   Constitutional:       General: She is not in acute distress.     Appearance: She is well-developed. She is morbidly obese.   HENT:      Head: Normocephalic and atraumatic.   Eyes:      Conjunctiva/sclera: Conjunctivae normal.   Cardiovascular:      Rate and Rhythm: Normal rate and regular rhythm.      Heart sounds: No murmur heard.  Pulmonary:      Effort: Pulmonary effort is normal. No respiratory distress.      Breath sounds: Normal breath sounds.   Abdominal:      Palpations: Abdomen is soft.      Tenderness: There is no abdominal tenderness.   Musculoskeletal:         General: No swelling. Normal range of motion.      Cervical back: Neck supple.      Right lower leg: No edema.      Left lower leg: No edema.   Skin:     General: Skin is warm and dry.      Capillary Refill: Capillary refill takes less than 2 seconds.   Neurological:      Mental Status: She is alert.   Psychiatric:         Mood and Affect: Mood normal.         Vital Signs  ED Triage Vitals   Temperature Pulse Respirations Blood Pressure SpO2   01/02/24 1238 01/02/24 1219 01/02/24 1219 01/02/24 1230 01/02/24 1219   98.2 °F (36.8 °C) 72 20 (!) 176/97 97 %      Temp Source Heart Rate Source Patient Position - Orthostatic VS BP Location FiO2 (%)   01/02/24 1238 -- -- -- --   Oral          Pain Score       --                  Vitals:    01/02/24 1219 01/02/24 1230   BP:  (!) 176/97   Pulse: 72 66         Visual Acuity      ED Medications  Medications   iohexol (OMNIPAQUE) 350 MG/ML injection (SINGLE-DOSE) 100 mL (100 mL Intravenous Given 1/2/24 150)       Diagnostic Studies  Results Reviewed       Procedure Component Value Units Date/Time    B-Type Natriuretic Peptide(BNP) [[id number]]   (Abnormal) Collected: 01/02/24 1239    Lab Status: Final result Specimen: Blood from Arm, Left Updated: 01/02/24 1441      pg/mL     Comprehensive metabolic panel [[id number]]  (Abnormal) Collected: 01/02/24 1343    Lab Status: Final result Specimen: Blood from Arm, Right Updated: 01/02/24 1409     Sodium 139 mmol/L      Potassium 4.7 mmol/L      Chloride 109 mmol/L      CO2 26 mmol/L      ANION GAP 4 mmol/L      BUN 13 mg/dL      Creatinine 1.00 mg/dL      Glucose 85 mg/dL      Calcium 8.5 mg/dL      Corrected Calcium 9.0 mg/dL      AST 19 U/L      ALT 11 U/L      Alkaline Phosphatase 60 U/L      Total Protein 6.5 g/dL      Albumin 3.4 g/dL      Total Bilirubin 0.42 mg/dL      eGFR 55 ml/min/1.73sq m     Narrative:      National Kidney Disease Foundation guidelines for Chronic Kidney Disease (CKD):     Stage 1 with normal or high GFR (GFR > 90 mL/min/1.73 square meters)    Stage 2 Mild CKD (GFR = 60-89 mL/min/1.73 square meters)    Stage 3A Moderate CKD (GFR = 45-59 mL/min/1.73 square meters)    Stage 3B Moderate CKD (GFR = 30-44 mL/min/1.73 square meters)    Stage 4 Severe CKD (GFR = 15-29 mL/min/1.73 square meters)    Stage 5 End Stage CKD (GFR <15 mL/min/1.73 square meters)  Note: GFR calculation is accurate only with a steady state creatinine    HS Troponin 0hr (reflex protocol) [[id number]]  (Normal) Collected: 01/02/24 1239    Lab Status: Final result Specimen: Blood from Arm, Left Updated: 01/02/24 1315     hs TnI 0hr 5 ng/L     CBC and differential [[id number]]  (Abnormal) Collected: 01/02/24 1239    Lab Status: Final result Specimen: Blood from Arm, Left Updated: 01/02/24 1255     WBC 3.38 Thousand/uL      RBC 3.38 Million/uL      Hemoglobin 11.2 g/dL      Hematocrit 35.7 %       fL      MCH 33.1 pg      MCHC 31.4 g/dL      RDW 14.6 %      MPV 10.6 fL      Platelets 147 Thousands/uL      nRBC 0 /100 WBCs      Neutrophils Relative 55 %      Immat GRANS % 1 %      Lymphocytes Relative 29 %       Monocytes Relative 11 %      Eosinophils Relative 3 %      Basophils Relative 1 %      Neutrophils Absolute 1.90 Thousands/µL      Immature Grans Absolute 0.02 Thousand/uL      Lymphocytes Absolute 0.98 Thousands/µL      Monocytes Absolute 0.37 Thousand/µL      Eosinophils Absolute 0.09 Thousand/µL      Basophils Absolute 0.02 Thousands/µL                    XR chest 1 view portable    (Results Pending)   CT chest with contrast    (Results Pending)              Procedures  Procedures         ED Course  ED Course as of 01/02/24 1541   Tue Jan 02, 2024   1447 BNP(!): 306   1534 S/o to Josette Orozco PA-C: pending CT chest. If normal and ambulatory pulse ox is normal, can dispo home with close PCP follow up              HEART Risk Score      Flowsheet Row Most Recent Value   Heart Score Risk Calculator    History 0 Filed at: 01/02/2024 1503   ECG 0 Filed at: 01/02/2024 1503   Age 2 Filed at: 01/02/2024 1503   Risk Factors 2 Filed at: 01/02/2024 1503   Troponin 0 Filed at: 01/02/2024 1503   HEART Score 4 Filed at: 01/02/2024 1503                                        Medical Decision Making  73 YOF with PMH CKD, HTN, endometrial cancer, osteoarthritis presents today with worsening chest pain and SOB for a few months. Was sent in by her PCP for further eval. Physical exam as noted above.     DDX including but not limited to: pneumonia, pleural effusion, CHF, PE, ACS, MI, asthma exacerbation, COPD exacerbation, COVID 19, anemia, metabolic abnormality, renal failure.    Trop was 5 and ECG was nonischemic- unlikely to be cardiac etiology. Not suspecting PE as pt is already on AC, O2 sats normal and nontachycardic. BNP slightly elevated.     Signed out to Josette Orozco PA-C: pending CT chest. If normal and pt is stable during ambulation trial with O2 sats, can dispo home with small dose lasix and close PCP follow up.       Problems Addressed:  SOB (shortness of breath): acute illness or injury    Amount and/or Complexity  of Data Reviewed  Labs: ordered. Decision-making details documented in ED Course.  Radiology: ordered.    Risk  Prescription drug management.             Disposition  Final diagnoses:   SOB (shortness of breath)   Elevated brain natriuretic peptide (BNP) level     Time reflects when diagnosis was documented in both MDM as applicable and the Disposition within this note       Time User Action Codes Description Comment    1/2/2024  3:27 PM Michi Santoyo Add [R06.02] SOB (shortness of breath)     1/2/2024  3:41 PM Michi Santoyo Add [R79.89] Elevated brain natriuretic peptide (BNP) level           ED Disposition       None          Follow-up Information    None         Patient's Medications   Discharge Prescriptions    FUROSEMIDE (LASIX) 20 MG TABLET    Take 1 tablet (20 mg total) by mouth daily for 5 days       Start Date: 1/2/2024  End Date: 1/7/2024       Order Dose: 20 mg       Quantity: 5 tablet    Refills: 0       No discharge procedures on file.    PDMP Review         Value Time User    PDMP Reviewed  Yes 5/11/2022  3:20 PM Nicole Jose PA-C            ED Provider  Electronically Signed by             Michi Santoyo PA-C  01/02/24 8108

## 2024-01-02 NOTE — ED CARE HANDOFF
Emergency Department Sign Out Note        Sign out and transfer of care from Michi Santoyo PA-C. See Separate Emergency Department note.     The patient, Sg Scherer, was evaluated by the previous provider for shortness of breath x 1 month.    Workup Completed:  Labs Reviewed   CBC AND DIFFERENTIAL - Abnormal       Result Value Ref Range Status    WBC 3.38 (*) 4.31 - 10.16 Thousand/uL Final    RBC 3.38 (*) 3.81 - 5.12 Million/uL Final    Hemoglobin 11.2 (*) 11.5 - 15.4 g/dL Final    Hematocrit 35.7  34.8 - 46.1 % Final     (*) 82 - 98 fL Final    MCH 33.1  26.8 - 34.3 pg Final    MCHC 31.4  31.4 - 37.4 g/dL Final    RDW 14.6  11.6 - 15.1 % Final    MPV 10.6  8.9 - 12.7 fL Final    Platelets 147 (*) 149 - 390 Thousands/uL Final    nRBC 0  /100 WBCs Final    Neutrophils Relative 55  43 - 75 % Final    Immat GRANS % 1  0 - 2 % Final    Lymphocytes Relative 29  14 - 44 % Final    Monocytes Relative 11  4 - 12 % Final    Eosinophils Relative 3  0 - 6 % Final    Basophils Relative 1  0 - 1 % Final    Neutrophils Absolute 1.90  1.85 - 7.62 Thousands/µL Final    Immature Grans Absolute 0.02  0.00 - 0.20 Thousand/uL Final    Lymphocytes Absolute 0.98  0.60 - 4.47 Thousands/µL Final    Monocytes Absolute 0.37  0.17 - 1.22 Thousand/µL Final    Eosinophils Absolute 0.09  0.00 - 0.61 Thousand/µL Final    Basophils Absolute 0.02  0.00 - 0.10 Thousands/µL Final   COMPREHENSIVE METABOLIC PANEL - Abnormal    Sodium 139  135 - 147 mmol/L Final    Potassium 4.7  3.5 - 5.3 mmol/L Final    Comment: Slightly Hemolyzed:Results may be affected.    Chloride 109 (*) 96 - 108 mmol/L Final    CO2 26  21 - 32 mmol/L Final    ANION GAP 4  mmol/L Final    BUN 13  5 - 25 mg/dL Final    Creatinine 1.00  0.60 - 1.30 mg/dL Final    Comment: Standardized to IDMS reference method    Glucose 85  65 - 140 mg/dL Final    Comment: If the patient is fasting, the ADA then defines impaired fasting glucose as > 100 mg/dL and diabetes as > or equal  to 123 mg/dL.    Calcium 8.5  8.4 - 10.2 mg/dL Final    Corrected Calcium 9.0  8.3 - 10.1 mg/dL Final    AST 19  13 - 39 U/L Final    Comment: Slightly Hemolyzed:Results may be affected.    ALT 11  7 - 52 U/L Final    Comment: Specimen collection should occur prior to Sulfasalazine administration due to the potential for falsely depressed results.     Alkaline Phosphatase 60  34 - 104 U/L Final    Total Protein 6.5  6.4 - 8.4 g/dL Final    Albumin 3.4 (*) 3.5 - 5.0 g/dL Final    Total Bilirubin 0.42  0.20 - 1.00 mg/dL Final    Comment: Use of this assay is not recommended for patients undergoing treatment with eltrombopag due to the potential for falsely elevated results.  N-acetyl-p-benzoquinone imine (metabolite of Acetaminophen) will generate erroneously low results in samples for patients that have taken an overdose of Acetaminophen.    eGFR 55  ml/min/1.73sq m Final    Narrative:     National Kidney Disease Foundation guidelines for Chronic Kidney Disease (CKD):     Stage 1 with normal or high GFR (GFR > 90 mL/min/1.73 square meters)    Stage 2 Mild CKD (GFR = 60-89 mL/min/1.73 square meters)    Stage 3A Moderate CKD (GFR = 45-59 mL/min/1.73 square meters)    Stage 3B Moderate CKD (GFR = 30-44 mL/min/1.73 square meters)    Stage 4 Severe CKD (GFR = 15-29 mL/min/1.73 square meters)    Stage 5 End Stage CKD (GFR <15 mL/min/1.73 square meters)  Note: GFR calculation is accurate only with a steady state creatinine   B-TYPE NATRIURETIC PEPTIDE (BNP) - Abnormal     (*) 0 - 100 pg/mL Final   D-DIMER, QUANTITATIVE - Abnormal    D-Dimer, Quant 3.05 (*) <0.50 ug/ml FEU Final    Comment: Reference and upper limits to exclude DVT and PE are the same.  Do not use to exclude if clinical symptoms are present.  Pregnant women:  1st trimester:  <0.22 - 1.06 ug/ml FEU  2nd trimester:  <0.22 - 1.88 ug/ml FEU  3rd trimester:   0.24 - 3.28 ug/ml FEU    Note: Normal ranges may not apply to patients who are transgender,  "non-binary, or whose legal sex, sex at birth, and gender identity differ.      Narrative:     In the evaluation for possible pulmonary embolism, in the appropriate (Well's Score of 4 or less) patient, the age adjusted d-dimer cutoff for this patient can be calculated as:    Age x 0.01 (in ug/mL) for Age-adjusted D-dimer exclusion threshold for a patient over 50 years.   HS TROPONIN I 0HR - Normal    hs TnI 0hr 5  \"Refer to ACS Flowchart\"- see link ng/L Final    Comment:                                              Initial (time 0) result  If >=50 ng/L, Myocardial injury suggested ;  Type of myocardial injury and treatment strategy  to be determined.  If 5-49 ng/L, a delta result at 2 hours and or 4 hours will be needed to further evaluate.  If <4 ng/L, and chest pain has been >3 hours since onset, patient may qualify for discharge based on the HEART score in the ED.  If <5 ng/L and <3hours since onset of chest pain, a delta result at 2 hours will be needed to further evaluate.    HS Troponin 99th Percentile URL of a Health Population=12 ng/L with a 95% Confidence Interval of 8-18 ng/L.    Second Troponin (time 2 hours)  If calculated delta >= 20 ng/L,  Myocardial injury suggested ; Type of myocardial injury and treatment strategy to be determined.  If 5-49 ng/L and the calculated delta is 5-19 ng/L, consult medical service for evaluation.  Continue evaluation for ischemia on ecg and other possible etiology and repeat hs troponin at 4 hours.  If delta is <5 ng/L at 2 hours, consider discharge based on risk stratification via the HEART score (if in ED), or CM risk score in IP/Observation.    HS Troponin 99th Percentile URL of a Health Population=12 ng/L with a 95% Confidence Interval of 8-18 ng/L.   COVID19, INFLUENZA A/B, RSV PCR, [facility]         ED Course / Workup Pending (followup):  -CT pending.  -Ambulatory pulse ox and dispo.      HEART Risk Score      Flowsheet Row Most Recent Value   Heart Score Risk " Calculator    History 0 Filed at: 01/02/2024 1503   ECG 0 Filed at: 01/02/2024 1503   Age 2 Filed at: 01/02/2024 1503   Risk Factors 2 Filed at: 01/02/2024 1503   Troponin 0 Filed at: 01/02/2024 1503   HEART Score 4 Filed at: 01/02/2024 1503                                    ED Course as of 01/03/24 0009   Tue Jan 02, 2024   1537 S/o from [facility]: SOB x1mo. BNP mildly elevated. O2 sats normal on RA. CT and ambulatory pulse ox pending.   1604 CT chest with contrast  IMPRESSION:     No acute disease in the lungs.     1654 When reviewing chart prior to discharge, saw nursing note stating RN spoke with doctor's office who sent her in and they were concerned about PE.  Patient states that she has not taken Eliquis in about 6 months.  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  CT chest performed with contrast so cannot rescan her with contrast to evaluate for PE.  Will D-dimer and reevaluate.   1753 TT sent to [facility] due to elevated D dimer and risk factors for PE.     Procedures  Medical Decision Making  Patient is a 73-year-old female presenting with worsening shortness of breath.  Previous provider obtain cardiac workup and CT chest with contrast.  BNP was slightly elevated but remaining labs were mostly at patient's baseline.  Ambulatory pulse ox remained in the upper 90s.  However, nursing note stated that doctor office who sent her in concern for PE and I confirmed with patient that she is no longer taking Eliquis.  Ordered D dimer which came back elevated. Will admit for further workup as cannot rescan with contrast for PE study given CKD.    Discussed findings and plan for admission with patient/family who were in agreement. All questions were answered at bedside to the satisfaction of the patient/family. Patient remained stable throughout stay in the emergency department. Patient stable at time of admission.        Amount and/or Complexity of Data Reviewed  Labs: ordered.  Radiology: ordered. Decision-making details  documented in ED Course.    Risk  Prescription drug management.  Decision regarding hospitalization.            Disposition  Final diagnoses:   SOB (shortness of breath)   Elevated brain natriuretic peptide (BNP) level     Time reflects when diagnosis was documented in both MDM as applicable and the Disposition within this note       Time User Action Codes Description Comment    1/2/2024  3:27 PM Michi Santoyo Add [R06.02] SOB (shortness of breath)     1/2/2024  3:41 PM Michi Santoyo Add [R79.89] Elevated brain natriuretic peptide (BNP) level           ED Disposition       ED Disposition   Admit    Condition   Stable    Date/Time   Tue Jan 2, 2024 0143    Comment   Case was discussed with MARIELOS and the patient's admission status was agreed to be Admission Status: inpatient status to the service of Dr. Blanchard .               Follow-up Information    None       Patient's Medications   Discharge Prescriptions    FUROSEMIDE (LASIX) 20 MG TABLET    Take 1 tablet (20 mg total) by mouth daily for 5 days       Start Date: 1/2/2024  End Date: 1/7/2024       Order Dose: 20 mg       Quantity: 5 tablet    Refills: 0     No discharge procedures on file.       ED Provider  Electronically Signed by     Josette Orozco PA-C  01/03/24 0009

## 2024-01-02 NOTE — ED NOTES
Received call from doctor's office. States they are concerned for PE.      Tanika Atkinson, KALA  01/02/24 2861

## 2024-01-03 ENCOUNTER — TELEPHONE (OUTPATIENT)
Dept: HEMATOLOGY ONCOLOGY | Facility: CLINIC | Age: 74
End: 2024-01-03

## 2024-01-03 LAB
ANION GAP SERPL CALCULATED.3IONS-SCNC: 6 MMOL/L
APTT PPP: 24 SECONDS (ref 23–37)
BUN SERPL-MCNC: 12 MG/DL (ref 5–25)
CALCIUM SERPL-MCNC: 8.4 MG/DL (ref 8.4–10.2)
CHLORIDE SERPL-SCNC: 109 MMOL/L (ref 96–108)
CO2 SERPL-SCNC: 26 MMOL/L (ref 21–32)
CREAT SERPL-MCNC: 0.89 MG/DL (ref 0.6–1.3)
ERYTHROCYTE [DISTWIDTH] IN BLOOD BY AUTOMATED COUNT: 14.6 % (ref 11.6–15.1)
FLUAV RNA RESP QL NAA+PROBE: NEGATIVE
FLUBV RNA RESP QL NAA+PROBE: NEGATIVE
GFR SERPL CREATININE-BSD FRML MDRD: 63 ML/MIN/1.73SQ M
GLUCOSE SERPL-MCNC: 73 MG/DL (ref 65–140)
HCT VFR BLD AUTO: 33.4 % (ref 34.8–46.1)
HGB BLD-MCNC: 10.6 G/DL (ref 11.5–15.4)
INR PPP: 1.03 (ref 0.84–1.19)
MAGNESIUM SERPL-MCNC: 2.1 MG/DL (ref 1.9–2.7)
MCH RBC QN AUTO: 33 PG (ref 26.8–34.3)
MCHC RBC AUTO-ENTMCNC: 31.7 G/DL (ref 31.4–37.4)
MCV RBC AUTO: 104 FL (ref 82–98)
PLATELET # BLD AUTO: 126 THOUSANDS/UL (ref 149–390)
PMV BLD AUTO: 10.7 FL (ref 8.9–12.7)
POTASSIUM SERPL-SCNC: 3.9 MMOL/L (ref 3.5–5.3)
PROTHROMBIN TIME: 13.7 SECONDS (ref 11.6–14.5)
RBC # BLD AUTO: 3.21 MILLION/UL (ref 3.81–5.12)
RSV RNA RESP QL NAA+PROBE: NEGATIVE
SARS-COV-2 RNA RESP QL NAA+PROBE: NEGATIVE
SODIUM SERPL-SCNC: 141 MMOL/L (ref 135–147)
TSH SERPL DL<=0.05 MIU/L-ACNC: 1.86 UIU/ML (ref 0.45–4.5)
WBC # BLD AUTO: 2.87 THOUSAND/UL (ref 4.31–10.16)

## 2024-01-03 PROCEDURE — 85027 COMPLETE CBC AUTOMATED: CPT

## 2024-01-03 PROCEDURE — 85730 THROMBOPLASTIN TIME PARTIAL: CPT

## 2024-01-03 PROCEDURE — 99232 SBSQ HOSP IP/OBS MODERATE 35: CPT | Performed by: INTERNAL MEDICINE

## 2024-01-03 PROCEDURE — 83735 ASSAY OF MAGNESIUM: CPT

## 2024-01-03 PROCEDURE — 80048 BASIC METABOLIC PNL TOTAL CA: CPT

## 2024-01-03 PROCEDURE — 85610 PROTHROMBIN TIME: CPT

## 2024-01-03 RX ORDER — FUROSEMIDE 10 MG/ML
40 INJECTION INTRAMUSCULAR; INTRAVENOUS DAILY
Status: DISCONTINUED | OUTPATIENT
Start: 2024-01-03 | End: 2024-01-04 | Stop reason: HOSPADM

## 2024-01-03 RX ORDER — METOCLOPRAMIDE HYDROCHLORIDE 5 MG/ML
10 INJECTION INTRAMUSCULAR; INTRAVENOUS EVERY 6 HOURS PRN
Status: DISCONTINUED | OUTPATIENT
Start: 2024-01-03 | End: 2024-01-03

## 2024-01-03 RX ORDER — POLYETHYLENE GLYCOL 3350 17 G/17G
17 POWDER, FOR SOLUTION ORAL DAILY PRN
Status: DISCONTINUED | OUTPATIENT
Start: 2024-01-03 | End: 2024-01-04 | Stop reason: HOSPADM

## 2024-01-03 RX ORDER — MAGNESIUM HYDROXIDE/ALUMINUM HYDROXICE/SIMETHICONE 120; 1200; 1200 MG/30ML; MG/30ML; MG/30ML
30 SUSPENSION ORAL EVERY 6 HOURS PRN
Status: DISCONTINUED | OUTPATIENT
Start: 2024-01-03 | End: 2024-01-04 | Stop reason: HOSPADM

## 2024-01-03 RX ORDER — ENOXAPARIN SODIUM 100 MG/ML
40 INJECTION SUBCUTANEOUS DAILY
Status: DISCONTINUED | OUTPATIENT
Start: 2024-01-03 | End: 2024-01-03

## 2024-01-03 RX ORDER — ACETAMINOPHEN 325 MG/1
650 TABLET ORAL EVERY 6 HOURS PRN
Status: DISCONTINUED | OUTPATIENT
Start: 2024-01-03 | End: 2024-01-04 | Stop reason: HOSPADM

## 2024-01-03 RX ORDER — ONDANSETRON 2 MG/ML
4 INJECTION INTRAMUSCULAR; INTRAVENOUS EVERY 6 HOURS PRN
Status: DISCONTINUED | OUTPATIENT
Start: 2024-01-03 | End: 2024-01-04 | Stop reason: HOSPADM

## 2024-01-03 RX ADMIN — FUROSEMIDE 40 MG: 10 INJECTION, SOLUTION INTRAVENOUS at 15:29

## 2024-01-03 RX ADMIN — APIXABAN 10 MG: 5 TABLET, FILM COATED ORAL at 17:21

## 2024-01-03 RX ADMIN — APIXABAN 10 MG: 5 TABLET, FILM COATED ORAL at 10:54

## 2024-01-03 RX ADMIN — ACETAMINOPHEN 325MG 650 MG: 325 TABLET ORAL at 17:11

## 2024-01-03 RX ADMIN — ENOXAPARIN SODIUM 40 MG: 40 INJECTION SUBCUTANEOUS at 08:43

## 2024-01-03 NOTE — PLAN OF CARE
Problem: PAIN - ADULT  Goal: Verbalizes/displays adequate comfort level or baseline comfort level  Description: Interventions:  - Encourage patient to monitor pain and request assistance  - Assess pain using appropriate pain scale  - Administer analgesics based on type and severity of pain and evaluate response  - Implement non-pharmacological measures as appropriate and evaluate response  - Consider cultural and social influences on pain and pain management  - Notify physician/advanced practitioner if interventions unsuccessful or patient reports new pain  Outcome: Progressing     Problem: SAFETY ADULT  Goal: Patient will remain free of falls  Description: INTERVENTIONS:  - Educate patient/family on patient safety including physical limitations  - Instruct patient to call for assistance with activity   - Consult OT/PT to assist with strengthening/mobility   - Keep Call bell within reach  - Keep bed low and locked with side rails adjusted as appropriate  - Keep care items and personal belongings within reach  - Initiate and maintain comfort rounds  - Make Fall Risk Sign visible to staff  - Initiate/Maintain bed alarm  - Obtain necessary fall risk management equipment: yellow socks  - Apply yellow socks and bracelet for high fall risk patients  - Consider moving patient to room near nurses station  Outcome: Progressing

## 2024-01-03 NOTE — ASSESSMENT & PLAN NOTE
Lab Results   Component Value Date    EGFR 63 01/03/2024    EGFR 55 01/02/2024    EGFR 45 (L) 09/19/2023    CREATININE 0.89 01/03/2024    CREATININE 1.00 01/02/2024    CREATININE 1.25 (H) 09/19/2023     In setting of age, hypertensive nephropathy  Baseline Cr: 1.2    Plan:  Admit Cr: 1   Monitor renal function, renal dose medications, maintain normotension/euvolemia, avoid nephrotoxic agents, I&Os

## 2024-01-03 NOTE — ASSESSMENT & PLAN NOTE
Worsening dyspnea for ~1 month, no inciting event, intermittent chest pain, on room air. Palpitations/dyspnea worse with exertion, hx of PE  CBC/BMP unremarkable, , trop 5, d-dimer 3.05  CT chest w/ contrast reviewed no acute lung pathology     Plan:  Obtain dedicated CTA PE study, TTE   Continuous pulse ox  Maintain telemetry

## 2024-01-03 NOTE — UTILIZATION REVIEW
Initial Clinical Review    WAS OBSERVATION 1/2/24 @ 1842 CONVERTED TO INPATIENT ADMISSION 1/3/24 @ 1539 DUE TO CONTINUED STAY REQUIRED TO CARE FOR PATIENT WITH DX: DYSPNEA ON EXERTION.    Admission: Date/Time/Statement:   Admission Orders (From admission, onward)       Ordered        01/03/24 1539  Inpatient Admission  Once            01/02/24 1842  Place in Observation  Once                          Orders Placed This Encounter   Procedures    Inpatient Admission     Standing Status:   Standing     Number of Occurrences:   1     Order Specific Question:   Level of Care     Answer:   Med Surg [16]     Order Specific Question:   Estimated length of stay     Answer:   More than 2 Midnights     Order Specific Question:   Certification     Answer:   I certify that inpatient services are medically necessary for this patient for a duration of greater than two midnights. See H&P and MD Progress Notes for additional information about the patient's course of treatment.     ED Arrival Information       Expected   -    Arrival   1/2/2024 12:11    Acuity   Urgent              Means of arrival   Walk-In    Escorted by   Family Member    Service   Hospitalist    Admission type   Emergency              Arrival complaint   chest pain             Chief Complaint   Patient presents with    Chest Pain     Worsening chest pain and SOB. Sx x1 month, worse today. Sent by pcp       Initial Presentation: 73 y.o. female to ED via walk-in from home   Present to ED with worsening shortness of breath/intermittent chest pain.    PMHX endometrial cancer, HTN, CKD, hx of PE previously on Eliquis ; CHF-with preserved ejection fraction (grade 1 diastolic dysfunction)   Admitted to OBS with DX: Dyspnea on exertion   on exam: Tachypnea; hypertensive; , trop 5, d-dimer 3.05   PLAN: tele monitoring; continuous pulse ox; monitor labs; f/u CTA PE study; f/u TTE    Date: 1/3/24 - CHANGED TO INPATIENT   Continues with dyspnea on exertion. CTA read  as 3 PE and right lobar lung; No home antihypertensives, monitor need for antihypertensive while inpatient   Plan: start iv lasix; monitor labs; f/u TTE; tele monitoring; continuous pulse ox; f/u thrombosis panel      Date: 1/4/24      Day 3: Has surpassed a 2nd midnight with active treatments and services, which include cont iv lasix.      ED Triage Vitals   Temperature Pulse Respirations Blood Pressure SpO2   01/02/24 1238 01/02/24 1219 01/02/24 1219 01/02/24 1230 01/02/24 1219   98.2 °F (36.8 °C) 72 20 (!) 176/97 97 %      Temp Source Heart Rate Source Patient Position - Orthostatic VS BP Location FiO2 (%)   01/02/24 1238 01/02/24 1551 01/02/24 1551 01/02/24 1551 --   Oral Monitor Lying Right arm       Pain Score       01/02/24 1551       7          Wt Readings from Last 1 Encounters:   11/09/23 112 kg (246 lb 14.6 oz)     Additional Vital Signs:   Date/Time Temp Pulse Resp BP MAP (mmHg) SpO2 O2 Device Patient Position - Orthostatic VS   01/04/24 07:07:09 97.8 °F (36.6 °C) 71 16 149/79 102 98 % None (Room air) Lying   01/03/24 23:29:22 97.6 °F (36.4 °C) 67 18 148/74 99 96 % -- --   01/03/24 17:02:16 -- 77 -- 127/62 84 97 % -- --   01/03/24 15:07:29 97.5 °F (36.4 °C) 67 16 171/90 Abnormal   117 94 % None (Room air) Lying     Date/Time Temp Pulse Resp BP MAP (mmHg) SpO2 O2 Device Patient Position - Orthostatic VS   01/03/24 07:02:20 97.5 °F (36.4 °C) 67 14 151/78 102 100 % None (Room air) Lying   01/03/24 0506 97.8 °F (36.6 °C) 66 -- 123/58 80 97 % None (Room air) --   01/03/24 01:07:22 97.8 °F (36.6 °C) 68 18 132/86 101 97 % None (Room air) --   01/03/24 0107 -- -- -- -- -- 97 % None (Room air) --   01/03/24 0043 -- 71 -- 146/88 -- 100 % -- --   01/02/24 2230 -- 68 20 113/79 93 99 % -- --   01/02/24 2030 -- 70 20 146/71 102 98 % -- --   01/02/24 2000 -- 73 20 171/79 Abnormal  113 100 % -- --   01/02/24 1800 -- 72 19 161/71 102 99 % -- --   01/02/24 1630 -- 70 21 137/76 102 98 % -- --   01/02/24 1616 -- 73 20  175/81 Abnormal  -- 100 % None (Room air) Lying   01/02/24 1551 -- 72 -- -- -- 99 % None (Room air) Lying   01/02/24 1238 98.2 °F (36.8 °C) -- -- -- -- -- -- --   01/02/24 1230 -- 66 20 176/97 Abnormal  130 100 % -- --   01/02/24 1219 -- 72 20 -- -- 97 %           EKG: Normal sinus rhythm  Normal ECG  When compared with ECG of 02-JAN-2024 12:17,  Premature supraventricular complexes are no longer Present  ST elevation now present in Inferior leads      Pertinent Labs/Diagnostic Test Results:   CTA chest pe study   Final Result by Michael Coombs MD (01/03 0948)      3 small right lung lobar pulmonary artery nonoccluding emboli      Measured RV/LV ratio is within normal limits at less than 0.9.         I personally discussed this study with MANNIE ROWE MD on 1/3/2024 9:47 AM.               Workstation performed: [id number]         CT chest with contrast   Final Result by Neptali Mccartney MD (01/02 1557)      No acute disease in the lungs.               Workstation performed: [id number]         XR chest 1 view portable   Final Result by Federico Mancilla MD (01/03 0832)      No acute cardiopulmonary disease.      Findings are stable            Workstation performed: [id number]           Results from last 7 days   Lab Units 01/02/24  2230   SARS-COV-2  Negative     Results from last 7 days   Lab Units 01/03/24  0451 01/02/24  1239   WBC Thousand/uL 2.87* 3.38*   HEMOGLOBIN g/dL 10.6* 11.2*   HEMATOCRIT % 33.4* 35.7   PLATELETS Thousands/uL 126* 147*   NEUTROS ABS Thousands/µL  --  1.90        Results from last 7 days   Lab Units 01/03/24  0451 01/02/24  1343   SODIUM mmol/L 141 139   POTASSIUM mmol/L 3.9 4.7   CHLORIDE mmol/L 109* 109*   CO2 mmol/L 26 26   ANION GAP mmol/L 6 4   BUN mg/dL 12 13   CREATININE mg/dL 0.89 1.00   EGFR ml/min/1.73sq m 63 55   CALCIUM mg/dL 8.4 8.5   MAGNESIUM mg/dL 2.1  --      Results from last 7 days   Lab Units 01/02/24  1343   AST U/L 19   ALT U/L 11   ALK PHOS U/L 60   TOTAL  PROTEIN g/dL 6.5   ALBUMIN g/dL 3.4*   TOTAL BILIRUBIN mg/dL 0.42        Results from last 7 days   Lab Units 01/03/24  0451 01/02/24  1343   GLUCOSE RANDOM mg/dL 73 85        Results from last 7 days   Lab Units 01/02/24  1239   HS TNI 0HR ng/L 5     Results from last 7 days   Lab Units 01/02/24  1645   D-DIMER QUANTITATIVE ug/ml FEU 3.05*     Results from last 7 days   Lab Units 01/03/24  0451   PROTIME seconds 13.7   INR  1.03   PTT seconds 24     Results from last 7 days   Lab Units 01/02/24  1343   TSH 3RD GENERATON uIU/mL 1.858        Results from last 7 days   Lab Units 01/02/24  1239   BNP pg/mL 306*        Results from last 7 days   Lab Units 01/02/24  2230   INFLUENZA A PCR  Negative   INFLUENZA B PCR  Negative   RSV PCR  Negative          ED Treatment:   Medication Administration from 01/02/2024 1211 to 01/03/2024 0059         Date/Time Order Dose Route Action     01/02/2024 1509 EST iohexol (OMNIPAQUE) 350 MG/ML injection (SINGLE-DOSE) 100 mL 100 mL Intravenous Given     01/02/2024 2046 EST iohexol (OMNIPAQUE) 350 MG/ML injection (MULTI-DOSE) 100 mL 100 mL Intravenous Given            Present on Admission:   Essential hypertension   Stage 3a chronic kidney disease (HCC)   Dyspnea on exertion      Admitting Diagnosis: Chest pain [R07.9]  SOB (shortness of breath) [R06.02]  Elevated brain natriuretic peptide (BNP) level [R79.89]    Age/Sex: 73 y.o. female    Admission Orders: SCDs; tele monitoring; continuous pulse ox; regular diet    Scheduled Medications:  apixaban, 10 mg, Oral, BID  enoxaparin, 40 mg, Subcutaneous, Daily  furosemide (LASIX) injection 40 mg, intravenous Daily       Continuous IV Infusions:     PRN Meds:  acetaminophen, 650 mg, Oral, Q6H PRN  (1/3 rec'd x1)   aluminum-magnesium hydroxide-simethicone, 30 mL, Oral, Q6H PRN  ondansetron, 4 mg, Intravenous, Q6H PRN  polyethylene glycol, 17 g, Oral, Daily PRN      Network Utilization Review Department  ATTENTION: Please call with any questions  or concerns to [phone] and carefully listen to the prompts so that you are directed to the right person. All voicemails are confidential.   For Discharge needs, contact Care Management DC Support Team at [phone] opt. 2  Send all requests for admission clinical reviews, approved or denied determinations and any other requests to dedicated fax number below belonging to the [facility] where the patient is receiving treatment. List of dedicated fax numbers for the Facilities:  FACILITY NAME UR FAX NUMBER   ADMISSION DENIALS (Administrative/Medical Necessity) [phone]   DISCHARGE SUPPORT TEAM (NETWORK) [phone]   PARENT CHILD HEALTH (Maternity/NICU/Pediatrics) [phone]   [facility] [phone]   [facility] [phone]   [facility] [phone]   [facility] [phone]   [facility] [phone]   [facility] [phone]   [facility] [phone]   [facility] [phone]   [facility] [phone]   [facility] [phone]   [facility] [phone]

## 2024-01-03 NOTE — ASSESSMENT & PLAN NOTE
Patient presents with 30 days of worsening shortness of breath/ROSAS; differential includes the following:  PE-history of PEs in the past and previously on Eliquis; D-dimer highly elevated in the ED; CTA read as 3 PE and right lobar lung  CHF-patient with history of heart failure with preserved ejection fraction (grade 1 diastolic dysfunction) and BNP slightly elevated.  Troponin and CT chest negative for edema.  Anemia-hemoglobin levels stable  COVID/flu/RSV-negative  Pneumonia-CTA clear; chest x-ray wet read with no consolidations or effusions; 0/4 SIRS criteria; will obtain procalcitonin  ACS-troponins and EKG negative for acute ischemic findings  Asthma-no documented history of asthma or COPD; SpO2 100% on room air at rest; likely not in exacerbation  Interstitial lung disease-no acute findings or history of ILD on CT chest or CTA chest    Worsening dyspnea for ~1 month, no inciting event, intermittent chest pain, on room air. Palpitations/dyspnea worse with exertion, hx of PE  CBC/BMP unremarkable, , trop 5, d-dimer 3.05  CT chest w/ contrast reviewed no acute lung pathology     Plan:  Obtain dedicated CTA PE study, TTE -notified by radiology that patient has 3 small PEs and right lobar lung  Initiate heparin drip and determine if patient has coverage for NOAC  Await TTE; as per cardiology, no right heart strain noted on imaging  Continuous pulse ox  Maintain telemetry

## 2024-01-03 NOTE — PLAN OF CARE
Problem: PAIN - ADULT  Goal: Verbalizes/displays adequate comfort level or baseline comfort level  Description: Interventions:  - Encourage patient to monitor pain and request assistance  - Assess pain using appropriate pain scale  - Administer analgesics based on type and severity of pain and evaluate response  - Implement non-pharmacological measures as appropriate and evaluate response  - Consider cultural and social influences on pain and pain management  - Notify physician/advanced practitioner if interventions unsuccessful or patient reports new pain  Outcome: Progressing     Problem: SAFETY ADULT  Goal: Patient will remain free of falls  Description: INTERVENTIONS:  - Educate patient/family on patient safety including physical limitations  - Instruct patient to call for assistance with activity   - Consult OT/PT to assist with strengthening/mobility   - Keep Call bell within reach  - Keep bed low and locked with side rails adjusted as appropriate  - Keep care items and personal belongings within reach  - Initiate and maintain comfort rounds  - Make Fall Risk Sign visible to staff  - Offer Toileting every 2 Hours, in advance of need  - Initiate/Maintain bed alarm  - Obtain necessary fall risk management equipment: walker  - Apply yellow socks and bracelet for high fall risk patients  - Consider moving patient to room near nurses station  Outcome: Progressing  Goal: Maintain or return to baseline ADL function  Description: INTERVENTIONS:  -  Assess patient's ability to carry out ADLs; assess patient's baseline for ADL function and identify physical deficits which impact ability to perform ADLs (bathing, care of mouth/teeth, toileting, grooming, dressing, etc.)  - Assess/evaluate cause of self-care deficits   - Assess range of motion  - Assess patient's mobility; develop plan if impaired  - Assess patient's need for assistive devices and provide as appropriate  - Encourage maximum independence but intervene  and supervise when necessary  - Involve family in performance of ADLs  - Assess for home care needs following discharge   - Consider OT consult to assist with ADL evaluation and planning for discharge  - Provide patient education as appropriate  Outcome: Progressing  Goal: Maintains/Returns to pre admission functional level  Description: INTERVENTIONS:  - Perform AM-PAC 6 Click Basic Mobility/ Daily Activity assessment daily.  - Set and communicate daily mobility goal to care team and patient/family/caregiver.   - Collaborate with rehabilitation services on mobility goals if consulted  - Perform Range of Motion 3 times a day.  - Reposition patient every 3 hours.  - Dangle patient 3 times a day  - Stand patient 3 times a day  - Ambulate patient 3 times a day  - Out of bed to chair 3 times a day   - Out of bed for meals 3 times a day  - Out of bed for toileting  - Record patient progress and toleration of activity level   Outcome: Progressing     Problem: DISCHARGE PLANNING  Goal: Discharge to home or other facility with appropriate resources  Description: INTERVENTIONS:  - Identify barriers to discharge w/patient and caregiver  - Arrange for needed discharge resources and transportation as appropriate  - Identify discharge learning needs (meds, wound care, etc.)  - Arrange for interpretive services to assist at discharge as needed  - Refer to Case Management Department for coordinating discharge planning if the patient needs post-hospital services based on physician/advanced practitioner order or complex needs related to functional status, cognitive ability, or social support system  Outcome: Progressing     Problem: Knowledge Deficit  Goal: Patient/family/caregiver demonstrates understanding of disease process, treatment plan, medications, and discharge instructions  Description: Complete learning assessment and assess knowledge base.  Interventions:  - Provide teaching at level of understanding  - Provide teaching  via preferred learning methods  Outcome: Progressing     Problem: CARDIOVASCULAR - ADULT  Goal: Absence of cardiac dysrhythmias or at baseline rhythm  Description: INTERVENTIONS:  - Continuous cardiac monitoring, vital signs, obtain 12 lead EKG if ordered  - Administer antiarrhythmic and heart rate control medications as ordered  - Monitor electrolytes and administer replacement therapy as ordered  Outcome: Progressing     Problem: HEMATOLOGIC - ADULT  Goal: Maintains hematologic stability  Description: INTERVENTIONS  - Assess for signs and symptoms of bleeding or hemorrhage  - Monitor labs  - Administer supportive blood products/factors as ordered and appropriate  Outcome: Progressing     Problem: Prexisting or High Potential for Compromised Skin Integrity  Goal: Skin integrity is maintained or improved  Description: INTERVENTIONS:  - Identify patients at risk for skin breakdown  - Assess and monitor skin integrity  - Assess and monitor nutrition and hydration status  - Monitor labs   - Assess for incontinence   - Turn and reposition patient  - Assist with mobility/ambulation  - Relieve pressure over bony prominences  - Avoid friction and shearing  - Provide appropriate hygiene as needed including keeping skin clean and dry  - Evaluate need for skin moisturizer/barrier cream  - Collaborate with interdisciplinary team   - Patient/family teaching  - Consider wound care consult   Outcome: Progressing

## 2024-01-03 NOTE — H&P
[facility]  H&P  Name: Sg Scherer 73 y.o. female I MRN: [id number]  Unit/Bed#: ED-02 I Date of Admission: 1/2/2024   Date of Service: 1/2/2024 I Hospital Day: 0      Assessment/Plan   Stage 3a chronic kidney disease (HCC)  Assessment & Plan  Lab Results   Component Value Date    EGFR 55 01/02/2024    EGFR 45 (L) 09/19/2023    EGFR 39 (L) 03/21/2023    CREATININE 1.00 01/02/2024    CREATININE 1.25 (H) 09/19/2023    CREATININE 1.42 (H) 03/21/2023     In setting of age, hypertensive nephropathy  Baseline Cr: 1.2    Plan:  Admit Cr: 1   Monitor renal function, renal dose medications, maintain normotension/euvolemia, avoid nephrotoxic agents, I&Os    Essential hypertension  Assessment & Plan  No home antihypertensives, monitor need for antihypertensive while inpatient     * Dyspnea on exertion  Assessment & Plan  Worsening dyspnea for ~1 month, no inciting event, intermittent chest pain, on room air. Palpitations/dyspnea worse with exertion, hx of PE  CBC/BMP unremarkable, , trop 5, d-dimer 3.05  CT chest w/ contrast reviewed no acute lung pathology     Plan:  Obtain dedicated CTA PE study, TTE   Continuous pulse ox  Maintain telemetry     VTE Pharmacologic Prophylaxis: VTE Score: 4 Moderate Risk (Score 3-4) - Pharmacological DVT Prophylaxis Ordered: enoxaparin (Lovenox).  Code Status: Prior   Discussion with family: patient    Anticipated Length of Stay: Patient will be admitted on an observation basis with an anticipated length of stay of less than 2 midnights secondary to dyspnea.    Total Time Spent on Date of Encounter in care of patient:  mins. This time was spent on one or more of the following: performing physical exam; counseling and coordination of care; obtaining or reviewing history; documenting in the medical record; reviewing/ordering tests, medications or procedures; communicating with other healthcare professionals and discussing with patient's  family/caregivers.    Chief Complaint: short of breath    History of Present Illness:  Sg Scherer is a 73 y.o. female with a PMH of endometrial cancer, HTN, CKD, hx of PE previously on Eliquis who presents with short of breath.   History obtained from patient, chart review and discussion with ED PA. She presents tonight with worsening shortness of breath/intermittent chest pain. Started about a month ago but worsened tonight. No inciting event or trauma. She was recently in [place] a couple weeks ago and noticed it when walking there, she had to rent a scooter to move around due to shortness of breath. Does feel palpitations/chest fluttering while moving/walking. Does have a history of PE and completed course of Eliquis. No tobacco, alcohol or drug use. No leg swelling or pain.     Review of Systems:  Review of Systems   Constitutional:  Negative for chills and fever.   Respiratory:  Positive for cough and shortness of breath.    Cardiovascular:  Negative for chest pain, palpitations and leg swelling.   Gastrointestinal:  Negative for abdominal pain, diarrhea, nausea and vomiting.   Neurological:  Negative for syncope.   All other systems reviewed and are negative.      Past Medical and Surgical History:   Past Medical History:   Diagnosis Date    Arthritis     osteo    Chronic kidney disease     Colon polyp     Diverticula of colon     Endometrial cancer (HCC)     2022    Hypertension     Obesity     Rhinitis        Past Surgical History:   Procedure Laterality Date    APPENDECTOMY      BREAST BIOPSY Left 1977    benign    BREAST SURGERY Left     biopsy    CHOLECYSTECTOMY      COLONOSCOPY      CYSTOSCOPY N/A 03/31/2022    Procedure: CYSTOSCOPY;  Surgeon: Gene Coburn MD;  Location: [facility];  Service: Gynecology Oncology    HERNIA REPAIR Right     inguinal    HYSTERECTOMY      IR PORT PLACEMENT  05/09/2022    JOINT REPLACEMENT Bilateral     Knee    OOPHORECTOMY      NE COLONOSCOPY FLX DX W/COLLJ SPEC  WHEN PFRMD N/A 02/21/2017    Procedure: COLONOSCOPY with polypectomy and hemo clip marjan.;  Surgeon: Ramirez Jones MD;  Location: [facility];  Service: Gastroenterology    MD LAPS TOTAL HYSTERECT 250 GM/< W/RMVL TUBE/OVARY N/A 03/31/2022    Procedure: ROBOTIC HYSTERECTOMY, BILATERAL SALPINGO-OOPHORECTOMY, STAGING PERITONEAL AND OMENTAL BIOPSIES, BILATERAL PELVIC SENTINEL LYMPH NODE BIOPSIES;  Surgeon: Gene Coburn MD;  Location: [facility];  Service: Gynecology Oncology       Meds/Allergies:  Prior to Admission medications    Medication Sig Start Date End Date Taking? Authorizing Provider   Ascorbic Acid (vitamin C) 100 MG tablet Take by mouth daily Pt unsure of mg   Yes Historical Provider, MD   cholecalciferol (VITAMIN D3) 1,000 units tablet Take 1,000 Units by mouth daily Pt unsure how many units   Yes Historical Provider, MD   denosumab (Prolia) 60 mg/mL Inject 1 mL under the skin every 6 (six) months 10/22/21  Yes Historical Provider, MD   furosemide (LASIX) 20 mg tablet Take 1 tablet (20 mg total) by mouth daily for 5 days 1/2/24 1/7/24 Yes Michi Satnoyo PA-C   vitamin B-12 (CYANOCOBALAMIN) 50 MCG tablet Take by mouth daily Pt unsure of mcg   Yes Historical Provider, MD   acetaminophen (TYLENOL) 650 mg CR tablet Take 1 tablet (650 mg total) by mouth every 6 (six) hours as needed for mild pain 3/31/22   Charlotte Zaldivar MD   calcitriol (ROCALTROL) 0.25 mcg capsule Take 1 capsule (0.25 mcg total) by mouth 3 (three) times a week 9/27/23 12/26/23  Maninder Ray MD   omeprazole (PriLOSEC) 20 mg delayed release capsule Take by mouth daily 9/12/22   Historical Provider, MD     I have reviewed home medications using recent Epic encounter.    Allergies: No Known Allergies    Social History:  Marital Status: Single   Occupation:   Patient Pre-hospital Living Situation: Home  Patient Pre-hospital Level of Mobility: walks  Patient Pre-hospital Diet Restrictions:   Substance Use History:   Social  History     Substance and Sexual Activity   Alcohol Use Yes    Alcohol/week: 1.0 standard drink of alcohol    Types: 1 Glasses of wine per week    Comment: social-seldom     Social History     Tobacco Use   Smoking Status Never   Smokeless Tobacco Never     Social History     Substance and Sexual Activity   Drug Use Never       Family History:  Family History   Problem Relation Age of Onset    Heart disease Mother     Prostate cancer Father 90    Diabetes Sister     Hypertension Sister     Transient ischemic attack Sister     No Known Problems Sister     Diabetes Brother     Heart disease Brother     Kidney disease Son     No Known Problems Maternal Grandmother     No Known Problems Maternal Grandfather     No Known Problems Paternal Grandmother     No Known Problems Paternal Grandfather     No Known Problems Maternal Aunt     No Known Problems Paternal Aunt     No Known Problems Paternal Aunt     No Known Problems Paternal Aunt     Stomach cancer Other 38    Thyroid disease Other     Cancer Other         unsure type    Breast cancer Neg Hx     Colon cancer Neg Hx     Ovarian cancer Neg Hx        Physical Exam:     Vitals:   Blood Pressure: 113/79 (01/02/24 2230)  Pulse: 68 (01/02/24 2230)  Temperature: 98.2 °F (36.8 °C) (01/02/24 1238)  Temp Source: Oral (01/02/24 1238)  Respirations: 20 (01/02/24 2230)  SpO2: 99 % (01/02/24 2230)    Physical Exam  Vitals and nursing note reviewed.   Constitutional:       General: She is not in acute distress.     Appearance: She is well-developed.   HENT:      Head: Normocephalic and atraumatic.   Eyes:      Conjunctiva/sclera: Conjunctivae normal.   Cardiovascular:      Rate and Rhythm: Normal rate and regular rhythm.      Heart sounds: No murmur heard.  Pulmonary:      Effort: Pulmonary effort is normal. No respiratory distress.      Breath sounds: Normal breath sounds.   Abdominal:      Palpations: Abdomen is soft.      Tenderness: There is no abdominal tenderness.    Musculoskeletal:         General: No swelling.      Cervical back: Neck supple.      Right lower leg: No edema.      Left lower leg: No edema.   Skin:     General: Skin is warm and dry.      Capillary Refill: Capillary refill takes 2 to 3 seconds.   Neurological:      Mental Status: She is alert and oriented to person, place, and time.   Psychiatric:         Mood and Affect: Mood normal.          Additional Data:     Lab Results:  Results from last 7 days   Lab Units 01/02/24  1239   WBC Thousand/uL 3.38*   HEMOGLOBIN g/dL 11.2*   HEMATOCRIT % 35.7   PLATELETS Thousands/uL 147*   NEUTROS PCT % 55   LYMPHS PCT % 29   MONOS PCT % 11   EOS PCT % 3     Results from last 7 days   Lab Units 01/02/24  1343   SODIUM mmol/L 139   POTASSIUM mmol/L 4.7   CHLORIDE mmol/L 109*   CO2 mmol/L 26   BUN mg/dL 13   CREATININE mg/dL 1.00   ANION GAP mmol/L 4   CALCIUM mg/dL 8.5   ALBUMIN g/dL 3.4*   TOTAL BILIRUBIN mg/dL 0.42   ALK PHOS U/L 60   ALT U/L 11   AST U/L 19   GLUCOSE RANDOM mg/dL 85                       Lines/Drains:  Invasive Devices       Central Venous Catheter Line  Duration             Port A Cath 05/09/22 Right Chest 603 days              Peripheral Intravenous Line  Duration             Peripheral IV 01/02/24 Left Antecubital <1 day                    Central Line:  Goal for removal: Port accessed. Will de-access as appropriate.           Imaging: Reviewed radiology reports from this admission including: chest CT scan and Personally reviewed the following imaging: chest CT scan  CT chest with contrast   Final Result by Neptali Mccartney MD (01/02 5385)      No acute disease in the lungs.               Workstation performed: [id number]         XR chest 1 view portable    (Results Pending)   CTA chest pe study    (Results Pending)       EKG and Other Studies Reviewed on Admission:   EKG: NSR. HR 65.    ** Please Note: This note has been constructed using a voice recognition system. **

## 2024-01-03 NOTE — ASSESSMENT & PLAN NOTE
Lab Results   Component Value Date    EGFR 55 01/02/2024    EGFR 45 (L) 09/19/2023    EGFR 39 (L) 03/21/2023    CREATININE 1.00 01/02/2024    CREATININE 1.25 (H) 09/19/2023    CREATININE 1.42 (H) 03/21/2023     In setting of age, hypertensive nephropathy  Baseline Cr: 1.2    Plan:  Admit Cr: 1   Monitor renal function, renal dose medications, maintain normotension/euvolemia, avoid nephrotoxic agents, I&Os

## 2024-01-03 NOTE — TELEPHONE ENCOUNTER
Appointment Schedule   Who are you speaking with? sister   If it is not the patient, are they listed on an active communication consent form? Yes   Which provider is the appointment scheduled with? RUBEN Cox   At which location is the appointment scheduled for? [facility]   When is the appointment scheduled?  Please list date and time 2/6/24 4240   What is the reason for this appointment? F/u   Did patient voice understanding of the details of this appointment? Yes   Was the no show policy reviewed with patient? Yes

## 2024-01-04 ENCOUNTER — APPOINTMENT (INPATIENT)
Dept: NON INVASIVE DIAGNOSTICS | Facility: HOSPITAL | Age: 74
DRG: 176 | End: 2024-01-04
Payer: COMMERCIAL

## 2024-01-04 VITALS
BODY MASS INDEX: 45.44 KG/M2 | TEMPERATURE: 97.8 F | RESPIRATION RATE: 16 BRPM | DIASTOLIC BLOOD PRESSURE: 79 MMHG | HEART RATE: 71 BPM | WEIGHT: 248.46 LBS | SYSTOLIC BLOOD PRESSURE: 149 MMHG | OXYGEN SATURATION: 98 %

## 2024-01-04 LAB
ALBUMIN SERPL BCP-MCNC: 3.4 G/DL (ref 3.5–5)
ALP SERPL-CCNC: 60 U/L (ref 34–104)
ALT SERPL W P-5'-P-CCNC: 10 U/L (ref 7–52)
ANION GAP SERPL CALCULATED.3IONS-SCNC: 4 MMOL/L
AST SERPL W P-5'-P-CCNC: 13 U/L (ref 13–39)
BASOPHILS # BLD AUTO: 0.02 THOUSANDS/ÂΜL (ref 0–0.1)
BASOPHILS NFR BLD AUTO: 1 % (ref 0–1)
BILIRUB SERPL-MCNC: 0.5 MG/DL (ref 0.2–1)
BUN SERPL-MCNC: 17 MG/DL (ref 5–25)
CALCIUM ALBUM COR SERPL-MCNC: 9 MG/DL (ref 8.3–10.1)
CALCIUM SERPL-MCNC: 8.5 MG/DL (ref 8.4–10.2)
CHLORIDE SERPL-SCNC: 106 MMOL/L (ref 96–108)
CO2 SERPL-SCNC: 30 MMOL/L (ref 21–32)
CREAT SERPL-MCNC: 1.02 MG/DL (ref 0.6–1.3)
DEPRECATED AT III PPP: 97 % OF NORMAL (ref 92–136)
EOSINOPHIL # BLD AUTO: 0.1 THOUSAND/ÂΜL (ref 0–0.61)
EOSINOPHIL NFR BLD AUTO: 4 % (ref 0–6)
ERYTHROCYTE [DISTWIDTH] IN BLOOD BY AUTOMATED COUNT: 14.6 % (ref 11.6–15.1)
GFR SERPL CREATININE-BSD FRML MDRD: 53 ML/MIN/1.73SQ M
GLUCOSE SERPL-MCNC: 86 MG/DL (ref 65–140)
HCT VFR BLD AUTO: 33.7 % (ref 34.8–46.1)
HGB BLD-MCNC: 10.6 G/DL (ref 11.5–15.4)
IMM GRANULOCYTES # BLD AUTO: 0.02 THOUSAND/UL (ref 0–0.2)
IMM GRANULOCYTES NFR BLD AUTO: 1 % (ref 0–2)
LYMPHOCYTES # BLD AUTO: 0.5 THOUSANDS/ÂΜL (ref 0.6–4.47)
LYMPHOCYTES NFR BLD AUTO: 20 % (ref 14–44)
MAGNESIUM SERPL-MCNC: 2 MG/DL (ref 1.9–2.7)
MCH RBC QN AUTO: 32.4 PG (ref 26.8–34.3)
MCHC RBC AUTO-ENTMCNC: 31.5 G/DL (ref 31.4–37.4)
MCV RBC AUTO: 103 FL (ref 82–98)
MONOCYTES # BLD AUTO: 0.27 THOUSAND/ÂΜL (ref 0.17–1.22)
MONOCYTES NFR BLD AUTO: 11 % (ref 4–12)
NEUTROPHILS # BLD AUTO: 1.58 THOUSANDS/ÂΜL (ref 1.85–7.62)
NEUTS SEG NFR BLD AUTO: 63 % (ref 43–75)
NRBC BLD AUTO-RTO: 0 /100 WBCS
PHOSPHATE SERPL-MCNC: 2.6 MG/DL (ref 2.3–4.1)
PLATELET # BLD AUTO: 148 THOUSANDS/UL (ref 149–390)
PMV BLD AUTO: 10.6 FL (ref 8.9–12.7)
POTASSIUM SERPL-SCNC: 3.7 MMOL/L (ref 3.5–5.3)
PROT C AG ACT/NOR PPP IA: 132 % OF NORMAL (ref 60–150)
PROT SERPL-MCNC: 6.4 G/DL (ref 6.4–8.4)
RBC # BLD AUTO: 3.27 MILLION/UL (ref 3.81–5.12)
SODIUM SERPL-SCNC: 140 MMOL/L (ref 135–147)
WBC # BLD AUTO: 2.49 THOUSAND/UL (ref 4.31–10.16)

## 2024-01-04 PROCEDURE — 85305 CLOT INHIBIT PROT S TOTAL: CPT | Performed by: INTERNAL MEDICINE

## 2024-01-04 PROCEDURE — 80053 COMPREHEN METABOLIC PANEL: CPT | Performed by: INTERNAL MEDICINE

## 2024-01-04 PROCEDURE — 85613 RUSSELL VIPER VENOM DILUTED: CPT | Performed by: INTERNAL MEDICINE

## 2024-01-04 PROCEDURE — 85732 THROMBOPLASTIN TIME PARTIAL: CPT | Performed by: INTERNAL MEDICINE

## 2024-01-04 PROCEDURE — 85670 THROMBIN TIME PLASMA: CPT | Performed by: INTERNAL MEDICINE

## 2024-01-04 PROCEDURE — 83735 ASSAY OF MAGNESIUM: CPT | Performed by: INTERNAL MEDICINE

## 2024-01-04 PROCEDURE — 85025 COMPLETE CBC W/AUTO DIFF WBC: CPT | Performed by: INTERNAL MEDICINE

## 2024-01-04 PROCEDURE — 84100 ASSAY OF PHOSPHORUS: CPT | Performed by: INTERNAL MEDICINE

## 2024-01-04 PROCEDURE — 85705 THROMBOPLASTIN INHIBITION: CPT | Performed by: INTERNAL MEDICINE

## 2024-01-04 PROCEDURE — 85303 CLOT INHIBIT PROT C ACTIVITY: CPT | Performed by: INTERNAL MEDICINE

## 2024-01-04 PROCEDURE — 85306 CLOT INHIBIT PROT S FREE: CPT | Performed by: INTERNAL MEDICINE

## 2024-01-04 PROCEDURE — 99239 HOSP IP/OBS DSCHRG MGMT >30: CPT | Performed by: INTERNAL MEDICINE

## 2024-01-04 PROCEDURE — 86147 CARDIOLIPIN ANTIBODY EA IG: CPT | Performed by: INTERNAL MEDICINE

## 2024-01-04 PROCEDURE — 86146 BETA-2 GLYCOPROTEIN ANTIBODY: CPT | Performed by: INTERNAL MEDICINE

## 2024-01-04 PROCEDURE — 85300 ANTITHROMBIN III ACTIVITY: CPT | Performed by: INTERNAL MEDICINE

## 2024-01-04 RX ORDER — FUROSEMIDE 20 MG/1
20 TABLET ORAL DAILY
Qty: 10 TABLET | Refills: 0 | Status: SHIPPED | OUTPATIENT
Start: 2024-01-04 | End: 2024-01-14

## 2024-01-04 RX ADMIN — FUROSEMIDE 40 MG: 10 INJECTION, SOLUTION INTRAVENOUS at 08:42

## 2024-01-04 RX ADMIN — APIXABAN 10 MG: 5 TABLET, FILM COATED ORAL at 08:43

## 2024-01-04 NOTE — PLAN OF CARE
Problem: PAIN - ADULT  Goal: Verbalizes/displays adequate comfort level or baseline comfort level  Description: Interventions:  - Encourage patient to monitor pain and request assistance  - Assess pain using appropriate pain scale  - Administer analgesics based on type and severity of pain and evaluate response  - Implement non-pharmacological measures as appropriate and evaluate response  - Consider cultural and social influences on pain and pain management  - Notify physician/advanced practitioner if interventions unsuccessful or patient reports new pain  Outcome: Progressing     Problem: SAFETY ADULT  Goal: Patient will remain free of falls  Description: INTERVENTIONS:  - Educate patient/family on patient safety including physical limitations  - Instruct patient to call for assistance with activity   - Consult OT/PT to assist with strengthening/mobility   - Keep Call bell within reach  - Keep bed low and locked with side rails adjusted as appropriate  - Keep care items and personal belongings within reach  - Initiate and maintain comfort rounds  - Make Fall Risk Sign visible to staff  - Offer Toileting every 2 Hours, in advance of need  - Initiate/Maintain bed alarm  - Obtain necessary fall risk management equipment:   - Apply yellow socks and bracelet for high fall risk patients  - Consider moving patient to room near nurses station  Outcome: Progressing  Goal: Maintain or return to baseline ADL function  Description: INTERVENTIONS:  -  Assess patient's ability to carry out ADLs; assess patient's baseline for ADL function and identify physical deficits which impact ability to perform ADLs (bathing, care of mouth/teeth, toileting, grooming, dressing, etc.)  - Assess/evaluate cause of self-care deficits   - Assess range of motion  - Assess patient's mobility; develop plan if impaired  - Assess patient's need for assistive devices and provide as appropriate  - Encourage maximum independence but intervene and  supervise when necessary  - Involve family in performance of ADLs  - Assess for home care needs following discharge   - Consider OT consult to assist with ADL evaluation and planning for discharge  - Provide patient education as appropriate  Outcome: Progressing  Goal: Maintains/Returns to pre admission functional level  Description: INTERVENTIONS:  - Perform AM-PAC 6 Click Basic Mobility/ Daily Activity assessment daily.  - Set and communicate daily mobility goal to care team and patient/family/caregiver.   - Collaborate with rehabilitation services on mobility goals if consulted  - Perform Range of Motion 3 times a day.  - Reposition patient every 2 hours.  - Dangle patient 3 times a day  - Stand patient 3 times a day  - Ambulate patient 3 times a day  - Out of bed to chair 3 times a day   - Out of bed for meals 3 times a day  - Out of bed for toileting  - Record patient progress and toleration of activity level   Outcome: Progressing     Problem: DISCHARGE PLANNING  Goal: Discharge to home or other facility with appropriate resources  Description: INTERVENTIONS:  - Identify barriers to discharge w/patient and caregiver  - Arrange for needed discharge resources and transportation as appropriate  - Identify discharge learning needs (meds, wound care, etc.)  - Arrange for interpretive services to assist at discharge as needed  - Refer to Case Management Department for coordinating discharge planning if the patient needs post-hospital services based on physician/advanced practitioner order or complex needs related to functional status, cognitive ability, or social support system  Outcome: Progressing     Problem: Knowledge Deficit  Goal: Patient/family/caregiver demonstrates understanding of disease process, treatment plan, medications, and discharge instructions  Description: Complete learning assessment and assess knowledge base.  Interventions:  - Provide teaching at level of understanding  - Provide teaching via  preferred learning methods  Outcome: Progressing     Problem: CARDIOVASCULAR - ADULT  Goal: Absence of cardiac dysrhythmias or at baseline rhythm  Description: INTERVENTIONS:  - Continuous cardiac monitoring, vital signs, obtain 12 lead EKG if ordered  - Administer antiarrhythmic and heart rate control medications as ordered  - Monitor electrolytes and administer replacement therapy as ordered  Outcome: Progressing     Problem: HEMATOLOGIC - ADULT  Goal: Maintains hematologic stability  Description: INTERVENTIONS  - Assess for signs and symptoms of bleeding or hemorrhage  - Monitor labs  - Administer supportive blood products/factors as ordered and appropriate  Outcome: Progressing     Problem: Prexisting or High Potential for Compromised Skin Integrity  Goal: Skin integrity is maintained or improved  Description: INTERVENTIONS:  - Identify patients at risk for skin breakdown  - Assess and monitor skin integrity  - Assess and monitor nutrition and hydration status  - Monitor labs   - Assess for incontinence   - Turn and reposition patient  - Assist with mobility/ambulation  - Relieve pressure over bony prominences  - Avoid friction and shearing  - Provide appropriate hygiene as needed including keeping skin clean and dry  - Evaluate need for skin moisturizer/barrier cream  - Collaborate with interdisciplinary team   - Patient/family teaching  - Consider wound care consult   Outcome: Progressing

## 2024-01-04 NOTE — ASSESSMENT & PLAN NOTE
Lab Results   Component Value Date    EGFR 53 01/04/2024    EGFR 63 01/03/2024    EGFR 55 01/02/2024    CREATININE 1.02 01/04/2024    CREATININE 0.89 01/03/2024    CREATININE 1.00 01/02/2024     In setting of age, hypertensive nephropathy  Baseline Cr: 1.2    Plan:  Admit Cr: 1   Monitor renal function, renal dose medications, maintain normotension/euvolemia, avoid nephrotoxic agents, I&Os

## 2024-01-04 NOTE — DISCHARGE SUMMARY
"  Discharge Summary - Sg Scherer 73 y.o. female MRN: [id number]    Unit/Bed#: E5 -01 Encounter: [id number]    Admission Date:   Admission Orders (From admission, onward)       Ordered        01/03/24 1539  Inpatient Admission  Once            01/02/24 1842  Place in Observation  Once                            Admitting Diagnosis: Chest pain [R07.9]  SOB (shortness of breath) [R06.02]  Elevated brain natriuretic peptide (BNP) level [R79.89]    HPI: \"Sg Scherer is a 73 y.o. female with a PMH of endometrial cancer, HTN, CKD, hx of PE previously on Eliquis who presents with short of breath.              History obtained from patient, chart review and discussion with ED PA. She presents tonight with worsening shortness of breath/intermittent chest pain. Started about a month ago but worsened tonight. No inciting event or trauma. She was recently in [place] a couple weeks ago and noticed it when walking there, she had to rent a scooter to move around due to shortness of breath. Does feel palpitations/chest fluttering while moving/walking. Does have a history of PE and completed course of Eliquis. No tobacco, alcohol or drug use. No leg swelling or pain.\"    Procedures Performed:    CTA chest      Summary of Hospital Course: Patient presented with worsening dyspnea on exertion noted over the last several days.  On admission, noted to have elevated D-dimer and CTA performed.  Showed 3 small pulmonary emboli in the right lobar region; no right heart strain appreciated.  Patient started on Eliquis.  Patient also with slightly elevated BNP and, as per patient and sister at bedside, approximately 10 pound weight gain.  Likely element of fluid overload also playing part in her symptoms.  2 doses of IV Lasix given and patient with substantial urine output.  On examination this morning, sister says that her feet are no longer swollen and overall, patient appears much better.  Patient underwent home O2 evaluation " and remained above 95% during 6-minute walk; does not qualify for oxygen.  Eliquis sent to lab; thrombosis panel pending, patient referred to hematology in the outpatient setting.  Stable for discharge; explained to patient she would likely need anticoagulation therapy indefinitely, she voiced understanding.    Significant Findings, Care, Treatment and Services Provided: PE; fluid overload    Complications: None    Discharge Diagnosis: Pulmonary emboli    Medical Problems       Resolved Problems  Date Reviewed: 1/3/2024   None         Condition at Discharge: stable         Discharge instructions/Information to patient and family:   See after visit summary for information provided to patient and family.      Provisions for Follow-Up Care:  See after visit summary for information related to follow-up care and any pertinent home health orders.      PCP: Thad Savage MD    Disposition: Home    Planned Readmission: No      Discharge Statement   I spent 45 minutes discharging the patient. This time was spent on the day of discharge. I had direct contact with the patient on the day of discharge. Additional documentation is required if more than 30 minutes were spent on discharge.     Discharge Medications:  See after visit summary for reconciled discharge medications provided to patient and family.

## 2024-01-04 NOTE — PLAN OF CARE
Problem: PAIN - ADULT  Goal: Verbalizes/displays adequate comfort level or baseline comfort level  Description: Interventions:  - Encourage patient to monitor pain and request assistance  - Assess pain using appropriate pain scale  - Administer analgesics based on type and severity of pain and evaluate response  - Implement non-pharmacological measures as appropriate and evaluate response  - Consider cultural and social influences on pain and pain management  - Notify physician/advanced practitioner if interventions unsuccessful or patient reports new pain  Outcome: Progressing     Problem: SAFETY ADULT  Goal: Patient will remain free of falls  Description: INTERVENTIONS:  - Educate patient/family on patient safety including physical limitations  - Instruct patient to call for assistance with activity   - Consult OT/PT to assist with strengthening/mobility   - Keep Call bell within reach  - Keep bed low and locked with side rails adjusted as appropriate  - Keep care items and personal belongings within reach  - Initiate and maintain comfort rounds  - Make Fall Risk Sign visible to staff  - Offer Toileting every 2 Hours, in advance of need    - Obtain necessary fall risk management equipment: walker  - Apply yellow socks and bracelet for high fall risk patients  - Consider moving patient to room near nurses station  Outcome: Progressing  Goal: Maintain or return to baseline ADL function  Description: INTERVENTIONS:  -  Assess patient's ability to carry out ADLs; assess patient's baseline for ADL function and identify physical deficits which impact ability to perform ADLs (bathing, care of mouth/teeth, toileting, grooming, dressing, etc.)  - Assess/evaluate cause of self-care deficits   - Assess range of motion  - Assess patient's mobility; develop plan if impaired  - Assess patient's need for assistive devices and provide as appropriate  - Encourage maximum independence but intervene and supervise when necessary  -  Involve family in performance of ADLs  - Assess for home care needs following discharge   - Consider OT consult to assist with ADL evaluation and planning for discharge  - Provide patient education as appropriate  Outcome: Progressing  Goal: Maintains/Returns to pre admission functional level  Description: INTERVENTIONS:  - Perform AM-PAC 6 Click Basic Mobility/ Daily Activity assessment daily.  - Set and communicate daily mobility goal to care team and patient/family/caregiver.   - Collaborate with rehabilitation services on mobility goals if consulted  - Perform Range of Motion 2 times a day.  - Reposition patient every 2 hours.  - Dangle patient 2 times a day  - Stand patient 2 times a day  - Ambulate patient 2 times a day  - Out of bed to chair 2 times a day   - Out of bed for meals 2 times a day  - Out of bed for toileting  - Record patient progress and toleration of activity level   Outcome: Progressing     Problem: DISCHARGE PLANNING  Goal: Discharge to home or other facility with appropriate resources  Description: INTERVENTIONS:  - Identify barriers to discharge w/patient and caregiver  - Arrange for needed discharge resources and transportation as appropriate  - Identify discharge learning needs (meds, wound care, etc.)  - Arrange for interpretive services to assist at discharge as needed  - Refer to Case Management Department for coordinating discharge planning if the patient needs post-hospital services based on physician/advanced practitioner order or complex needs related to functional status, cognitive ability, or social support system  Outcome: Progressing     Problem: Knowledge Deficit  Goal: Patient/family/caregiver demonstrates understanding of disease process, treatment plan, medications, and discharge instructions  Description: Complete learning assessment and assess knowledge base.  Interventions:  - Provide teaching at level of understanding  - Provide teaching via preferred learning  methods  Outcome: Progressing     Problem: CARDIOVASCULAR - ADULT  Goal: Absence of cardiac dysrhythmias or at baseline rhythm  Description: INTERVENTIONS:  - Continuous cardiac monitoring, vital signs, obtain 12 lead EKG if ordered  - Administer antiarrhythmic and heart rate control medications as ordered  - Monitor electrolytes and administer replacement therapy as ordered  Outcome: Progressing     Problem: HEMATOLOGIC - ADULT  Goal: Maintains hematologic stability  Description: INTERVENTIONS  - Assess for signs and symptoms of bleeding or hemorrhage  - Monitor labs  - Administer supportive blood products/factors as ordered and appropriate  Outcome: Progressing     Problem: Prexisting or High Potential for Compromised Skin Integrity  Goal: Skin integrity is maintained or improved  Description: INTERVENTIONS:  - Identify patients at risk for skin breakdown  - Assess and monitor skin integrity  - Assess and monitor nutrition and hydration status  - Monitor labs   - Assess for incontinence   - Turn and reposition patient  - Assist with mobility/ambulation  - Relieve pressure over bony prominences  - Avoid friction and shearing  - Provide appropriate hygiene as needed including keeping skin clean and dry  - Evaluate need for skin moisturizer/barrier cream  - Collaborate with interdisciplinary team   - Patient/family teaching  - Consider wound care consult   Outcome: Progressing

## 2024-01-04 NOTE — DISCHARGE INSTR - AVS FIRST PAGE
Please remember to take all medications as directed on your medication list and follow-up with your primary care provider in 1-2 weeks.    You are encouraged to keep your med list on your person (in your wallet or purse) and please take your medication list provided in this After Visit Summary to your PCP visit following discharge.    Please ask your nurse to show you the medication list and explain when to take your medications.    Additionally, we encourage all patient's to take their actual medications with them to their primary care visit! This is to verify you have the proper medications and the proper dosages.  Remember, while medications are often listed in your computer record; that may not always be right as mistakes can occur at the pharmacy or in the computer and sometimes old medication bottles can be mistaken for newer medications.    (Note to nursing: please place patient's medication list on top of the AVS.)  ________________________________________________________________________________________    Please take Eliquis as prescribed; follow-up with primary care provider.    A referral has been sent to hematology, please discuss with them length of therapy for Eliquis to prevent recurrent pulmonary emboli.    Please also take Lasix as prescribed for 10 days and discussed with primary care if you should be taking that longer to avoid water weight gain.  Avoid sodium and excess fluids.

## 2024-01-04 NOTE — PROGRESS NOTES
[facility]  Progress Note  Name: Sg Scherer I  MRN: [id number]  Unit/Bed#: E5 -01 I Date of Admission: 1/2/2024   Date of Service: 1/3/2024 I Hospital Day: 0    Assessment/Plan   Stage 3a chronic kidney disease (HCC)  Assessment & Plan  Lab Results   Component Value Date    EGFR 63 01/03/2024    EGFR 55 01/02/2024    EGFR 45 (L) 09/19/2023    CREATININE 0.89 01/03/2024    CREATININE 1.00 01/02/2024    CREATININE 1.25 (H) 09/19/2023     In setting of age, hypertensive nephropathy  Baseline Cr: 1.2    Plan:  Admit Cr: 1   Monitor renal function, renal dose medications, maintain normotension/euvolemia, avoid nephrotoxic agents, I&Os    Essential hypertension  Assessment & Plan  No home antihypertensives, monitor need for antihypertensive while inpatient     * Dyspnea on exertion  Assessment & Plan  Patient presents with 30 days of worsening shortness of breath/ROSAS; differential includes the following:  PE-history of PEs in the past and previously on Eliquis; D-dimer highly elevated in the ED; CTA read as 3 PE and right lobar lung  CHF-patient with history of heart failure with preserved ejection fraction (grade 1 diastolic dysfunction) and BNP slightly elevated.  Troponin and CT chest negative for edema.  Anemia-hemoglobin levels stable  COVID/flu/RSV-negative  Pneumonia-CTA clear; chest x-ray wet read with no consolidations or effusions; 0/4 SIRS criteria; will obtain procalcitonin  ACS-troponins and EKG negative for acute ischemic findings  Asthma-no documented history of asthma or COPD; SpO2 100% on room air at rest; likely not in exacerbation  Interstitial lung disease-no acute findings or history of ILD on CT chest or CTA chest    Worsening dyspnea for ~1 month, no inciting event, intermittent chest pain, on room air. Palpitations/dyspnea worse with exertion, hx of PE  CBC/BMP unremarkable, , trop 5, d-dimer 3.05  CT chest w/ contrast reviewed no acute lung pathology      Plan:  Obtain dedicated CTA PE study, TTE -notified by radiology that patient has 3 small PEs and right lobar lung  Initiate heparin drip and determine if patient has coverage for NOAC  Await TTE; as per cardiology, no right heart strain noted on imaging  Continuous pulse ox  Maintain telemetry            VTE Pharmacologic Prophylaxis:   Pharmacologic: Apixaban (Eliquis)  Mechanical VTE Prophylaxis in Place: Yes    Patient Centered Rounds: I have performed bedside rounds with nursing staff today.    Discussions with Specialists or Other Care Team Provider:     Education and Discussions with Family / Patient: Discussed treatment plan with family and patient who agree with current plan; encouraged to ask questions and participate.      Time Spent for Care: 45 minutes.  More than 50% of total time spent on counseling and coordination of care as described above.    Current Length of Stay: 0 day(s)    Current Patient Status: Inpatient   Certification Statement: The patient will continue to require additional inpatient hospital stay due to dyspnea on exertion/pulmonary emboli    Discharge Plan: To be determined, likely tomorrow    Code Status: Level 1 - Full Code      Subjective:   Patient seen and examined bedside, no acute distress but does report dyspnea on exertion.  Differential includes PE as seen on CTA versus congestive heart failure (patient and sister at bedside reported 10 pound weight gain over the last few days).  Have started Eliquis and patient will likely need to be on Eliquis lifelong as this is her second episode of PE in the last year.  Will obtain thrombosis panel on morning labs and refer to hematology on discharge.  Patient be noted she had large PE previously and this could be residual from that event and underlying dyspnea on exertion could be from congestive heart failure.  BNP is elevated and will give IV Lasix daily and monitor for improvement.  Cardiogram pending.  Currently saturating well  on room air at rest; obtain home O2 study tomorrow.    Objective:     Vitals:   Temp (24hrs), Av.7 °F (36.5 °C), Min:97.5 °F (36.4 °C), Max:97.8 °F (36.6 °C)    Temp:  [97.5 °F (36.4 °C)-97.8 °F (36.6 °C)] 97.5 °F (36.4 °C)  HR:  [66-77] 77  Resp:  [14-20] 16  BP: (113-171)/(58-90) 127/62  SpO2:  [94 %-100 %] 97 %  There is no height or weight on file to calculate BMI.     Input and Output Summary (last 24 hours):     No intake or output data in the 24 hours ending 24    Physical Exam:     Physical Exam  Vitals and nursing note reviewed.   Constitutional:       General: She is not in acute distress.     Appearance: She is well-developed. She is obese.   HENT:      Head: Normocephalic and atraumatic.   Eyes:      Conjunctiva/sclera: Conjunctivae normal.   Cardiovascular:      Rate and Rhythm: Normal rate and regular rhythm.      Heart sounds: Murmur heard.   Pulmonary:      Effort: Pulmonary effort is normal. No respiratory distress.      Breath sounds: Normal breath sounds.   Abdominal:      Palpations: Abdomen is soft.      Tenderness: There is no abdominal tenderness.   Musculoskeletal:         General: No swelling.      Cervical back: Neck supple.      Right lower leg: Edema present.      Left lower leg: Edema present.   Skin:     General: Skin is warm and dry.   Neurological:      Mental Status: She is alert.   Psychiatric:         Mood and Affect: Mood normal.           Additional Data:     Labs:    Results from last 7 days   Lab Units 24  0451 24  1239   WBC Thousand/uL 2.87* 3.38*   HEMOGLOBIN g/dL 10.6* 11.2*   HEMATOCRIT % 33.4* 35.7   PLATELETS Thousands/uL 126* 147*   NEUTROS PCT %  --  55   LYMPHS PCT %  --  29   MONOS PCT %  --  11   EOS PCT %  --  3     Results from last 7 days   Lab Units 24  0451 24  1343   SODIUM mmol/L 141 139   POTASSIUM mmol/L 3.9 4.7   CHLORIDE mmol/L 109* 109*   CO2 mmol/L 26 26   BUN mg/dL 12 13   CREATININE mg/dL 0.89 1.00   ANION GAP  mmol/L 6 4   CALCIUM mg/dL 8.4 8.5   ALBUMIN g/dL  --  3.4*   TOTAL BILIRUBIN mg/dL  --  0.42   ALK PHOS U/L  --  60   ALT U/L  --  11   AST U/L  --  19   GLUCOSE RANDOM mg/dL 73 85     Results from last 7 days   Lab Units 01/03/24  0451   INR  1.03                       * I Have Reviewed All Lab Data Listed Above.  * Additional Pertinent Lab Tests Reviewed: All Labs Within Last 24 Hours Reviewed    Imaging:    Imaging Reports Reviewed Today Include: CTA chest  Imaging Personally Reviewed by Myself Includes:      Recent Cultures (last 7 days):           Last 24 Hours Medication List:   Current Facility-Administered Medications   Medication Dose Route Frequency Provider Last Rate    acetaminophen  650 mg Oral Q6H PRN Diaz Paris PA-C      aluminum-magnesium hydroxide-simethicone  30 mL Oral Q6H PRN Diaz Paris PA-C      apixaban  10 mg Oral BID Zay Dodge MD      furosemide  40 mg Intravenous Daily Zay Dodge MD      ondansetron  4 mg Intravenous Q6H PRN Diaz Paris PA-C      polyethylene glycol  17 g Oral Daily PRN Diaz Paris PA-C          Today, Patient Was Seen By: Zay Dodge MD    ** Please Note: Dictation voice to text software may have been used in the creation of this document. **

## 2024-01-04 NOTE — CASE MANAGEMENT
Case Management Assessment & Discharge Planning Note    Patient name Sg Scherer  Location [facility] 5 /E5 -* MRN [id number]  : 1950 Date 2024       Current Admission Date: 2024  Current Admission Diagnosis:Dyspnea on exertion   Patient Active Problem List    Diagnosis Date Noted    Dyspnea on exertion 2024    Anemia due to stage 3a chronic kidney disease  2023    Secondary hyperparathyroidism of renal origin (HCC) 2023    Secondary hyperparathyroidism (HCC) 2023    Acute saddle pulmonary embolism with acute cor pulmonale (HCC) 2023    History of endometrial cancer 2023    Osteoporosis without current pathological fracture 2023    Chemotherapy induced neutropenia  2022    Insomnia 2022    Encounter for central line care 2022    Morbid obesity with BMI of 45.0-49.9, adult ([facility]) 2022    Encounter for follow-up surveillance of endometrial cancer 03/15/2022    Acute pulmonary embolism without acute cor pulmonale (HCC) 2022    Stage 3a chronic kidney disease (HCC) 2020    Essential hypertension 2018      LOS (days): 1  Geometric Mean LOS (GMLOS) (days):   Days to GMLOS:     OBJECTIVE:    Risk of Unplanned Readmission Score: 10.32         Current admission status: Inpatient       Preferred Pharmacy:   [id number] [place] - [address]  [address] [facility] [address]  Phone: [phone] Fax: [phone]    [facility] [place], [place] - [address]  [address] [address]  Phone: [phone] Fax: [phone]    [facility]  [place] - [address],  [address],  [address] [facility] [address]  Phone: [phone] Fax: [phone]    Primary Care Provider: Thad Savage MD    Primary Insurance: [employer] [facility]  Secondary Insurance:     ASSESSMENT:  Active  Health Care Proxies    There are no active Health Care Proxies on file.       Advance Directives  Does patient have a Health Care POA?: No  Was patient offered paperwork?: Yes (DECLINED)  Does patient currently have a Health Care decision maker?: Yes, please see Health Care Proxy section  Does patient have Advance Directives?: No  Was patient offered paperwork?: Yes (Declined)  Primary Contact: Marimar Reyes (Sister)  [phone]         Readmission Root Cause  30 Day Readmission: No    Patient Information  Admitted from:: Home  Mental Status: Alert  During Assessment patient was accompanied by: Not accompanied during assessment  Assessment information provided by:: Patient  Primary Caregiver: Family  Caregiver's Name:: Marimar Reyes (Sister)  [phone]  Caregiver's Relationship to Patient:: Family Member  Caregiver's Telephone Number:: Marimar Reyes (Sister)  [phone]  Support Systems: Self, Family members  County [place] Residence: [place]  [facility] do you live in?: [place]  Home entry access options. Select all that apply.: Stairs  Number of steps to enter home.: 3  Do the steps have railings?: No  Type of Current Residence: [address] [place] [address] home  Upon entering residence, is there a bedroom on the main floor (no further steps)?: No  A bedroom is located on the following floor levels of residence (select all that apply):: 2nd Floor  Upon entering residence, is there a bathroom on the main floor (no further steps)?: No  Indicate which floors of current residence have a bathroom (select all the apply):: 2nd Floor  Number of steps to 2nd floor from main floor: One Flight  Living Arrangements: Lives w/ Extended Family  Is patient a ?: No    Activities of Daily Living Prior to Admission  Functional Status: Independent  Completes ADLs independently?: Yes  Ambulates independently?: Yes  Does patient use assisted devices?: No  Does patient currently own DME?: No  Does patient have a history of Outpatient Therapy  (PT/OT)?: No  Does the patient have a history of Short-Term Rehab?: No  Does patient currently have HHC?: No         Patient Information Continued  Income Source: Pension/long-term  Does patient have prescription coverage?: Yes  Does patient receive dialysis treatments?: No  Does patient have a history of substance abuse?: No  Does patient have a history of Mental Health Diagnosis?: No    PHQ 2/9 Screening   Reviewed PHQ 2/9 Depression Screening Score?: No    Means of Transportation  Means of Transport to Appts:: Family transport      Housing Stability: Unknown (1/4/2024)    Housing Stability Vital Sign     Unable to Pay for Housing in the Last Year: Not on file     Number of Places Lived in the Last Year: Not on file     Unstable Housing in the Last Year: No   Food Insecurity: No Food Insecurity (1/4/2024)    Hunger Vital Sign     Worried About Running Out of Food in the Last Year: Never true     Ran Out of Food in the Last Year: Never true   Transportation Needs: No Transportation Needs (1/4/2024)    PRAPARE - Transportation     Lack of Transportation (Medical): No     Lack of Transportation (Non-Medical): No   Utilities: Not At Risk (1/4/2024)    [facility] Utilities     Threatened with loss of utilities: No       DISCHARGE DETAILS:    Discharge planning discussed with:: patient  Freedom of Choice: No  Comments - Freedom of Choice: TBD  CM contacted family/caregiver?: Yes  Were Treatment Team discharge recommendations reviewed with patient/caregiver?: Yes  Did patient/caregiver verbalize understanding of patient care needs?: Yes  Were patient/caregiver advised of the risks associated with not following Treatment Team discharge recommendations?: Yes    Contacts  Patient Contacts: Marimar Reyes ()  [phone] (M)  Relationship to Patient:: Family  Contact Method: Phone  Phone Number: Marimar Garcia)  [phone] (M)  Reason/Outcome: Discharge Planning, Emergency Contact    Requested Home Health Care          Is the patient interested in HHC at discharge?: No    DME Referral Provided  Referral made for DME?: No  Would you like to participate in our [facility] service program?  : No - Declined    Treatment Team Recommendation: Home  Discharge Destination Plan:: Home         Additional Comments: CM spoke with patient at bedside who confirmed all info on facesheet is correct. Patient states she lives with wher sister and son in a 2 story home. Reports being IND with ADLs and ambulations. Denies any use of DME, Home Oxygen, HHC, or HD. Patient states that she was having issue affording her Eliquis but now has a $10 copay card that [employer] uses. States her family will provide transport home. CM will follow up with any anticipated d/c needs.

## 2024-01-05 NOTE — UTILIZATION REVIEW
NOTIFICATION OF ADMISSION DISCHARGE   This is a Notification of Discharge from [facility]. Please be advised that this patient has been discharge from our facility. Below you will find the admission and discharge date and time including the patient’s disposition.   UTILIZATION REVIEW CONTACT:  Bettye Paulson MA  Utilization   Network Utilization Review Department  Phone: [phone] x carefully listen to the prompts. All voicemails are confidential.  Email: [email or web address]     ADMISSION INFORMATION  PRESENTATION DATE: 1/2/2024 12:12 PM  OBERVATION ADMISSION DATE:   INPATIENT ADMISSION DATE: 1/3/24  3:39 PM   DISCHARGE DATE: 1/4/2024  2:22 PM   DISPOSITION:Home/Self Care    Network Utilization Review Department  ATTENTION: Please call with any questions or concerns to [phone] and carefully listen to the prompts so that you are directed to the right person. All voicemails are confidential.   For Discharge needs, contact Care Management DC Support Team at [phone] opt. 2  Send all requests for admission clinical reviews, approved or denied determinations and any other requests to dedicated fax number below belonging to the campus where the patient is receiving treatment. List of dedicated fax numbers for the Facilities:  FACILITY NAME UR FAX NUMBER   ADMISSION DENIALS (Administrative/Medical Necessity) [phone]   DISCHARGE SUPPORT TEAM ([facility]) [phone]   PARENT CHILD HEALTH (Maternity/NICU/Pediatrics) [phone]   [facility] [phone]   [facility] [phone]   [facility] [phone]   [facility] [phone]   [facility] [phone]   [facility] [phone]   [facility] [phone]   [facility] [phone]    [facility] [phone]   Pending sale to [facility] [phone]   [facility] [phone]

## 2024-01-06 LAB
PROT S ACT/NOR PPP: 80 % (ref 61–136)
PROT S PPP-ACNC: 89 % (ref 60–150)

## 2024-01-09 LAB
APTT SCREEN TO CONFIRM RATIO: 0.81 RATIO (ref 0–1.34)
B2 GLYCOPROT1 IGA SERPL IA-ACNC: 1.7
B2 GLYCOPROT1 IGG SERPL IA-ACNC: 1.2
B2 GLYCOPROT1 IGM SERPL IA-ACNC: <2.4
CARDIOLIPIN IGA SER IA-ACNC: 3.1
CARDIOLIPIN IGG SER IA-ACNC: 1.6
CARDIOLIPIN IGM SER IA-ACNC: 1.4
CONFIRM APTT/NORMAL: 40.7 SEC (ref 0–47.6)
DRVVT IMM 1:2 NP PPP: 42.1 SEC (ref 0–40.4)
DRVVT SCREEN TO CONFIRM RATIO: 0.9 RATIO (ref 0.8–1.2)
LA PPP-IMP: ABNORMAL
SCREEN APTT: 39.9 SEC (ref 0–43.5)
SCREEN DRVVT: 70.4 SEC (ref 0–47)
THROMBIN TIME: 17.2 SEC (ref 0–23)

## 2024-01-25 ENCOUNTER — TELEPHONE (OUTPATIENT)
Dept: HEMATOLOGY ONCOLOGY | Facility: CLINIC | Age: 74
End: 2024-01-25

## 2024-01-25 NOTE — TELEPHONE ENCOUNTER
Appointment Change  Cancel, Reschedule, Change to Virtual      Who are you speaking with? Patient and Child   If it is not the patient, is the caller listed on the communication consent form? Yes   Which provider is the appointment scheduled with? RUBEN Cox   When was the original appointment scheduled?    Please list date and time 02/06/2024 @ 10:30AM    At which location is the appointment scheduled to take place? [place]   Was the appointment rescheduled?     Was the appointment changed from an in person visit to a virtual visit?    If so, please list the details of the change. Yes, 02/20/2024 @11:30AM    What is the reason for the appointment change? Scheduling conflict

## 2024-02-20 ENCOUNTER — OFFICE VISIT (OUTPATIENT)
Dept: GYNECOLOGIC ONCOLOGY | Facility: CLINIC | Age: 74
End: 2024-02-20
Payer: COMMERCIAL

## 2024-02-20 VITALS
TEMPERATURE: 97.3 F | HEART RATE: 79 BPM | OXYGEN SATURATION: 99 % | WEIGHT: 253 LBS | BODY MASS INDEX: 46.27 KG/M2 | SYSTOLIC BLOOD PRESSURE: 118 MMHG | DIASTOLIC BLOOD PRESSURE: 80 MMHG

## 2024-02-20 DIAGNOSIS — I26.99 OTHER ACUTE PULMONARY EMBOLISM WITHOUT ACUTE COR PULMONALE (HCC): Primary | ICD-10-CM

## 2024-02-20 DIAGNOSIS — Z85.42 HISTORY OF ENDOMETRIAL CANCER: ICD-10-CM

## 2024-02-20 PROBLEM — I26.02 ACUTE SADDLE PULMONARY EMBOLISM WITH ACUTE COR PULMONALE (HCC): Status: RESOLVED | Noted: 2023-03-20 | Resolved: 2024-02-20

## 2024-02-20 PROCEDURE — 99214 OFFICE O/P EST MOD 30 MIN: CPT | Performed by: NURSE PRACTITIONER

## 2024-02-20 NOTE — ASSESSMENT & PLAN NOTE
Patient presented to the ED on 1/2/24 with SOB and was diagnosed with pulmonary emboli. She is now anticoagulated on Eliquis. She is scheduled to see hematology on Monday.    Given history of cancer and new thrombosis, will obtain CT a/p for completeness, as this was not done last month in the ED.

## 2024-02-20 NOTE — PATIENT INSTRUCTIONS
1. Return to the office in 3 months for continued surveillance.   2. Contact the office in the interim if you develop any any new or concerning symptoms, including vaginal bleeding, discharge, pain, etc.

## 2024-02-20 NOTE — PROGRESS NOTES
Assessment/Plan:    Problem List Items Addressed This Visit          Cardiovascular and Mediastinum    Acute pulmonary embolism without acute cor pulmonale (HCC) - Primary     Patient presented to the ED on 1/2/24 with SOB and was diagnosed with pulmonary emboli. She is now anticoagulated on Eliquis. She is scheduled to see hematology on Monday.    Given history of cancer and new thrombosis, will obtain CT a/p for completeness, as this was not done last month in the ED.            Other    History of endometrial cancer     74-year-old with a history of stage IIIC1 endometrial cancer. She completed chemotherapy in August 2022 and radiation in November 2022. She was recently diagnosed with new PE in January. She is now taking Eliquis. From a gynecologic standpoint, she is feeling well. Her  was low at the time of diagnosis and is not a good marker. Her pelvic exam is normal, without evidence of recurrent disease. Her PS is 0.    Obtain CT in the next few weeks due to new blood clots.    Patient will return to the office in 3 months for her next surveillance visit. She will call the office in the interim with any new concerns or issues.         Relevant Orders    CT abdomen pelvis w contrast           CHIEF COMPLAINT: Endometrial cancer surveillance      Subjective:     Problem:  Cancer Staging   Encounter for follow-up surveillance of endometrial cancer  Staging form: Corpus Uteri - Carcinoma, AJCC 8th Edition  - Pathologic stage from 3/31/2022: FIGO Stage IIIC1 - Signed by Gene Coburn MD on 4/19/2022      Previous therapy:  Oncology History   Encounter for follow-up surveillance of endometrial cancer   3/2/2022 Initial Diagnosis    Endometrial cancer (HCC)     3/2/2022 Biopsy    Final Diagnosis   A. Endometrium, EMB:  - High-grade endometrial carcinoma, favor clear cell carcinoma.  See comment.        3/31/2022 -  Cancer Staged    Staging form: Corpus Uteri - Carcinoma, AJCC 8th Edition  - Pathologic  stage from 3/31/2022: FIGO Stage IIIC1 - Signed by Gene Coburn MD on 4/19/2022  Histopathologic type: Clear cell adenocarcinoma, NOS  Stage prefix: Initial diagnosis  Histologic grade (G): G3  Histologic grading system: 3 grade system  Residual tumor (R): R0 - None  Pelvic fátima status: Positive  Number of pelvic nodes positive from dissection: 1  Number of pelvic nodes examined during dissection: 2  Lymph node metastasis: Present  Omentectomy performed: Yes  Morcellation performed: No       3/31/2022 Surgery    Robotic hysterectomy, bilateral salpingo oophorectomy, bilateral pelvic sentinel lymph node biopsies, pelvic peritoneal/omental biopsies.  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  Negative cervix.  Negative parametria.  1/2 sentinel pelvic lymph nodes positive for malignancy.  Negative staging biopsies.  Stage IIIC1.  No evidence of DNA mismatch repair protein loss.     5/11/2022 - 8/25/2022 Chemotherapy    palonosetron (ALOXI), 0.25 mg, Intravenous, Once, 6 of 6 cycles  Administration: 0.25 mg (5/11/2022), 0.25 mg (6/1/2022), 0.25 mg (6/22/2022), 0.25 mg (7/13/2022), 0.25 mg (8/3/2022), 0.25 mg (8/24/2022)  Pegfilgrastim-bmez (ZIEXTENZO), 6 mg, Subcutaneous, Once, 3 of 3 cycles  Administration: 6 mg (7/14/2022), 6 mg (8/4/2022), 6 mg (8/25/2022)  fosaprepitant (EMEND) IVPB, 150 mg, Intravenous, Once, 6 of 6 cycles  Administration: 150 mg (5/11/2022), 150 mg (6/1/2022), 150 mg (6/22/2022), 150 mg (7/13/2022), 150 mg (8/3/2022), 150 mg (8/24/2022)  CARBOplatin (PARAPLATIN) IVPB (GOG AUC DOSING), 352.5 mg, Intravenous, Once, 6 of 6 cycles  Administration: 352.5 mg (5/11/2022), 377.5 mg (6/1/2022), 340.5 mg (6/22/2022), 353 mg (7/13/2022), 266.4 mg (8/3/2022), 271.6 mg (8/24/2022)  PACLItaxel (TAXOL) chemo IVPB, 135 mg/m2 = 290.4 mg (77.1 % of original dose 175 mg/m2), Intravenous, Once, 6 of 6 cycles  Dose modification: 135 mg/m2 (original dose 175 mg/m2, Cycle 1,  Reason: Other (Must fill in a comment), Comment: prescribed starting dose)  Administration: 290.4 mg (5/11/2022), 290.4 mg (6/1/2022), 289.2 mg (6/22/2022), 292.8 mg (7/13/2022), 289.2 mg (8/3/2022), 300 mg (8/24/2022)     10/4/2022 - 11/21/2022 Radiation    Plan ID Energy Fractions Dose per Fraction (cGy) Dose Correction (cGy) Total Dose Delivered (cGy) Elapsed Days   Vag Cylinder  2 / 2 600 0 1,200 5   Pelvis 6 MV 25 180  4500 34              Patient ID: Sg Scherer is a 74 y.o. female  Patient presents to the office today for follow-up due to history of stage IIIC endometrial cancer. She has been afebrile, and without nausea or vomiting. She denies SOB. She has a normal appetite. She denies bowel or bladder dysfunction, and has no pain in the abdomen or pelvis. She is without vaginal bleeding or discharge. She is ambulatory and independent with all ADL's. She recently returned from a trip to [place].          Review of Systems   Constitutional:  Negative for chills, fatigue, fever and unexpected weight change.   HENT:  Negative for nosebleeds.    Eyes: Negative.    Respiratory:  Negative for cough, chest tightness, shortness of breath and wheezing.    Cardiovascular:  Negative for chest pain, palpitations and leg swelling.   Gastrointestinal:  Negative for abdominal distention, abdominal pain, anal bleeding, blood in stool, constipation, diarrhea, nausea, rectal pain and vomiting.   Endocrine: Negative.    Genitourinary:  Negative for difficulty urinating, dysuria, frequency, hematuria, pelvic pain, urgency, vaginal bleeding, vaginal discharge and vaginal pain.   Musculoskeletal:  Negative for arthralgias and joint swelling.   Skin:  Negative for color change, pallor and rash.   Neurological:  Negative for dizziness, weakness, light-headedness, numbness and headaches.   Hematological: Negative.    Psychiatric/Behavioral: Negative.         Current Outpatient Medications   Medication Sig Dispense Refill     acetaminophen (TYLENOL) 650 mg CR tablet Take 1 tablet (650 mg total) by mouth every 6 (six) hours as needed for mild pain 30 tablet 0    apixaban (Eliquis) 5 mg Take 2 tablets (10 mg total) by mouth 2 (two) times a day for 7 days, THEN 1 tablet (5 mg total) 2 (two) times a day for 23 days. 74 tablet 0    Ascorbic Acid (vitamin C) 100 MG tablet Take by mouth daily Pt unsure of mg      calcitriol (ROCALTROL) 0.25 mcg capsule Take 1 capsule (0.25 mcg total) by mouth 3 (three) times a week 36 capsule 2    cholecalciferol (VITAMIN D3) 1,000 units tablet Take 1,000 Units by mouth daily Pt unsure how many units      denosumab (Prolia) 60 mg/mL Inject 1 mL under the skin every 6 (six) months      omeprazole (PriLOSEC) 20 mg delayed release capsule Take by mouth daily      vitamin B-12 (CYANOCOBALAMIN) 50 MCG tablet Take by mouth daily Pt unsure of mcg      furosemide (LASIX) 20 mg tablet Take 1 tablet (20 mg total) by mouth daily for 10 days 10 tablet 0     No current facility-administered medications for this visit.       No Known Allergies    Past Medical History:   Diagnosis Date    Arthritis     osteo    Chronic kidney disease     Colon polyp     Diverticula of colon     Endometrial cancer (HCC)     2022    Hypertension     Obesity     Rhinitis        Past Surgical History:   Procedure Laterality Date    APPENDECTOMY      BREAST BIOPSY Left 1977    benign    BREAST SURGERY Left     biopsy    CHOLECYSTECTOMY      COLONOSCOPY      CYSTOSCOPY N/A 03/31/2022    Procedure: CYSTOSCOPY;  Surgeon: Gene Coburn MD;  Location: [facility];  Service: Gynecology Oncology    HERNIA REPAIR Right     inguinal    HYSTERECTOMY      IR PORT PLACEMENT  05/09/2022    JOINT REPLACEMENT Bilateral     Knee    OOPHORECTOMY      MN COLONOSCOPY FLX DX W/COLLJ SPEC WHEN PFRMD N/A 02/21/2017    Procedure: COLONOSCOPY with polypectomy and hemo clip marjan.;  Surgeon: Ramirez Jones MD;  Location: [facility];  Service: Gastroenterology    MN  LAPS TOTAL HYSTERECT 250 GM/< W/RMVL TUBE/OVARY N/A 2022    Procedure: ROBOTIC HYSTERECTOMY, BILATERAL SALPINGO-OOPHORECTOMY, STAGING PERITONEAL AND OMENTAL BIOPSIES, BILATERAL PELVIC SENTINEL LYMPH NODE BIOPSIES;  Surgeon: Gene Coburn MD;  Location:  MAIN OR;  Service: Gynecology Oncology       OB History          1    Para   1    Term   1            AB        Living   1         SAB        IAB        Ectopic        Multiple        Live Births   1                 Family History   Problem Relation Age of Onset    Heart disease Mother     Prostate cancer Father 90    Diabetes Sister     Hypertension Sister     Transient ischemic attack Sister     No Known Problems Sister     Diabetes Brother     Heart disease Brother     Kidney disease Son     No Known Problems Maternal Grandmother     No Known Problems Maternal Grandfather     No Known Problems Paternal Grandmother     No Known Problems Paternal Grandfather     No Known Problems Maternal Aunt     No Known Problems Paternal Aunt     No Known Problems Paternal Aunt     No Known Problems Paternal Aunt     Stomach cancer Other 38    Thyroid disease Other     Cancer Other         unsure type    Breast cancer Neg Hx     Colon cancer Neg Hx     Ovarian cancer Neg Hx        The following portions of the patient's history were reviewed and updated as appropriate: allergies, current medications, past family history, past medical history, past social history, past surgical history, and problem list.      Objective:    Blood pressure 118/80, pulse 79, temperature (!) 97.3 °F (36.3 °C), temperature source Temporal, weight 115 kg (253 lb), SpO2 99%, not currently breastfeeding.  Body mass index is 46.27 kg/m².    Physical Exam  Vitals reviewed. Exam conducted with a chaperone present.   Constitutional:       General: She is not in acute distress.     Appearance: Normal appearance. She is obese. She is not ill-appearing.   HENT:      Head: Normocephalic  and atraumatic.      Mouth/Throat:      Mouth: Mucous membranes are moist.   Eyes:      General:         Right eye: No discharge.         Left eye: No discharge.      Conjunctiva/sclera: Conjunctivae normal.   Pulmonary:      Effort: Pulmonary effort is normal.   Abdominal:      Palpations: Abdomen is soft. There is no mass.      Tenderness: There is no abdominal tenderness.      Hernia: No hernia is present.   Genitourinary:     Comments: The external female genitalia is normal. The bartholin's, uretheral and skenes glands are normal. The urethral meatus is normal (midline with no lesions). Anus without fissure or lesion. Speculum exam reveals a grossly normal vagina. No masses, lesions,discharge or bleeding. No significant cystocele or rectocele noted. Bimanual exam notes a surgical absent cervix, uterus and adnexal structures. No masses or fullness. Bladder is without fullness, mass or tenderness.  Musculoskeletal:      Right lower leg: No edema.      Left lower leg: No edema.   Skin:     General: Skin is warm and dry.      Coloration: Skin is not jaundiced.      Findings: No rash.   Neurological:      General: No focal deficit present.      Mental Status: She is alert and oriented to person, place, and time.      Cranial Nerves: No cranial nerve deficit.      Sensory: No sensory deficit.      Motor: No weakness.      Gait: Gait normal.   Psychiatric:         Mood and Affect: Mood normal.         Behavior: Behavior normal.         Thought Content: Thought content normal.         Judgment: Judgment normal.           Lab Results   Component Value Date     5.1 03/10/2022     Lab Results   Component Value Date    WBC 2.49 (L) 01/04/2024    HGB 10.6 (L) 01/04/2024    HCT 33.7 (L) 01/04/2024     (H) 01/04/2024     (L) 01/04/2024     Lab Results   Component Value Date    K 3.7 01/04/2024     01/04/2024    CO2 30 01/04/2024    BUN 17 01/04/2024    CREATININE 1.02 01/04/2024    GLUF 109 (H)  05/16/2022    CALCIUM 8.5 01/04/2024    CORRECTEDCA 9.0 01/04/2024    AST 13 01/04/2024    ALT 10 01/04/2024    ALKPHOS 60 01/04/2024    EGFR 53 01/04/2024        Trend:  Lab Results   Component Value Date     5.1 03/10/2022

## 2024-02-20 NOTE — ASSESSMENT & PLAN NOTE
74-year-old with a history of stage IIIC1 endometrial cancer. She completed chemotherapy in August 2022 and radiation in November 2022. She was recently diagnosed with new PE in January. She is now taking Eliquis. From a gynecologic standpoint, she is feeling well. Her  was low at the time of diagnosis and is not a good marker. Her pelvic exam is normal, without evidence of recurrent disease. Her PS is 0.    Obtain CT in the next few weeks due to new blood clots.    Patient will return to the office in 3 months for her next surveillance visit. She will call the office in the interim with any new concerns or issues.

## 2024-02-26 ENCOUNTER — OFFICE VISIT (OUTPATIENT)
Dept: HEMATOLOGY ONCOLOGY | Facility: CLINIC | Age: 74
End: 2024-02-26
Payer: COMMERCIAL

## 2024-02-26 VITALS
HEART RATE: 65 BPM | RESPIRATION RATE: 18 BRPM | WEIGHT: 254.5 LBS | OXYGEN SATURATION: 98 % | SYSTOLIC BLOOD PRESSURE: 120 MMHG | BODY MASS INDEX: 46.83 KG/M2 | HEIGHT: 62 IN | DIASTOLIC BLOOD PRESSURE: 80 MMHG | TEMPERATURE: 97.2 F

## 2024-02-26 DIAGNOSIS — Z82.49 FAMILY HISTORY OF PULMONARY EMBOLISM: ICD-10-CM

## 2024-02-26 DIAGNOSIS — Z86.711 HISTORY OF PULMONARY EMBOLISM: ICD-10-CM

## 2024-02-26 DIAGNOSIS — I26.99 ACUTE PULMONARY EMBOLISM WITHOUT ACUTE COR PULMONALE, UNSPECIFIED PULMONARY EMBOLISM TYPE (HCC): Primary | ICD-10-CM

## 2024-02-26 DIAGNOSIS — Z13.71 SCREENING FOR GENETIC DISEASE CARRIER STATUS: ICD-10-CM

## 2024-02-26 PROCEDURE — 99204 OFFICE O/P NEW MOD 45 MIN: CPT

## 2024-02-26 RX ORDER — FAMOTIDINE 40 MG/1
TABLET, FILM COATED ORAL DAILY
COMMUNITY
Start: 2024-02-23

## 2024-02-26 NOTE — PROGRESS NOTES
[address]  [facility]AT[facility] [place]  [address] [place] [place] [id number]  Hematology Ambulatory Consult  Sg Scherer, 1950, [id number]  2/26/2024    Assessment/Plan:  1. Acute pulmonary embolism without acute cor pulmonale, unspecified pulmonary embolism type (HCC)  2. History of pulmonary embolism  3. Family history of pulmonary embolism  4. Screening for genetic disease carrier status  Ms. Scherer is a 74-year-old female seen in consultation for anticoagulation recommendations.  While undergoing cancer treatment in 2022 she was diagnosed with DVT PE with RV strain and placed on 6 months of anticoagulation with Eliquis.  Most recently over the last 6 months she has had increased shortness of breath and decreased stamina requiring a scooter/wheelchair for ambulation and she is more sedentary.  On 1/2/2024 she was again diagnosed with 3 small right lung lobar pulmonary emboli nonocclusive with no RV strain.  We discussed that due to her sedentary lifestyle, second VTE, and obesity lifelong/indefinite anticoagulation is recommended.  She tolerates Eliquis well without any excess bleeding or bruising and it is not financially taxing for her so she will continue on this indefinitely.  She should continue workup with GYN oncology for recurrent disease.  We did discuss the testing not completed by the thrombosis panel including factor V Leiden mutation and prothrombin gene mutation might not be covered by insurance.  We discussed that this would not change our recommendations for her but could determine further testing for her children.  She will wait to hear back from our prior authorization team on pricing for this and if she decides to go for these labs they will be interpreted accordingly.  At this time there is no ongoing need for hematology follow-up and she should continue to follow with her PCP for refills of her Eliquis.  Signs and symptoms of DVT/PE were reviewed  with the patient today and when to go to the emergency room.  Also signs and symptoms of excess bleeding or bruising for which to call the office for were reviewed.    - Ambulatory Referral to Hematology / Oncology  - Prothrombin gene mutation; Future  - Factor 5 leiden; Future  - apixaban (Eliquis) 5 mg; Take 1 tablet (5 mg total) by mouth 2 (two) times a day  Dispense: 60 tablet; Refill: 6     Patient voiced agreement and understanding to the above. Patient knows to call the Hematology/Oncology office with any questions and concerns regarding the above.      Barrier(s) to care: None.   The patient is able to self care.    -------------------------------------------------------------------------------------------------------    No chief complaint on file.      Referring provider:  Zay Dodge MD  [address] [place] [id number]    History of present illness:  Sg Scherer is a 74-year-old female with past medical history of hyperparathyroidism, hypertension, osteoporosis, CKD stage III, endometrial cancer, obesity.  She was diagnosed in March 2022 with stage IIIc endometrial cancer status post hysterectomy, chemotherapy, and radiation.  She most recently was KELLY on exam and has a repeat CT scan scheduled for next week to assess disease status.  While undergoing cancer treatment she was diagnosed with DVT and PE with RV strain in May of 2022. This was treated with Eliquis x 6 months and then discontinued.  Over the last 6 months she has had increased weakness and decreased stamina requiring scooter/wheelchair for ambulation as well as leading a more sedentary life than previous.  On 1/2/2024 she was evaluated in the emergency room for this increased weakness and found to have 3 right long lobar pulmonary artery nonoccluding emboli with no RV strain.  No signs symptoms of DVT either.  She was discarded on Eliquis again.  Since starting Eliquis she has had increased stamina and walking further distances  without becoming significantly short of breath.  She has a family history of VTE and her sister that is unsure of the workup associated with this event and is on lifelong anticoagulation.  She has prior surgical history including hysterectomy, gallbladder, knee, and hernia repair which did not have complications of VTE.  No other oncology history other than endometrial cancer as discussed above.  She has never been on hormonal treatment.  She denies smoking.  She denies history of MI or CVA.  She has no history of miscarriages.      Review of Systems   Constitutional:  Negative for activity change, appetite change, chills, fatigue, fever and unexpected weight change.   HENT: Negative.     Eyes:  Negative for photophobia and visual disturbance.   Respiratory:  Negative for cough, chest tightness and shortness of breath.    Cardiovascular: Negative.    Gastrointestinal: Negative.    Endocrine: Negative for cold intolerance and heat intolerance.   Genitourinary:  Negative for dysuria and urgency.   Musculoskeletal: Negative.    Skin: Negative.    Neurological:  Negative for dizziness, weakness, light-headedness and headaches.   Hematological: Negative.    Psychiatric/Behavioral: Negative.         Patient Active Problem List   Diagnosis    Essential hypertension    Stage 3a chronic kidney disease (HCC)    Acute pulmonary embolism without acute cor pulmonale ([facility])    Encounter for follow-up surveillance of endometrial cancer    Morbid obesity with BMI of 45.0-49.9, adult ([facility])    Encounter for central line care    Insomnia    Chemotherapy induced neutropenia     History of endometrial cancer    Osteoporosis without current pathological fracture    Secondary hyperparathyroidism (HCC)    Anemia due to stage 3a chronic kidney disease     Secondary hyperparathyroidism of renal origin (HCC)    Dyspnea on exertion       Past Medical History:   Diagnosis Date    Arthritis     osteo    Chronic kidney disease     Colon polyp      Diverticula of colon     Endometrial cancer (HCC)     2022    Hypertension     Obesity     Rhinitis        Past Surgical History:   Procedure Laterality Date    APPENDECTOMY      BREAST BIOPSY Left 1977    benign    BREAST SURGERY Left     biopsy    CHOLECYSTECTOMY      COLONOSCOPY      CYSTOSCOPY N/A 03/31/2022    Procedure: CYSTOSCOPY;  Surgeon: Gene Coburn MD;  Location: [facility];  Service: Gynecology Oncology    HERNIA REPAIR Right     inguinal    HYSTERECTOMY      IR PORT PLACEMENT  05/09/2022    JOINT REPLACEMENT Bilateral     Knee    OOPHORECTOMY      GA COLONOSCOPY FLX DX W/COLLJ SPEC WHEN PFRMD N/A 02/21/2017    Procedure: COLONOSCOPY with polypectomy and hemo clip marjan.;  Surgeon: Ramirez Jones MD;  Location: [facility];  Service: Gastroenterology    GA LAPS TOTAL HYSTERECT 250 GM/< W/RMVL TUBE/OVARY N/A 03/31/2022    Procedure: ROBOTIC HYSTERECTOMY, BILATERAL SALPINGO-OOPHORECTOMY, STAGING PERITONEAL AND OMENTAL BIOPSIES, BILATERAL PELVIC SENTINEL LYMPH NODE BIOPSIES;  Surgeon: Gene Coburn MD;  Location: [facility];  Service: Gynecology Oncology       Family History   Problem Relation Age of Onset    Heart disease Mother     Prostate cancer Father 90    Diabetes Sister     Hypertension Sister     Transient ischemic attack Sister     No Known Problems Sister     Diabetes Brother     Heart disease Brother     Kidney disease Son     No Known Problems Maternal Grandmother     No Known Problems Maternal Grandfather     No Known Problems Paternal Grandmother     No Known Problems Paternal Grandfather     No Known Problems Maternal Aunt     No Known Problems Paternal Aunt     No Known Problems Paternal Aunt     No Known Problems Paternal Aunt     Stomach cancer Other 38    Thyroid disease Other     Cancer Other         unsure type    Breast cancer Neg Hx     Colon cancer Neg Hx     Ovarian cancer Neg Hx        Social History     Socioeconomic History    Marital status: Single     Spouse name:  Not on file    Number of children: Not on file    Years of education: Not on file    Highest education level: Not on file   Occupational History    Not on file   Tobacco Use    Smoking status: Never    Smokeless tobacco: Never   Vaping Use    Vaping status: Never Used   Substance and Sexual Activity    Alcohol use: Yes     Alcohol/week: 1.0 standard drink of alcohol     Types: 1 Glasses of wine per week     Comment: social-seldom    Drug use: Never    Sexual activity: Not Currently   Other Topics Concern    Not on file   Social History Narrative    Not on file     Social Determinants of Health     Financial Resource Strain: Not on file   Food Insecurity: No Food Insecurity (1/4/2024)    Hunger Vital Sign     Worried About Running Out of Food in the Last Year: Never true     Ran Out of Food in the Last Year: Never true   Transportation Needs: No Transportation Needs (1/4/2024)    PRAPARE - Transportation     Lack of Transportation (Medical): No     Lack of Transportation (Non-Medical): No   Physical Activity: Not on file   Stress: Not on file   Social Connections: Not on file   Intimate Partner Violence: Not on file   Housing Stability: Unknown (1/4/2024)    Housing Stability Vital Sign     Unable to Pay for Housing in the Last Year: Not on file     Number of Places Lived in the Last Year: Not on file     Unstable Housing in the Last Year: No         Current Outpatient Medications:     acetaminophen (TYLENOL) 650 mg CR tablet, Take 1 tablet (650 mg total) by mouth every 6 (six) hours as needed for mild pain, Disp: 30 tablet, Rfl: 0    apixaban (Eliquis) 5 mg, Take 2 tablets (10 mg total) by mouth 2 (two) times a day for 7 days, THEN 1 tablet (5 mg total) 2 (two) times a day for 23 days., Disp: 74 tablet, Rfl: 0    Ascorbic Acid (vitamin C) 100 MG tablet, Take by mouth daily Pt unsure of mg, Disp: , Rfl:     calcitriol (ROCALTROL) 0.25 mcg capsule, Take 1 capsule (0.25 mcg total) by mouth 3 (three) times a week, Disp:  36 capsule, Rfl: 2    cholecalciferol (VITAMIN D3) 1,000 units tablet, Take 1,000 Units by mouth daily Pt unsure how many units, Disp: , Rfl:     denosumab (Prolia) 60 mg/mL, Inject 1 mL under the skin every 6 (six) months, Disp: , Rfl:     furosemide (LASIX) 20 mg tablet, Take 1 tablet (20 mg total) by mouth daily for 10 days, Disp: 10 tablet, Rfl: 0    omeprazole (PriLOSEC) 20 mg delayed release capsule, Take by mouth daily, Disp: , Rfl:     vitamin B-12 (CYANOCOBALAMIN) 50 MCG tablet, Take by mouth daily Pt unsure of mcg, Disp: , Rfl:     No Known Allergies    Objective:  LMP  (LMP Unknown)   Physical Exam  Constitutional:       General: She is not in acute distress.     Appearance: Normal appearance. She is obese. She is not ill-appearing.   HENT:      Head: Normocephalic and atraumatic.   Eyes:      Extraocular Movements: Extraocular movements intact.      Conjunctiva/sclera: Conjunctivae normal.   Cardiovascular:      Rate and Rhythm: Normal rate.      Pulses: Normal pulses.   Pulmonary:      Effort: Pulmonary effort is normal. No respiratory distress.   Abdominal:      General: Abdomen is flat. Bowel sounds are normal. There is no distension.      Palpations: Abdomen is soft.      Tenderness: There is no abdominal tenderness.   Musculoskeletal:      Cervical back: Normal range of motion.      Right lower leg: No edema.      Left lower leg: No edema.   Skin:     General: Skin is warm and dry.      Capillary Refill: Capillary refill takes less than 2 seconds.      Coloration: Skin is not jaundiced or pale.   Neurological:      General: No focal deficit present.      Mental Status: She is alert and oriented to person, place, and time. Mental status is at baseline.      Motor: No weakness.      Gait: Gait normal.   Psychiatric:         Mood and Affect: Mood normal.         Behavior: Behavior normal.         Thought Content: Thought content normal.         Judgment: Judgment normal.         Result Review  Labs:    No visits with results within 1 Month(s) from this visit.   Latest known visit with results is:   Admission on 01/02/2024, Discharged on 01/04/2024   Component Date Value Ref Range Status    Ventricular Rate 01/02/2024 71  BPM Final    Atrial Rate 01/02/2024 71  BPM Final    CT Interval 01/02/2024 156  ms Final    QRSD Interval 01/02/2024 72  ms Final    QT Interval 01/02/2024 410  ms Final    QTC Interval 01/02/2024 445  ms Final    P Axis 01/02/2024 77  degrees Final    QRS [place] 01/02/2024 54  degrees Final    T Wave [place] 01/02/2024 63  degrees Final    WBC 01/02/2024 3.38 (L)  4.31 - 10.16 Thousand/uL Final    RBC 01/02/2024 3.38 (L)  3.81 - 5.12 Million/uL Final    Hemoglobin 01/02/2024 11.2 (L)  11.5 - 15.4 g/dL Final    Hematocrit 01/02/2024 35.7  34.8 - 46.1 % Final    MCV 01/02/2024 106 (H)  82 - 98 fL Final    MCH 01/02/2024 33.1  26.8 - 34.3 pg Final    MCHC 01/02/2024 31.4  31.4 - 37.4 g/dL Final    RDW 01/02/2024 14.6  11.6 - 15.1 % Final    MPV 01/02/2024 10.6  8.9 - 12.7 fL Final    Platelets 01/02/2024 147 (L)  149 - 390 Thousands/uL Final    nRBC 01/02/2024 0  /100 WBCs Final    Neutrophils Relative 01/02/2024 55  43 - 75 % Final    Immat GRANS % 01/02/2024 1  0 - 2 % Final    Lymphocytes Relative 01/02/2024 29  14 - 44 % Final    Monocytes Relative 01/02/2024 11  4 - 12 % Final    Eosinophils Relative 01/02/2024 3  0 - 6 % Final    Basophils Relative 01/02/2024 1  0 - 1 % Final    Neutrophils Absolute 01/02/2024 1.90  1.85 - 7.62 Thousands/µL Final    Immature Grans Absolute 01/02/2024 0.02  0.00 - 0.20 Thousand/uL Final    Lymphocytes Absolute 01/02/2024 0.98  0.60 - 4.47 Thousands/µL Final    Monocytes Absolute 01/02/2024 0.37  0.17 - 1.22 Thousand/µL Final    Eosinophils Absolute 01/02/2024 0.09  0.00 - 0.61 Thousand/µL Final    Basophils Absolute 01/02/2024 0.02  0.00 - 0.10 Thousands/µL Final    Sodium 01/02/2024 139  135 - 147 mmol/L Final    Potassium 01/02/2024 4.7  3.5 - 5.3 mmol/L Final  "   Slightly Hemolyzed:Results may be affected.    Chloride 01/02/2024 109 (H)  96 - 108 mmol/L Final    CO2 01/02/2024 26  21 - 32 mmol/L Final    ANION GAP 01/02/2024 4  mmol/L Final    BUN 01/02/2024 13  5 - 25 mg/dL Final    Creatinine 01/02/2024 1.00  0.60 - 1.30 mg/dL Final    Standardized to IDMS reference method    Glucose 01/02/2024 85  65 - 140 mg/dL Final    If the patient is fasting, the ADA then defines impaired fasting glucose as > 100 mg/dL and diabetes as > or equal to 123 mg/dL.    Calcium 01/02/2024 8.5  8.4 - 10.2 mg/dL Final    Corrected Calcium 01/02/2024 9.0  8.3 - 10.1 mg/dL Final    AST 01/02/2024 19  13 - 39 U/L Final    Slightly Hemolyzed:Results may be affected.    ALT 01/02/2024 11  7 - 52 U/L Final    Specimen collection should occur prior to Sulfasalazine administration due to the potential for falsely depressed results.     Alkaline Phosphatase 01/02/2024 60  34 - 104 U/L Final    Total Protein 01/02/2024 6.5  6.4 - 8.4 g/dL Final    Albumin 01/02/2024 3.4 (L)  3.5 - 5.0 g/dL Final    Total Bilirubin 01/02/2024 0.42  0.20 - 1.00 mg/dL Final    Use of this assay is not recommended for patients undergoing treatment with eltrombopag due to the potential for falsely elevated results.  N-acetyl-p-benzoquinone imine (metabolite of Acetaminophen) will generate erroneously low results in samples for patients that have taken an overdose of Acetaminophen.    eGFR 01/02/2024 55  ml/min/1.73sq m Final    hs TnI 0hr 01/02/2024 5  \"Refer to ACS Flowchart\"- see link ng/L Final    Comment:                                              Initial (time 0) result  If >=50 ng/L, Myocardial injury suggested ;  Type of myocardial injury and treatment strategy  to be determined.  If 5-49 ng/L, a delta result at 2 hours and or 4 hours will be needed to further evaluate.  If <4 ng/L, and chest pain has been >3 hours since onset, patient may qualify for discharge based on the HEART score in the ED.  If <5 ng/L and " <3hours since onset of chest pain, a delta result at 2 hours will be needed to further evaluate.    HS Troponin 99th Percentile URL of a Health Population=12 ng/L with a 95% Confidence Interval of 8-18 ng/L.    Second Troponin (time 2 hours)  If calculated delta >= 20 ng/L,  Myocardial injury suggested ; Type of myocardial injury and treatment strategy to be determined.  If 5-49 ng/L and the calculated delta is 5-19 ng/L, consult medical service for evaluation.  Continue evaluation for ischemia on ecg and other possible etiology and repeat hs troponin at 4 hours.  If delta                            is <5 ng/L at 2 hours, consider discharge based on risk stratification via the HEART score (if in ED), or CM risk score in IP/Observation.    HS Troponin 99th Percentile URL of a Health Population=12 ng/L with a 95% Confidence Interval of 8-18 ng/L.    BNP 01/02/2024 306 (H)  0 - 100 pg/mL Final    Ventricular Rate 01/02/2024 66  BPM Final    Atrial Rate 01/02/2024 66  BPM Final    RI Interval 01/02/2024 170  ms Final    QRSD Interval 01/02/2024 74  ms Final    QT Interval 01/02/2024 434  ms Final    QTC Interval 01/02/2024 454  ms Final    P Axis 01/02/2024 74  degrees Final    QRS [place] 01/02/2024 64  degrees Final    T Wave [place] 01/02/2024 57  degrees Final    D-Dimer, Quant 01/02/2024 3.05 (H)  <0.50 ug/ml FEU Final    Reference and upper limits to exclude DVT and PE are the same.  Do not use to exclude if clinical symptoms are present.  Pregnant women:  1st trimester:  <0.22 - 1.06 ug/ml FEU  2nd trimester:  <0.22 - 1.88 ug/ml FEU  3rd trimester:   0.24 - 3.28 ug/ml FEU    Note: Normal ranges may not apply to patients who are transgender, non-binary, or whose legal sex, sex at birth, and gender identity differ.      SARS-CoV-2 01/02/2024 Negative  Negative Final         INFLUENZA A PCR 01/02/2024 Negative  Negative Final         INFLUENZA B PCR 01/02/2024 Negative  Negative Final         RSV PCR 01/02/2024 Negative   Negative Final         TSH 3RD GENERATON 01/02/2024 1.858  0.450 - 4.500 uIU/mL Final    The recommended reference ranges for TSH during pregnancy are as follows:   First trimester 0.100 to 2.500 uIU/mL   Second trimester  0.200 to 3.000 uIU/mL   Third trimester 0.300 to 3.000 uIU/m    Note: Normal ranges may not apply to patients who are transgender, non-binary, or whose legal sex, sex at birth, and gender identity differ.  Adult TSH (3rd generation) reference range follows the recommended guidelines of the American Thyroid Association, January, 2020.    Sodium 01/03/2024 141  135 - 147 mmol/L Final    Potassium 01/03/2024 3.9  3.5 - 5.3 mmol/L Final    Chloride 01/03/2024 109 (H)  96 - 108 mmol/L Final    CO2 01/03/2024 26  21 - 32 mmol/L Final    ANION GAP 01/03/2024 6  mmol/L Final    BUN 01/03/2024 12  5 - 25 mg/dL Final    Creatinine 01/03/2024 0.89  0.60 - 1.30 mg/dL Final    Standardized to IDMS reference method    Glucose 01/03/2024 73  65 - 140 mg/dL Final    If the patient is fasting, the ADA then defines impaired fasting glucose as > 100 mg/dL and diabetes as > or equal to 123 mg/dL.    Calcium 01/03/2024 8.4  8.4 - 10.2 mg/dL Final    eGFR 01/03/2024 63  ml/min/1.73sq m Final    Magnesium 01/03/2024 2.1  1.9 - 2.7 mg/dL Final    WBC 01/03/2024 2.87 (L)  4.31 - 10.16 Thousand/uL Final    RBC 01/03/2024 3.21 (L)  3.81 - 5.12 Million/uL Final    This is an appended report.  These results have been appended to a previously preliminary verified report.    Hemoglobin 01/03/2024 10.6 (L)  11.5 - 15.4 g/dL Final    Hematocrit 01/03/2024 33.4 (L)  34.8 - 46.1 % Final    MCV 01/03/2024 104 (H)  82 - 98 fL Final    MCH 01/03/2024 33.0  26.8 - 34.3 pg Final    MCHC 01/03/2024 31.7  31.4 - 37.4 g/dL Final    RDW 01/03/2024 14.6  11.6 - 15.1 % Final    Platelets 01/03/2024 126 (L)  149 - 390 Thousands/uL Final    This is an appended report.  These results have been appended to a previously preliminary verified  report.    MPV 01/03/2024 10.7  8.9 - 12.7 fL Final    PTT 01/03/2024 24  23 - 37 seconds Final    Therapeutic Heparin Range =  60-90 seconds    Protime 01/03/2024 13.7  11.6 - 14.5 seconds Final    INR 01/03/2024 1.03  0.84 - 1.19 Final    WBC 01/04/2024 2.49 (L)  4.31 - 10.16 Thousand/uL Final    RBC 01/04/2024 3.27 (L)  3.81 - 5.12 Million/uL Final    Hemoglobin 01/04/2024 10.6 (L)  11.5 - 15.4 g/dL Final    Hematocrit 01/04/2024 33.7 (L)  34.8 - 46.1 % Final    MCV 01/04/2024 103 (H)  82 - 98 fL Final    MCH 01/04/2024 32.4  26.8 - 34.3 pg Final    MCHC 01/04/2024 31.5  31.4 - 37.4 g/dL Final    RDW 01/04/2024 14.6  11.6 - 15.1 % Final    MPV 01/04/2024 10.6  8.9 - 12.7 fL Final    Platelets 01/04/2024 148 (L)  149 - 390 Thousands/uL Final    nRBC 01/04/2024 0  /100 WBCs Final    Neutrophils Relative 01/04/2024 63  43 - 75 % Final    Immat GRANS % 01/04/2024 1  0 - 2 % Final    Lymphocytes Relative 01/04/2024 20  14 - 44 % Final    Monocytes Relative 01/04/2024 11  4 - 12 % Final    Eosinophils Relative 01/04/2024 4  0 - 6 % Final    Basophils Relative 01/04/2024 1  0 - 1 % Final    Neutrophils Absolute 01/04/2024 1.58 (L)  1.85 - 7.62 Thousands/µL Final    Immature Grans Absolute 01/04/2024 0.02  0.00 - 0.20 Thousand/uL Final    Lymphocytes Absolute 01/04/2024 0.50 (L)  0.60 - 4.47 Thousands/µL Final    Monocytes Absolute 01/04/2024 0.27  0.17 - 1.22 Thousand/µL Final    Eosinophils Absolute 01/04/2024 0.10  0.00 - 0.61 Thousand/µL Final    Basophils Absolute 01/04/2024 0.02  0.00 - 0.10 Thousands/µL Final    Sodium 01/04/2024 140  135 - 147 mmol/L Final    Potassium 01/04/2024 3.7  3.5 - 5.3 mmol/L Final    Chloride 01/04/2024 106  96 - 108 mmol/L Final    CO2 01/04/2024 30  21 - 32 mmol/L Final    ANION GAP 01/04/2024 4  mmol/L Final    BUN 01/04/2024 17  5 - 25 mg/dL Final    Creatinine 01/04/2024 1.02  0.60 - 1.30 mg/dL Final    Standardized to IDMS reference method    Glucose 01/04/2024 86  65 - 140 mg/dL  Final    If the patient is fasting, the ADA then defines impaired fasting glucose as > 100 mg/dL and diabetes as > or equal to 123 mg/dL.    Calcium 01/04/2024 8.5  8.4 - 10.2 mg/dL Final    Corrected Calcium 01/04/2024 9.0  8.3 - 10.1 mg/dL Final    AST 01/04/2024 13  13 - 39 U/L Final    ALT 01/04/2024 10  7 - 52 U/L Final    Specimen collection should occur prior to Sulfasalazine administration due to the potential for falsely depressed results.     Alkaline Phosphatase 01/04/2024 60  34 - 104 U/L Final    Total Protein 01/04/2024 6.4  6.4 - 8.4 g/dL Final    Albumin 01/04/2024 3.4 (L)  3.5 - 5.0 g/dL Final    Total Bilirubin 01/04/2024 0.50  0.20 - 1.00 mg/dL Final    Use of this assay is not recommended for patients undergoing treatment with eltrombopag due to the potential for falsely elevated results.  N-acetyl-p-benzoquinone imine (metabolite of Acetaminophen) will generate erroneously low results in samples for patients that have taken an overdose of Acetaminophen.    eGFR 01/04/2024 53  ml/min/1.73sq m Final    Magnesium 01/04/2024 2.0  1.9 - 2.7 mg/dL Final    Phosphorus 01/04/2024 2.6  2.3 - 4.1 mg/dL Final    AntiThrombIN III Activity 01/04/2024 97  92 - 136 % of Normal Final    ANTICARDIOLIPIN IGG ANTIBODY 01/04/2024 1.6  See comment Final    ANTICARDIOLIPIN IGA ANTIBODY 01/04/2024 3.1  See comment Final    ANTICARDIOLIPIN IGM ANTIBODY 01/04/2024 1.4  See comment Final    PTT Lupus Anticoagulant 01/04/2024 39.9  0.0 - 43.5 sec Final    Dilute Viper Venom Time 01/04/2024 70.4 (H)  0.0 - 47.0 sec Final    DILUTE PROTHROMBIN TIME(DPT) 01/04/2024 40.7  0.0 - 47.6 sec Final    THROMBIN TIME (DRVW) 01/04/2024 17.2  0.0 - 23.0 sec Final    DPT CONFIRM RATIO 01/04/2024 0.81  0.00 - 1.34 Ratio Final    LUPUS REFLEX INTERPRETATION 01/04/2024 Comment:   Final    No lupus anticoagulant was detected. These results are consistent with  specific inhibitors to one or more common pathway factors (X, V, II  or  fibrinogen).    Protein C Activity 2024 132.0  60 - 150 % of Normal Final    Protein S Ag, Total 2024 89  60 - 150 % Final    This test was developed and its performance characteristics  determined by [facility]. It has not been cleared or approved  by the Food and Drug Administration.    Protein S Ag, Free 2024 80  61 - 136 % Final    BETA 2 GLYCO 1 IGG 2024 1.2  See comment Final    BETA 2 GLYCO 1 IGA 2024 1.7  See comment Final    BETA 2 GLYCO 1 IGM 2024 <2.4  See comment Final    dRVVT Mix Interp. 2024 42.1 (H)  0.0 - 40.4 sec Final    DRVVT/Confirm Ratio 2024 0.9  0.8 - 1.2 ratio Final           Imagin2024: CTA PE   IMPRESSION:   3 small right lung lobar pulmonary artery nonoccluding emboli  Measured RV/LV ratio is within normal limits at less than 0.9.     Please note:  This report has been generated by a voice recognition software system. Therefore there may be syntax, spelling, and/or grammatical errors. Please call if you have any questions.

## 2024-03-05 ENCOUNTER — HOSPITAL ENCOUNTER (OUTPATIENT)
Dept: CT IMAGING | Facility: HOSPITAL | Age: 74
Discharge: HOME | End: 2024-03-05
Payer: COMMERCIAL

## 2024-03-05 DIAGNOSIS — Z85.42 HISTORY OF ENDOMETRIAL CANCER: ICD-10-CM

## 2024-03-05 PROCEDURE — 74177 CT ABD & PELVIS W/CONTRAST: CPT

## 2024-03-05 RX ADMIN — IOHEXOL 100 ML: 350 INJECTION, SOLUTION INTRAVENOUS at 15:04

## 2024-03-06 ENCOUNTER — TELEPHONE (OUTPATIENT)
Dept: NEPHROLOGY | Facility: CLINIC | Age: 74
End: 2024-03-06

## 2024-03-06 NOTE — TELEPHONE ENCOUNTER
Received a call from patient's sister Marimar want to know if patient has a appointment with Dr. Ray. I schedule patient follow up appointment on 4/23 with Dr. Ray at [facility]. Also, Marimar requested to mail labs.       Labs mailed to patient home address.

## 2024-04-09 LAB
ALBUMIN SERPL-MCNC: 3.6 G/DL (ref 3.6–5.1)
BASOPHILS # BLD AUTO: 19 CELLS/UL (ref 0–200)
BASOPHILS NFR BLD AUTO: 0.6 %
BUN SERPL-MCNC: 21 MG/DL (ref 7–25)
BUN/CREAT SERPL: 18 (CALC) (ref 6–22)
CALCIUM SERPL-MCNC: 8.4 MG/DL (ref 8.6–10.4)
CALCIUM SERPL-MCNC: 8.4 MG/DL (ref 8.6–10.4)
CHLORIDE SERPL-SCNC: 110 MMOL/L (ref 98–110)
CO2 SERPL-SCNC: 25 MMOL/L (ref 20–32)
CREAT SERPL-MCNC: 1.17 MG/DL (ref 0.6–1)
CREAT UR-MCNC: 205 MG/DL (ref 20–275)
EOSINOPHIL # BLD AUTO: 59 CELLS/UL (ref 15–500)
EOSINOPHIL NFR BLD AUTO: 1.9 %
ERYTHROCYTE [DISTWIDTH] IN BLOOD BY AUTOMATED COUNT: 13.3 % (ref 11–15)
GFR/BSA.PRED SERPLBLD CYS-BASED-ARV: 48 ML/MIN/1.73M2
GLUCOSE SERPL-MCNC: 87 MG/DL (ref 65–99)
HCT VFR BLD AUTO: 33.5 % (ref 35–45)
HGB BLD-MCNC: 11.1 G/DL (ref 11.7–15.5)
LYMPHOCYTES # BLD AUTO: 955 CELLS/UL (ref 850–3900)
LYMPHOCYTES NFR BLD AUTO: 30.8 %
MCH RBC QN AUTO: 32.6 PG (ref 27–33)
MCHC RBC AUTO-ENTMCNC: 33.1 G/DL (ref 32–36)
MCV RBC AUTO: 98.5 FL (ref 80–100)
MONOCYTES # BLD AUTO: 319 CELLS/UL (ref 200–950)
MONOCYTES NFR BLD AUTO: 10.3 %
NEUTROPHILS # BLD AUTO: 1748 CELLS/UL (ref 1500–7800)
NEUTROPHILS NFR BLD AUTO: 56.4 %
PHOSPHATE SERPL-MCNC: 3.5 MG/DL (ref 2.1–4.3)
PLATELET # BLD AUTO: 137 THOUSAND/UL (ref 140–400)
PMV BLD REES-ECKER: 11.2 FL (ref 7.5–12.5)
POTASSIUM SERPL-SCNC: 4.2 MMOL/L (ref 3.5–5.3)
PROT UR-MCNC: 21 MG/DL (ref 5–24)
PROT/CREAT UR: 0.1 MG/MG CREAT (ref 0.02–0.18)
PROT/CREAT UR: 102 MG/G CREAT (ref 24–184)
PTH-INTACT SERPL-MCNC: 151 PG/ML (ref 16–77)
RBC # BLD AUTO: 3.4 MILLION/UL (ref 3.8–5.1)
SODIUM SERPL-SCNC: 143 MMOL/L (ref 135–146)
WBC # BLD AUTO: 3.1 THOUSAND/UL (ref 3.8–10.8)

## 2024-04-23 ENCOUNTER — OFFICE VISIT (OUTPATIENT)
Dept: NEPHROLOGY | Facility: CLINIC | Age: 74
End: 2024-04-23
Payer: COMMERCIAL

## 2024-04-23 VITALS
HEART RATE: 76 BPM | BODY MASS INDEX: 46.19 KG/M2 | SYSTOLIC BLOOD PRESSURE: 110 MMHG | WEIGHT: 251 LBS | DIASTOLIC BLOOD PRESSURE: 60 MMHG | OXYGEN SATURATION: 99 % | HEIGHT: 62 IN

## 2024-04-23 DIAGNOSIS — R06.09 DYSPNEA ON EXERTION: ICD-10-CM

## 2024-04-23 DIAGNOSIS — I10 ESSENTIAL HYPERTENSION: ICD-10-CM

## 2024-04-23 DIAGNOSIS — Z85.42 HISTORY OF ENDOMETRIAL CANCER: ICD-10-CM

## 2024-04-23 DIAGNOSIS — N18.31 ANEMIA DUE TO STAGE 3A CHRONIC KIDNEY DISEASE  (HCC): ICD-10-CM

## 2024-04-23 DIAGNOSIS — I26.99 OTHER ACUTE PULMONARY EMBOLISM WITHOUT ACUTE COR PULMONALE (HCC): ICD-10-CM

## 2024-04-23 DIAGNOSIS — E66.01 MORBID OBESITY WITH BMI OF 45.0-49.9, ADULT (HCC): ICD-10-CM

## 2024-04-23 DIAGNOSIS — N18.31 STAGE 3A CHRONIC KIDNEY DISEASE (HCC): Primary | ICD-10-CM

## 2024-04-23 DIAGNOSIS — D63.1 ANEMIA DUE TO STAGE 3A CHRONIC KIDNEY DISEASE  (HCC): ICD-10-CM

## 2024-04-23 DIAGNOSIS — N25.81 SECONDARY HYPERPARATHYROIDISM OF RENAL ORIGIN (HCC): ICD-10-CM

## 2024-04-23 PROCEDURE — 99214 OFFICE O/P EST MOD 30 MIN: CPT | Performed by: INTERNAL MEDICINE

## 2024-04-23 NOTE — PATIENT INSTRUCTIONS
As we discussed in the office visit and after reviewing most recent blood test your kidney function remains fairly stable for the last several years.  Remember to follow low-salt diet less than 2 g of sodium a day.  Remember to keep working on losing weight, recommend to increase physical activity as tolerated.  Remember to avoid NSAIDs (no ibuprofen, Motrin, Advil, Aleve, naproxen).  Okay to take Tylenol or acetaminophen as needed for pain.  I would like you to go for repeat blood and urine test in 6 months, we will contact you with the results, if your kidney function remains stable I would like to see you back in the office in 1 year.  Continue close follow-up with your primary care doctor, gynecologist, hematologist oncologist

## 2024-04-23 NOTE — LETTER
April 23, 2024     Thad Savage MD  [address] [facility] [id number]    Patient: Sg Scherer   YOB: 1950   Date of Visit: 4/23/2024       Dear Dr. Savage:    Thank you for referring Sg Scherer to me for evaluation. Below are my notes for this consultation.    If you have questions, please do not hesitate to call me. I look forward to following your patient along with you.         Sincerely,        Maninder Ray MD        CC: No Recipients    Maninder Ray MD  4/23/2024  1:39 PM  Sign when Signing Visit  OFFICE FOLLOW UP - Nephrology   Sg Scherer 74 y.o. female MRN: [id number]       ASSESSMENT and PLAN:  Sg was seen today for follow-up and chronic kidney disease.    Diagnoses and all orders for this visit:    Stage 3a chronic kidney disease (HCC)  -     Renal function panel; Future  -     Protein / creatinine ratio, urine; Future  -     PTH, intact; Future  -     CBC; Future    Essential hypertension    Secondary hyperparathyroidism of renal origin (HCC)    Anemia due to stage 3a chronic kidney disease     History of endometrial cancer    Morbid obesity with BMI of 45.0-49.9, adult (HCC)    Dyspnea on exertion    Other acute pulmonary embolism without acute cor pulmonale (HCC)        This is a 74-year-old lady who returns to the office for CKD follow-up.  Patient was initially seen on 03/2022, s/p total hysterectomy 03/2022, she was hospitalized few months ago after syncopal episode was found to have PE on 05/2022.  Last time she was seen was on 09/2023, today returns for 6-month follow-up.    1. Chronic kidney disease stage III with previous creatinine around 1.1-1.4 back since 2018 as per Care everywhere.  CKD suspected secondary to long-term history hypertension, obesity, age-related nephron loss.  Recent blood and urine test were reviewed with patient and with her sister, kidney function is stable with a creatinine of 1.17 with minimal proteinuria.  Once  again discussed about importance to follow low-salt diet, avoid NSAIDs.  Plan to follow labs in 6 months and if stable I would like to see her back in the office in 1 year.    2. Hypertension, pressure currently well-controlled, continue current regimen, follow low-salt diet and advised to lose weight.    3. Postmenopausal bleeding with high-grade endometrial cancer, status post bilateral salpingo-oophorectomy, hysterectomy on 03/31/2022.  Patient completed adjuvant chemotherapy with Taxol and carboplatin in 8/2022, completed radiation in fall 2022.  Continue close follow-up with gynecology oncology    4. Anemia multifactorial in the setting of previous chemotherapy with a component of chronic kidney disease.  Her hemoglobin is low but is stable, follow CBC in 6 months    5. Morbid obesity, advised to lose weight noted she gained 4 pounds since last office visit    6. Elevated PTH, secondary hyperparathyroidism, vitamin D level between normal limits, improving, continue Calcitrol 1 tablet 3 times a week and follow labs in 6 months    7. PE, on anticoagulation with Eliquis      Patient Instructions   As we discussed in the office visit and after reviewing most recent blood test your kidney function remains fairly stable for the last several years.  Remember to follow low-salt diet less than 2 g of sodium a day.  Remember to keep working on losing weight, recommend to increase physical activity as tolerated.  Remember to avoid NSAIDs (no ibuprofen, Motrin, Advil, Aleve, naproxen).  Okay to take Tylenol or acetaminophen as needed for pain.  I would like you to go for repeat blood and urine test in 6 months, we will contact you with the results, if your kidney function remains stable I would like to see you back in the office in 1 year.  Continue close follow-up with your primary care doctor, gynecologist, hematologist oncologist        HPI: Sg Scherer is a 74 y.o. female who is here for Follow-up and Chronic  Kidney Disease  .    This is a patient history of chronic kidney disease, hypertension, obesity, stage III endometrial cancer status post hysterectomy on 03/2022, anemia, who returns to the office for six-month follow-up.    Status post total hysterectomy at the end of March 2022, patient was started on adjuvant chemotherapy with Taxol and carboplatin.  Patient was hospitalized on May 2022 after syncopal episode, was found to have a PE, was previously on anticoagulation but it was discontinued.    Patient was 1st time seen on 03/202.  Last time patient was seen was on 09/2023, today returns for follow-up with her sister.    Since last office visit was hospitalized on 1/2024 at [facility] due to worsening shortness of breath, was found to have a small PE as well as volume overload treated with IV diuretics, was started on Eliquis.  Noted patient saw hematology and was advised to continue with anticoagulation indefinitely.    Patient in general he is doing well other than some shortness of breath with moderate exertion.  Denies any chest pain no leg swelling  Denies any abdominal pain, no nausea, vomiting, no diarrhea or constipation.  Denies any urinary problems, no burning sensation or gross hematuria.  Gained 4 pounds since last office visit    The last blood work was done on 4/8/2024, which we have reviewed together.  Serum creatinine 1.17,       ROS: All the systems were reviewed and were negative except as documented on the H&P.        Allergies: Patient has no known allergies.    Medications:   Current Outpatient Medications:   •  acetaminophen (TYLENOL) 650 mg CR tablet, Take 1 tablet (650 mg total) by mouth every 6 (six) hours as needed for mild pain, Disp: 30 tablet, Rfl: 0  •  apixaban (Eliquis) 5 mg, Take 1 tablet (5 mg total) by mouth 2 (two) times a day, Disp: 60 tablet, Rfl: 6  •  Ascorbic Acid (vitamin C) 100 MG tablet, Take by mouth daily Pt unsure of mg, Disp: , Rfl:   •   calcitriol (ROCALTROL) 0.25 mcg capsule, Take 1 capsule (0.25 mcg total) by mouth 3 (three) times a week, Disp: 36 capsule, Rfl: 2  •  cholecalciferol (VITAMIN D3) 1,000 units tablet, Take 1,000 Units by mouth daily Pt unsure how many units, Disp: , Rfl:   •  denosumab (Prolia) 60 mg/mL, Inject 1 mL under the skin every 6 (six) months, Disp: , Rfl:   •  famotidine (PEPCID) 40 MG tablet, Take by mouth Daily, Disp: , Rfl:   •  vitamin B-12 (CYANOCOBALAMIN) 50 MCG tablet, Take by mouth daily Pt unsure of mcg, Disp: , Rfl:   •  furosemide (LASIX) 20 mg tablet, Take 1 tablet (20 mg total) by mouth daily for 10 days (Patient not taking: Reported on 4/23/2024), Disp: 10 tablet, Rfl: 0  •  omeprazole (PriLOSEC) 20 mg delayed release capsule, Take by mouth daily (Patient not taking: Reported on 4/23/2024), Disp: , Rfl:     Past Medical History:   Diagnosis Date   • Arthritis     osteo   • Chronic kidney disease    • Colon polyp    • Diverticula of colon    • Endometrial cancer (HCC)     2022   • Hypertension    • Obesity    • Rhinitis      Past Surgical History:   Procedure Laterality Date   • APPENDECTOMY     • BREAST BIOPSY Left 1977    benign   • BREAST SURGERY Left     biopsy   • CHOLECYSTECTOMY     • COLONOSCOPY     • CYSTOSCOPY N/A 03/31/2022    Procedure: CYSTOSCOPY;  Surgeon: Gene Coburn MD;  Location: [facility];  Service: Gynecology Oncology   • HERNIA REPAIR Right     inguinal   • HYSTERECTOMY     • IR PORT PLACEMENT  05/09/2022   • JOINT REPLACEMENT Bilateral     Knee   • OOPHORECTOMY     • WI COLONOSCOPY FLX DX W/COLLJ SPEC WHEN PFRMD N/A 02/21/2017    Procedure: COLONOSCOPY with polypectomy and hemo clip marjan.;  Surgeon: Ramirez Jones MD;  Location: [facility];  Service: Gastroenterology   • WI LAPS TOTAL HYSTERECT 250 GM/< W/RMVL TUBE/OVARY N/A 03/31/2022    Procedure: ROBOTIC HYSTERECTOMY, BILATERAL SALPINGO-OOPHORECTOMY, STAGING PERITONEAL AND OMENTAL BIOPSIES, BILATERAL PELVIC SENTINEL LYMPH NODE  "BIOPSIES;  Surgeon: Gene Coburn MD;  Location: [facility];  Service: Gynecology Oncology     Family History   Problem Relation Age of Onset   • Heart disease Mother    • Prostate cancer Father 90   • Diabetes Sister    • Hypertension Sister    • Transient ischemic attack Sister    • No Known Problems Sister    • Diabetes Brother    • Heart disease Brother    • Kidney disease Son    • No Known Problems Maternal Grandmother    • No Known Problems Maternal Grandfather    • No Known Problems Paternal Grandmother    • No Known Problems Paternal Grandfather    • No Known Problems Maternal Aunt    • No Known Problems Paternal Aunt    • No Known Problems Paternal Aunt    • No Known Problems Paternal Aunt    • Stomach cancer Other 38   • Thyroid disease Other    • Cancer Other         unsure type   • Breast cancer Neg Hx    • Colon cancer Neg Hx    • Ovarian cancer Neg Hx       reports that she has never smoked. She has never used smokeless tobacco. She reports current alcohol use of about 1.0 standard drink of alcohol per week. She reports that she does not use drugs.      Physical Exam:   Vitals:    04/23/24 1256   BP: 110/60   BP Location: Left arm   Patient Position: Sitting   Cuff Size: Adult   Pulse: 76   SpO2: 99%   Weight: 114 kg (251 lb)   Height: 5' 2\" (1.575 m)     Body mass index is 45.91 kg/m².    General: morbid obese, conscious, cooperative, in not acute distress  Eyes: conjunctivae pink, anicteric sclerae  ENT: lips and mucous membranes moist  Neck: supple, no JVD  Chest: clear breath sounds bilateral, no crackles, ronchus or wheezings  CVS: distinct S1 & S2, normal rate, regular rhythm  Abdomen: obese, non-tender, non-distended, normoactive bowel sounds  Back: no CVA tenderness  Extremities: no significant edema of both legs  Skin: no rash  Neuro: awake, alert, oriented        Lab Results:  Results for orders placed or performed in visit on 04/08/24   PTH, Intact and Calcium   Result Value Ref Range " "   PTH, Intact 151 (H) 16 - 77 pg/mL    Calcium 8.4 (L) 8.6 - 10.4 mg/dL   Renal function panel   Result Value Ref Range    Glucose, Random 87 65 - 99 mg/dL    BUN 21 7 - 25 mg/dL    Creatinine 1.17 (H) 0.60 - 1.00 mg/dL    eGFR 48 (L) > OR = 60 mL/min/1.73m2    SL AMB BUN/CREATININE RATIO 18 6 - 22 (calc)    Sodium 143 135 - 146 mmol/L    Potassium 4.2 3.5 - 5.3 mmol/L    Chloride 110 98 - 110 mmol/L    CO2 25 20 - 32 mmol/L    Calcium 8.4 (L) 8.6 - 10.4 mg/dL    Phosphorus, Serum 3.5 2.1 - 4.3 mg/dL    Albumin 3.6 3.6 - 5.1 g/dL   CBC and differential   Result Value Ref Range    White Blood Cell Count 3.1 (L) 3.8 - 10.8 Thousand/uL    Red Blood Cell Count 3.40 (L) 3.80 - 5.10 Million/uL    Hemoglobin 11.1 (L) 11.7 - 15.5 g/dL    HCT 33.5 (L) 35.0 - 45.0 %    MCV 98.5 80.0 - 100.0 fL    MCH 32.6 27.0 - 33.0 pg    MCHC 33.1 32.0 - 36.0 g/dL    RDW 13.3 11.0 - 15.0 %    Platelet Count 137 (L) 140 - 400 Thousand/uL    SL AMB MPV 11.2 7.5 - 12.5 fL    Neutrophils (Absolute) 1,748 1,500 - 7,800 cells/uL    Lymphocytes (Absolute) 955 850 - 3,900 cells/uL    Monocytes (Absolute) 319 200 - 950 cells/uL    Eosinophils (Absolute) 59 15 - 500 cells/uL    Basophils ABS 19 0 - 200 cells/uL    Neutrophils 56.4 %    Lymphocytes 30.8 %    Monocytes 10.3 %    Eosinophils 1.9 %    Basophils PCT 0.6 %   Protein, Total w/Creat, Random Urine   Result Value Ref Range    Creatinine, Urine 205 20 - 275 mg/dL    Protein/Creat Ratio 102 24 - 184 mg/g creat    Protein/Creat Ratio 0.102 0.024 - 0.184 mg/mg creat    Total Protein, Urine 21 5 - 24 mg/dL           Portions of the record may have been created with voice recognition software. Occasional wrong word or \"sound a like\" substitutions may have occurred due to the inherent limitations of voice recognition software. Read the chart carefully and recognize, using context, where substitutions have occurred.If you have any questions, please contact the dictating provider.  "

## 2024-04-23 NOTE — PROGRESS NOTES
OFFICE FOLLOW UP - Nephrology   Sg Scherer 74 y.o. female MRN: [id number]       ASSESSMENT and PLAN:  Sg was seen today for follow-up and chronic kidney disease.    Diagnoses and all orders for this visit:    Stage 3a chronic kidney disease (HCC)  -     Renal function panel; Future  -     Protein / creatinine ratio, urine; Future  -     PTH, intact; Future  -     CBC; Future    Essential hypertension    Secondary hyperparathyroidism of renal origin (HCC)    Anemia due to stage 3a chronic kidney disease     History of endometrial cancer    Morbid obesity with BMI of 45.0-49.9, adult (HCC)    Dyspnea on exertion    Other acute pulmonary embolism without acute cor pulmonale (HCC)        This is a 74-year-old lady who returns to the office for CKD follow-up.  Patient was initially seen on 03/2022, s/p total hysterectomy 03/2022, she was hospitalized few months ago after syncopal episode was found to have PE on 05/2022.  Last time she was seen was on 09/2023, today returns for 6-month follow-up.    1. Chronic kidney disease stage III with previous creatinine around 1.1-1.4 back since 2018 as per Care everywhere.  CKD suspected secondary to long-term history hypertension, obesity, age-related nephron loss.  Recent blood and urine test were reviewed with patient and with her sister, kidney function is stable with a creatinine of 1.17 with minimal proteinuria.  Once again discussed about importance to follow low-salt diet, avoid NSAIDs.  Plan to follow labs in 6 months and if stable I would like to see her back in the office in 1 year.    2. Hypertension, pressure currently well-controlled, continue current regimen, follow low-salt diet and advised to lose weight.    3. Postmenopausal bleeding with high-grade endometrial cancer, status post bilateral salpingo-oophorectomy, hysterectomy on 03/31/2022.  Patient completed adjuvant chemotherapy with Taxol and carboplatin in 8/2022, completed radiation in fall  2022.  Continue close follow-up with gynecology oncology    4. Anemia multifactorial in the setting of previous chemotherapy with a component of chronic kidney disease.  Her hemoglobin is low but is stable, follow CBC in 6 months    5. Morbid obesity, advised to lose weight noted she gained 4 pounds since last office visit    6. Elevated PTH, secondary hyperparathyroidism, vitamin D level between normal limits, improving, continue Calcitrol 1 tablet 3 times a week and follow labs in 6 months    7. PE, on anticoagulation with Eliquis      Patient Instructions   As we discussed in the office visit and after reviewing most recent blood test your kidney function remains fairly stable for the last several years.  Remember to follow low-salt diet less than 2 g of sodium a day.  Remember to keep working on losing weight, recommend to increase physical activity as tolerated.  Remember to avoid NSAIDs (no ibuprofen, Motrin, Advil, Aleve, naproxen).  Okay to take Tylenol or acetaminophen as needed for pain.  I would like you to go for repeat blood and urine test in 6 months, we will contact you with the results, if your kidney function remains stable I would like to see you back in the office in 1 year.  Continue close follow-up with your primary care doctor, gynecologist, hematologist oncologist        HPI: Sg Scherer is a 74 y.o. female who is here for Follow-up and Chronic Kidney Disease  .    This is a patient history of chronic kidney disease, hypertension, obesity, stage III endometrial cancer status post hysterectomy on 03/2022, anemia, who returns to the office for six-month follow-up.    Status post total hysterectomy at the end of March 2022, patient was started on adjuvant chemotherapy with Taxol and carboplatin.  Patient was hospitalized on May 2022 after syncopal episode, was found to have a PE, was previously on anticoagulation but it was discontinued.    Patient was 1st time seen on 03/202.  Last time  patient was seen was on 09/2023, today returns for follow-up with her sister.    Since last office visit was hospitalized on 1/2024 at [facility] due to worsening shortness of breath, was found to have a small PE as well as volume overload treated with IV diuretics, was started on Eliquis.  Noted patient saw hematology and was advised to continue with anticoagulation indefinitely.    Patient in general he is doing well other than some shortness of breath with moderate exertion.  Denies any chest pain no leg swelling  Denies any abdominal pain, no nausea, vomiting, no diarrhea or constipation.  Denies any urinary problems, no burning sensation or gross hematuria.  Gained 4 pounds since last office visit    The last blood work was done on 4/8/2024, which we have reviewed together.  Serum creatinine 1.17,       ROS: All the systems were reviewed and were negative except as documented on the H&P.        Allergies: Patient has no known allergies.    Medications:   Current Outpatient Medications:     acetaminophen (TYLENOL) 650 mg CR tablet, Take 1 tablet (650 mg total) by mouth every 6 (six) hours as needed for mild pain, Disp: 30 tablet, Rfl: 0    apixaban (Eliquis) 5 mg, Take 1 tablet (5 mg total) by mouth 2 (two) times a day, Disp: 60 tablet, Rfl: 6    Ascorbic Acid (vitamin C) 100 MG tablet, Take by mouth daily Pt unsure of mg, Disp: , Rfl:     calcitriol (ROCALTROL) 0.25 mcg capsule, Take 1 capsule (0.25 mcg total) by mouth 3 (three) times a week, Disp: 36 capsule, Rfl: 2    cholecalciferol (VITAMIN D3) 1,000 units tablet, Take 1,000 Units by mouth daily Pt unsure how many units, Disp: , Rfl:     denosumab (Prolia) 60 mg/mL, Inject 1 mL under the skin every 6 (six) months, Disp: , Rfl:     famotidine (PEPCID) 40 MG tablet, Take by mouth Daily, Disp: , Rfl:     vitamin B-12 (CYANOCOBALAMIN) 50 MCG tablet, Take by mouth daily Pt unsure of mcg, Disp: , Rfl:     furosemide (LASIX) 20 mg tablet,  Take 1 tablet (20 mg total) by mouth daily for 10 days (Patient not taking: Reported on 4/23/2024), Disp: 10 tablet, Rfl: 0    omeprazole (PriLOSEC) 20 mg delayed release capsule, Take by mouth daily (Patient not taking: Reported on 4/23/2024), Disp: , Rfl:     Past Medical History:   Diagnosis Date    Arthritis     osteo    Chronic kidney disease     Colon polyp     Diverticula of colon     Endometrial cancer (HCC)     2022    Hypertension     Obesity     Rhinitis      Past Surgical History:   Procedure Laterality Date    APPENDECTOMY      BREAST BIOPSY Left 1977    benign    BREAST SURGERY Left     biopsy    CHOLECYSTECTOMY      COLONOSCOPY      CYSTOSCOPY N/A 03/31/2022    Procedure: CYSTOSCOPY;  Surgeon: Gene Coburn MD;  Location: [facility];  Service: Gynecology Oncology    HERNIA REPAIR Right     inguinal    HYSTERECTOMY      IR PORT PLACEMENT  05/09/2022    JOINT REPLACEMENT Bilateral     Knee    OOPHORECTOMY      OH COLONOSCOPY FLX DX W/COLLJ SPEC WHEN PFRMD N/A 02/21/2017    Procedure: COLONOSCOPY with polypectomy and hemo clip marjan.;  Surgeon: Ramirez Jones MD;  Location: [facility];  Service: Gastroenterology    OH LAPS TOTAL HYSTERECT 250 GM/< W/RMVL TUBE/OVARY N/A 03/31/2022    Procedure: ROBOTIC HYSTERECTOMY, BILATERAL SALPINGO-OOPHORECTOMY, STAGING PERITONEAL AND OMENTAL BIOPSIES, BILATERAL PELVIC SENTINEL LYMPH NODE BIOPSIES;  Surgeon: Gene Coburn MD;  Location: [facility];  Service: Gynecology Oncology     Family History   Problem Relation Age of Onset    Heart disease Mother     Prostate cancer Father 90    Diabetes Sister     Hypertension Sister     Transient ischemic attack Sister     No Known Problems Sister     Diabetes Brother     Heart disease Brother     Kidney disease Son     No Known Problems Maternal Grandmother     No Known Problems Maternal Grandfather     No Known Problems Paternal Grandmother     No Known Problems Paternal Grandfather     No Known Problems Maternal  "Aunt     No Known Problems Paternal Aunt     No Known Problems Paternal Aunt     No Known Problems Paternal Aunt     Stomach cancer Other 38    Thyroid disease Other     Cancer Other         unsure type    Breast cancer Neg Hx     Colon cancer Neg Hx     Ovarian cancer Neg Hx       reports that she has never smoked. She has never used smokeless tobacco. She reports current alcohol use of about 1.0 standard drink of alcohol per week. She reports that she does not use drugs.      Physical Exam:   Vitals:    04/23/24 1256   BP: 110/60   BP Location: Left arm   Patient Position: Sitting   Cuff Size: Adult   Pulse: 76   SpO2: 99%   Weight: 114 kg (251 lb)   Height: 5' 2\" (1.575 m)     Body mass index is 45.91 kg/m².    General: morbid obese, conscious, cooperative, in not acute distress  Eyes: conjunctivae pink, anicteric sclerae  ENT: lips and mucous membranes moist  Neck: supple, no JVD  Chest: clear breath sounds bilateral, no crackles, ronchus or wheezings  CVS: distinct S1 & S2, normal rate, regular rhythm  Abdomen: obese, non-tender, non-distended, normoactive bowel sounds  Back: no CVA tenderness  Extremities: no significant edema of both legs  Skin: no rash  Neuro: awake, alert, oriented        Lab Results:  Results for orders placed or performed in visit on 04/08/24   PTH, Intact and Calcium   Result Value Ref Range    PTH, Intact 151 (H) 16 - 77 pg/mL    Calcium 8.4 (L) 8.6 - 10.4 mg/dL   Renal function panel   Result Value Ref Range    Glucose, Random 87 65 - 99 mg/dL    BUN 21 7 - 25 mg/dL    Creatinine 1.17 (H) 0.60 - 1.00 mg/dL    eGFR 48 (L) > OR = 60 mL/min/1.73m2    SL AMB BUN/CREATININE RATIO 18 6 - 22 (calc)    Sodium 143 135 - 146 mmol/L    Potassium 4.2 3.5 - 5.3 mmol/L    Chloride 110 98 - 110 mmol/L    CO2 25 20 - 32 mmol/L    Calcium 8.4 (L) 8.6 - 10.4 mg/dL    Phosphorus, Serum 3.5 2.1 - 4.3 mg/dL    Albumin 3.6 3.6 - 5.1 g/dL   CBC and differential   Result Value Ref Range    White Blood Cell " "Count 3.1 (L) 3.8 - 10.8 Thousand/uL    Red Blood Cell Count 3.40 (L) 3.80 - 5.10 Million/uL    Hemoglobin 11.1 (L) 11.7 - 15.5 g/dL    HCT 33.5 (L) 35.0 - 45.0 %    MCV 98.5 80.0 - 100.0 fL    MCH 32.6 27.0 - 33.0 pg    MCHC 33.1 32.0 - 36.0 g/dL    RDW 13.3 11.0 - 15.0 %    Platelet Count 137 (L) 140 - 400 Thousand/uL    SL AMB MPV 11.2 7.5 - 12.5 fL    Neutrophils (Absolute) 1,748 1,500 - 7,800 cells/uL    Lymphocytes (Absolute) 955 850 - 3,900 cells/uL    Monocytes (Absolute) 319 200 - 950 cells/uL    Eosinophils (Absolute) 59 15 - 500 cells/uL    Basophils ABS 19 0 - 200 cells/uL    Neutrophils 56.4 %    Lymphocytes 30.8 %    Monocytes 10.3 %    Eosinophils 1.9 %    Basophils PCT 0.6 %   Protein, Total w/Creat, Random Urine   Result Value Ref Range    Creatinine, Urine 205 20 - 275 mg/dL    Protein/Creat Ratio 102 24 - 184 mg/g creat    Protein/Creat Ratio 0.102 0.024 - 0.184 mg/mg creat    Total Protein, Urine 21 5 - 24 mg/dL           Portions of the record may have been created with voice recognition software. Occasional wrong word or \"sound a like\" substitutions may have occurred due to the inherent limitations of voice recognition software. Read the chart carefully and recognize, using context, where substitutions have occurred.If you have any questions, please contact the dictating provider.  "

## 2024-05-01 NOTE — PLAN OF CARE
Problem: PHYSICAL THERAPY ADULT  Goal: Performs mobility at highest level of function for planned discharge setting  See evaluation for individualized goals  Description: Treatment/Interventions: Functional transfer training, LE strengthening/ROM, Elevations, Therapeutic exercise, Endurance training, Patient/family training, Bed mobility, Gait training, Spoke to nursing, OT, Family          See flowsheet documentation for full assessment, interventions and recommendations  Note: Prognosis: Good  Problem List: Decreased strength, Decreased endurance, Impaired balance, Decreased mobility, Obesity  Assessment: Pt  74 y  o female w/ known endometrial CA s/p recent S/p ROBOTIC HYSTERECTOMY, BILATERAL SALPINGO-OOPHORECTOMY, STAGING PERITONEAL AND OMENTAL BIOPSIES, BILATERAL PELVIC SENTINEL LYMPH NODE BIOPSIES (N/A), CYSTOSCOPY (N/A) on 3/85/1479 without complication  Started chemo on 5/11/22  Pt presented w/ fatigue, ROSAS, L sided chest pain & syncope  Pt admitted for Acute pulmonary embolism with acute cor pulmonale (HCC) w/ Syncope & Wound dehiscence  Pt referred to PT for mobility assessment & D/C planning w/ orders of activity as tolerated  Please see above for information re: home set-up & PLOF as well as objective findings during PT assessment  PTA, pt reports being fairly I w/o AD but admits to feeling fatigued easily  On eval, pt functioning below baseline hence will continue skilled PT to improve function & safety  Pt require S for most functional mobility + cues for techniques & safety  Gait deviations as above but no gross LOB noted  Require seated rests in between amb trials 2* to fatigue  The patient's AM-PAC Basic Mobility Inpatient Short Form Raw Score is 18  A Raw score of greater than 16 suggests the patient may benefit from discharge to home  Please also refer to the recommendation of the Physical Therapist for safe discharge planning   From PT standpoint, will anticipate good progress in PT for safe D/C to home  Will recommend HHPT & family support at D/C  No SOB & dizziness reported t/o session  Nsg staff most recent vital signs as follows: /61   Pulse 70   Temp (!) 97 4 °F (36 3 °C)   Resp 18   Ht 5' 2" (1 575 m)   Wt 122 kg (268 lb 1 3 oz)   LMP  (LMP Unknown)   SpO2 97%   BMI 49 03 kg/m²   At end of session, pt OOB in chair in stable condition, call bell & phone in reach, all lines intact  Fall precautions reinforced w/ good understanding  CM to follow  Nsg staff to continue to mobilized pt (OOB in chair for all meals & ambulate in room/unit) as tolerated to prevent further decline in function  Nsg notified  Co-eval was necessary to complete this PT eval for the pt's best interest given pt's medical acuity & complexity  PT Discharge Recommendation: Home with home health rehabilitation          See flowsheet documentation for full assessment  by other medical complications  [] Other:     Return to Play: NA    Prognosis for POC: [x] Good [] Fair  [] Poor    Patient requires continued skilled intervention: [x] Yes  [] No      GOALS     Patient stated goal: decrease pain to allow for social activities/hobbies; be able to get off floor if he falls  Status:             [x] Progressing: [] Met: [] Not Met: [] Adjusted     Therapist goals for Patient:   Short Term Goals: To be achieved in: 2 weeks  Independent in HEP and progression per patient tolerance, in order to progress toward full function and prevent re-injury.               Status: [x] Progressing: [] Met: [] Not Met: [] Adjusted  Patient will have a decrease in pain to 2/10 to help  facilitate improvement in movement, function, and ADLs as indicated by functional deficits.              Status: [x] Progressing: [] Met: [] Not Met: [] Adjusted     Long Term Goals: To be achieved in: 8-10 weeks  Disability index score of 40% or less for the [place] Back Pain Disability Scale to assist with return top prior level of function.                  Status: [x] Progressing: [] Met: [] Not Met: [] Adjusted  2. Pt to improve strength to 4+/5 or better of proximal hip and UE musculature to allow for proper muscle and joint use in functional mobility, ADLs and prior level of function   Status: [x] Progressing: [] Met: [] Not Met: [] Adjusted  3. New goal: Patient will be able to lift a 15# crate from ground to waist height 5x to demonstrate improved functional strength.                               Status: [] Progressing: [x] Met: [] Not Met: [] Adjusted  4. New goal: Patient will improve 5xSTS to 15 seconds or less to demonstrate decreased fall risk.    Status: [x] Progressing: [] Met: [] Not Met: [] Adjusted  New goal: patient will perform a floor transfer with independently and minimal cues in 5 minutes or less in order recover from a potential fall at home.

## 2024-05-21 ENCOUNTER — OFFICE VISIT (OUTPATIENT)
Dept: GYNECOLOGIC ONCOLOGY | Facility: CLINIC | Age: 74
End: 2024-05-21
Payer: COMMERCIAL

## 2024-05-21 VITALS
BODY MASS INDEX: 46.33 KG/M2 | HEART RATE: 106 BPM | WEIGHT: 251.8 LBS | SYSTOLIC BLOOD PRESSURE: 130 MMHG | OXYGEN SATURATION: 97 % | HEIGHT: 62 IN | TEMPERATURE: 97.7 F | RESPIRATION RATE: 18 BRPM | DIASTOLIC BLOOD PRESSURE: 82 MMHG

## 2024-05-21 DIAGNOSIS — Z08 ENCOUNTER FOR FOLLOW-UP SURVEILLANCE OF ENDOMETRIAL CANCER: Primary | ICD-10-CM

## 2024-05-21 DIAGNOSIS — Z85.42 ENCOUNTER FOR FOLLOW-UP SURVEILLANCE OF ENDOMETRIAL CANCER: Primary | ICD-10-CM

## 2024-05-21 DIAGNOSIS — Z85.42 HISTORY OF ENDOMETRIAL CANCER: ICD-10-CM

## 2024-05-21 PROCEDURE — 99213 OFFICE O/P EST LOW 20 MIN: CPT | Performed by: NURSE PRACTITIONER

## 2024-05-21 NOTE — ASSESSMENT & PLAN NOTE
74-year-old with a history of stage IIIC1 endometrial cancer. She completed chemotherapy in August 2022 and radiation in November 2022. She is taking Eliquis for diagnosis of PE. Patient is feeling well and has no new concerns. Her  was low at the time of diagnosis and is not a good marker. Her pelvic exam is unremarkable. Her PS is 1.    Patient will return to the office in 3 months for her next surveillance visit. We plan to see her Q3 months through November 2024, then will lengthen interval to Q6 months. She will call the office in the interim with any new concerns or issues.

## 2024-05-21 NOTE — PROGRESS NOTES
Assessment/Plan:    Problem List Items Addressed This Visit          Oncology    Encounter for follow-up surveillance of endometrial cancer - Primary     74-year-old with a history of stage IIIC1 endometrial cancer. She completed chemotherapy in August 2022 and radiation in November 2022. She is taking Eliquis for diagnosis of PE. Patient is feeling well and has no new concerns. Her  was low at the time of diagnosis and is not a good marker. Her pelvic exam is unremarkable. Her PS is 1.    Patient will return to the office in 3 months for her next surveillance visit. We plan to see her Q3 months through November 2024, then will lengthen interval to Q6 months. She will call the office in the interim with any new concerns or issues.         History of endometrial cancer           CHIEF COMPLAINT: Endometrial cancer surveillance      Subjective:     Problem:  Cancer Staging   Encounter for follow-up surveillance of endometrial cancer  Staging form: Corpus Uteri - Carcinoma, AJCC 8th Edition  - Pathologic stage from 3/31/2022: FIGO Stage IIIC1 - Signed by Gene Coburn MD on 4/19/2022      Previous therapy:  Oncology History   Encounter for follow-up surveillance of endometrial cancer   3/2/2022 Initial Diagnosis    Endometrial cancer (HCC)     3/2/2022 Biopsy    Final Diagnosis   A. Endometrium, EMB:  - High-grade endometrial carcinoma, favor clear cell carcinoma.  See comment.        3/31/2022 -  Cancer Staged    Staging form: Corpus Uteri - Carcinoma, AJCC 8th Edition  - Pathologic stage from 3/31/2022: FIGO Stage IIIC1 - Signed by Gene Coburn MD on 4/19/2022  Histopathologic type: Clear cell adenocarcinoma, NOS  Stage prefix: Initial diagnosis  Histologic grade (G): G3  Histologic grading system: 3 grade system  Residual tumor (R): R0 - None  Pelvic fátima status: Positive  Number of pelvic nodes positive from dissection: 1  Number of pelvic nodes examined during dissection: 2  Lymph node metastasis:  Present  Omentectomy performed: Yes  Morcellation performed: No       3/31/2022 Surgery    Robotic hysterectomy, bilateral salpingo oophorectomy, bilateral pelvic sentinel lymph node biopsies, pelvic peritoneal/omental biopsies.  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  Negative cervix.  Negative parametria.  1/2 sentinel pelvic lymph nodes positive for malignancy.  Negative staging biopsies.  Stage IIIC1.  No evidence of DNA mismatch repair protein loss.     5/11/2022 - 8/25/2022 Chemotherapy    palonosetron (ALOXI), 0.25 mg, Intravenous, Once, 6 of 6 cycles  Administration: 0.25 mg (5/11/2022), 0.25 mg (6/1/2022), 0.25 mg (6/22/2022), 0.25 mg (7/13/2022), 0.25 mg (8/3/2022), 0.25 mg (8/24/2022)  Pegfilgrastim-bmez (ZIEXTENZO), 6 mg, Subcutaneous, Once, 3 of 3 cycles  Administration: 6 mg (7/14/2022), 6 mg (8/4/2022), 6 mg (8/25/2022)  fosaprepitant (EMEND) IVPB, 150 mg, Intravenous, Once, 6 of 6 cycles  Administration: 150 mg (5/11/2022), 150 mg (6/1/2022), 150 mg (6/22/2022), 150 mg (7/13/2022), 150 mg (8/3/2022), 150 mg (8/24/2022)  CARBOplatin (PARAPLATIN) IVPB (GO AUC DOSING), 352.5 mg, Intravenous, Once, 6 of 6 cycles  Administration: 352.5 mg (5/11/2022), 377.5 mg (6/1/2022), 340.5 mg (6/22/2022), 353 mg (7/13/2022), 266.4 mg (8/3/2022), 271.6 mg (8/24/2022)  PACLItaxel (TAXOL) chemo IVPB, 135 mg/m2 = 290.4 mg (77.1 % of original dose 175 mg/m2), Intravenous, Once, 6 of 6 cycles  Dose modification: 135 mg/m2 (original dose 175 mg/m2, Cycle 1, Reason: Other (Must fill in a comment), Comment: prescribed starting dose)  Administration: 290.4 mg (5/11/2022), 290.4 mg (6/1/2022), 289.2 mg (6/22/2022), 292.8 mg (7/13/2022), 289.2 mg (8/3/2022), 300 mg (8/24/2022)     10/4/2022 - 11/21/2022 Radiation    Plan ID Energy Fractions Dose per Fraction (cGy) Dose Correction (cGy) Total Dose Delivered (cGy) Elapsed Days   Vag Cylinder  2 / 2 600 0 1,200 5   Pelvis 6 MV 25 180  4500 34               Patient ID: Sg Scherer is a 74 y.o. female    Patient presents for evaluation without interval concerns. She has been afebrile, and without nausea or vomiting. She has a normal appetite. She denies bowel or bladder dysfunction, and has no pain in the abdomen or pelvis. She is without vaginal bleeding or discharge. She is ambulatory and independent with all ADL's. She does have assistance from her sister for certain activities, such as driving.        Review of Systems   Constitutional:  Negative for chills, fatigue, fever and unexpected weight change.   HENT:  Negative for nosebleeds.    Eyes: Negative.    Respiratory:  Negative for cough, chest tightness, shortness of breath and wheezing.    Cardiovascular:  Negative for chest pain, palpitations and leg swelling.   Gastrointestinal:  Negative for abdominal distention, abdominal pain, anal bleeding, blood in stool, constipation, diarrhea, nausea, rectal pain and vomiting.   Endocrine: Negative.    Genitourinary:  Negative for difficulty urinating, dysuria, frequency, hematuria, pelvic pain, urgency, vaginal bleeding, vaginal discharge and vaginal pain.   Musculoskeletal:  Negative for arthralgias and joint swelling.   Skin:  Negative for color change, pallor and rash.   Neurological:  Negative for dizziness, weakness, light-headedness, numbness and headaches.   Hematological: Negative.    Psychiatric/Behavioral: Negative.         Current Outpatient Medications   Medication Sig Dispense Refill    acetaminophen (TYLENOL) 650 mg CR tablet Take 1 tablet (650 mg total) by mouth every 6 (six) hours as needed for mild pain 30 tablet 0    apixaban (Eliquis) 5 mg Take 1 tablet (5 mg total) by mouth 2 (two) times a day 60 tablet 6    Ascorbic Acid (vitamin C) 100 MG tablet Take by mouth daily Pt unsure of mg      calcitriol (ROCALTROL) 0.25 mcg capsule Take 1 capsule (0.25 mcg total) by mouth 3 (three) times a week 36 capsule 2    cholecalciferol (VITAMIN  D3) 1,000 units tablet Take 1,000 Units by mouth daily Pt unsure how many units      denosumab (Prolia) 60 mg/mL Inject 1 mL under the skin every 6 (six) months      famotidine (PEPCID) 40 MG tablet Take by mouth Daily      vitamin B-12 (CYANOCOBALAMIN) 50 MCG tablet Take by mouth daily Pt unsure of mcg       No current facility-administered medications for this visit.       No Known Allergies    Past Medical History:   Diagnosis Date    Arthritis     osteo    Chronic kidney disease     Colon polyp     Diverticula of colon     Endometrial cancer (HCC)         Hypertension     Obesity     Rhinitis        Past Surgical History:   Procedure Laterality Date    APPENDECTOMY      BREAST BIOPSY Left 1977    benign    BREAST SURGERY Left     biopsy    CHOLECYSTECTOMY      COLONOSCOPY      CYSTOSCOPY N/A 2022    Procedure: CYSTOSCOPY;  Surgeon: Gene Coburn MD;  Location: [facility];  Service: Gynecology Oncology    HERNIA REPAIR Right     inguinal    HYSTERECTOMY      IR PORT PLACEMENT  2022    JOINT REPLACEMENT Bilateral     Knee    OOPHORECTOMY      ND COLONOSCOPY FLX DX W/COLLJ SPEC WHEN PFRMD N/A 2017    Procedure: COLONOSCOPY with polypectomy and hemo clip marjan.;  Surgeon: Ramirez Jones MD;  Location: [facility];  Service: Gastroenterology    ND LAPS TOTAL HYSTERECT 250 GM/< W/RMVL TUBE/OVARY N/A 2022    Procedure: ROBOTIC HYSTERECTOMY, BILATERAL SALPINGO-OOPHORECTOMY, STAGING PERITONEAL AND OMENTAL BIOPSIES, BILATERAL PELVIC SENTINEL LYMPH NODE BIOPSIES;  Surgeon: Gene Coburn MD;  Location: [facility];  Service: Gynecology Oncology       OB History          1    Para   1    Term   1            AB        Living   1         SAB        IAB        Ectopic        Multiple        Live Births   1                 Family History   Problem Relation Age of Onset    Heart disease Mother     Prostate cancer Father 90    Diabetes Sister     Hypertension Sister     Transient  "ischemic attack Sister     No Known Problems Sister     Diabetes Brother     Heart disease Brother     Kidney disease Son     No Known Problems Maternal Grandmother     No Known Problems Maternal Grandfather     No Known Problems Paternal Grandmother     No Known Problems Paternal Grandfather     No Known Problems Maternal Aunt     No Known Problems Paternal Aunt     No Known Problems Paternal Aunt     No Known Problems Paternal Aunt     Stomach cancer Other 38    Thyroid disease Other     Cancer Other         unsure type    Breast cancer Neg Hx     Colon cancer Neg Hx     Ovarian cancer Neg Hx        The following portions of the patient's history were reviewed and updated as appropriate: allergies, current medications, past family history, past medical history, past social history, past surgical history, and problem list.      Objective:    Blood pressure 130/82, pulse (!) 106, temperature 97.7 °F (36.5 °C), temperature source Temporal, resp. rate 18, height 5' 2\" (1.575 m), weight 114 kg (251 lb 12.8 oz), SpO2 97%, not currently breastfeeding.  Body mass index is 46.05 kg/m².    Physical Exam  Vitals reviewed. Exam conducted with a chaperone present.   Constitutional:       General: She is not in acute distress.     Appearance: Normal appearance. She is obese. She is not ill-appearing.   HENT:      Head: Normocephalic and atraumatic.      Mouth/Throat:      Mouth: Mucous membranes are moist.   Eyes:      General:         Right eye: No discharge.         Left eye: No discharge.      Conjunctiva/sclera: Conjunctivae normal.   Pulmonary:      Effort: Pulmonary effort is normal.   Abdominal:      Palpations: Abdomen is soft. There is no mass.      Tenderness: There is no abdominal tenderness.      Hernia: No hernia is present.   Genitourinary:     Comments: The external female genitalia is normal. The bartholin's, uretheral and skenes glands are normal. The urethral meatus is normal (midline with no lesions). Anus " without fissure or lesion. Speculum exam reveals a grossly normal vagina. No masses, lesions,discharge or bleeding. No significant cystocele or rectocele noted. Bimanual exam notes a surgical absent cervix, uterus and adnexal structures. No masses or fullness. Bladder is without fullness, mass or tenderness.  Musculoskeletal:      Right lower leg: No edema.      Left lower leg: No edema.   Skin:     General: Skin is warm and dry.      Coloration: Skin is not jaundiced.      Findings: No rash.   Neurological:      General: No focal deficit present.      Mental Status: She is alert and oriented to person, place, and time.      Cranial Nerves: No cranial nerve deficit.      Sensory: No sensory deficit.      Motor: No weakness.      Gait: Gait normal.   Psychiatric:         Mood and Affect: Mood normal.         Behavior: Behavior normal.         Thought Content: Thought content normal.         Judgment: Judgment normal.           Lab Results   Component Value Date     5.1 03/10/2022     Lab Results   Component Value Date    WBC 3.1 (L) 04/08/2024    HGB 11.1 (L) 04/08/2024    HCT 33.5 (L) 04/08/2024    MCV 98.5 04/08/2024     (L) 04/08/2024     Lab Results   Component Value Date    K 4.2 04/08/2024     04/08/2024    CO2 25 04/08/2024    BUN 21 04/08/2024    CREATININE 1.17 (H) 04/08/2024    GLUF 109 (H) 05/16/2022    CALCIUM 8.4 (L) 04/08/2024    CALCIUM 8.4 (L) 04/08/2024    CORRECTEDCA 9.0 01/04/2024    AST 13 01/04/2024    ALT 10 01/04/2024    ALKPHOS 60 01/04/2024    EGFR 48 (L) 04/08/2024        Trend:  Lab Results   Component Value Date     5.1 03/10/2022

## 2024-05-30 ENCOUNTER — TELEPHONE (OUTPATIENT)
Dept: OBGYN CLINIC | Facility: CLINIC | Age: 74
End: 2024-05-30

## 2024-05-30 NOTE — TELEPHONE ENCOUNTER
LVM to make patient aware she is not due for her annual exam until after 7/15/2024. There is no need to come in today for her yearly as it will not be covered under her insurance because it is too soon. If she is having other concerns we can definitely see her. Please call the office to reschedule.            ----- Message from RUBEN Kurtz sent at 5/29/2024  5:35 PM EDT -----  Please call pt she is scheduled for annual today. She is not due until 7/15/24.     Please schedule 1 year from last annual exam     Thank you

## 2024-08-13 ENCOUNTER — TELEPHONE (OUTPATIENT)
Dept: SURGICAL ONCOLOGY | Facility: CLINIC | Age: 74
End: 2024-08-13

## 2024-08-13 NOTE — TELEPHONE ENCOUNTER
Spoke with patient's sister regarding her appointment today with Ninoska Sal.  Patient's sister stated the patient completely forgot about the appointment.  She will call the office to reschedule her missed appt.

## 2024-09-03 ENCOUNTER — ANNUAL EXAM (OUTPATIENT)
Dept: OBGYN CLINIC | Facility: MEDICAL CENTER | Age: 74
End: 2024-09-03
Payer: COMMERCIAL

## 2024-09-03 VITALS
WEIGHT: 236.8 LBS | DIASTOLIC BLOOD PRESSURE: 72 MMHG | HEIGHT: 64 IN | BODY MASS INDEX: 40.43 KG/M2 | SYSTOLIC BLOOD PRESSURE: 128 MMHG

## 2024-09-03 DIAGNOSIS — Z01.419 WELL WOMAN EXAM WITH ROUTINE GYNECOLOGICAL EXAM: Primary | ICD-10-CM

## 2024-09-03 DIAGNOSIS — Z12.31 BREAST CANCER SCREENING BY MAMMOGRAM: ICD-10-CM

## 2024-09-03 PROCEDURE — G0101 CA SCREEN;PELVIC/BREAST EXAM: HCPCS | Performed by: NURSE PRACTITIONER

## 2024-09-03 RX ORDER — SEMAGLUTIDE 0.5 MG/.5ML
INJECTION, SOLUTION SUBCUTANEOUS
COMMUNITY
Start: 2024-08-09

## 2024-09-03 NOTE — PROGRESS NOTES
"Chelle Scherer is a 74 y.o. female who presents for annual exam.  Last Pap smear s/p hysterectomy 3/31/22 for endometrial cancer.  Last mammogram 23 BI-RADS 2.  CRC screening 3/10/20 colonoscopy; due for repeat in 5 years. Dexa scan 23 osteoporosis.  The patient has no complaints today. The patient is not currently sexually active.  The patient is not taking hormone replacement therapy. Patient denies post-menopausal vaginal bleeding.. The patient wears seatbelts: yes. The patient participates in regular exercise: no. The patient reports that there is not domestic violence in her life.     Menstrual History:  OB History          1    Para   1    Term   1            AB        Living   1         SAB        IAB        Ectopic        Multiple        Live Births   1                Menarche age:  10  No LMP recorded (lmp unknown). Patient has had a hysterectomy.       The following portions of the patient's history were reviewed and updated as appropriate: allergies, current medications, past family history, past medical history, past social history, past surgical history, and problem list.    Review of Systems  Pertinent items are noted in HPI.     Objective      /72   Ht 5' 4\" (1.626 m)   Wt 107 kg (236 lb 12.8 oz)   LMP  (LMP Unknown)   BMI 40.65 kg/m²     General:   alert and oriented, in no acute distress and morbidly obese   Heart: regular rate and rhythm, S1, S2 normal, no murmur, click, rub or gallop   Lungs: clear to auscultation bilaterally   Abdomen: soft, non-tender, without masses or organomegaly   Vulva: normal, Bartholin's, Urethra, [place]'s normal   Vagina: normal mucosa, normal discharge, no palpable nodules, vaginal cuff intact   Cervix: surgically absent   Uterus: surgically absent   Adnexa: surgically absent   Breast:  breasts appear normal, no suspicious masses, no skin or nipple changes or axillary nodes.          Assessment/Plan     Diagnoses and " all orders for this visit:    Well woman exam with routine gynecological exam    Breast cancer screening by mammogram  -     Mammo screening bilateral w 3d & cad; Future    Other orders  -     Wegovy 0.5 MG/0.5ML; INJECT (0.5MG)  BY SUBCUTANEOUS ROUTE  EVERY WEEK ON THE SAME DAY OF EACH WEEK FOR E66.01      Encouraged healthy diet, exercise and lifestyle  Encouraged follow-up with PCP and specialists as needed  Encouraged supplementation with calcium, vitamin-D and strength training exercises for good bone health  Will call/[employer] Project[employer] message with results  VBI-   BMI Counseling: Body mass index is 40.65 kg/m². The BMI is above normal. Nutrition recommendations include 3-5 servings of fruits/vegetables daily. Exercise recommendations include exercising 3-5 times per week.

## 2024-10-08 ENCOUNTER — HOSPITAL ENCOUNTER (OUTPATIENT)
Dept: RADIOLOGY | Facility: HOSPITAL | Age: 74
Discharge: HOME | End: 2024-10-08
Payer: COMMERCIAL

## 2024-10-08 DIAGNOSIS — R06.02 SHORTNESS OF BREATH: ICD-10-CM

## 2024-10-08 PROCEDURE — 71046 X-RAY EXAM CHEST 2 VIEWS: CPT

## 2024-10-09 ENCOUNTER — HOSPITAL ENCOUNTER (OUTPATIENT)
Dept: PULMONOLOGY | Facility: HOSPITAL | Age: 74
Discharge: HOME | End: 2024-10-09
Payer: COMMERCIAL

## 2024-10-09 DIAGNOSIS — R06.02 SHORTNESS OF BREATH: ICD-10-CM

## 2024-10-09 LAB
BASE EXCESS BLDA CALC-SCNC: 1 MMOL/L (ref -2–3)
CA-I BLD-SCNC: 1.1 MMOL/L (ref 1.12–1.32)
GLUCOSE SERPL-MCNC: 108 MG/DL (ref 65–140)
HCO3 BLDA-SCNC: 24.9 MMOL/L (ref 22–28)
HCT VFR BLD CALC: 36 % (ref 34.8–46.1)
HGB BLDA-MCNC: 12.2 G/DL (ref 11.5–15.4)
PCO2 BLD: 26 MMOL/L (ref 21–32)
PCO2 BLD: 36.1 MM HG (ref 36–44)
PH BLD: 7.45 [PH] (ref 7.35–7.45)
PO2 BLD: 77 MM HG (ref 75–129)
POTASSIUM BLD-SCNC: 3.6 MMOL/L (ref 3.5–5.3)
SAO2 % BLD FROM PO2: 96 % (ref 60–85)
SODIUM BLD-SCNC: 136 MMOL/L (ref 136–145)
SPECIMEN SOURCE: ABNORMAL

## 2024-10-09 PROCEDURE — 84132 ASSAY OF SERUM POTASSIUM: CPT

## 2024-10-09 PROCEDURE — 82947 ASSAY GLUCOSE BLOOD QUANT: CPT

## 2024-10-09 PROCEDURE — 84295 ASSAY OF SERUM SODIUM: CPT

## 2024-10-09 PROCEDURE — 82803 BLOOD GASES ANY COMBINATION: CPT

## 2024-10-09 PROCEDURE — 82330 ASSAY OF CALCIUM: CPT

## 2024-10-09 PROCEDURE — 85014 HEMATOCRIT: CPT

## 2024-10-09 PROCEDURE — 36600 WITHDRAWAL OF ARTERIAL BLOOD: CPT

## 2024-10-14 ENCOUNTER — APPOINTMENT (EMERGENCY)
Dept: RADIOLOGY | Facility: HOSPITAL | Age: 74
DRG: 683 | End: 2024-10-14
Payer: COMMERCIAL

## 2024-10-14 ENCOUNTER — HOSPITAL ENCOUNTER (INPATIENT)
Facility: HOSPITAL | Age: 74
LOS: 9 days | Discharge: HOME | DRG: 683 | End: 2024-10-23
Attending: EMERGENCY MEDICINE | Admitting: HOSPITALIST
Payer: COMMERCIAL

## 2024-10-14 DIAGNOSIS — D46.Z MDS (MYELODYSPLASTIC SYNDROME), LOW GRADE (HCC): ICD-10-CM

## 2024-10-14 DIAGNOSIS — I95.1 ORTHOSTATIC HYPOTENSION: ICD-10-CM

## 2024-10-14 DIAGNOSIS — D61.818 PANCYTOPENIA (HCC): ICD-10-CM

## 2024-10-14 DIAGNOSIS — R42 DIZZINESS: ICD-10-CM

## 2024-10-14 DIAGNOSIS — N17.9 AKI (ACUTE KIDNEY INJURY) (HCC): ICD-10-CM

## 2024-10-14 DIAGNOSIS — I44.7 LBBB (LEFT BUNDLE BRANCH BLOCK): Primary | ICD-10-CM

## 2024-10-14 DIAGNOSIS — R06.00 DYSPNEA: ICD-10-CM

## 2024-10-14 DIAGNOSIS — Z86.711 HISTORY OF PULMONARY EMBOLISM: ICD-10-CM

## 2024-10-14 LAB
2HR DELTA HS TROPONIN: 1 NG/L
4HR DELTA HS TROPONIN: 1 NG/L
ALBUMIN SERPL BCG-MCNC: 3.9 G/DL (ref 3.5–5)
ALP SERPL-CCNC: 40 U/L (ref 34–104)
ALT SERPL W P-5'-P-CCNC: 25 U/L (ref 7–52)
ANION GAP SERPL CALCULATED.3IONS-SCNC: 11 MMOL/L (ref 4–13)
AST SERPL W P-5'-P-CCNC: 20 U/L (ref 13–39)
ATRIAL RATE: 78 BPM
ATRIAL RATE: 79 BPM
BASOPHILS # BLD AUTO: 0.02 THOUSANDS/ΜL (ref 0–0.1)
BASOPHILS NFR BLD AUTO: 1 % (ref 0–1)
BILIRUB SERPL-MCNC: 0.64 MG/DL (ref 0.2–1)
BILIRUB UR QL STRIP: NEGATIVE
BUN SERPL-MCNC: 34 MG/DL (ref 5–25)
CALCIUM SERPL-MCNC: 9.7 MG/DL (ref 8.4–10.2)
CARDIAC TROPONIN I PNL SERPL HS: 6 NG/L
CARDIAC TROPONIN I PNL SERPL HS: 7 NG/L
CARDIAC TROPONIN I PNL SERPL HS: 7 NG/L
CHLORIDE SERPL-SCNC: 100 MMOL/L (ref 96–108)
CLARITY UR: CLEAR
CO2 SERPL-SCNC: 31 MMOL/L (ref 21–32)
COLOR UR: NORMAL
CREAT SERPL-MCNC: 1.66 MG/DL (ref 0.6–1.3)
EOSINOPHIL # BLD AUTO: 0.01 THOUSAND/ΜL (ref 0–0.61)
EOSINOPHIL NFR BLD AUTO: 0 % (ref 0–6)
ERYTHROCYTE [DISTWIDTH] IN BLOOD BY AUTOMATED COUNT: 13.9 % (ref 11.6–15.1)
GFR SERPL CREATININE-BSD FRML MDRD: 29 ML/MIN/1.73SQ M
GLUCOSE SERPL-MCNC: 115 MG/DL (ref 65–140)
GLUCOSE SERPL-MCNC: 118 MG/DL (ref 65–140)
GLUCOSE UR STRIP-MCNC: NEGATIVE MG/DL
HCT VFR BLD AUTO: 37.8 % (ref 34.8–46.1)
HGB BLD-MCNC: 12.7 G/DL (ref 11.5–15.4)
HGB UR QL STRIP.AUTO: NEGATIVE
IMM GRANULOCYTES # BLD AUTO: 0.03 THOUSAND/UL (ref 0–0.2)
IMM GRANULOCYTES NFR BLD AUTO: 1 % (ref 0–2)
KETONES UR STRIP-MCNC: NEGATIVE MG/DL
LEUKOCYTE ESTERASE UR QL STRIP: NEGATIVE
LYMPHOCYTES # BLD AUTO: 0.91 THOUSANDS/ΜL (ref 0.6–4.47)
LYMPHOCYTES NFR BLD AUTO: 21 % (ref 14–44)
MCH RBC QN AUTO: 34.7 PG (ref 26.8–34.3)
MCHC RBC AUTO-ENTMCNC: 33.6 G/DL (ref 31.4–37.4)
MCV RBC AUTO: 103 FL (ref 82–98)
MONOCYTES # BLD AUTO: 0.36 THOUSAND/ΜL (ref 0.17–1.22)
MONOCYTES NFR BLD AUTO: 8 % (ref 4–12)
NEUTROPHILS # BLD AUTO: 2.99 THOUSANDS/ΜL (ref 1.85–7.62)
NEUTS SEG NFR BLD AUTO: 69 % (ref 43–75)
NITRITE UR QL STRIP: NEGATIVE
NRBC BLD AUTO-RTO: 0 /100 WBCS
P AXIS: 192 DEGREES
P AXIS: 81 DEGREES
PH UR STRIP.AUTO: 5 [PH]
PLATELET # BLD AUTO: 149 THOUSANDS/UL (ref 149–390)
PMV BLD AUTO: 10.3 FL (ref 8.9–12.7)
POTASSIUM SERPL-SCNC: 3.4 MMOL/L (ref 3.5–5.3)
PR INTERVAL: 96 MS
PROT SERPL-MCNC: 7.4 G/DL (ref 6.4–8.4)
PROT UR STRIP-MCNC: NEGATIVE MG/DL
QRS AXIS: 33 DEGREES
QRS AXIS: 34 DEGREES
QRSD INTERVAL: 78 MS
QRSD INTERVAL: 80 MS
QT INTERVAL: 368 MS
QT INTERVAL: 378 MS
QTC INTERVAL: 421 MS
QTC INTERVAL: 433 MS
RBC # BLD AUTO: 3.66 MILLION/UL (ref 3.81–5.12)
SODIUM SERPL-SCNC: 142 MMOL/L (ref 135–147)
SP GR UR STRIP.AUTO: 1.01 (ref 1–1.03)
T WAVE AXIS: 28 DEGREES
T WAVE AXIS: 45 DEGREES
TSH SERPL DL<=0.05 MIU/L-ACNC: 1.31 UIU/ML (ref 0.45–4.5)
TSH SERPL DL<=0.05 MIU/L-ACNC: 1.92 UIU/ML (ref 0.45–4.5)
UROBILINOGEN UR STRIP-ACNC: <2 MG/DL
VENTRICULAR RATE: 79 BPM
VENTRICULAR RATE: 79 BPM
WBC # BLD AUTO: 4.32 THOUSAND/UL (ref 4.31–10.16)

## 2024-10-14 PROCEDURE — 36415 COLL VENOUS BLD VENIPUNCTURE: CPT

## 2024-10-14 PROCEDURE — 84443 ASSAY THYROID STIM HORMONE: CPT | Performed by: HOSPITALIST

## 2024-10-14 PROCEDURE — 99223 1ST HOSP IP/OBS HIGH 75: CPT | Performed by: HOSPITALIST

## 2024-10-14 PROCEDURE — 82948 REAGENT STRIP/BLOOD GLUCOSE: CPT

## 2024-10-14 PROCEDURE — 71046 X-RAY EXAM CHEST 2 VIEWS: CPT

## 2024-10-14 PROCEDURE — 80053 COMPREHEN METABOLIC PANEL: CPT

## 2024-10-14 PROCEDURE — 93005 ELECTROCARDIOGRAM TRACING: CPT

## 2024-10-14 PROCEDURE — 81003 URINALYSIS AUTO W/O SCOPE: CPT

## 2024-10-14 PROCEDURE — 84443 ASSAY THYROID STIM HORMONE: CPT

## 2024-10-14 PROCEDURE — 84484 ASSAY OF TROPONIN QUANT: CPT | Performed by: HOSPITALIST

## 2024-10-14 PROCEDURE — 93010 ELECTROCARDIOGRAM REPORT: CPT | Performed by: STUDENT IN AN ORGANIZED HEALTH CARE EDUCATION/TRAINING PROGRAM

## 2024-10-14 PROCEDURE — 99285 EMERGENCY DEPT VISIT HI MDM: CPT

## 2024-10-14 PROCEDURE — 99285 EMERGENCY DEPT VISIT HI MDM: CPT | Performed by: EMERGENCY MEDICINE

## 2024-10-14 PROCEDURE — 85025 COMPLETE CBC W/AUTO DIFF WBC: CPT

## 2024-10-14 PROCEDURE — 84484 ASSAY OF TROPONIN QUANT: CPT

## 2024-10-14 RX ORDER — ACETAMINOPHEN 325 MG/1
650 TABLET ORAL EVERY 4 HOURS PRN
Status: DISCONTINUED | OUTPATIENT
Start: 2024-10-14 | End: 2024-10-23 | Stop reason: HOSPADM

## 2024-10-14 RX ORDER — ONDANSETRON 2 MG/ML
4 INJECTION INTRAMUSCULAR; INTRAVENOUS EVERY 6 HOURS PRN
Status: DISCONTINUED | OUTPATIENT
Start: 2024-10-14 | End: 2024-10-23 | Stop reason: HOSPADM

## 2024-10-14 RX ORDER — TORSEMIDE 20 MG/1
20 TABLET ORAL DAILY
COMMUNITY

## 2024-10-14 RX ORDER — FAMOTIDINE 20 MG/1
40 TABLET, FILM COATED ORAL DAILY
Status: DISCONTINUED | OUTPATIENT
Start: 2024-10-14 | End: 2024-10-23 | Stop reason: HOSPADM

## 2024-10-14 RX ORDER — CALCITRIOL 0.25 UG/1
0.25 CAPSULE, LIQUID FILLED ORAL 3 TIMES WEEKLY
Status: DISCONTINUED | OUTPATIENT
Start: 2024-10-16 | End: 2024-10-23 | Stop reason: HOSPADM

## 2024-10-14 RX ADMIN — APIXABAN 5 MG: 5 TABLET, FILM COATED ORAL at 17:32

## 2024-10-14 NOTE — H&P
H&P - Hospitalist   Name: Sg Scherer 74 y.o. female I MRN: [id number]  Unit/Bed#: ED-42 I Date of Admission: 10/14/2024   Date of Service: 10/14/2024 I Hospital Day: 0     Assessment & Plan  Dyspnea on exertion  Dyspnea on exertion for several weeks to months    Increased lower extremity edema    It looks like her PCP started her on torsemide 20 mg by mouth daily on October 4    Now she is having presyncope and acute kidney injury likely related to the torsemide    I suspect she has new CHF    Check echocardiogram, rule out for MI    If heart checks out normal, may need to get CTA chest once her kidney function is better.  She is on Eliquis for history of PEs, but you could always have an Eliquis failure  Acute kidney injury (HCC)  Patient has acute kidney injury.  Likely from starting torsemide recently and also if she has decompensated CHF we will cause this.    Hold diuretics for now    Baseline creatinine is around 1    I will give 1 L of slow IV fluids  Morbid obesity with BMI of 45.0-49.9, adult (HCC)  Started Wigovy 8 months ago.  History of endometrial cancer  Had previous chemotherapy, not requiring any treatment currently.  Cancer is in remission  History of pulmonary embolism  History of    He is chronically on Eliquis and did not miss any doses use    I doubt she has an Eliquis failure, but if were not making any headway with determining the cause of dyspnea on exertion, we may need a CTA of the chest if her creatinine gets better          History of Present Illness   Chief Complaint: Dyspnea on exertion    Sg Scherer is a 74 y.o. female with a PMH of endometrial cancer and history of pulmonary embolism who is chronically on Eliquis who presents with dyspnea on exertion for several weeks to months.  Also having increased lower extremity edema.  No chest pain.  She was started on torsemide on October 4, 2024 for lower extremity edema but she is not improved with her symptoms.  Also now has some  acute kidney injury.  She had presyncopal symptoms today that she told her PCP about so we sent her to the ER.  No other recent changes to medications.  8 months ago she was started on Wegovy but that was well before the symptoms started.  She is lost 8 pounds on the Wegovy.  She has a history of pulmonary embolism but she is maintained on Eliquis and has not missed any dosages..    Review of Systems   Constitutional:  Negative for chills and fever.   HENT:  Negative for ear pain and sore throat.    Eyes:  Negative for pain and visual disturbance.   Respiratory:  Positive for shortness of breath. Negative for cough.    Cardiovascular:  Positive for leg swelling. Negative for chest pain and palpitations.   Gastrointestinal:  Negative for abdominal pain and vomiting.   Genitourinary:  Negative for dysuria and hematuria.   Musculoskeletal:  Negative for arthralgias and back pain.   Skin:  Negative for color change and rash.   Neurological:  Negative for seizures and syncope.   All other systems reviewed and are negative.      Historical Information   Past Medical History:   Diagnosis Date    Arthritis     osteo    Chronic kidney disease     Colon polyp     Diverticula of colon     Endometrial cancer (HCC)     2022    Hypertension     Obesity      Past Surgical History:   Procedure Laterality Date    APPENDECTOMY      BREAST BIOPSY Left 1977    benign    CHOLECYSTECTOMY      COLONOSCOPY      CYSTOSCOPY N/A 03/31/2022    Procedure: CYSTOSCOPY;  Surgeon: Gene Coburn MD;  Location: [facility];  Service: Gynecology Oncology    HERNIA REPAIR Right     inguinal    IR PORT PLACEMENT  05/09/2022    JOINT REPLACEMENT Bilateral     Knee    OOPHORECTOMY      SD COLONOSCOPY FLX DX W/COLLJ SPEC WHEN PFRMD N/A 02/21/2017    Procedure: COLONOSCOPY with polypectomy and hemo clip marjan.;  Surgeon: Ramirez Jones MD;  Location: [facility];  Service: Gastroenterology    SD LAPS TOTAL HYSTERECT 250 GM/< W/RMVL TUBE/OVARY N/A  03/31/2022    Procedure: ROBOTIC HYSTERECTOMY, BILATERAL SALPINGO-OOPHORECTOMY, STAGING PERITONEAL AND OMENTAL BIOPSIES, BILATERAL PELVIC SENTINEL LYMPH NODE BIOPSIES;  Surgeon: Gene Coburn MD;  Location: [facility];  Service: Gynecology Oncology     Social History     Tobacco Use    Smoking status: Never    Smokeless tobacco: Never   Vaping Use    Vaping status: Never Used   Substance and Sexual Activity    Alcohol use: Yes     Alcohol/week: 1.0 standard drink of alcohol     Types: 1 Glasses of wine per week     Comment: social-seldom    Drug use: Never    Sexual activity: Not Currently     E-Cigarette/Vaping    E-Cigarette Use Never User      E-Cigarette/Vaping Substances    Nicotine No     THC No     CBD No     Flavoring No     Other No     Unknown No        Social History:  Marital Status: Single     Meds/Allergies     Prior to Admission medications    Medication Sig Start Date End Date Taking? Authorizing Provider   torsemide (DEMADEX) 20 mg tablet Take 20 mg by mouth daily   Yes Historical Provider, MD   acetaminophen (TYLENOL) 650 mg CR tablet Take 1 tablet (650 mg total) by mouth every 6 (six) hours as needed for mild pain 3/31/22   Charlotte Zaldivar MD   apixaban (Eliquis) 5 mg Take 1 tablet (5 mg total) by mouth 2 (two) times a day 2/26/24   RUBEN Webb   Ascorbic Acid (vitamin C) 100 MG tablet Take by mouth daily Pt unsure of mg    Historical Provider, MD   calcitriol (ROCALTROL) 0.25 mcg capsule Take 1 capsule (0.25 mcg total) by mouth 3 (three) times a week 9/27/23 5/21/24  Maninder Ray MD   cholecalciferol (VITAMIN D3) 1,000 units tablet Take 1,000 Units by mouth daily Pt unsure how many units    Historical Provider, MD   denosumab (Prolia) 60 mg/mL Inject 1 mL under the skin every 6 (six) months 10/22/21   Historical Provider, MD   famotidine (PEPCID) 40 MG tablet Take by mouth Daily 2/23/24   Historical Provider, MD   vitamin B-12 (CYANOCOBALAMIN) 50 MCG tablet Take by mouth  daily Pt unsure of mcg    Historical Provider, MD   Wegovy 0.5 MG/0.5ML INJECT (0.5MG)  BY SUBCUTANEOUS ROUTE  EVERY WEEK ON THE SAME DAY OF EACH WEEK FOR E66.01 8/9/24   Historical Provider, MD     No Known Allergies    Objective :  Temp:  [98 °F (36.7 °C)] 98 °F (36.7 °C)  HR:  [81-98] 81  BP: (112-128)/(62-87) 112/62  Resp:  [16-18] 18  SpO2:  [98 %-100 %] 98 %  O2 Device: None (Room air)    Physical Exam  Vitals and nursing note reviewed.   Constitutional:       General: She is not in acute distress.     Appearance: She is well-developed.   HENT:      Head: Normocephalic and atraumatic.   Eyes:      Conjunctiva/sclera: Conjunctivae normal.   Neck:      Comments: HJR 1/2 way up  Cardiovascular:      Rate and Rhythm: Normal rate and regular rhythm.      Heart sounds: No murmur heard.  Pulmonary:      Effort: Pulmonary effort is normal. No respiratory distress.      Breath sounds: Normal breath sounds.   Abdominal:      Palpations: Abdomen is soft.      Tenderness: There is no abdominal tenderness.   Musculoskeletal:         General: No swelling.      Cervical back: Neck supple.      Right lower leg: Edema (1+) present.      Left lower leg: Edema (1+) present.   Skin:     General: Skin is warm and dry.      Capillary Refill: Capillary refill takes less than 2 seconds.   Neurological:      Mental Status: She is alert.   Psychiatric:         Mood and Affect: Mood normal.      .    Lines/Drains:  Lines/Drains/Airways       Active Status       Name Placement date Placement time Site Days    Port A Cath 05/09/22 Right Chest 05/09/22  1100  Chest  889                    Central Line:  Goal for removal:              Lab Results: I have reviewed the following results:  Results from last 7 days   Lab Units 10/14/24  1204   WBC Thousand/uL 4.32   HEMOGLOBIN g/dL 12.7   HEMATOCRIT % 37.8   PLATELETS Thousands/uL 149   SEGS PCT % 69   LYMPHO PCT % 21   MONO PCT % 8   EOS PCT % 0     Results from last 7 days   Lab Units  10/14/24  1204   SODIUM mmol/L 142   POTASSIUM mmol/L 3.4*   CHLORIDE mmol/L 100   CO2 mmol/L 31   BUN mg/dL 34*   CREATININE mg/dL 1.66*   ANION GAP mmol/L 11   CALCIUM mg/dL 9.7   ALBUMIN g/dL 3.9   TOTAL BILIRUBIN mg/dL 0.64   ALK PHOS U/L 40   ALT U/L 25   AST U/L 20   GLUCOSE RANDOM mg/dL 118         Results from last 7 days   Lab Units 10/14/24  1059   POC GLUCOSE mg/dl 115     Lab Results   Component Value Date    HGBA1C 5.3 03/15/2022             ** Please Note: This note has been constructed using a voice recognition system. **

## 2024-10-14 NOTE — ASSESSMENT & PLAN NOTE
Patient has acute kidney injury.  Likely from starting torsemide recently and also if she has decompensated CHF we will cause this.    Hold diuretics for now    Baseline creatinine is around 1    I will give 1 L of slow IV fluids

## 2024-10-14 NOTE — ED PROVIDER NOTES
Time reflects when diagnosis was documented in both MDM as applicable and the Disposition within this note       Time User Action Codes Description Comment    10/14/2024  2:07 PM Андрей Alberts Add [I44.7] LBBB (left bundle branch block)     10/14/2024  2:07 PM Андрей Alberts Add [R06.00] Dyspnea     10/14/2024  2:07 PM Андрей Alberts Add [N17.9] BARRY (acute kidney injury) (HCC)           ED Disposition       ED Disposition   Admit    Condition   Stable    Date/Time   Mon Oct 14, 2024  2:08 PM    Comment   Case was discussed with Dr Sullivan and the patient's admission status was agreed to be Admission Status: inpatient status to the service of Dr. Sullivan .               Assessment & Plan       Medical Decision Making  Patient is 74F with PMH of HTN, CKD who presents to the ED with presyncope.    Vital signs stable. On exam no acute abnormalities.    History and physical exam most consistent with orthostatic presyncope. However, differential diagnosis included but not limited to abnormality, BARRY and ACS. Plan cardiac and metabolic workup, EKG, monitor on telemetry    View ED course above for further discussion on patient workup.    All labs reviewed and utilized in the medical decision making process  All radiology studies independently viewed by me and interpreted by the radiologist.  I reviewed all testing with the patient.     Upon re-evaluation found to have BARRY.  Patient unable to ambulate significantly assistance presentation in the emergency department, will be admitted to the hospital    Amount and/or Complexity of Data Reviewed  Labs: ordered.    Risk  Decision regarding hospitalization.        ED Course as of 10/15/24 0633   Tue Oct 15, 2024   0628 Procedure Note: EKG  Date/Time: 10/14/24 10:57 AM   Interpreted by: Андрей Alberts  Indications / Diagnosis: presyncope  ECG reviewed by me, the ED Provider: yes   The EKG demonstrates: sinus  Rhythm: sinus  Intervals: RSR`  Axis: normal axis  QRS/Blocks: normal  QRS  ST Changes: No acute ST Changes, no STD/IGOR.         Medications       ED Risk Strat Scores                           SBIRT 22yo+      Flowsheet Row Most Recent Value   Initial Alcohol Screen: US AUDIT-C     1. How often do you have a drink containing alcohol? 0 Filed at: 10/14/2024 1201   2. How many drinks containing alcohol do you have on a typical day you are drinking?  0 Filed at: 10/14/2024 1201   3b. FEMALE Any Age, or MALE 65+: How often do you have 4 or more drinks on one occassion? 0 Filed at: 10/14/2024 1201   Audit-C Score 0 Filed at: 10/14/2024 1201   BRITTNI: How many times in the past year have you...    Used an illegal drug or used a prescription medication for non-medical reasons? Never Filed at: 10/14/2024 1201                            History of Present Illness       Chief Complaint   Patient presents with    Shortness of Breath     Pt reports SOB that worsened today with exertion. Pt reports chest pressure       Past Medical History:   Diagnosis Date    Arthritis     osteo    Chronic kidney disease     Colon polyp     Diverticula of colon     Endometrial cancer (HCC)     2022    Hypertension     Obesity       Past Surgical History:   Procedure Laterality Date    APPENDECTOMY      BREAST BIOPSY Left 1977    benign    CHOLECYSTECTOMY      COLONOSCOPY      CYSTOSCOPY N/A 03/31/2022    Procedure: CYSTOSCOPY;  Surgeon: Gene Coburn MD;  Location: [facility];  Service: Gynecology Oncology    HERNIA REPAIR Right     inguinal    IR PORT PLACEMENT  05/09/2022    JOINT REPLACEMENT Bilateral     Knee    OOPHORECTOMY      AL COLONOSCOPY FLX DX W/COLLJ SPEC WHEN PFRMD N/A 02/21/2017    Procedure: COLONOSCOPY with polypectomy and hemo clip marjan.;  Surgeon: Ramirez Jones MD;  Location: [facility];  Service: Gastroenterology    AL LAPS TOTAL HYSTERECT 250 GM/< W/RMVL TUBE/OVARY N/A 03/31/2022    Procedure: ROBOTIC HYSTERECTOMY, BILATERAL SALPINGO-OOPHORECTOMY, STAGING PERITONEAL AND OMENTAL  BIOPSIES, BILATERAL PELVIC SENTINEL LYMPH NODE BIOPSIES;  Surgeon: Gene Coburn MD;  Location: [facility];  Service: Gynecology Oncology      Family History   Problem Relation Age of Onset    Heart disease Mother     Prostate cancer Father 90    Diabetes Sister     Hypertension Sister     Transient ischemic attack Sister     No Known Problems Sister     Diabetes Brother     Heart disease Brother     Kidney disease Son     No Known Problems Maternal Grandmother     No Known Problems Maternal Grandfather     No Known Problems Paternal Grandmother     No Known Problems Paternal Grandfather     No Known Problems Maternal Aunt     No Known Problems Paternal Aunt     No Known Problems Paternal Aunt     No Known Problems Paternal Aunt     Stomach cancer Other 38    Thyroid disease Other     Cancer Other         throat    Breast cancer Neg Hx     Colon cancer Neg Hx     Ovarian cancer Neg Hx       Social History     Tobacco Use    Smoking status: Never    Smokeless tobacco: Never   Vaping Use    Vaping status: Never Used   Substance Use Topics    Alcohol use: Yes     Alcohol/week: 1.0 standard drink of alcohol     Types: 1 Glasses of wine per week     Comment: social-seldom    Drug use: Never      E-Cigarette/Vaping    E-Cigarette Use Never User       E-Cigarette/Vaping Substances    Nicotine No     THC No     CBD No     Flavoring No     Other No     Unknown No       I have reviewed and agree with the history as documented.     Patient presents with:  Shortness of Breath: Pt reports SOB that worsened today with exertion. Pt reports chest pressure    Patient is a 74F pmh HTN, CKD p/w exertional SOB, orthostatic presyncope, general fatigue going on for several months coming in today for an episode of presyncope noted at PCP office today, told to come in. Patient feels lightheaded upon standing, SOB with exertion, associated chest heaviness.         Review of Systems   All other systems reviewed and are  negative.          Objective       ED Triage Vitals   Temperature Pulse Blood Pressure Respirations SpO2 Patient Position - Orthostatic VS   10/14/24 1033 10/14/24 1033 10/14/24 1033 10/14/24 1033 10/14/24 1033 10/14/24 1406   98 °F (36.7 °C) 98 128/87 16 100 % Lying      Temp Source Heart Rate Source BP Location FiO2 (%) Pain Score    10/14/24 1543 10/14/24 1406 10/14/24 1406 -- 10/14/24 1529    Temporal Monitor Right arm  No Pain      Vitals      Date and Time Temp Pulse SpO2 Resp BP Pain Score FACES Pain Rating User   10/15/24 0337 96.9 °F (36.1 °C) 77 95 % 18 89/53 -- -- JV   10/14/24 2323 96.9 °F (36.1 °C) 83 96 % 18 102/55 -- -- JV   10/14/24 1927 -- -- -- -- -- No Pain -- RB   10/14/24 1927 97.4 °F (36.3 °C) 80 95 % 18 99/58 -- -- JV   10/14/24 1543 97.8 °F (36.6 °C) 85 98 % 20 111/64 -- -- YC   10/14/24 1530 -- -- -- -- -- No Pain -- MD   10/14/24 1529 -- -- -- -- -- No Pain -- MD   10/14/24 1406 -- 81 98 % 18 112/62 -- -- DR   10/14/24 1033 98 °F (36.7 °C) 98 100 % 16 128/87 -- -- JL            Physical Exam  Vitals and nursing note reviewed.   Constitutional:       General: She is not in acute distress.     Appearance: She is well-developed. She is obese.   HENT:      Head: Normocephalic and atraumatic.   Eyes:      Conjunctiva/sclera: Conjunctivae normal.   Cardiovascular:      Rate and Rhythm: Normal rate and regular rhythm.      Heart sounds: No murmur heard.  Pulmonary:      Effort: Pulmonary effort is normal. No respiratory distress.      Breath sounds: Normal breath sounds.   Abdominal:      Palpations: Abdomen is soft.      Tenderness: There is no abdominal tenderness.   Musculoskeletal:         General: No swelling.      Cervical back: Neck supple.   Skin:     General: Skin is warm and dry.      Capillary Refill: Capillary refill takes less than 2 seconds.   Neurological:      Mental Status: She is alert.   Psychiatric:         Mood and Affect: Mood normal.         Results Reviewed       Procedure  Component Value Units Date/Time    B-Type Natriuretic Peptide(BNP) [[id number]]  (Normal) Collected: 10/15/24 0518    Lab Status: Final result Specimen: Blood from Central Venous Line Updated: 10/15/24 0604     BNP 61 pg/mL     HS Troponin I 4hr [[id number]]  (Normal) Collected: 10/14/24 1602    Lab Status: Final result Specimen: Blood from Central Venous Line Updated: 10/14/24 1632     hs TnI 4hr 7 ng/L      Delta 4hr hsTnI 1 ng/L     HS Troponin I 2hr [[id number]]  (Normal) Collected: 10/14/24 1402    Lab Status: Final result Specimen: Blood from Line Updated: 10/14/24 1429     hs TnI 2hr 7 ng/L      Delta 2hr hsTnI 1 ng/L     TSH, 3rd generation with Free T4 reflex [[id number]]  (Normal) Collected: 10/14/24 1204    Lab Status: Final result Specimen: Blood from Line Updated: 10/14/24 1245     TSH 3RD GENERATON 1.922 uIU/mL     HS Troponin 0hr (reflex protocol) [[id number]]  (Normal) Collected: 10/14/24 1204    Lab Status: Final result Specimen: Blood from Line Updated: 10/14/24 1236     hs TnI 0hr 6 ng/L     Comprehensive metabolic panel [[id number]]  (Abnormal) Collected: 10/14/24 1204    Lab Status: Final result Specimen: Blood from Line Updated: 10/14/24 1234     Sodium 142 mmol/L      Potassium 3.4 mmol/L      Chloride 100 mmol/L      CO2 31 mmol/L      ANION GAP 11 mmol/L      BUN 34 mg/dL      Creatinine 1.66 mg/dL      Glucose 118 mg/dL      Calcium 9.7 mg/dL      AST 20 U/L      ALT 25 U/L      Alkaline Phosphatase 40 U/L      Total Protein 7.4 g/dL      Albumin 3.9 g/dL      Total Bilirubin 0.64 mg/dL      eGFR 29 ml/min/1.73sq m     Narrative:      National Kid[employer] Disease Foundation guidelines for Chronic Kidney Disease (CKD):     Stage 1 with normal or high GFR (GFR > 90 mL/min/1.73 square meters)    Stage 2 Mild CKD (GFR = 60-89 mL/min/1.73 square meters)    Stage 3A Moderate CKD (GFR = 45-59 mL/min/1.73 square meters)    Stage 3B Moderate CKD (GFR = 30-44 mL/min/1.73 square meters)    Stage 4 Severe CKD  (GFR = 15-29 mL/min/1.73 square meters)    Stage 5 End Stage CKD (GFR <15 mL/min/1.73 square meters)  Note: GFR calculation is accurate only with a steady state creatinine    UA w Reflex to Microscopic w Reflex to Culture [[id number]] Collected: 10/14/24 1217    Lab Status: Final result Specimen: Urine, Clean Catch Updated: 10/14/24 1227     Color, UA Light Yellow     Clarity, UA Clear     Specific Gravity, UA 1.010     pH, UA 5.0     Leukocytes, UA Negative     Nitrite, UA Negative     Protein, UA Negative mg/dl      Glucose, UA Negative mg/dl      Ketones, UA Negative mg/dl      Urobilinogen, UA <2.0 mg/dl      Bilirubin, UA Negative     Occult Blood, UA Negative    CBC and differential [[id number]]  (Abnormal) Collected: 10/14/24 1204    Lab Status: Final result Specimen: Blood from Line Updated: 10/14/24 1211     WBC 4.32 Thousand/uL      RBC 3.66 Million/uL      Hemoglobin 12.7 g/dL      Hematocrit 37.8 %       fL      MCH 34.7 pg      MCHC 33.6 g/dL      RDW 13.9 %      MPV 10.3 fL      Platelets 149 Thousands/uL      nRBC 0 /100 WBCs      Segmented % 69 %      Immature Grans % 1 %      Lymphocytes % 21 %      Monocytes % 8 %      Eosinophils Relative 0 %      Basophils Relative 1 %      Absolute Neutrophils 2.99 Thousands/µL      Absolute Immature Grans 0.03 Thousand/uL      Absolute Lymphocytes 0.91 Thousands/µL      Absolute Monocytes 0.36 Thousand/µL      Eosinophils Absolute 0.01 Thousand/µL      Basophils Absolute 0.02 Thousands/µL     Fingerstick Glucose (POCT) [[id number]]  (Normal) Collected: 10/14/24 1059    Lab Status: Final result Specimen: Blood Updated: 10/14/24 1100     POC Glucose 115 mg/dl             XR chest 2 views   Final Interpretation by Dmitry Thornton MD (10/14 1630)      No acute cardiopulmonary disease.            Workstation performed: [id number]             Procedures    ED Medication and Procedure Management   Prior to Admission Medications   Prescriptions Last Dose  Informant Patient Reported? Taking?   Ascorbic Acid (vitamin C) 100 MG tablet 10/13/2024 Self Yes Yes   Sig: Take by mouth daily Pt unsure of mg   Wegovy 0.5 MG/0.5ML Past Week  Yes Yes   Sig: INJECT (0.5MG)  BY SUBCUTANEOUS ROUTE  EVERY WEEK ON THE SAME DAY OF EACH WEEK FOR E66.01   acetaminophen (TYLENOL) 650 mg CR tablet 10/13/2024 Self No Yes   Sig: Take 1 tablet (650 mg total) by mouth every 6 (six) hours as needed for mild pain   apixaban (Eliquis) 5 mg 10/14/2024 Self No Yes   Sig: Take 1 tablet (5 mg total) by mouth 2 (two) times a day   calcitriol (ROCALTROL) 0.25 mcg capsule  Self No No   Sig: Take 1 capsule (0.25 mcg total) by mouth 3 (three) times a week   cholecalciferol (VITAMIN D3) 1,000 units tablet 10/13/2024 Self Yes Yes   Sig: Take 1,000 Units by mouth daily Pt unsure how many units   denosumab (Prolia) 60 mg/mL More than a month Self Yes No   Sig: Inject 1 mL under the skin every 6 (six) months   famotidine (PEPCID) 40 MG tablet Past Month Self Yes Yes   Sig: Take by mouth Daily   torsemide (DEMADEX) 20 mg tablet 10/14/2024  Yes Yes   Sig: Take 20 mg by mouth daily   vitamin B-12 (CYANOCOBALAMIN) 50 MCG tablet 10/13/2024 Self Yes Yes   Sig: Take by mouth daily Pt unsure of mcg      Facility-Administered Medications: None     Current Discharge Medication List        CONTINUE these medications which have NOT CHANGED    Details   acetaminophen (TYLENOL) 650 mg CR tablet Take 1 tablet (650 mg total) by mouth every 6 (six) hours as needed for mild pain  Qty: 30 tablet, Refills: 0    Associated Diagnoses: S/P hysterectomy with oophorectomy      apixaban (Eliquis) 5 mg Take 1 tablet (5 mg total) by mouth 2 (two) times a day  Qty: 60 tablet, Refills: 6    Associated Diagnoses: Acute pulmonary embolism without acute cor pulmonale, unspecified pulmonary embolism type (HCC)      Ascorbic Acid (vitamin C) 100 MG tablet Take by mouth daily Pt unsure of mg      cholecalciferol (VITAMIN D3) 1,000 units tablet  Take 1,000 Units by mouth daily Pt unsure how many units      famotidine (PEPCID) 40 MG tablet Take by mouth Daily      torsemide (DEMADEX) 20 mg tablet Take 20 mg by mouth daily      vitamin B-12 (CYANOCOBALAMIN) 50 MCG tablet Take by mouth daily Pt unsure of mcg      Wegovy 0.5 MG/0.5ML INJECT (0.5MG)  BY SUBCUTANEOUS ROUTE  EVERY WEEK ON THE SAME DAY OF EACH WEEK FOR E66.01      calcitriol (ROCALTROL) 0.25 mcg capsule Take 1 capsule (0.25 mcg total) by mouth 3 (three) times a week  Qty: 36 capsule, Refills: 2    Associated Diagnoses: Stage 3a chronic kidney disease (HCC); Secondary hyperparathyroidism of renal origin (HCC)      denosumab (Prolia) 60 mg/mL Inject 1 mL under the skin every 6 (six) months           No discharge procedures on file.  ED SEPSIS DOCUMENTATION   Time reflects when diagnosis was documented in both MDM as applicable and the Disposition within this note       Time User Action Codes Description Comment    10/14/2024  2:07 PM Андрей Alberts [I44.7] LBBB (left bundle branch block)     10/14/2024  2:07 PM Андрей Alberts [R06.00] Dyspnea     10/14/2024  2:07 PM Андрей Alberts [N17.9] BARRY (acute kidney injury) ([facility])                  Андрей Alberts MD  10/15/24 0634

## 2024-10-14 NOTE — Clinical Note
Case was discussed with Dr Sullivan and the patient's admission status was agreed to be Admission Status: inpatient status to the service of Dr. Sullivan .

## 2024-10-14 NOTE — PLAN OF CARE
Problem: PAIN - ADULT  Goal: Verbalizes/displays adequate comfort level or baseline comfort level  Description: Interventions:  - Encourage patient to monitor pain and request assistance  - Assess pain using appropriate pain scale  - Administer analgesics based on type and severity of pain and evaluate response  - Implement non-pharmacological measures as appropriate and evaluate response  - Consider cultural and social influences on pain and pain management  - Notify physician/advanced practitioner if interventions unsuccessful or patient reports new pain  Outcome: Progressing     Problem: INFECTION - ADULT  Goal: Absence or prevention of progression during hospitalization  Description: INTERVENTIONS:  - Assess and monitor for signs and symptoms of infection  - Monitor lab/diagnostic results  - Monitor all insertion sites, i.e. indwelling lines, tubes, and drains  - Monitor endotracheal if appropriate and nasal secretions for changes in amount and color  - [place] appropriate cooling/warming therapies per order  - Administer medications as ordered  - Instruct and encourage patient and family to use good hand hygiene technique  - Identify and instruct in appropriate isolation precautions for identified infection/condition  Outcome: Progressing  Goal: Absence of fever/infection during neutropenic period  Description: INTERVENTIONS:  - Monitor WBC    Outcome: Progressing     Problem: SAFETY ADULT  Goal: Patient will remain free of falls  Description: INTERVENTIONS:  - Educate patient/family on patient safety including physical limitations  - Instruct patient to call for assistance with activity   - Consult OT/PT to assist with strengthening/mobility   - Keep Call bell within reach  - Keep bed low and locked with side rails adjusted as appropriate  - Keep care items and personal belongings within reach  - Initiate and maintain comfort rounds  - Make Fall Risk Sign visible to staff  - Offer Toileting every 3 Hours,  in advance of need  - Initiate/Maintain bed alarm  - Obtain necessary fall risk management equipment:   - Apply yellow socks and bracelet for high fall risk patients  - Consider moving patient to room near nurses station  Outcome: Progressing  Goal: Maintain or return to baseline ADL function  Description: INTERVENTIONS:  -  Assess patient's ability to carry out ADLs; assess patient's baseline for ADL function and identify physical deficits which impact ability to perform ADLs (bathing, care of mouth/teeth, toileting, grooming, dressing, etc.)  - Assess/evaluate cause of self-care deficits   - Assess range of motion  - Assess patient's mobility; develop plan if impaired  - Assess patient's need for assistive devices and provide as appropriate  - Encourage maximum independence but intervene and supervise when necessary  - Involve family in performance of ADLs  - Assess for home care needs following discharge   - Consider OT consult to assist with ADL evaluation and planning for discharge  - Provide patient education as appropriate  Outcome: Progressing  Goal: Maintains/Returns to pre admission functional level  Description: INTERVENTIONS:  - Perform AM-PAC 6 Click Basic Mobility/ Daily Activity assessment daily.  - Set and communicate daily mobility goal to care team and patient/family/caregiver.   - Collaborate with rehabilitation services on mobility goals if consulted  - Perform Range of Motion 3 times a day.  - Reposition patient every 2 hours.  - Dangle patient 3 times a day  - Stand patient 3 times a day  - Ambulate patient 3 times a day  - Out of bed to chair 3 times a day   - Out of bed for meals 3 times a day  - Out of bed for toileting  - Record patient progress and toleration of activity level   Outcome: Progressing     Problem: DISCHARGE PLANNING  Goal: Discharge to home or other facility with appropriate resources  Description: INTERVENTIONS:  - Identify barriers to discharge w/patient and caregiver  -  Arrange for needed discharge resources and transportation as appropriate  - Identify discharge learning needs (meds, wound care, etc.)  - Arrange for interpretive services to assist at discharge as needed  - Refer to Case Management Department for coordinating discharge planning if the patient needs post-hospital services based on physician/advanced practitioner order or complex needs related to functional status, cognitive ability, or social support system  Outcome: Progressing     Problem: Knowledge Deficit  Goal: Patient/family/caregiver demonstrates understanding of disease process, treatment plan, medications, and discharge instructions  Description: Complete learning assessment and assess knowledge base.  Interventions:  - Provide teaching at level of understanding  - Provide teaching via preferred learning methods  Outcome: Progressing

## 2024-10-14 NOTE — ASSESSMENT & PLAN NOTE
H/o hysterectomy /BSO/lymph node & omental biopsy, 2022  Completed chemo  GYN Onco visit from May shows stable findings

## 2024-10-14 NOTE — ASSESSMENT & PLAN NOTE
History of    He is chronically on Eliquis and did not miss any doses use    I doubt she has an Eliquis failure, but if were not making any headway with determining the cause of dyspnea on exertion, we may need a CTA of the chest if her creatinine gets better

## 2024-10-14 NOTE — ASSESSMENT & PLAN NOTE
Main complaint to me is dizziness with any activity  Check orthostatic vitals  Treat BARRY  Telemetry shows NSR with PACs. No sustained arrhythmias.   Pt discharged on keflex, awaiting final C&S

## 2024-10-14 NOTE — CONSULTS
Consultation - Cardiology   Name: Sg Scherer 74 y.o. female I MRN: [id number]  Unit/Bed#: E4 -01 I Date of Admission: 10/14/2024   Date of Service: 10/15/2024 I Hospital Day: 1   Inpatient consult to Cardiology  Consult performed by: Jewels Ribera PA-C  Consult ordered by: Kristian Sullivan DO      Physician Requesting Evaluation: Kristian Sullivan DO   Reason for Evaluation / Principal Problem: Suspected new CHF, dyspnea  Assessment & Plan  Dyspnea on exertion  Patient reports chronic ROSAS for months but lately her dizziness has prevented her from even really leaving the house to walk around the block, etc.  BNP & Trop WNL  CXR no pulmonary edema or pleural effusions. Doubt CHF. More likely deconditioning?  Await Echo  Dizziness  Main complaint to me is dizziness with any activity  Check orthostatic vitals  Treat BARRY  Telemetry shows NSR with PACs. No sustained arrhythmias.  History of endometrial cancer  H/o hysterectomy /BSO/lymph node & omental biopsy, 2022  Completed chemo  GYN Onco visit from May shows stable findings  History of pulmonary embolism  Continue home Eliquis 5 mg BID  Essential hypertension  Low normal, encourage PO hydration  Acute kidney injury superimposed on stage 3a chronic kidney disease (HCC)  Monitor BMP off of diuretic  Prior S Cr baseline was ~1.5 back in 2022. Most recently 1-1.2    History of Present Illness   Sg Scherer is a 74 y.o. female who presents with complaints of a month and a half of dizziness with any exertion.  Patient reports earlier in the summer she was more active including shopping with her sister but lately she can hardly move around the home or even to the bathroom because of dizziness like the room spinning with any exertion.  Patient reports if she rests either sitting or laying back in bed for 2 minutes her symptoms resolved.  Patient reports presyncope, fear of collapsing, she has not collapsed or syncopized thankfully she is able to get to a  chair each time her symptoms act up.  The dizziness does not happen when moving around in bed.    Patient reports she has not been eating as much since starting Wegovy but she does try to keep hydrated with water and flavored mullins throughout the day.  Patient denies recent illness or change to her urinary or bowel habit.    Patient denies chest pain, pressure, tightness, heaviness or burning, pleurisy, palpitation, racing heartbeat, headache or weakness in her legs.  Patient reports ROSAS for months. Earlier in the summer she would have to take 3 breaks when ambulating around the block. Saw the PCP for this problem and he started torsemide. Denies wheezing, coughing or fatigue. For about 1 1/2 months, the patient's dizziness is such a problem that it is limiting her from even ambulating around the home and she denies current SOB or peripheral edema.  Patient denies orthopnea or PND.    Review of Systems   Constitutional:  Negative for diaphoresis and fatigue.   Eyes:         - diplopia, blurry vision, tunnel vision, blackness in the visual field   Respiratory:  Negative for shortness of breath.    Cardiovascular:  Negative for chest pain.   Gastrointestinal:  Negative for abdominal distention, abdominal pain and nausea.   Musculoskeletal:         + Right leg arthritis   Neurological:  Positive for dizziness. Negative for syncope, weakness, light-headedness, numbness and headaches.        + pre-syncope     Pertinent PMH: PE s with RV strain in the past, obesity & hypertension  Surgical history: Hysterectomy, BSO, lymph node and omental biopsy in 2022  Social history: Lives in a home with her sister and her son.  Does not drink alcohol smoke cigarettes or use drugs.    Home medication list confirmed with RN from PCP office: Wegovy 0.5 mg injectable, Torsemide 20 mg QD (recently started Oct 4th), famotidine 40 mg QHS, calcitriol 0.25 mcg capsule 1 capsule 1x/month, fluticasone nasal spray PRN, Prolia Injectable 1x/6 mo  & Eliquis 5 mg BID.    Objective :  Temp:  [96.9 °F (36.1 °C)-98 °F (36.7 °C)] 97.6 °F (36.4 °C)  HR:  [74-98] 74  BP: ()/(53-87) 91/58  Resp:  [16-20] 18  SpO2:  [95 %-100 %] 98 %  O2 Device: None (Room air)    Vitals:    10/14/24 1529   Weight: 96.4 kg (212 lb 8.4 oz)     Physical Exam  Vitals and nursing note reviewed.   Constitutional:       Appearance: She is not ill-appearing.   HENT:      Head: Normocephalic and atraumatic.      Nose: Nose normal.      Mouth/Throat:      Mouth: Mucous membranes are dry.   Eyes:      Conjunctiva/sclera: Conjunctivae normal.   Neck:      Vascular: No JVD.   Cardiovascular:      Rate and Rhythm: Normal rate. Rhythm irregular. Extrasystoles are present.     Heart sounds: S1 normal and S2 normal. No murmur heard.  Pulmonary:      Effort: Pulmonary effort is normal.      Breath sounds: No wheezing, rhonchi or rales.   Chest:      Comments: Right side chemo port   Abdominal:      General: Abdomen is flat.   Musculoskeletal:         General: No swelling. Normal range of motion.   Skin:     General: Skin is warm and dry.   Neurological:      Mental Status: She is alert and oriented to person, place, and time.   Psychiatric:         Behavior: Behavior normal.     Lab Results: I have reviewed the following results:  CBC & CMP   Troponins 6 -7  BNP 10/4/24 69 - 61 (today)  Imaging: CXR on my review is no pulmonary edema, vascular congestion or pleural effusions.  Other studies: Echo May 2022 (in the setting of massive PE) mild LVH, LVEF 75%.  All motion normal. G1DD.  Dilated RV.  Mildly dilated bilateral atria.  Aortic valve sclerosis.  Mild TR.  PASP 35-40 mmHg.  Ectatic ascending aorta (3.6 cm).  EKG today NSR with HR 79 bpm. Nl axis, Possible RBBB (chronic), no infarct or ischemia.  Telemetry with NSR. PACs no other events. Average HR 75 bpm

## 2024-10-14 NOTE — ASSESSMENT & PLAN NOTE
Patient reports chronic ROSAS for months but lately her dizziness has prevented her from even really leaving the house to walk around the block, etc.  BNP & Trop WNL  CXR no pulmonary edema or pleural effusions. Doubt CHF. More likely deconditioning?  Await Echo

## 2024-10-14 NOTE — ED NOTES
Charge RN accessing port at this time     Baltazar Tariq, KALA  10/14/24 1159       Baltazar Tariq RN  10/14/24 1157

## 2024-10-14 NOTE — ASSESSMENT & PLAN NOTE
Dyspnea on exertion for several weeks to months    Increased lower extremity edema    It looks like her PCP started her on torsemide 20 mg by mouth daily on October 4    Now she is having presyncope and acute kidney injury likely related to the torsemide    I suspect she has new CHF    Check echocardiogram, rule out for MI    If heart checks out normal, may need to get CTA chest once her kidney function is better.  She is on Eliquis for history of PEs, but you could always have an Eliquis failure

## 2024-10-15 ENCOUNTER — APPOINTMENT (INPATIENT)
Dept: NON INVASIVE DIAGNOSTICS | Facility: HOSPITAL | Age: 74
DRG: 683 | End: 2024-10-15
Payer: COMMERCIAL

## 2024-10-15 LAB
ANION GAP SERPL CALCULATED.3IONS-SCNC: 7 MMOL/L (ref 4–13)
AORTIC ROOT: 2.8 CM
APICAL FOUR CHAMBER EJECTION FRACTION: 62 %
ASCENDING AORTA: 3.4 CM
BASOPHILS # BLD AUTO: 0.01 THOUSANDS/ΜL (ref 0–0.1)
BASOPHILS NFR BLD AUTO: 0 % (ref 0–1)
BNP SERPL-MCNC: 61 PG/ML (ref 0–100)
BSA FOR ECHO PROCEDURE: 2.01 M2
BUN SERPL-MCNC: 32 MG/DL (ref 5–25)
CALCIUM SERPL-MCNC: 8.6 MG/DL (ref 8.4–10.2)
CHLORIDE SERPL-SCNC: 102 MMOL/L (ref 96–108)
CHOLEST SERPL-MCNC: 183 MG/DL
CO2 SERPL-SCNC: 31 MMOL/L (ref 21–32)
CREAT SERPL-MCNC: 1.33 MG/DL (ref 0.6–1.3)
E WAVE DECELERATION TIME: 258 MS
E/A RATIO: 0.77
EOSINOPHIL # BLD AUTO: 0.07 THOUSAND/ΜL (ref 0–0.61)
EOSINOPHIL NFR BLD AUTO: 2 % (ref 0–6)
ERYTHROCYTE [DISTWIDTH] IN BLOOD BY AUTOMATED COUNT: 14.1 % (ref 11.6–15.1)
FRACTIONAL SHORTENING: 33 (ref 28–44)
GFR SERPL CREATININE-BSD FRML MDRD: 38 ML/MIN/1.73SQ M
GLUCOSE SERPL-MCNC: 88 MG/DL (ref 65–140)
HCT VFR BLD AUTO: 34.4 % (ref 34.8–46.1)
HDLC SERPL-MCNC: 45 MG/DL
HGB BLD-MCNC: 11.2 G/DL (ref 11.5–15.4)
IMM GRANULOCYTES # BLD AUTO: 0.01 THOUSAND/UL (ref 0–0.2)
IMM GRANULOCYTES NFR BLD AUTO: 0 % (ref 0–2)
INTERVENTRICULAR SEPTUM IN DIASTOLE (PARASTERNAL SHORT AXIS VIEW): 1.5 CM
INTERVENTRICULAR SEPTUM: 1.5 CM (ref 0.6–1.1)
LAAS-AP2: 18.4 CM2
LAAS-AP4: 14.8 CM2
LDLC SERPL CALC-MCNC: 124 MG/DL (ref 0–100)
LEFT ATRIUM SIZE: 3.7 CM
LEFT ATRIUM VOLUME (MOD BIPLANE): 42 ML
LEFT ATRIUM VOLUME INDEX (MOD BIPLANE): 20.9 ML/M2
LEFT INTERNAL DIMENSION IN SYSTOLE: 2.6 CM (ref 2.1–4)
LEFT VENTRICULAR INTERNAL DIMENSION IN DIASTOLE: 3.9 CM (ref 3.5–6)
LEFT VENTRICULAR POSTERIOR WALL IN END DIASTOLE: 1.1 CM
LEFT VENTRICULAR STROKE VOLUME: 42 ML
LVSV (TEICH): 42 ML
LYMPHOCYTES # BLD AUTO: 0.88 THOUSANDS/ΜL (ref 0.6–4.47)
LYMPHOCYTES NFR BLD AUTO: 30 % (ref 14–44)
MAGNESIUM SERPL-MCNC: 2 MG/DL (ref 1.9–2.7)
MCH RBC QN AUTO: 33.7 PG (ref 26.8–34.3)
MCHC RBC AUTO-ENTMCNC: 32.6 G/DL (ref 31.4–37.4)
MCV RBC AUTO: 104 FL (ref 82–98)
MONOCYTES # BLD AUTO: 0.31 THOUSAND/ΜL (ref 0.17–1.22)
MONOCYTES NFR BLD AUTO: 11 % (ref 4–12)
MV E'TISSUE VEL-LAT: 8 CM/S
MV E'TISSUE VEL-SEP: 7 CM/S
MV PEAK A VEL: 0.79 M/S
MV PEAK E VEL: 61 CM/S
MV STENOSIS PRESSURE HALF TIME: 75 MS
MV VALVE AREA P 1/2 METHOD: 2.93
NEUTROPHILS # BLD AUTO: 1.63 THOUSANDS/ΜL (ref 1.85–7.62)
NEUTS SEG NFR BLD AUTO: 57 % (ref 43–75)
NONHDLC SERPL-MCNC: 138 MG/DL
NRBC BLD AUTO-RTO: 0 /100 WBCS
PLATELET # BLD AUTO: 140 THOUSANDS/UL (ref 149–390)
PMV BLD AUTO: 10.6 FL (ref 8.9–12.7)
POTASSIUM SERPL-SCNC: 3.4 MMOL/L (ref 3.5–5.3)
RBC # BLD AUTO: 3.32 MILLION/UL (ref 3.81–5.12)
RIGHT ATRIUM AREA SYSTOLE A4C: 10.2 CM2
RIGHT VENTRICLE ID DIMENSION: 2.7 CM
SL CV LEFT ATRIUM LENGTH A2C: 6 CM
SL CV LV EF: 62
SL CV PED ECHO LEFT VENTRICLE DIASTOLIC VOLUME (MOD BIPLANE) 2D: 67 ML
SL CV PED ECHO LEFT VENTRICLE SYSTOLIC VOLUME (MOD BIPLANE) 2D: 25 ML
SODIUM SERPL-SCNC: 140 MMOL/L (ref 135–147)
TR MAX PG: 19 MMHG
TR PEAK VELOCITY: 2.2 M/S
TRICUSPID ANNULAR PLANE SYSTOLIC EXCURSION: 1.7 CM
TRICUSPID VALVE PEAK REGURGITATION VELOCITY: 2.19 M/S
TRIGL SERPL-MCNC: 70 MG/DL
WBC # BLD AUTO: 2.91 THOUSAND/UL (ref 4.31–10.16)

## 2024-10-15 PROCEDURE — 99232 SBSQ HOSP IP/OBS MODERATE 35: CPT | Performed by: INTERNAL MEDICINE

## 2024-10-15 PROCEDURE — 97166 OT EVAL MOD COMPLEX 45 MIN: CPT

## 2024-10-15 PROCEDURE — 83735 ASSAY OF MAGNESIUM: CPT | Performed by: HOSPITALIST

## 2024-10-15 PROCEDURE — 80048 BASIC METABOLIC PNL TOTAL CA: CPT | Performed by: HOSPITALIST

## 2024-10-15 PROCEDURE — 80061 LIPID PANEL: CPT | Performed by: HOSPITALIST

## 2024-10-15 PROCEDURE — 93306 TTE W/DOPPLER COMPLETE: CPT

## 2024-10-15 PROCEDURE — 85025 COMPLETE CBC W/AUTO DIFF WBC: CPT | Performed by: HOSPITALIST

## 2024-10-15 PROCEDURE — 83880 ASSAY OF NATRIURETIC PEPTIDE: CPT | Performed by: HOSPITALIST

## 2024-10-15 PROCEDURE — 99222 1ST HOSP IP/OBS MODERATE 55: CPT | Performed by: STUDENT IN AN ORGANIZED HEALTH CARE EDUCATION/TRAINING PROGRAM

## 2024-10-15 PROCEDURE — 93306 TTE W/DOPPLER COMPLETE: CPT | Performed by: INTERNAL MEDICINE

## 2024-10-15 PROCEDURE — 97163 PT EVAL HIGH COMPLEX 45 MIN: CPT

## 2024-10-15 RX ORDER — SODIUM CHLORIDE, SODIUM GLUCONATE, SODIUM ACETATE, POTASSIUM CHLORIDE, MAGNESIUM CHLORIDE, SODIUM PHOSPHATE, DIBASIC, AND POTASSIUM PHOSPHATE .53; .5; .37; .037; .03; .012; .00082 G/100ML; G/100ML; G/100ML; G/100ML; G/100ML; G/100ML; G/100ML
100 INJECTION, SOLUTION INTRAVENOUS CONTINUOUS
Status: DISPENSED | OUTPATIENT
Start: 2024-10-15 | End: 2024-10-15

## 2024-10-15 RX ADMIN — APIXABAN 5 MG: 5 TABLET, FILM COATED ORAL at 08:18

## 2024-10-15 RX ADMIN — SODIUM CHLORIDE, SODIUM GLUCONATE, SODIUM ACETATE, POTASSIUM CHLORIDE, MAGNESIUM CHLORIDE, SODIUM PHOSPHATE, DIBASIC, AND POTASSIUM PHOSPHATE 100 ML/HR: .53; .5; .37; .037; .03; .012; .00082 INJECTION, SOLUTION INTRAVENOUS at 13:55

## 2024-10-15 RX ADMIN — APIXABAN 5 MG: 5 TABLET, FILM COATED ORAL at 17:37

## 2024-10-15 RX ADMIN — FAMOTIDINE 40 MG: 20 TABLET, FILM COATED ORAL at 08:18

## 2024-10-15 RX ADMIN — Medication 1000 UNITS: at 08:18

## 2024-10-15 NOTE — PHYSICAL THERAPY NOTE
PHYSICAL THERAPY EVALUATION          Patient Name: Sg Scherer  Today's Date: 10/15/2024       10/15/24 1029   PT Last Visit   PT Visit Date 10/15/24   Note Type   Note type Evaluation   Pain Assessment   Pain Assessment Tool 0-10   Pain Score No Pain   Restrictions/Precautions   Other Precautions Telemetry;Fall Risk   Home Living   Type of Home House   Home Layout Two level;Bed/bath upstairs;Stairs to enter with rails   Bathroom Shower/Tub Tub/shower unit   Bathroom Toilet Raised   Home Equipment Walker;Cane   Additional Comments 6 IGOR. no bathroom first fl   Prior Function   Level of [place] Independent with functional mobility;Independent with ADLs   Lives With Family  (sister and son)   Receives Help From Family   IADLs Independent with driving;Independent with medication management;Family/Friend/Other provides meals  (can drive but hasnt lately)   Falls in the last 6 months 0   Comments indep without an AD. sponge bathes at baseline or soaks in tub   General   Additional Pertinent History pt admitted 10/14/24 for ROSAS. PMhx significant for obesity, CA in remission, osteoporosis   Cognition   Overall Cognitive Status WFL   Arousal/Participation Cooperative   Attention Within functional limits   Orientation Level Oriented X4   Memory Decreased recall of precautions   Following Commands Follows one step commands without difficulty   Comments questionable safety awareness   Bed Mobility   Supine to Sit 6  Modified independent   Additional items HOB elevated;Bedrails   Sit to Supine 6  Modified independent   Additional items HOB elevated;Increased time required   Transfers   Sit to Stand 6  Modified independent   Additional items Bedrails   Stand to Sit 6  Modified independent   Additional items Increased time required   Toilet transfer 6  Modified independent   Additional items Increased time required;Standard toilet   Ambulation/Elevation   Gait pattern Improper Weight shift;Wide JONES   Gait  Assistance 5  Supervision   Additional items Assist x 1   Assistive Device None   Distance 8'x2, 70'x2   Balance   Static Standing Fair   Dynamic Standing Fair -   Ambulatory Fair -   Endurance Deficit   Endurance Deficit No  (does report mild ROSAS and fatigue post ambulation. require seated rest break ambulating unlimited household distances. vitals during session 108/58, 77 post ambulation to bathroom. intermittent dizziness that improved w time)   Activity Tolerance   Activity Tolerance Patient tolerated treatment well   Medical Staff Made Aware Leann OT   Nurse Made Aware yes   Assessment   Prognosis Good   Assessment Sg Scherer is a 74 y.o. female admitted to [facility] on 10/14/2024 for Dyspnea on exertion. PT was consulted and pt was seen on 10/15/2024 for mobility assessment and d/c planning. Pt presents w low fall risk, telemetry, +orthostatic vital signs per nsg. At baseline she is indep without an AD. Pt is currently functioning at a mod I bed mobility and transfers, S for ambulation. Pt demonstrated ability to ambulate unlimited household distances. She was mildly dizzy sitting up at the EOB which improved w time. She also reports mild dizziness after going to the bathroom, again which improved w time. See BP in flowsheet. Pt was educated on pacing and safety precautions to take to prevent lightheadedness as well as common situations that could increase likelihood of feeling dizzy. Pt verbalize understanding. Pt will benefit from 1-2 f/u sessions to ensure progression of mobility and provide generalized HEP for conditioning given complaints of fatigue.   Barriers to Discharge None   Goals   STG Expiration Date 10/22/24   Short Term Goal #1 1).  Pt will perform bed mobility indep demonstrating appropriate technique 100% of the time in order to improve function.2)  Perform all transfers with [place] demonstrating safe and appropriate technique 100% of the time in order to improve ability to negotiate safely in  home environment.3) Amb with least restrictive AD > 100'x1 with mod I in order to demonstrate ability to negotiate in home environment.4)  Improve overall strength and balance 1/2 grade in order to optimize ability to perform functional tasks and reduce fall risk.5) Negotiate stairs using most appropriate technique and S in order to be able to negotiate safely in home environment.   Plan   Treatment/Interventions Functional transfer training;LE strengthening/ROM;Therapeutic exercise;Elevations;Patient/family training;Equipment eval/education;Bed mobility;Gait training;Compensatory technique education;Endurance training;Spoke to nursing;Family;OT   PT Frequency 1-2x/wk  (1-2 f/u prn)   Discharge Recommendation   Rehab Resource Intensity Level, PT No post-acute rehabilitation needs   AM-PAC Basic Mobility Inpatient   Turning in Flat Bed Without Bedrails 4   Lying on Back to Sitting on Edge of Flat Bed Without Bedrails 3   Moving Bed to Chair 4   Standing Up From Chair Using Arms 4   Walk in Room 3   Climb 3-5 Stairs With Railing 3   Basic Mobility Inpatient Raw Score 21   Basic Mobility Standardized Score 45.55   [facility] Highest Level Of Mobility   -HLM Goal 6: Walk 10 steps or more   -HLM Achieved 7: Walk 25 feet or more   End of Consult   Patient Position at End of Consult Supine;Bed/Chair alarm activated;All needs within reach   History: co - morbidities including age, current experience including fall risk  Exam: impairments in systems including multiple body structures involved; neuromuscular (balance, gait, transfers), cardiopulmonary (vitals), cognition; activity limitations  (difficulties executing an action); participation restrictions (problems associated w involvement in life situations), AM-PAC  Clinical: unstable/unpredictable  Complexity:high    Rasheeda Vitale, PT

## 2024-10-15 NOTE — PLAN OF CARE
Problem: PAIN - ADULT  Goal: Verbalizes/displays adequate comfort level or baseline comfort level  Description: Interventions:  - Encourage patient to monitor pain and request assistance  - Assess pain using appropriate pain scale  - Administer analgesics based on type and severity of pain and evaluate response  - Implement non-pharmacological measures as appropriate and evaluate response  - Consider cultural and social influences on pain and pain management  - Notify physician/advanced practitioner if interventions unsuccessful or patient reports new pain  Outcome: Progressing     Problem: INFECTION - ADULT  Goal: Absence or prevention of progression during hospitalization  Description: INTERVENTIONS:  - Assess and monitor for signs and symptoms of infection  - Monitor lab/diagnostic results  - Monitor all insertion sites, i.e. indwelling lines, tubes, and drains  - Monitor endotracheal if appropriate and nasal secretions for changes in amount and color  - [place] appropriate cooling/warming therapies per order  - Administer medications as ordered  - Instruct and encourage patient and family to use good hand hygiene technique  - Identify and instruct in appropriate isolation precautions for identified infection/condition  Outcome: Progressing  Goal: Absence of fever/infection during neutropenic period  Description: INTERVENTIONS:  - Monitor WBC    Outcome: Progressing     Problem: SAFETY ADULT  Goal: Patient will remain free of falls  Description: INTERVENTIONS:  - Educate patient/family on patient safety including physical limitations  - Instruct patient to call for assistance with activity   - Consult OT/PT to assist with strengthening/mobility   - Keep Call bell within reach  - Keep bed low and locked with side rails adjusted as appropriate  - Keep care items and personal belongings within reach  - Initiate and maintain comfort rounds  - Make Fall Risk Sign visible to staff  - Offer Toileting every 3 Hours,  in advance of need  - Initiate/Maintain bed alarm  - Obtain necessary fall risk management equipment:   - Apply yellow socks and bracelet for high fall risk patients  - Consider moving patient to room near nurses station  Outcome: Progressing  Goal: Maintain or return to baseline ADL function  Description: INTERVENTIONS:  -  Assess patient's ability to carry out ADLs; assess patient's baseline for ADL function and identify physical deficits which impact ability to perform ADLs (bathing, care of mouth/teeth, toileting, grooming, dressing, etc.)  - Assess/evaluate cause of self-care deficits   - Assess range of motion  - Assess patient's mobility; develop plan if impaired  - Assess patient's need for assistive devices and provide as appropriate  - Encourage maximum independence but intervene and supervise when necessary  - Involve family in performance of ADLs  - Assess for home care needs following discharge   - Consider OT consult to assist with ADL evaluation and planning for discharge  - Provide patient education as appropriate  Outcome: Progressing  Goal: Maintains/Returns to pre admission functional level  Description: INTERVENTIONS:  - Perform AM-PAC 6 Click Basic Mobility/ Daily Activity assessment daily.  - Set and communicate daily mobility goal to care team and patient/family/caregiver.   - Collaborate with rehabilitation services on mobility goals if consulted  - Perform Range of Motion 3 times a day.  - Reposition patient every 2 hours.  - Dangle patient 3 times a day  - Stand patient 3 times a day  - Ambulate patient 3 times a day  - Out of bed to chair 3 times a day   - Out of bed for meals 3 times a day  - Out of bed for toileting  - Record patient progress and toleration of activity level   Outcome: Progressing     Problem: DISCHARGE PLANNING  Goal: Discharge to home or other facility with appropriate resources  Description: INTERVENTIONS:  - Identify barriers to discharge w/patient and caregiver  -  Arrange for needed discharge resources and transportation as appropriate  - Identify discharge learning needs (meds, wound care, etc.)  - Arrange for interpretive services to assist at discharge as needed  - Refer to Case Management Department for coordinating discharge planning if the patient needs post-hospital services based on physician/advanced practitioner order or complex needs related to functional status, cognitive ability, or social support system  Outcome: Progressing     Problem: Knowledge Deficit  Goal: Patient/family/caregiver demonstrates understanding of disease process, treatment plan, medications, and discharge instructions  Description: Complete learning assessment and assess knowledge base.  Interventions:  - Provide teaching at level of understanding  - Provide teaching via preferred learning methods  Outcome: Progressing

## 2024-10-15 NOTE — PLAN OF CARE
Problem: PAIN - ADULT  Goal: Verbalizes/displays adequate comfort level or baseline comfort level  Description: Interventions:  - Encourage patient to monitor pain and request assistance  - Assess pain using appropriate pain scale  - Administer analgesics based on type and severity of pain and evaluate response  - Implement non-pharmacological measures as appropriate and evaluate response  - Consider cultural and social influences on pain and pain management  - Notify physician/advanced practitioner if interventions unsuccessful or patient reports new pain  Outcome: Progressing     Problem: INFECTION - ADULT  Goal: Absence or prevention of progression during hospitalization  Description: INTERVENTIONS:  - Assess and monitor for signs and symptoms of infection  - Monitor lab/diagnostic results  - Monitor all insertion sites, i.e. indwelling lines, tubes, and drains  - Monitor endotracheal if appropriate and nasal secretions for changes in amount and color  - [place] appropriate cooling/warming therapies per order  - Administer medications as ordered  - Instruct and encourage patient and family to use good hand hygiene technique  - Identify and instruct in appropriate isolation precautions for identified infection/condition  Outcome: Progressing  Goal: Absence of fever/infection during neutropenic period  Description: INTERVENTIONS:  - Monitor WBC    Outcome: Progressing     Problem: SAFETY ADULT  Goal: Patient will remain free of falls  Description: INTERVENTIONS:  - Educate patient/family on patient safety including physical limitations  - Instruct patient to call for assistance with activity   - Consult OT/PT to assist with strengthening/mobility   - Keep Call bell within reach  - Keep bed low and locked with side rails adjusted as appropriate  - Keep care items and personal belongings within reach  - Initiate and maintain comfort rounds  - Make Fall Risk Sign visible to staff  - Offer Toileting every 3 Hours,  in advance of need  - Initiate/Maintain bed alarm  - Obtain necessary fall risk management equipment:   - Apply yellow socks and bracelet for high fall risk patients  - Consider moving patient to room near nurses station  Outcome: Progressing  Goal: Maintain or return to baseline ADL function  Description: INTERVENTIONS:  -  Assess patient's ability to carry out ADLs; assess patient's baseline for ADL function and identify physical deficits which impact ability to perform ADLs (bathing, care of mouth/teeth, toileting, grooming, dressing, etc.)  - Assess/evaluate cause of self-care deficits   - Assess range of motion  - Assess patient's mobility; develop plan if impaired  - Assess patient's need for assistive devices and provide as appropriate  - Encourage maximum independence but intervene and supervise when necessary  - Involve family in performance of ADLs  - Assess for home care needs following discharge   - Consider OT consult to assist with ADL evaluation and planning for discharge  - Provide patient education as appropriate  Outcome: Progressing  Goal: Maintains/Returns to pre admission functional level  Description: INTERVENTIONS:  - Perform AM-PAC 6 Click Basic Mobility/ Daily Activity assessment daily.  - Set and communicate daily mobility goal to care team and patient/family/caregiver.   - Collaborate with rehabilitation services on mobility goals if consulted  - Perform Range of Motion 3 times a day.  - Reposition patient every 2 hours.  - Dangle patient 3 times a day  - Stand patient 3 times a day  - Ambulate patient 3 times a day  - Out of bed to chair 3 times a day   - Out of bed for meals 3 times a day  - Out of bed for toileting  - Record patient progress and toleration of activity level   Outcome: Progressing     Problem: DISCHARGE PLANNING  Goal: Discharge to home or other facility with appropriate resources  Description: INTERVENTIONS:  - Identify barriers to discharge w/patient and caregiver  -  Arrange for needed discharge resources and transportation as appropriate  - Identify discharge learning needs (meds, wound care, etc.)  - Arrange for interpretive services to assist at discharge as needed  - Refer to Case Management Department for coordinating discharge planning if the patient needs post-hospital services based on physician/advanced practitioner order or complex needs related to functional status, cognitive ability, or social support system  Outcome: Progressing     Problem: Knowledge Deficit  Goal: Patient/family/caregiver demonstrates understanding of disease process, treatment plan, medications, and discharge instructions  Description: Complete learning assessment and assess knowledge base.  Interventions:  - Provide teaching at level of understanding  - Provide teaching via preferred learning methods  Outcome: Progressing

## 2024-10-15 NOTE — PLAN OF CARE
Problem: PAIN - ADULT  Goal: Verbalizes/displays adequate comfort level or baseline comfort level  Description: Interventions:  - Encourage patient to monitor pain and request assistance  - Assess pain using appropriate pain scale  - Administer analgesics based on type and severity of pain and evaluate response  - Implement non-pharmacological measures as appropriate and evaluate response  - Consider cultural and social influences on pain and pain management  - Notify physician/advanced practitioner if interventions unsuccessful or patient reports new pain  Outcome: Progressing     Problem: INFECTION - ADULT  Goal: Absence or prevention of progression during hospitalization  Description: INTERVENTIONS:  - Assess and monitor for signs and symptoms of infection  - Monitor lab/diagnostic results  - Monitor all insertion sites, i.e. indwelling lines, tubes, and drains  - Monitor endotracheal if appropriate and nasal secretions for changes in amount and color  - [place] appropriate cooling/warming therapies per order  - Administer medications as ordered  - Instruct and encourage patient and family to use good hand hygiene technique  - Identify and instruct in appropriate isolation precautions for identified infection/condition  Outcome: Progressing  Goal: Absence of fever/infection during neutropenic period  Description: INTERVENTIONS:  - Monitor WBC    Outcome: Progressing     Problem: SAFETY ADULT  Goal: Patient will remain free of falls  Description: INTERVENTIONS:  - Educate patient/family on patient safety including physical limitations  - Instruct patient to call for assistance with activity   - Consult OT/PT to assist with strengthening/mobility   - Keep Call bell within reach  - Keep bed low and locked with side rails adjusted as appropriate  - Keep care items and personal belongings within reach  - Initiate and maintain comfort rounds  - Make Fall Risk Sign visible to staff  - Offer Toileting every 2 Hours,  in advance of need  - Initiate/Maintain bed/chair alarm  - Obtain necessary fall risk management equipment: bed/chair   - Apply yellow socks and bracelet for high fall risk patients  - Consider moving patient to room near nurses station  Outcome: Progressing  Goal: Maintain or return to baseline ADL function  Description: INTERVENTIONS:  -  Assess patient's ability to carry out ADLs; assess patient's baseline for ADL function and identify physical deficits which impact ability to perform ADLs (bathing, care of mouth/teeth, toileting, grooming, dressing, etc.)  - Assess/evaluate cause of self-care deficits   - Assess range of motion  - Assess patient's mobility; develop plan if impaired  - Assess patient's need for assistive devices and provide as appropriate  - Encourage maximum independence but intervene and supervise when necessary  - Involve family in performance of ADLs  - Assess for home care needs following discharge   - Consider OT consult to assist with ADL evaluation and planning for discharge  - Provide patient education as appropriate  Outcome: Progressing  Goal: Maintains/Returns to pre admission functional level  Description: INTERVENTIONS:  - Perform AM-PAC 6 Click Basic Mobility/ Daily Activity assessment daily.  - Set and communicate daily mobility goal to care team and patient/family/caregiver.   - Collaborate with rehabilitation services on mobility goals if consulted  - Perform Range of Motion 3 times a day.  - Reposition patient every 2 hours.  - Dangle patient 3 times a day  - Stand patient 3 times a day  - Ambulate patient 3 times a day  - Out of bed to chair 3 times a day   - Out of bed for meals 3 times a day  - Out of bed for toileting  - Record patient progress and toleration of activity level   Outcome: Progressing     Problem: DISCHARGE PLANNING  Goal: Discharge to home or other facility with appropriate resources  Description: INTERVENTIONS:  - Identify barriers to discharge w/patient  and caregiver  - Arrange for needed discharge resources and transportation as appropriate  - Identify discharge learning needs (meds, wound care, etc.)  - Arrange for interpretive services to assist at discharge as needed  - Refer to Case Management Department for coordinating discharge planning if the patient needs post-hospital services based on physician/advanced practitioner order or complex needs related to functional status, cognitive ability, or social support system  Outcome: Progressing     Problem: Knowledge Deficit  Goal: Patient/family/caregiver demonstrates understanding of disease process, treatment plan, medications, and discharge instructions  Description: Complete learning assessment and assess knowledge base.  Interventions:  - Provide teaching at level of understanding  - Provide teaching via preferred learning methods  Outcome: Progressing

## 2024-10-15 NOTE — PLAN OF CARE
Problem: PHYSICAL THERAPY ADULT  Goal: Performs mobility at highest level of function for planned discharge setting.  See evaluation for individualized goals.  Description: Treatment/Interventions: Functional transfer training, LE strengthening/ROM, Therapeutic exercise, Elevations, Patient/family training, Equipment eval/education, Bed mobility, Gait training, Compensatory technique education, Endurance training, Spoke to nursing, Family, OT          See flowsheet documentation for full assessment, interventions and recommendations.  Note: Prognosis: Good     Assessment: Sg Scherer is a 74 y.o. female admitted to [facility] on 10/14/2024 for Dyspnea on exertion. PT was consulted and pt was seen on 10/15/2024 for mobility assessment and d/c planning. Pt presents w low fall risk, telemetry, +orthostatic vital signs per nsg. At baseline she is indep without an AD. Pt is currently functioning at a mod I bed mobility and transfers, S for ambulation. Pt demonstrated ability to ambulate unlimited household distances. She was mildly dizzy sitting up at the EOB which improved w time. She also reports mild dizziness after going to the bathroom, again which improved w time. See BP in flowsheet. Pt was educated on pacing and safety precautions to take to prevent lightheadedness as well as common situations that could increase likelihood of feeling dizzy. Pt verbalize understanding. Pt will benefit from 1-2 f/u sessions to ensure progression of mobility and provide generalized HEP for conditioning given complaints of fatigue.  Barriers to Discharge: None     Rehab Resource Intensity Level, PT: No post-acute rehabilitation needs    See flowsheet documentation for full assessment.

## 2024-10-15 NOTE — OCCUPATIONAL THERAPY NOTE
Occupational Therapy Evaluation     Patient Name: Sg Scherer  Today's Date: 10/15/2024  Problem List  Principal Problem:    Dyspnea on exertion  Active Problems:    Essential hypertension    Acute kidney injury superimposed on stage 3a chronic kidney disease (HCC)    Morbid obesity with BMI of 45.0-49.9, adult (HCC)    History of endometrial cancer    Acute kidney injury (HCC)    History of pulmonary embolism    Dizziness    Past Medical History  Past Medical History:   Diagnosis Date    Arthritis     osteo    Chronic kidney disease     Colon polyp     Diverticula of colon     Endometrial cancer (HCC)     2022    Hypertension     Obesity      Past Surgical History  Past Surgical History:   Procedure Laterality Date    APPENDECTOMY      BREAST BIOPSY Left 1977    benign    CHOLECYSTECTOMY      COLONOSCOPY      CYSTOSCOPY N/A 03/31/2022    Procedure: CYSTOSCOPY;  Surgeon: Gene Coburn MD;  [facility]: [facility];  Service: Gynecology Oncology    HERNIA REPAIR Right     inguinal    IR PORT PLACEMENT  05/09/2022    JOINT REPLACEMENT Bilateral     Knee    OOPHORECTOMY      ND COLONOSCOPY FLX DX W/COLLJ SPEC WHEN PFRMD N/A 02/21/2017    Procedure: COLONOSCOPY with polypectomy and hemo clip marjan.;  Surgeon: Ramirez Jones MD;  Location: [facility];  Service: Gastroenterology    ND LAPS TOTAL HYSTERECT 250 GM/< W/RMVL TUBE/OVARY N/A 03/31/2022    Procedure: ROBOTIC HYSTERECTOMY, BILATERAL SALPINGO-OOPHORECTOMY, STAGING PERITONEAL AND OMENTAL BIOPSIES, BILATERAL PELVIC SENTINEL LYMPH NODE BIOPSIES;  Surgeon: Gene Coburn MD;  [facility];  Service: Gynecology Oncology           10/15/24 1030   OT Last Visit   OT Visit Date 10/15/24   Note Type   Note type Evaluation   Pain Assessment   Pain Assessment Tool 0-10   Pain Score No Pain   Restrictions/Precautions   Weight Bearing Precautions Per Order No   Other Precautions Telemetry;Fall Risk   Home Living   Type of Home House   Home Layout Two  level;Stairs to enter with rails;Bed/bath upstairs   Bathroom Shower/Tub Tub/shower unit   Bathroom Toilet   (has both standard and raised)   Home Equipment Walker;Cane   Additional Comments Pt lives with sister and son in a two level house with 4 IGOR and FOS to 2nd floor bed/bath.   Prior Function   Level of [place] Independent with ADLs;Independent with functional mobility;Independent with IADLS   Lives With Family  (Sister, son)   IADLs Independent with medication management;Family/Friend/Other provides transportation;Family/Friend/Other provides meals   Falls in the last 6 months 0   Vocational Retired   Comments At baseline, pt was I w/ ADLs, required assist w/ IADLs, and I w/ functional transfers/mobility w/o use of AD. (-) . Denies falls PTA   Lifestyle   Autonomy At baseline, pt was I w/ ADLs, required assist w/ IADLs, and I w/ functional transfers/mobility w/o use of AD. (-) . Denies falls PTA   Reciprocal Relationships Sister, son   Service to Others Retired   ADL   Where Assessed Edge of bed   Eating Assistance 7  Independent   Grooming Assistance 6  Modified Independent   UB Bathing Assistance 6  Modified Independent   LB Bathing Assistance 5  Supervision/Setup   UB Dressing Assistance 6  Modified independent   LB Dressing Assistance 5  Supervision/Setup   Toileting Assistance  5  Supervision/Setup   Bed Mobility   Supine to Sit 6  Modified independent   Additional items HOB elevated   Sit to Supine 6  Modified independent   Additional items HOB elevated   Additional Comments +dizziness initially w/ sitting EOB. Pt reports symptoms resolved w/ seated rest break. Pt lying supine at end of session. Call bell and phone within reach. All needs met and pt reports no further questions for OT at this time.   Transfers   Sit to Stand 6  Modified independent   Additional items   (RW)   Stand to Sit 6  Modified independent   Additional items Increased time required   Toilet transfer 6  Modified  independent   Additional items Standard toilet   Functional Mobility   Functional Mobility 5  Supervision   Additional Comments w/o use of AD. Pt required 1-2 seated rest breaks 2* fatigue/ROSAS. Pt also c/o dizziness post-mobility on 1st trial. BP seated EOB post-mobility: 108/68. HR: 77 bpm   Balance   Static Sitting Fair +   Dynamic Sitting Fair   Static Standing Fair   Dynamic Standing Fair -   Ambulatory Fair -   Activity Tolerance   Activity Tolerance Patient tolerated treatment well;Patient limited by fatigue   Medical Staff Made Aware Rasheeda, PT   Nurse Made Aware yes; KALA Cuevas   RUE Assessment   RUE Assessment WFL  (4/5 throughout)   LUE Assessment   LUE Assessment WFL  (4/5 throughout)   Hand Function   Gross Motor Coordination Functional   Fine Motor Coordination Functional   Sensation   Light Touch No apparent deficits   Proprioception   Proprioception No apparent deficits   Vision-Basic Assessment   Current Vision Wears glasses only for reading   Vision - Complex Assessment   Ocular Range of Motion Intact   Acuity Able to read clock/calendar on wall without difficulty;Able to read employee name badge without difficulty   Psychosocial   Psychosocial (WDL) WDL   Perception   Inattention/Neglect Appears intact   Cognition   Overall Cognitive Status WFL   Arousal/Participation Alert;Cooperative   Attention Within functional limits   Orientation Level Oriented X4   Memory Decreased recall of precautions   Following Commands Follows all commands and directions without difficulty   Assessment   Prognosis Good   Assessment Pt is a 74 y.o. female seen for OT evaluation s/p adm to [facility] on 10/14/2024 w/ Dyspnea on exertion, BARRY. Comorbidities affecting pt’s functional performance include a significant PMH of Endometrial CA, PE, Arthritis, CKD, HTN, Obesity. Pt with active OT orders. Pt lives with sister and son in a two level house with 4 IGOR and FOS to 2nd floor bed/bath. At baseline, pt was I w/  ADLs, required assist w/ IADLs, and I w/ functional transfers/mobility w/o use of AD. (-) . Denies falls PTA. Upon evaluation, pt currently functioning at a Supervision-Mod I level for ADLs, Mod I for bed mobility, Mod I for transfers, and Supervision for functional mobility 2* the following deficits impacting occupational performance: decreased balance, decreased activity tolerance, limited functional reach, dizziness, and ROSAS. Pt with the following personal factors of: IGOR home environment, steps within home environment, and fall risk . Despite above mentioned deficits and personal factors, pt is functioning near baseline level of performance. Limited ADL deficits. No further acute OT needs identified at this time. Recommend continued mobilization with hospital staff and restorative program while in the hospital to increase pt’s endurance and strength upon D/C. The patient's raw score on the AM-PAC Daily Activity Inpatient Short Form is 21. A raw score of greater than or equal to 19 suggests the patient may benefit from discharge to home. Please refer to the recommendation of the Occupational Therapist for safe discharge planning. D/C pt from OT caseload at this time.   Plan   OT Frequency Eval only  (D/C OT. Maintain on restorative program)   Discharge Recommendation   Rehab Resource Intensity Level, OT No post-acute rehabilitation needs   AM-PAC Daily Activity Inpatient   Lower Body Dressing 3   Bathing 3   Toileting 3   Upper Body Dressing 4   Grooming 4   Eating 4   Daily Activity Raw Score 21   Daily Activity Standardized Score (Calc for Raw Score >=11) 44.27   AM-PAC Applied Cognition Inpatient   Following a Speech/Presentation 4   Understanding Ordinary Conversation 4   Taking Medications 3   Remembering Where Things Are Placed or Put Away 4   Remembering List of 4-5 Errands 4   Taking Care of Complicated Tasks 3   Applied Cognition Raw Score 22   Applied Cognition Standardized Score 47.83       Leann  Leticia OTR/L

## 2024-10-15 NOTE — CASE MANAGEMENT
Case Management Assessment & Discharge Planning Note    Patient name Sg Scherer  [facility] -* MRN [id number]  : 1950 Date 10/15/2024       Current Admission Date: 10/14/2024  Current Admission Diagnosis:Dizziness   Patient Active Problem List    Diagnosis Date Noted Date Diagnosed    Acute kidney injury (HCC) 10/14/2024     History of pulmonary embolism 10/14/2024     Dizziness 10/14/2024     Dyspnea on exertion 2024     Anemia due to stage 3a chronic kidney disease  2023     Secondary hyperparathyroidism of renal origin (HCC) 2023     Secondary hyperparathyroidism (HCC) 2023     History of endometrial cancer 2023     Osteoporosis without current pathological fracture 2023     Chemotherapy induced neutropenia (HCC) 2022     Insomnia 2022     Encounter for central line care 2022     Morbid obesity with BMI of 45.0-49.9, adult (HCC) 2022     Encounter for follow-up surveillance of endometrial cancer 03/15/2022     Acute pulmonary embolism without acute cor pulmonale (HCC) 2022     Acute kidney injury superimposed on stage 3a chronic kidney disease (HCC) 2020     Essential hypertension 2018       LOS (days): 1  Geometric Mean LOS (GMLOS) (days): 2.2  Days to GMLOS:1.2     OBJECTIVE:    Risk of Unplanned Readmission Score: 12.37      Current admission status: Inpatient    Preferred Pharmacy:   [id number] [place] - [address]  [address] [facility] [address]  Phone: [phone] Fax: [phone]    [facility] [place] - [address]  [address] [facility] [address] [facility] [address]  Phone: [phone] Fax: [phone]    [facility]  [place] - [address],  [address],  First [address] [facility] [address]  Phone: [phone] Fax: [phone]    [facility] - [address]  [address]  [address]  [facility] [address]  Phone: [phone] Fax: [phone]    Primary Care Provider: Thad Savage MD    Primary Insurance: [employer] [facility]  Secondary Insurance:     ASSESSMENT:    Readmission Root Cause  30 Day Readmission: No    Patient Information  Admitted from:: Home  Mental Status: Alert  During Assessment patient was accompanied by: Other-Comment (family)  Assessment information provided by:: Patient  Primary Caregiver: Self  Support Systems: Self, Family members  County of Residence: [place] [facility] entry access options. Select all that apply.: Stairs  Number of steps to enter home.: 3  Type of Current Residence: 2 [place] home  Upon entering residence, is there a bedroom on the main floor (no further steps)?: No  A bedroom is located on the following floor levels of residence (select all that apply):: 2nd Floor  Number of steps to 2nd floor from main floor: One Flight  Living Arrangements: Lives w/ Son, Lives w/ Family members, Lives w/ Extended Family  Is patient a ?: No    Activities of Daily Living Prior to Admission  Functional Status: Independent  Completes ADLs independently?: Yes  Ambulates independently?: Yes  Does patient use assisted devices?: No  Does patient currently own DME?: No  Does patient have a history of Outpatient Therapy (PT/OT)?: No  Does the patient have a history of Short-Term Rehab?: No  Does patient have a history of HHC?: No  Does patient currently have HHC?: No    Patient Information Continued  Does patient have prescription coverage?: Yes  Does patient receive dialysis treatments?: No  Does patient have a history of Mental Health Diagnosis?: No    Means of Transportation  [facility]    Social Determinants of Health (SDOH)      Flowsheet Row Most Recent Value   Housing Stability    In the last 12 months, was there a time when you were not able to pay  the mortgage or rent on time? N   In the past 12 months, how many times have you moved where you were living? 0   At any time in the past 12 months, were you homeless or living in a shelter (including now)? N   Transportation Needs    In the past 12 months, has lack of transportation kept you from medical appointments or from getting medications? no   In the past 12 months, has lack of transportation kept you from meetings, work, or from getting things needed for daily living? No   Food Insecurity    Within the past 12 months, you worried that your food would run out before you got the money to buy more. Never true   Within the past 12 months, the food you bought just didn't last and you didn't have money to get more. Never true   Mikro Odeme | [employer]    In the past 12 months has the electric, gas, oil, or water company threatened to shut off services in your home? No     DISCHARGE DETAILS:    Discharge planning discussed with:: pt     CM contacted family/caregiver?: Yes  Were Treatment Team discharge recommendations reviewed with patient/caregiver?: Yes  Did patient/caregiver verbalize understanding of patient care needs?: Yes  Were patient/caregiver advised of the risks associated with not following Treatment Team discharge recommendations?: Yes    Contacts  Patient Contacts: Marimar Reyes ()  [phone] (P)  Relationship to Patient:: Family  Contact Method: Phone  Phone Number: Marimar Garcia)  [phone] (Q)  Reason/Outcome: Discharge Planning, Emergency Contact    Requested Home Health Care         Is the patient interested in HHC at discharge?: No    DME Referral Provided  Referral made for DME?: No    Treatment Team Recommendation: Home  Discharge Destination Plan:: Home  Transport at Discharge : Family     Additional Comments: CM met with pt and reviewed cm role. Pt's family was present. SHe lives with family in a [facility] with 3 IGOR. B/b are on 2nd floor. Pt is ind with no use of DME. Pt has no hx of  STR/ outpt PT, HHC or MH/D/A tx. Pt's family will transport home. No anticiapted dc needs.

## 2024-10-15 NOTE — PROGRESS NOTES
Progress Note - Hospitalist   Name: Sg Scherer 74 y.o. female I MRN: [id number]  Unit/Bed#: E4 -01 I Date of Admission: 10/14/2024   Date of Service: 10/15/2024 I Hospital Day: 1    Assessment & Plan  Dizziness  Suspect this is due to orthostatic hypotension and possible side effect of wegovy and dehydration  Discussed with patient and rest of family members regarding stopping/holding Wegovy and they were in agreement.  She stated that she previously weighed 258 pounds and is now 212 pounds  Hold Wegovy and follow-up with PCP.  Acute kidney injury superimposed on stage 3a chronic kidney disease (HCC)  Due to prerenal azotemia from poor oral intake  Resume IV fluids  Check BMP in a.m.   History of pulmonary embolism  Continue Eliquis 5 mg twice daily  History of endometrial cancer  Most recent CT showed no sign of metastatic disease or recurrence  Follow-up with GYN as scheduled in November  Morbid obesity with BMI of 45.0-49.9, adult (HCC)  She has lost 46 pounds since starting Wegovy 4 months ago.    VTE Pharmacologic Prophylaxis: VTE Score: 5 High Risk (Score >/= 5) - Pharmacological DVT Prophylaxis Ordered: apixaban (Eliquis). Sequential Compression Devices Ordered.    Discussions with Specialists or Other Care Team Provider: Case management.  H&P reviewed  Education and Discussions with Family / Patient: Discussed with multiple family members at bedside    Current Length of Stay: 1 day(s)  Current Patient Status: Inpatient   Certification Statement: The patient will continue to require additional inpatient hospital stay due to BARRY  Discharge Plan: Anticipate discharge tomorrow to home.    Code Status: Level 1 - Full Code    Subjective   Seen and examined  She has no symptoms as long as she is laying  She gets dizzy when standing from sitting position    Objective :  Temp:  [96.9 °F (36.1 °C)-97.9 °F (36.6 °C)] 97.9 °F (36.6 °C)  HR:  [74-85] 76  BP: ()/(50-64) 99/61  Resp:  [18-20] 18  SpO2:   [95 %-99 %] 99 %  O2 Device: None (Room air)    Body mass index is 36.39 kg/m².     Input and Output Summary (last 24 hours):     Intake/Output Summary (Last 24 hours) at 10/15/2024 1403  Last data filed at 10/15/2024 0826  Gross per 24 hour   Intake 900 ml   Output 1150 ml   Net -250 ml       Physical Exam  Vitals reviewed.   Constitutional:       Appearance: She is obese. She is not ill-appearing.   HENT:      Head: Normocephalic and atraumatic.      Nose: No congestion or rhinorrhea.   Eyes:      General: No scleral icterus.  Cardiovascular:      Rate and Rhythm: Normal rate and regular rhythm.   Pulmonary:      Breath sounds: No wheezing or rhonchi.   Abdominal:      Palpations: Abdomen is soft.      Tenderness: There is no abdominal tenderness. There is no guarding.   Musculoskeletal:      Right lower leg: No edema.      Left lower leg: No edema.   Skin:     General: Skin is warm and dry.   Neurological:      Mental Status: She is oriented to person, place, and time.   Psychiatric:         Mood and Affect: Mood normal.         Behavior: Behavior normal.     Lines/Drains:  Lines/Drains/Airways       Active Status       Name Placement date Placement time Site Days    Port A Cath 05/09/22 Right Chest 05/09/22  1100  Chest  890                       Telemetry:  Telemetry Orders (From admission, onward)               24 Hour Telemetry Monitoring  Continuous x 24 Hours (Telem)        Expiring   Question:  Reason for 24 Hour Telemetry  Answer:  Decompensated CHF- and any one of the following: continuous diuretic infusion or total diuretic dose >200 mg daily, associated electrolyte derangement (I.e. K < 3.0), ionotropic drip (continuous infusion), hx of ventricular arrhythmia, or new EF < 35%                     T             Lab Results: I have reviewed the following results:   Results from last 7 days   Lab Units 10/15/24  0518   WBC Thousand/uL 2.91*   HEMOGLOBIN g/dL 11.2*   HEMATOCRIT % 34.4*   PLATELETS  Thousands/uL 140*   SEGS PCT % 57   LYMPHO PCT % 30   MONO PCT % 11   EOS PCT % 2     Results from last 7 days   Lab Units 10/15/24  0518 10/14/24  1204   SODIUM mmol/L 140 142   POTASSIUM mmol/L 3.4* 3.4*   CHLORIDE mmol/L 102 100   CO2 mmol/L 31 31   BUN mg/dL 32* 34*   CREATININE mg/dL 1.33* 1.66*   ANION GAP mmol/L 7 11   CALCIUM mg/dL 8.6 9.7   ALBUMIN g/dL  --  3.9   TOTAL BILIRUBIN mg/dL  --  0.64   ALK PHOS U/L  --  40   ALT U/L  --  25   AST U/L  --  20   GLUCOSE RANDOM mg/dL 88 118         Results from last 7 days   Lab Units 10/14/24  1059   POC GLUCOSE mg/dl 115               Recent Cultures (last 7 days):         Imaging Results Review: I reviewed radiology reports from this admission including: Echocardiogram.      Last 24 Hours Medication List:     Current Facility-Administered Medications:     acetaminophen (TYLENOL) tablet 650 mg, Q4H PRN    apixaban (ELIQUIS) tablet 5 mg, BID    [START ON 10/16/2024] calcitriol (ROCALTROL) capsule 0.25 mcg, Once per day on Monday Wednesday Friday    Cholecalciferol (VITAMIN D3) tablet 1,000 Units, Daily    famotidine (PEPCID) tablet 40 mg, Daily    multi-electrolyte (PLASMALYTE-A/ISOLYTE-S PH 7.4) IV solution, Continuous    ondansetron (ZOFRAN) injection 4 mg, Q6H PRN    Administrative Statements   Today, Patient Was Seen By: Oc Ziegler MD      **Please Note: This note may have been constructed using a voice recognition system.**

## 2024-10-15 NOTE — UTILIZATION REVIEW
Initial Clinical Review    Admission: Date/Time/Statement:   Admission Orders (From admission, onward)       Ordered        10/14/24 1413  Inpatient Admission  Once            10/14/24 1408  INPATIENT ADMISSION  Once                          Orders Placed This Encounter   Procedures    INPATIENT ADMISSION     Standing Status:   Standing     Number of Occurrences:   1     Order Specific Question:   Level of Care     Answer:   Med Surg [16]     Order Specific Question:   Estimated length of stay     Answer:   More than 2 Midnights     Order Specific Question:   Certification     Answer:   I certify that inpatient services are medically necessary for this patient for a duration of greater than two midnights. See H&P and MD Progress Notes for additional information about the patient's course of treatment.    Inpatient Admission     Standing Status:   Standing     Number of Occurrences:   1     Order Specific Question:   Level of Care     Answer:   Med Surg [16]     Order Specific Question:   Estimated length of stay     Answer:   More than 2 Midnights     Order Specific Question:   Certification     Answer:   I certify that inpatient services are medically necessary for this patient for a duration of greater than two midnights. See H&P and MD Progress Notes for additional information about the patient's course of treatment.     ED Arrival Information       Expected   -    Arrival   10/14/2024 10:30    Acuity   Urgent              Means of arrival   Wheelchair    Escorted by   Family Member    Service   Hospitalist    Admission type   Emergency              Arrival complaint   sob             Chief Complaint   Patient presents with    Shortness of Breath     Pt reports SOB that worsened today with exertion. Pt reports chest pressure       Initial Presentation: 74 y.o. female presents to the ED from home with c/o ROSAS, increased BLE edema, no c/o CP x several wks to months and acute knee injury, presyncopal symptoms today.  Not improved on recently started Torsemide on 10/4.  PMH: Endometrial CA, h/o PE on Eliquis, on Wegovy.  In the ED VS and sat WNL.  Labs - elevated BUN/Cr, low K.  Imaging - no acute disease.  ECG - NSR.  On exam HJR 1/2 way up, BLE 1 +.  Admitted to INPATIENT status with ROSAS, increased BLE edema, BARRY w/ presyncope (likely d/t Torsemide), probable new CHF - echo, r/o MI, may need CTA chest when kidney function improved, continue Eliquis, hold diuretics, IV Fluids x 1 liter - slowly. Doubt Eliquis failure.       Date: 10/15   Day 2:   Dizziness, ROSAS, increased BLE edema, BARRY w/ presyncope (likely d/t Torsemide), probable new CHF - suspect orthostatic hypotension and poss SE of Wegovy and dehydration,  stop Wegovy, resume IV fluids, trend BMP, lost 46 lbs on Wegovy.  On exam asymptomatic while laying down.  No abd tenderness, lungs clear.   BUN/CR, trending down, K same 3.4.  Worked with PT today - mild ROSAS and fatigue post ambulation. require seated rest break ambulating. Has some soft BP today.      10/15 Cardio Consult - c/o ROSAS, presyncope, dizziness, BARRY On CKD, not eating much on Wegovy, keeps hydrated. No c/o CP, headaches, weakness legs.  On exam extrasystoles, irreg rhythm, no wheezing, rales, A&O x 3.      ED Treatment-Medication Administration from 10/14/2024 1030 to 10/14/2024 1517       None            Scheduled Medications:  apixaban, 5 mg, Oral, BID  [START ON 10/16/2024] calcitriol, 0.25 mcg, Oral, Once per day on Monday Wednesday Friday  cholecalciferol, 1,000 Units, Oral, Daily  famotidine, 40 mg, Oral, Daily      Continuous IV Infusions:  multi-electrolyte, 100 mL/hr, Intravenous, Continuous      PRN Meds:  acetaminophen, 650 mg, Oral, Q4H PRN  ondansetron, 4 mg, Intravenous, Q6H PRN      ED Triage Vitals   Temperature Pulse Respirations Blood Pressure SpO2 Pain Score   10/14/24 1033 10/14/24 1033 10/14/24 1033 10/14/24 1033 10/14/24 1033 10/14/24 1529   98 °F (36.7 °C) 98 16 128/87 100 % No Pain      Weight (last 2 days)       Date/Time Weight    10/15/24 0915 96.2 (212)    10/14/24 1529 96.4 (212.52)            Vital Signs (last 3 days)       Date/Time Temp Pulse Resp BP MAP (mmHg) SpO2 O2 Device Patient Position - Orthostatic VS Pain    10/15/24 1506 97.5 °F (36.4 °C) 77 16 100/71 77 100 % None (Room air) Sitting --    10/15/24 1116 97.9 °F (36.6 °C) 76 18 99/61 67 99 % None (Room air) Lying --    10/15/24 1030 -- -- -- -- -- -- -- -- No Pain    10/15/24 1029 -- -- -- -- -- -- -- -- No Pain    10/15/24 0915 -- 78 -- 82/50 -- -- -- Standing - Orthostatic VS --    10/15/24 0914 -- 79 -- 84/51 -- -- -- Sitting - Orthostatic VS --    10/15/24 0913 -- 75 -- 92/54 -- -- -- Lying - Orthostatic VS --    10/15/24 0815 -- -- -- -- -- -- -- -- No Pain    10/15/24 0730 -- -- -- -- -- -- -- -- No Pain    10/15/24 0723 97.6 °F (36.4 °C) 74 18 91/58 66 98 % None (Room air) Lying --    10/15/24 0337 96.9 °F (36.1 °C) 77 18 89/53 -- 95 % None (Room air) Lying --    10/14/24 2323 96.9 °F (36.1 °C) 83 18 102/55 71 96 % None (Room air) Lying --    10/14/24 1927 97.4 °F (36.3 °C) 80 18 99/58 -- 95 % None (Room air) Lying No Pain    10/14/24 1543 97.8 °F (36.6 °C) 85 20 111/64 -- 98 % None (Room air) Lying --    10/14/24 1530 -- -- -- -- -- -- -- -- No Pain    10/14/24 1529 -- -- -- -- -- -- -- -- No Pain    10/14/24 1406 -- 81 18 112/62 -- 98 % None (Room air) Lying --    10/14/24 1033 98 °F (36.7 °C) 98 16 128/87 -- 100 % -- -- --              Pertinent Labs/Diagnostic Test Results:   Radiology:  XR chest 2 views   Final Interpretation by Dmitry Thornton MD (10/14 1630)      No acute cardiopulmonary disease.     Cardiology:  Echo complete   Final Result by Harish Coombs DO (10/15 1128)        Left Ventricle: Left ventricular cavity size is normal. Wall thickness    is mildly increased. The left ventricular ejection fraction is 62%.    Systolic function is normal. Wall motion is normal. Diastolic function is    mildly  abnormal, consistent with grade I (abnormal) relaxation.     Mitral Valve: There is mild annular calcification.     Tricuspid Valve: There is mild regurgitation.         ECG 12 lead   Final Result by Neptali Palomares MD (10/14 1803)   Age and gender specific ECG analysis   Sinus rhythm with Premature supraventricular complexes   Otherwise normal ECG   When compared with ECG of 14-OCT-2024 10:56,   Previous ECG has undetermined rhythm, needs review   Confirmed by Neptali Palomares ([id number]) on 10/14/2024 6:03:14 PM      ECG 12 lead   Final Result by Neptali Palomares MD (10/14 9026)   Age and gender specific ECG analysis    Sinus rhythm   Baseline artifact   RSR' or QR pattern in V1 suggests right ventricular conduction delay   Abnormal ECG   When compared with ECG of 02-JAN-2024 14:40,   RSR' pattern in V1 is now Present   Confirmed by Neptali Palomares ([id number]) on 10/14/2024 11:36:19 AM        GI:  No orders to display           Results from last 7 days   Lab Units 10/15/24  0518 10/14/24  1204 10/09/24  1314   WBC Thousand/uL 2.91* 4.32  --    HEMOGLOBIN g/dL 11.2* 12.7  --    I STAT HEMOGLOBIN g/dl  --   --  12.2   HEMATOCRIT % 34.4* 37.8  --    HEMATOCRIT, ISTAT %  --   --  36   PLATELETS Thousands/uL 140* 149  --    TOTAL NEUT ABS Thousands/µL 1.63* 2.99  --          Results from last 7 days   Lab Units 10/15/24  0518 10/14/24  1204 10/09/24  1314   SODIUM mmol/L 140 142  --    POTASSIUM mmol/L 3.4* 3.4*  --    CHLORIDE mmol/L 102 100  --    CO2 mmol/L 31 31  --    CO2, I-STAT mmol/L  --   --  26   ANION GAP mmol/L 7 11  --    BUN mg/dL 32* 34*  --    CREATININE mg/dL 1.33* 1.66*  --    EGFR ml/min/1.73sq m 38 29  --    CALCIUM mg/dL 8.6 9.7  --    CALCIUM, IONIZED, ISTAT mmol/L  --   --  1.10*   MAGNESIUM mg/dL 2.0  --   --      Results from last 7 days   Lab Units 10/14/24  1204   AST U/L 20   ALT U/L 25   ALK PHOS U/L 40   TOTAL PROTEIN g/dL 7.4   ALBUMIN g/dL 3.9   TOTAL BILIRUBIN mg/dL 0.64      Results from last 7 days   Lab Units 10/14/24  1059   POC GLUCOSE mg/dl 115     Results from last 7 days   Lab Units 10/15/24  0518 10/14/24  1204   GLUCOSE RANDOM mg/dL 88 118     Results from last 7 days   Lab Units 10/09/24  1314   I STAT BASE EXC mmol/L 1   I STAT O2 SAT % 96*   ISTAT PH ART  7.446   I STAT ART PCO2 mm HG 36.1   I STAT ART PO2 mm HG 77.0   I STAT ART HCO3 mmol/L 24.9         Results from last 7 days   Lab Units 10/14/24  1602 10/14/24  1402 10/14/24  1204   HS TNI 0HR ng/L  --   --  6   HS TNI 2HR ng/L  --  7  --    HSTNI D2 ng/L  --  1  --    HS TNI 4HR ng/L 7  --   --    HSTNI D4 ng/L 1  --   --              Results from last 7 days   Lab Units 10/14/24  1602 10/14/24  1204   TSH 3RD GENERATON uIU/mL 1.315 1.922         Results from last 7 days   Lab Units 10/15/24  0518   BNP pg/mL 61     Results from last 7 days   Lab Units 10/14/24  1217   CLARITY UA  Clear   COLOR UA  Light Yellow   SPEC GRAV UA  1.010   PH UA  5.0   GLUCOSE UA mg/dl Negative   KETONES UA mg/dl Negative   BLOOD UA  Negative   PROTEIN UA mg/dl Negative   NITRITE UA  Negative   BILIRUBIN UA  Negative   UROBILINOGEN UA (BE) mg/dl <2.0   LEUKOCYTES UA  Negative     Past Medical History:   Diagnosis Date    Arthritis     osteo    Chronic kidney disease     Colon polyp     Diverticula of colon     Endometrial cancer (HCC)     2022    Hypertension     Obesity      Present on Admission:   Dyspnea on exertion   Essential hypertension   Acute kidney injury superimposed on stage 3a chronic kidney disease (HCC)      Admitting Diagnosis: Shortness of breath [R06.02]  LBBB (left bundle branch block) [I44.7]  Dyspnea [R06.00]  BRARY (acute kidney injury) (HCC) [N17.9]  Age/Sex: 74 y.o. female    Network Utilization Review Department  ATTENTION: Please call with any questions or concerns to [phone] and carefully listen to the prompts so that you are directed to the right person. All voicemails are confidential.   For Discharge  needs, contact Care Management DC Support Team at [phone] opt. [phone]  Send all requests for admission clinical reviews, approved or denied determinations and any other requests to dedicated fax number below belonging to the campus where the patient is receiving treatment. List of dedicated fax numbers for the Facilities:  FACILITY NAME UR FAX NUMBER   ADMISSION DENIALS (Administrative/Medical Necessity) [phone]   DISCHARGE SUPPORT TEAM (NETWORK) [phone]   PARENT CHILD HEALTH (Maternity/NICU/Pediatrics) [phone]   [facility] [phone]   [facility] [phone]   [facility] [phone]   [facility] [phone]   [facility] [phone]   [facility] [phone]   [facility] [phone]   [facility] [phone]   [facility] [phone]   [facility] [phone]   General acute hospital [phone]   [facility] [phone]

## 2024-10-15 NOTE — ASSESSMENT & PLAN NOTE
Most recent CT showed no sign of metastatic disease or recurrence  Follow-up with GYN as scheduled in November

## 2024-10-15 NOTE — ASSESSMENT & PLAN NOTE
Suspect this is due to orthostatic hypotension and possible side effect of wegovy and dehydration  Discussed with patient and rest of family members regarding stopping/holding Wegovy and they were in agreement.  She stated that she previously weighed 258 pounds and is now 212 pounds  Hold Wegovy and follow-up with PCP.

## 2024-10-16 PROBLEM — D61.818 PANCYTOPENIA (HCC): Status: ACTIVE | Noted: 2024-10-16

## 2024-10-16 PROBLEM — I95.1 ORTHOSTATIC HYPOTENSION: Status: ACTIVE | Noted: 2024-10-16

## 2024-10-16 LAB
ANION GAP SERPL CALCULATED.3IONS-SCNC: 6 MMOL/L (ref 4–13)
BASOPHILS # BLD AUTO: 0.02 THOUSANDS/ΜL (ref 0–0.1)
BASOPHILS NFR BLD AUTO: 1 % (ref 0–1)
BUN SERPL-MCNC: 27 MG/DL (ref 5–25)
CALCIUM SERPL-MCNC: 8.8 MG/DL (ref 8.4–10.2)
CHLORIDE SERPL-SCNC: 101 MMOL/L (ref 96–108)
CO2 SERPL-SCNC: 33 MMOL/L (ref 21–32)
CREAT SERPL-MCNC: 1.14 MG/DL (ref 0.6–1.3)
EOSINOPHIL # BLD AUTO: 0.07 THOUSAND/ΜL (ref 0–0.61)
EOSINOPHIL NFR BLD AUTO: 2 % (ref 0–6)
ERYTHROCYTE [DISTWIDTH] IN BLOOD BY AUTOMATED COUNT: 13.8 % (ref 11.6–15.1)
GFR SERPL CREATININE-BSD FRML MDRD: 46 ML/MIN/1.73SQ M
GLUCOSE SERPL-MCNC: 99 MG/DL (ref 65–140)
HCT VFR BLD AUTO: 34.4 % (ref 34.8–46.1)
HGB BLD-MCNC: 11.3 G/DL (ref 11.5–15.4)
IMM GRANULOCYTES # BLD AUTO: 0.01 THOUSAND/UL (ref 0–0.2)
IMM GRANULOCYTES NFR BLD AUTO: 0 % (ref 0–2)
LYMPHOCYTES # BLD AUTO: 0.85 THOUSANDS/ΜL (ref 0.6–4.47)
LYMPHOCYTES NFR BLD AUTO: 25 % (ref 14–44)
MCH RBC QN AUTO: 33.7 PG (ref 26.8–34.3)
MCHC RBC AUTO-ENTMCNC: 32.8 G/DL (ref 31.4–37.4)
MCV RBC AUTO: 103 FL (ref 82–98)
MONOCYTES # BLD AUTO: 0.29 THOUSAND/ΜL (ref 0.17–1.22)
MONOCYTES NFR BLD AUTO: 9 % (ref 4–12)
NEUTROPHILS # BLD AUTO: 2.15 THOUSANDS/ΜL (ref 1.85–7.62)
NEUTS SEG NFR BLD AUTO: 63 % (ref 43–75)
NRBC BLD AUTO-RTO: 0 /100 WBCS
PLATELET # BLD AUTO: 140 THOUSANDS/UL (ref 149–390)
PMV BLD AUTO: 10.4 FL (ref 8.9–12.7)
POTASSIUM SERPL-SCNC: 3.2 MMOL/L (ref 3.5–5.3)
RBC # BLD AUTO: 3.35 MILLION/UL (ref 3.81–5.12)
SODIUM SERPL-SCNC: 140 MMOL/L (ref 135–147)
WBC # BLD AUTO: 3.39 THOUSAND/UL (ref 4.31–10.16)

## 2024-10-16 PROCEDURE — 80048 BASIC METABOLIC PNL TOTAL CA: CPT | Performed by: INTERNAL MEDICINE

## 2024-10-16 PROCEDURE — 99232 SBSQ HOSP IP/OBS MODERATE 35: CPT | Performed by: INTERNAL MEDICINE

## 2024-10-16 PROCEDURE — 85025 COMPLETE CBC W/AUTO DIFF WBC: CPT | Performed by: INTERNAL MEDICINE

## 2024-10-16 RX ORDER — MIDODRINE HYDROCHLORIDE 2.5 MG/1
2.5 TABLET ORAL
Status: DISCONTINUED | OUTPATIENT
Start: 2024-10-16 | End: 2024-10-17

## 2024-10-16 RX ORDER — POTASSIUM CHLORIDE 1500 MG/1
40 TABLET, EXTENDED RELEASE ORAL ONCE
Status: COMPLETED | OUTPATIENT
Start: 2024-10-16 | End: 2024-10-16

## 2024-10-16 RX ADMIN — ACETAMINOPHEN 650 MG: 325 TABLET, FILM COATED ORAL at 07:32

## 2024-10-16 RX ADMIN — MIDODRINE HYDROCHLORIDE 2.5 MG: 2.5 TABLET ORAL at 16:59

## 2024-10-16 RX ADMIN — APIXABAN 5 MG: 5 TABLET, FILM COATED ORAL at 07:34

## 2024-10-16 RX ADMIN — Medication 1000 UNITS: at 07:32

## 2024-10-16 RX ADMIN — CALCITRIOL 0.25 MCG: 0.25 CAPSULE, LIQUID FILLED ORAL at 07:32

## 2024-10-16 RX ADMIN — POTASSIUM CHLORIDE 40 MEQ: 1500 TABLET, EXTENDED RELEASE ORAL at 09:34

## 2024-10-16 RX ADMIN — APIXABAN 5 MG: 5 TABLET, FILM COATED ORAL at 17:00

## 2024-10-16 NOTE — ASSESSMENT & PLAN NOTE
Not new. She has had mild pancytopenia since January  ? Due to blood draws from the port  Check cbc in am via peripheral IV

## 2024-10-16 NOTE — PLAN OF CARE
Problem: PAIN - ADULT  Goal: Verbalizes/displays adequate comfort level or baseline comfort level  Description: Interventions:  - Encourage patient to monitor pain and request assistance  - Assess pain using appropriate pain scale  - Administer analgesics based on type and severity of pain and evaluate response  - Implement non-pharmacological measures as appropriate and evaluate response  - Consider cultural and social influences on pain and pain management  - Notify physician/advanced practitioner if interventions unsuccessful or patient reports new pain  Outcome: Progressing     Problem: INFECTION - ADULT  Goal: Absence or prevention of progression during hospitalization  Description: INTERVENTIONS:  - Assess and monitor for signs and symptoms of infection  - Monitor lab/diagnostic results  - Monitor all insertion sites, i.e. indwelling lines, tubes, and drains  - Monitor endotracheal if appropriate and nasal secretions for changes in amount and color  - [place] appropriate cooling/warming therapies per order  - Administer medications as ordered  - Instruct and encourage patient and family to use good hand hygiene technique  - Identify and instruct in appropriate isolation precautions for identified infection/condition  Outcome: Progressing  Goal: Absence of fever/infection during neutropenic period  Description: INTERVENTIONS:  - Monitor WBC    Outcome: Progressing     Problem: SAFETY ADULT  Goal: Patient will remain free of falls  Description: INTERVENTIONS:  - Educate patient/family on patient safety including physical limitations  - Instruct patient to call for assistance with activity   - Consult OT/PT to assist with strengthening/mobility   - Keep Call bell within reach  - Keep bed low and locked with side rails adjusted as appropriate  - Keep care items and personal belongings within reach  - Initiate and maintain comfort rounds  - Make Fall Risk Sign visible to staff  - Offer Toileting every 2 Hours,  in advance of need  - Initiate/Maintain bed alarm  - Obtain necessary fall risk management equipment:   - Apply yellow socks and bracelet for high fall risk patients  - Consider moving patient to room near nurses station  Outcome: Progressing  Goal: Maintain or return to baseline ADL function  Description: INTERVENTIONS:  -  Assess patient's ability to carry out ADLs; assess patient's baseline for ADL function and identify physical deficits which impact ability to perform ADLs (bathing, care of mouth/teeth, toileting, grooming, dressing, etc.)  - Assess/evaluate cause of self-care deficits   - Assess range of motion  - Assess patient's mobility; develop plan if impaired  - Assess patient's need for assistive devices and provide as appropriate  - Encourage maximum independence but intervene and supervise when necessary  - Involve family in performance of ADLs  - Assess for home care needs following discharge   - Consider OT consult to assist with ADL evaluation and planning for discharge  - Provide patient education as appropriate  Outcome: Progressing  Goal: Maintains/Returns to pre admission functional level  Description: INTERVENTIONS:  - Perform AM-PAC 6 Click Basic Mobility/ Daily Activity assessment daily.  - Set and communicate daily mobility goal to care team and patient/family/caregiver.   - Collaborate with rehabilitation services on mobility goals if consulted  - Perform Range of Motion 3 times a day.  - Reposition patient every 2 hours.  - Dangle patient 3 times a day  - Stand patient 3 times a day  - Ambulate patient 3 times a day  - Out of bed to chair 3 times a day   - Out of bed for meals 3 times a day  - Out of bed for toileting  - Record patient progress and toleration of activity level   Outcome: Progressing     Problem: DISCHARGE PLANNING  Goal: Discharge to home or other facility with appropriate resources  Description: INTERVENTIONS:  - Identify barriers to discharge w/patient and caregiver  -  Arrange for needed discharge resources and transportation as appropriate  - Identify discharge learning needs (meds, wound care, etc.)  - Arrange for interpretive services to assist at discharge as needed  - Refer to Case Management Department for coordinating discharge planning if the patient needs post-hospital services based on physician/advanced practitioner order or complex needs related to functional status, cognitive ability, or social support system  Outcome: Progressing     Problem: Knowledge Deficit  Goal: Patient/family/caregiver demonstrates understanding of disease process, treatment plan, medications, and discharge instructions  Description: Complete learning assessment and assess knowledge base.  Interventions:  - Provide teaching at level of understanding  - Provide teaching via preferred learning methods  Outcome: Progressing

## 2024-10-16 NOTE — ASSESSMENT & PLAN NOTE
Suspect this is due to dehydration, orthostatic hypotension and possible side effect of wegovy and dehydration  Discussed with patient and rest of family members regarding stopping/holding Wegovy and they were in agreement.  She stated that she previously weighed 258 pounds and is now 212 pounds  Hold Wegovy and follow-up with PCP.

## 2024-10-16 NOTE — ASSESSMENT & PLAN NOTE
Persistent.  Symptomatic and is limiting her activity  Start trial of midodrine  If this is from wegovy it may still be in his system

## 2024-10-16 NOTE — UTILIZATION REVIEW
Admission: Date/Time/Statement:   Admission Orders (From admission, onward)          Ordered         10/14/24 1413   Inpatient Admission  Once             10/14/24 1408   INPATIENT ADMISSION  Once                Chief Complaint   Patient presents with    Shortness of Breath       Pt reports SOB that worsened today with exertion. Pt reports chest pressure         Initial Presentation: 74 y.o. female presents to the ED from home with c/o ROSAS, increased BLE edema, no c/o CP x several wks to months and acute knee injury, presyncopal symptoms today. Not improved on recently started Torsemide on 10/4.  PMH: Endometrial CA, h/o PE on Eliquis, on Wegovy.  In the ED VS and sat WNL.  Labs - elevated BUN/Cr, low K.  Imaging - no acute disease.  ECG - NSR.  On exam HJR 1/2 way up, BLE 1 +.  Admitted to INPATIENT status with ROSAS, increased BLE edema, BARRY w/ presyncope (likely d/t Torsemide), probable new CHF - echo, r/o MI, may need CTA chest when kidney function improved, continue Eliquis, hold diuretics, IV Fluids x 1 liter - slowly. Doubt Eliquis failure.        Date: 10/15   Day 2:   Dizziness, ROSAS, increased BLE edema, BARRY w/ presyncope (likely d/t Torsemide), probable new CHF - suspect orthostatic hypotension and poss SE of Wegovy and dehydration,  stop Wegovy, resume IV fluids, trend BMP, lost 46 lbs on Wegovy.  On exam asymptomatic while laying down.  No abd tenderness, lungs clear.   BUN/CR, trending down, K same 3.4.  Worked with PT today - mild ROSAS and fatigue post ambulation. require seated rest break ambulating. Has some soft BP today.      10/15 Cardio Consult - c/o ROSAS, presyncope, dizziness, BARRY On CKD, not eating much on Wegovy, keeps hydrated. No c/o CP, headaches, weakness legs.  On exam extrasystoles, irreg rhythm, no wheezing, rales, A&O x 3.      Date: 10/16    Day 3: Has surpassed a 2nd midnight with active treatments and services.  Continue  PT/OT.   IV  plasmalyte  d/c  10/15.          apixaban, 5 mg,  Oral, BID  [START ON 10/16/2024] calcitriol, 0.25 mcg, Oral, Once per day on Monday Wednesday Friday  cholecalciferol, 1,000 Units, Oral, Daily  famotidine, 40 mg, Oral, Daily         Continuous IV Infusions:    Infusions Meds - Displays dose, route, & frequency only   multi-electrolyte, 100 mL/hr, Intravenous, Continuous        Date/Time Temp Pulse Resp BP MAP (mmHg) SpO2 O2 Device Patient Position - Orthostatic VS Pain      10/15/24 1506 97.5 °F (36.4 °C) 77 16 100/71 77 100 % None (Room air) Sitting --     10/15/24 1116 97.9 °F (36.6 °C) 76 18 99/61 67 99 % None (Room air) Lying --     10/15/24 1030 -- -- -- -- -- -- -- -- No Pain     10/15/24 1029 -- -- -- -- -- -- -- -- No Pain     10/15/24 0915 -- 78 -- 82/50 -- -- -- Standing - Orthostatic VS --     10/15/24 0914 -- 79 -- 84/51 -- -- -- Sitting - Orthostatic VS --     10/15/24 0913 -- 75 -- 92/54 -- -- -- Lying - Orthostatic VS --     10/15/24 0815 -- -- -- -- -- -- -- -- No Pain     10/15/24 0730 -- -- -- -- -- -- -- -- No Pain     10/15/24 0723 97.6 °F (36.4 °C) 74 18 91/58 66 98 % None (Room air) Lying --     10/15/24 0337 96.9 °F (36.1 °C) 77 18 89/53 -- 95 % None (Room air) Lying --     10/14/24 2323 96.9 °F (36.1 °C) 83 18 102/55 71 96 % None (Room air) Lying --     10/14/24 1927 97.4 °F (36.3 °C) 80 18 99/58 -- 95 % None (Room air) Lying No Pain     10/14/24 1543 97.8 °F (36.6 °C) 85 20 111/64 -- 98 % None (Room air) Lying --     10/14/24 1530 -- -- -- -- -- -- -- -- No Pain     10/14/24 1529 -- -- -- -- -- -- -- -- No Pain     10/14/24 1406 -- 81 18 112/62 -- 98 % None (Room air) Lying --     10/14/24 1033 98 °F (36.7 °C) 98 16 128/87 -- 100 % -- -- --                   Pertinent Labs/Diagnostic Test Results:   Radiology:  XR chest 2 views   Final Interpretation by Dmitry Thornton MD (10/14 1630)       No acute cardiopulmonary disease.      Cardiology:  Echo complete   Final Result by Harish Coombs DO (10/15 1128)         Left Ventricle: Left  ventricular cavity size is normal. Wall thickness    is mildly increased. The left ventricular ejection fraction is 62%.    Systolic function is normal. Wall motion is normal. Diastolic function is    mildly abnormal, consistent with grade I (abnormal) relaxation.     Mitral Valve: There is mild annular calcification.     Tricuspid Valve: There is mild regurgitation.       ECG 12 lead   Final Result by Neptali Palomares MD (10/14 8396)   Age and gender specific ECG analysis    Sinus rhythm   Baseline artifact   RSR' or QR pattern in V1 suggests right ventricular conduction delay   Abnormal ECG   When compared with ECG of 02-JAN-2024 14:40,   RSR' pattern in V1 is now Present   Confirmed by Neptali Palomares ([id number]) on 10/14/2024 11:36:19 AM                     Results from last 7 days   Lab Units 10/15/24  0518 10/14/24  1204 10/09/24  1314   WBC Thousand/uL 2.91* 4.32  --    HEMOGLOBIN g/dL 11.2* 12.7  --    I STAT HEMOGLOBIN g/dl  --   --  12.2   HEMATOCRIT % 34.4* 37.8  --    HEMATOCRIT, ISTAT %  --   --  36   PLATELETS Thousands/uL 140* 149  --    TOTAL NEUT ABS Thousands/µL 1.63* 2.99  --                  Results from last 7 days   Lab Units 10/15/24  0518 10/14/24  1204 10/09/24  1314   SODIUM mmol/L 140 142  --    POTASSIUM mmol/L 3.4* 3.4*  --    CHLORIDE mmol/L 102 100  --    CO2 mmol/L 31 31  --    CO2, I-STAT mmol/L  --   --  26   ANION GAP mmol/L 7 11  --    BUN mg/dL 32* 34*  --    CREATININE mg/dL 1.33* 1.66*  --    EGFR ml/min/1.73sq m 38 29  --    CALCIUM mg/dL 8.6 9.7  --    CALCIUM, IONIZED, ISTAT mmol/L  --   --  1.10*   MAGNESIUM mg/dL 2.0  --   --            Results from last 7 days   Lab Units 10/14/24  1204   AST U/L 20   ALT U/L 25   ALK PHOS U/L 40   TOTAL PROTEIN g/dL 7.4   ALBUMIN g/dL 3.9   TOTAL BILIRUBIN mg/dL 0.64           Results from last 7 days   Lab Units 10/14/24  1059   POC GLUCOSE mg/dl 115            Results from last 7 days   Lab Units 10/15/24  0518 10/14/24  1207    GLUCOSE RANDOM mg/dL 88 118           Results from last 7 days   Lab Units 10/09/24  1314   I STAT BASE EXC mmol/L 1   I STAT O2 SAT % 96*   ISTAT PH ART   7.446   I STAT ART PCO2 mm HG 36.1   I STAT ART PO2 mm HG 77.0   I STAT ART HCO3 mmol/L 24.9              Results from last 7 days   Lab Units 10/14/24  1602 10/14/24  1402 10/14/24  1204   HS TNI 0HR ng/L  --   --  6   HS TNI 2HR ng/L  --  7  --    HSTNI D2 ng/L  --  1  --    HS TNI 4HR ng/L 7  --   --    HSTNI D4 ng/L 1  --   --                     Results from last 7 days   Lab Units 10/14/24  1602 10/14/24  1204   TSH 3RD GENERATON uIU/mL 1.315 1.922               Results from last 7 days   Lab Units 10/15/24  0518   BNP pg/mL 61           Results from last 7 days   Lab Units 10/14/24  1217   CLARITY UA   Clear   COLOR UA   Light Yellow   SPEC GRAV UA   1.010   PH UA   5.0   GLUCOSE UA mg/dl Negative   KETONES UA mg/dl Negative   BLOOD UA   Negative   PROTEIN UA mg/dl Negative   NITRITE UA   Negative   BILIRUBIN UA   Negative   UROBILINOGEN UA (BE) mg/dl <2.0   LEUKOCYTES UA   Negative         sent on Admission:   Dyspnea on exertion   Essential hypertension   Acute kidney injury superimposed on stage 3a chronic kidney disease (HCC)        Admitting Diagnosis: Shortness of breath [R06.02]  LBBB (left bundle branch block) [I44.7]  Dyspnea [R06.00]  BARRY (acute kidney injury) (HCC) [N17.9]  Age/Sex: 74 y.o. female     Network Utilization Review Department  ATTENTION: Please call with any questions or concerns to [phone] and carefully listen to the prompts so that you are directed to the right person. All voicemails are confidential.   For Discharge needs, contact Care Management DC Support Team at [phone] opt. 2  Send all requests for admission clinical reviews, approved or denied determinations and any other requests to dedicated fax number below belonging to the campus where the patient is receiving treatment. List of dedicated fax numbers for the  Facilities:  FACILITY NAME UR FAX NUMBER   ADMISSION DENIALS (Administrative/Medical Necessity) [phone]   DISCHARGE SUPPORT TEAM (NETWORK) [phone]   PARENT CHILD HEALTH (Maternity/NICU/Pediatrics) [phone]   [facility] [phone]   [facility] [phone]   [facility] [phone]   [facility] [phone]   [facility] [phone]   [facility] [phone]   General acute hospital [phone]   [facility] [phone]   [facility] [phone]   [facility] [phone]   [facility] [phone]   [facility] [phone]

## 2024-10-16 NOTE — PLAN OF CARE
Problem: PAIN - ADULT  Goal: Verbalizes/displays adequate comfort level or baseline comfort level  Description: Interventions:  - Encourage patient to monitor pain and request assistance  - Assess pain using appropriate pain scale  - Administer analgesics based on type and severity of pain and evaluate response  - Implement non-pharmacological measures as appropriate and evaluate response  - Consider cultural and social influences on pain and pain management  - Notify physician/advanced practitioner if interventions unsuccessful or patient reports new pain  Outcome: Progressing     Problem: INFECTION - ADULT  Goal: Absence or prevention of progression during hospitalization  Description: INTERVENTIONS:  - Assess and monitor for signs and symptoms of infection  - Monitor lab/diagnostic results  - Monitor all insertion sites, i.e. indwelling lines, tubes, and drains  - Monitor endotracheal if appropriate and nasal secretions for changes in amount and color  - [place] appropriate cooling/warming therapies per order  - Administer medications as ordered  - Instruct and encourage patient and family to use good hand hygiene technique  - Identify and instruct in appropriate isolation precautions for identified infection/condition  Outcome: Progressing  Goal: Absence of fever/infection during neutropenic period  Description: INTERVENTIONS:  - Monitor WBC    Outcome: Progressing     Problem: SAFETY ADULT  Goal: Patient will remain free of falls  Description: INTERVENTIONS:  - Educate patient/family on patient safety including physical limitations  - Instruct patient to call for assistance with activity   - Consult OT/PT to assist with strengthening/mobility   - Keep Call bell within reach  - Keep bed low and locked with side rails adjusted as appropriate  - Keep care items and personal belongings within reach  - Initiate and maintain comfort rounds  - Make Fall Risk Sign visible to staff  - Offer Toileting every 3 Hours,  in advance of need  - Initiate/Maintain bed alarm  - Obtain necessary fall risk management equipment:   - Apply yellow socks and bracelet for high fall risk patients  - Consider moving patient to room near nurses station  Outcome: Progressing  Goal: Maintain or return to baseline ADL function  Description: INTERVENTIONS:  -  Assess patient's ability to carry out ADLs; assess patient's baseline for ADL function and identify physical deficits which impact ability to perform ADLs (bathing, care of mouth/teeth, toileting, grooming, dressing, etc.)  - Assess/evaluate cause of self-care deficits   - Assess range of motion  - Assess patient's mobility; develop plan if impaired  - Assess patient's need for assistive devices and provide as appropriate  - Encourage maximum independence but intervene and supervise when necessary  - Involve family in performance of ADLs  - Assess for home care needs following discharge   - Consider OT consult to assist with ADL evaluation and planning for discharge  - Provide patient education as appropriate  Outcome: Progressing  Goal: Maintains/Returns to pre admission functional level  Description: INTERVENTIONS:  - Perform AM-PAC 6 Click Basic Mobility/ Daily Activity assessment daily.  - Set and communicate daily mobility goal to care team and patient/family/caregiver.   - Collaborate with rehabilitation services on mobility goals if consulted  - Perform Range of Motion 3 times a day.  - Reposition patient every 2 hours.  - Dangle patient 3 times a day  - Stand patient 3 times a day  - Ambulate patient 3 times a day  - Out of bed to chair 3 times a day   - Out of bed for meals 3 times a day  - Out of bed for toileting  - Record patient progress and toleration of activity level   Outcome: Progressing     Problem: DISCHARGE PLANNING  Goal: Discharge to home or other facility with appropriate resources  Description: INTERVENTIONS:  - Identify barriers to discharge w/patient and caregiver  -  Arrange for needed discharge resources and transportation as appropriate  - Identify discharge learning needs (meds, wound care, etc.)  - Arrange for interpretive services to assist at discharge as needed  - Refer to Case Management Department for coordinating discharge planning if the patient needs post-hospital services based on physician/advanced practitioner order or complex needs related to functional status, cognitive ability, or social support system  Outcome: Progressing     Problem: Knowledge Deficit  Goal: Patient/family/caregiver demonstrates understanding of disease process, treatment plan, medications, and discharge instructions  Description: Complete learning assessment and assess knowledge base.  Interventions:  - Provide teaching at level of understanding  - Provide teaching via preferred learning methods  Outcome: Progressing

## 2024-10-16 NOTE — PROGRESS NOTES
Progress Note - Hospitalist   Name: Sg Scherer 74 y.o. female I MRN: [id number]  Unit/Bed#: E4 -01 I Date of Admission: 10/14/2024   Date of Service: 10/16/2024 I Hospital Day: 2    Assessment & Plan  Dizziness  Suspect this is due to dehydration, orthostatic hypotension and possible side effect of wegovy and dehydration  Discussed with patient and rest of family members regarding stopping/holding Wegovy and they were in agreement.  She stated that she previously weighed 258 pounds and is now 212 pounds  Hold Wegovy and follow-up with PCP.  Orthostatic hypotension  Persistent.  Symptomatic and is limiting her activity  Start trial of midodrine  If this is from wegovy it may still be in his system  Acute kidney injury superimposed on stage 3a chronic kidney disease (HCC)  Due to prerenal azotemia from poor oral intake  Resolved with IVF  History of pulmonary embolism  Continue Eliquis 5 mg twice daily  History of endometrial cancer  Most recent CT showed no sign of metastatic disease or recurrence  Follow-up with GYN as scheduled in November  Morbid obesity with BMI of 45.0-49.9, adult (HCC)  Hold wegovy due to suspected side effect  Pancytopenia (HCC)  Not new. She has had mild pancytopenia since January  ? Due to blood draws from the port  Check cbc in am via peripheral IV    VTE Pharmacologic Prophylaxis: VTE Score: 5 High Risk (Score >/= 5) - Pharmacological DVT Prophylaxis Ordered: apixaban (Eliquis). Sequential Compression Devices Ordered.    Patient Centered Rounds: I performed bedside rounds with nursing staff today.   Discussions with Specialists or Other Care Team Provider: case management    Current Length of Stay: 2 day(s)  Current Patient Status: Inpatient   Certification Statement: The patient will continue to require additional inpatient hospital stay due to orthostasis  Discharge Plan: Anticipate discharge in 24-48 hrs to home.    Code Status: Level 1 - Full Code    Subjective   She is  still orthostatic and can't stand up too long  Discussed that this may be from wegovy  We discussed starting midodrine and she is agreeable    Objective :  Temp:  [96.7 °F (35.9 °C)-98.1 °F (36.7 °C)] 96.7 °F (35.9 °C)  HR:  [71-83] 79  BP: ()/(57-72) 107/67  Resp:  [16] 16  SpO2:  [97 %-100 %] 100 %  O2 Device: None (Room air)    Body mass index is 36.39 kg/m².     Input and Output Summary (last 24 hours):     Intake/Output Summary (Last 24 hours) at 10/16/2024 1701  Last data filed at 10/16/2024 1700  Gross per 24 hour   Intake 2471.67 ml   Output 1875 ml   Net 596.67 ml       Physical Exam  Vitals reviewed.   HENT:      Head: Normocephalic and atraumatic.      Nose: No congestion or rhinorrhea.   Eyes:      General: No scleral icterus.  Cardiovascular:      Rate and Rhythm: Normal rate and regular rhythm.   Pulmonary:      Breath sounds: No wheezing or rhonchi.   Abdominal:      Palpations: Abdomen is soft.      Tenderness: There is no abdominal tenderness. There is no guarding.   Musculoskeletal:      Right lower leg: No edema.      Left lower leg: No edema.   Skin:     General: Skin is warm and dry.   Neurological:      Mental Status: She is oriented to person, place, and time.   Psychiatric:         Mood and Affect: Mood normal.         Behavior: Behavior normal.     Lines/Drains:  Lines/Drains/Airways       Active Status       Name Placement date Placement time Site Days    Port A Cath 05/09/22 Right Chest 05/09/22  1100  Chest  891                       Lab Results: I have reviewed the following results:   Results from last 7 days   Lab Units 10/16/24  0944   WBC Thousand/uL 3.39*   HEMOGLOBIN g/dL 11.3*   HEMATOCRIT % 34.4*   PLATELETS Thousands/uL 140*   SEGS PCT % 63   LYMPHO PCT % 25   MONO PCT % 9   EOS PCT % 2     Results from last 7 days   Lab Units 10/16/24  0614 10/15/24  0518 10/14/24  1204   SODIUM mmol/L 140   < > 142   POTASSIUM mmol/L 3.2*   < > 3.4*   CHLORIDE mmol/L 101   < > 100   CO2  mmol/L 33*   < > 31   BUN mg/dL 27*   < > 34*   CREATININE mg/dL 1.14   < > 1.66*   ANION GAP mmol/L 6   < > 11   CALCIUM mg/dL 8.8   < > 9.7   ALBUMIN g/dL  --   --  3.9   TOTAL BILIRUBIN mg/dL  --   --  0.64   ALK PHOS U/L  --   --  40   ALT U/L  --   --  25   AST U/L  --   --  20   GLUCOSE RANDOM mg/dL 99   < > 118    < > = values in this interval not displayed.         Results from last 7 days   Lab Units 10/14/24  1059   POC GLUCOSE mg/dl 115               Recent Cultures (last 7 days):       Imaging Results Review: I reviewed radiology reports from this admission including: Echocardiogram.      Last 24 Hours Medication List:     Current Facility-Administered Medications:     acetaminophen (TYLENOL) tablet 650 mg, Q4H PRN    apixaban (ELIQUIS) tablet 5 mg, BID    calcitriol (ROCALTROL) capsule 0.25 mcg, Once per day on Monday Wednesday Friday    Cholecalciferol (VITAMIN D3) tablet 1,000 Units, Daily    famotidine (PEPCID) tablet 40 mg, Daily    midodrine (PROAMATINE) tablet 2.5 mg, TID AC    ondansetron (ZOFRAN) injection 4 mg, Q6H PRN    Administrative Statements   Today, Patient Was Seen By: Oc Ziegler MD      **Please Note: This note may have been constructed using a voice recognition system.**

## 2024-10-17 LAB
BASOPHILS # BLD AUTO: 0.02 THOUSANDS/ΜL (ref 0–0.1)
BASOPHILS NFR BLD AUTO: 1 % (ref 0–1)
EOSINOPHIL # BLD AUTO: 0.08 THOUSAND/ΜL (ref 0–0.61)
EOSINOPHIL NFR BLD AUTO: 3 % (ref 0–6)
ERYTHROCYTE [DISTWIDTH] IN BLOOD BY AUTOMATED COUNT: 13.7 % (ref 11.6–15.1)
HCT VFR BLD AUTO: 32.4 % (ref 34.8–46.1)
HGB BLD-MCNC: 10.6 G/DL (ref 11.5–15.4)
IMM GRANULOCYTES # BLD AUTO: 0.01 THOUSAND/UL (ref 0–0.2)
IMM GRANULOCYTES NFR BLD AUTO: 0 % (ref 0–2)
LYMPHOCYTES # BLD AUTO: 0.96 THOUSANDS/ΜL (ref 0.6–4.47)
LYMPHOCYTES NFR BLD AUTO: 31 % (ref 14–44)
MCH RBC QN AUTO: 33.5 PG (ref 26.8–34.3)
MCHC RBC AUTO-ENTMCNC: 32.7 G/DL (ref 31.4–37.4)
MCV RBC AUTO: 103 FL (ref 82–98)
MONOCYTES # BLD AUTO: 0.32 THOUSAND/ΜL (ref 0.17–1.22)
MONOCYTES NFR BLD AUTO: 10 % (ref 4–12)
NEUTROPHILS # BLD AUTO: 1.71 THOUSANDS/ΜL (ref 1.85–7.62)
NEUTS SEG NFR BLD AUTO: 55 % (ref 43–75)
NRBC BLD AUTO-RTO: 0 /100 WBCS
PLATELET # BLD AUTO: 130 THOUSANDS/UL (ref 149–390)
PMV BLD AUTO: 11.1 FL (ref 8.9–12.7)
RBC # BLD AUTO: 3.16 MILLION/UL (ref 3.81–5.12)
WBC # BLD AUTO: 3.1 THOUSAND/UL (ref 4.31–10.16)

## 2024-10-17 PROCEDURE — 99223 1ST HOSP IP/OBS HIGH 75: CPT | Performed by: INTERNAL MEDICINE

## 2024-10-17 PROCEDURE — 99232 SBSQ HOSP IP/OBS MODERATE 35: CPT | Performed by: INTERNAL MEDICINE

## 2024-10-17 PROCEDURE — 85025 COMPLETE CBC W/AUTO DIFF WBC: CPT | Performed by: INTERNAL MEDICINE

## 2024-10-17 RX ORDER — MIDODRINE HYDROCHLORIDE 5 MG/1
5 TABLET ORAL
Status: DISCONTINUED | OUTPATIENT
Start: 2024-10-17 | End: 2024-10-22

## 2024-10-17 RX ADMIN — MIDODRINE HYDROCHLORIDE 5 MG: 5 TABLET ORAL at 11:16

## 2024-10-17 RX ADMIN — FAMOTIDINE 40 MG: 20 TABLET, FILM COATED ORAL at 08:30

## 2024-10-17 RX ADMIN — APIXABAN 5 MG: 5 TABLET, FILM COATED ORAL at 08:29

## 2024-10-17 RX ADMIN — MIDODRINE HYDROCHLORIDE 5 MG: 5 TABLET ORAL at 16:51

## 2024-10-17 RX ADMIN — Medication 1000 UNITS: at 08:29

## 2024-10-17 RX ADMIN — APIXABAN 5 MG: 5 TABLET, FILM COATED ORAL at 16:51

## 2024-10-17 RX ADMIN — MIDODRINE HYDROCHLORIDE 2.5 MG: 2.5 TABLET ORAL at 06:17

## 2024-10-17 NOTE — PLAN OF CARE
Problem: PAIN - ADULT  Goal: Verbalizes/displays adequate comfort level or baseline comfort level  Description: Interventions:  - Encourage patient to monitor pain and request assistance  - Assess pain using appropriate pain scale  - Administer analgesics based on type and severity of pain and evaluate response  - Implement non-pharmacological measures as appropriate and evaluate response  - Consider cultural and social influences on pain and pain management  - Notify physician/advanced practitioner if interventions unsuccessful or patient reports new pain  Outcome: Progressing     Problem: INFECTION - ADULT  Goal: Absence or prevention of progression during hospitalization  Description: INTERVENTIONS:  - Assess and monitor for signs and symptoms of infection  - Monitor lab/diagnostic results  - Monitor all insertion sites, i.e. indwelling lines, tubes, and drains  - Monitor endotracheal if appropriate and nasal secretions for changes in amount and color  - [place] appropriate cooling/warming therapies per order  - Administer medications as ordered  - Instruct and encourage patient and family to use good hand hygiene technique  - Identify and instruct in appropriate isolation precautions for identified infection/condition  Outcome: Progressing  Goal: Absence of fever/infection during neutropenic period  Description: INTERVENTIONS:  - Monitor WBC    Outcome: Progressing     Problem: SAFETY ADULT  Goal: Patient will remain free of falls  Description: INTERVENTIONS:  - Educate patient/family on patient safety including physical limitations  - Instruct patient to call for assistance with activity   - Consult OT/PT to assist with strengthening/mobility   - Keep Call bell within reach  - Keep bed low and locked with side rails adjusted as appropriate  - Keep care items and personal belongings within reach  - Initiate and maintain comfort rounds  - Make Fall Risk Sign visible to staff  - Offer Toileting every 2 Hours,  in advance of need  - Initiate/Maintain bed alarm  - Obtain necessary fall risk management equipment:   - Apply yellow socks and bracelet for high fall risk patients  - Consider moving patient to room near nurses station  Outcome: Progressing  Goal: Maintain or return to baseline ADL function  Description: INTERVENTIONS:  -  Assess patient's ability to carry out ADLs; assess patient's baseline for ADL function and identify physical deficits which impact ability to perform ADLs (bathing, care of mouth/teeth, toileting, grooming, dressing, etc.)  - Assess/evaluate cause of self-care deficits   - Assess range of motion  - Assess patient's mobility; develop plan if impaired  - Assess patient's need for assistive devices and provide as appropriate  - Encourage maximum independence but intervene and supervise when necessary  - Involve family in performance of ADLs  - Assess for home care needs following discharge   - Consider OT consult to assist with ADL evaluation and planning for discharge  - Provide patient education as appropriate  Outcome: Progressing  Goal: Maintains/Returns to pre admission functional level  Description: INTERVENTIONS:  - Perform AM-PAC 6 Click Basic Mobility/ Daily Activity assessment daily.  - Set and communicate daily mobility goal to care team and patient/family/caregiver.   - Collaborate with rehabilitation services on mobility goals if consulted  - Perform Range of Motion 3 times a day.  - Reposition patient every 2 hours.  - Dangle patient 3 times a day  - Stand patient 3 times a day  - Ambulate patient 3 times a day  - Out of bed to chair 3 times a day   - Out of bed for meals 3 times a day  - Out of bed for toileting  - Record patient progress and toleration of activity level   Outcome: Progressing     Problem: DISCHARGE PLANNING  Goal: Discharge to home or other facility with appropriate resources  Description: INTERVENTIONS:  - Identify barriers to discharge w/patient and caregiver  -  Arrange for needed discharge resources and transportation as appropriate  - Identify discharge learning needs (meds, wound care, etc.)  - Arrange for interpretive services to assist at discharge as needed  - Refer to Case Management Department for coordinating discharge planning if the patient needs post-hospital services based on physician/advanced practitioner order or complex needs related to functional status, cognitive ability, or social support system  Outcome: Progressing     Problem: Knowledge Deficit  Goal: Patient/family/caregiver demonstrates understanding of disease process, treatment plan, medications, and discharge instructions  Description: Complete learning assessment and assess knowledge base.  Interventions:  - Provide teaching at level of understanding  - Provide teaching via preferred learning methods  Outcome: Progressing

## 2024-10-17 NOTE — PROGRESS NOTES
Progress Note - Hospitalist   Name: Sg Scherer 74 y.o. female I MRN: [id number]  Unit/Bed#: E4 -01 I Date of Admission: 10/14/2024   Date of Service: 10/17/2024 I Hospital Day: 3    Assessment & Plan  Dizziness  Due to combination of orthostatic hypotension, dehydration, possible side effect of Wegovy  Dehydration and BARRY resolved  She continues to have symptomatic orthostatic hypotension  We will increase midodrine to 5 mg 3 times daily  Hold/DC Wegovy  Orthostatic hypotension  Persistent.  Symptomatic   Could not even stand up   Increase midodrine to 5 mg 3 times daily   Hold/DC Wegovy  Acute kidney injury superimposed on stage 3a chronic kidney disease (HCC)  Due to prerenal azotemia from poor oral intake  Resolved with IVF  History of pulmonary embolism  Continue Eliquis 5 mg twice daily  History of endometrial cancer  Most recent CT showed no sign of metastatic disease or recurrence  Follow-up with GYN as scheduled in November  Morbid obesity with BMI of 45.0-49.9, adult (HCC)  Hold wegovy due to suspected side effect  Pancytopenia (HCC)  Persistent. she has had mild pancytopenia since January  She had a recent CT which showed no metastatic disease  Given history of endometrial cancer with previous chemotherapy, consult oncology for possible underlying bone marrow pathology  VTE Pharmacologic Prophylaxis: VTE Score: 5 High Risk (Score >/= 5) - Pharmacological DVT Prophylaxis Ordered: apixaban (Eliquis). Sequential Compression Devices Ordered.    Patient Centered Rounds:  discussed with her nurse    Current Length of Stay: 3 day(s)  Current Patient Status: Inpatient   Certification Statement: The patient will continue to require additional inpatient hospital stay due to symptomatic orthostasis  Discharge Plan: Anticipate discharge in 24-48 hrs to home.    Code Status: Level 1 - Full Code    Subjective   She is unable to stand due to dizziness  BP goes down when measured sitting up from lying  position    Objective :  Temp:  [96.7 °F (35.9 °C)-97.6 °F (36.4 °C)] 97.2 °F (36.2 °C)  HR:  [79-86] 86  BP: ()/(58-74) 82/59  Resp:  [16-18] 18  SpO2:  [98 %-100 %] 98 %  O2 Device: None (Room air)    Body mass index is 36.39 kg/m².     Input and Output Summary (last 24 hours):     Intake/Output Summary (Last 24 hours) at 10/17/2024 0921  Last data filed at 10/17/2024 0126  Gross per 24 hour   Intake 480 ml   Output 1400 ml   Net -920 ml       Physical Exam  Vitals reviewed.   Constitutional:       Appearance: She is not ill-appearing.   HENT:      Head: Normocephalic and atraumatic.      Nose: No congestion or rhinorrhea.   Eyes:      General: No scleral icterus.  Cardiovascular:      Rate and Rhythm: Normal rate and regular rhythm.   Pulmonary:      Breath sounds: No wheezing or rhonchi.   Abdominal:      Palpations: Abdomen is soft.      Tenderness: There is no abdominal tenderness. There is no guarding.   Musculoskeletal:      Right lower leg: No edema.      Left lower leg: No edema.   Skin:     General: Skin is warm and dry.   Neurological:      Mental Status: She is oriented to person, place, and time.   Psychiatric:         Mood and Affect: Mood normal.         Behavior: Behavior normal.     Lines/Drains:  Lines/Drains/Airways       Active Status       Name Placement date Placement time Site Days    Port A Cath 05/09/22 Right Chest 05/09/22  1100  Chest  891                             Lab Results: I have reviewed the following results:   Results from last 7 days   Lab Units 10/17/24  0445   WBC Thousand/uL 3.10*   HEMOGLOBIN g/dL 10.6*   HEMATOCRIT % 32.4*   PLATELETS Thousands/uL 130*   SEGS PCT % 55   LYMPHO PCT % 31   MONO PCT % 10   EOS PCT % 3     Results from last 7 days   Lab Units 10/16/24  0614 10/15/24  0518 10/14/24  1204   SODIUM mmol/L 140   < > 142   POTASSIUM mmol/L 3.2*   < > 3.4*   CHLORIDE mmol/L 101   < > 100   CO2 mmol/L 33*   < > 31   BUN mg/dL 27*   < > 34*   CREATININE mg/dL  1.14   < > 1.66*   ANION GAP mmol/L 6   < > 11   CALCIUM mg/dL 8.8   < > 9.7   ALBUMIN g/dL  --   --  3.9   TOTAL BILIRUBIN mg/dL  --   --  0.64   ALK PHOS U/L  --   --  40   ALT U/L  --   --  25   AST U/L  --   --  20   GLUCOSE RANDOM mg/dL 99   < > 118    < > = values in this interval not displayed.         Results from last 7 days   Lab Units 10/14/24  1059   POC GLUCOSE mg/dl 115               Recent Cultures (last 7 days):       Imaging Results Review: I reviewed radiology reports from this admission including: chest xray.      Last 24 Hours Medication List:     Current Facility-Administered Medications:     acetaminophen (TYLENOL) tablet 650 mg, Q4H PRN    apixaban (ELIQUIS) tablet 5 mg, BID    calcitriol (ROCALTROL) capsule 0.25 mcg, Once per day on Monday Wednesday Friday    Cholecalciferol (VITAMIN D3) tablet 1,000 Units, Daily    famotidine (PEPCID) tablet 40 mg, Daily    midodrine (PROAMATINE) tablet 5 mg, TID AC    ondansetron (ZOFRAN) injection 4 mg, Q6H PRN    Administrative Statements   Today, Patient Was Seen By: Oc Ziegler MD      **Please Note: This note may have been constructed using a voice recognition system.**

## 2024-10-17 NOTE — ASSESSMENT & PLAN NOTE
Persistent.  Symptomatic   Could not even stand up   Increase midodrine to 5 mg 3 times daily   Hold/DC Wegovy

## 2024-10-17 NOTE — ASSESSMENT & PLAN NOTE
Persistent. she has had mild pancytopenia since January  She had a recent CT which showed no metastatic disease  Given history of endometrial cancer with previous chemotherapy, consult oncology for possible underlying bone marrow pathology

## 2024-10-17 NOTE — ASSESSMENT & PLAN NOTE
Due to combination of orthostatic hypotension, dehydration, possible side effect of Wegovy  Dehydration and BARRY resolved  She continues to have symptomatic orthostatic hypotension  We will increase midodrine to 5 mg 3 times daily  Hold/DC Wegovy

## 2024-10-18 LAB
FOLATE SERPL-MCNC: 7.4 NG/ML
VIT B12 SERPL-MCNC: 416 PG/ML (ref 180–914)

## 2024-10-18 PROCEDURE — 97530 THERAPEUTIC ACTIVITIES: CPT

## 2024-10-18 PROCEDURE — 97116 GAIT TRAINING THERAPY: CPT

## 2024-10-18 PROCEDURE — NC001 PR NO CHARGE: Performed by: NURSE PRACTITIONER

## 2024-10-18 PROCEDURE — 99232 SBSQ HOSP IP/OBS MODERATE 35: CPT | Performed by: INTERNAL MEDICINE

## 2024-10-18 PROCEDURE — 82746 ASSAY OF FOLIC ACID SERUM: CPT | Performed by: INTERNAL MEDICINE

## 2024-10-18 PROCEDURE — 82607 VITAMIN B-12: CPT | Performed by: INTERNAL MEDICINE

## 2024-10-18 RX ADMIN — MIDODRINE HYDROCHLORIDE 5 MG: 5 TABLET ORAL at 16:19

## 2024-10-18 RX ADMIN — MIDODRINE HYDROCHLORIDE 5 MG: 5 TABLET ORAL at 10:34

## 2024-10-18 RX ADMIN — Medication 1000 UNITS: at 08:18

## 2024-10-18 RX ADMIN — FAMOTIDINE 40 MG: 20 TABLET, FILM COATED ORAL at 08:18

## 2024-10-18 RX ADMIN — CALCITRIOL 0.25 MCG: 0.25 CAPSULE, LIQUID FILLED ORAL at 08:17

## 2024-10-18 RX ADMIN — APIXABAN 5 MG: 5 TABLET, FILM COATED ORAL at 16:19

## 2024-10-18 RX ADMIN — MIDODRINE HYDROCHLORIDE 5 MG: 5 TABLET ORAL at 06:49

## 2024-10-18 RX ADMIN — APIXABAN 5 MG: 5 TABLET, FILM COATED ORAL at 08:17

## 2024-10-18 NOTE — CONSULTS
Consultation - Medical Oncology   Sg Scherer 74 y.o. female MRN: [id number]  Unit/Bed#: E4 -01 Encounter: [id number]    Referring physician: Dr. Oc Ziegler  Date of service: 10/17/2024  Reason for Consult: Pancytopenia  HPI: Sg Scherer is a 74 y.o. year old female.  Patient is here because of vertigo, orthostatic hypotension, acute on chronic renal insufficiency, history of pulmonary embolism and history of endometrial cancer.  Patient has chronic but mild pancytopenia.  Patient states she is feeling better after intravenous hydration and medications.  Patient has tiredness.  She is overweight.  She has exertional dyspnea.  No chest pain.  When she came she had increased shortness of breath and swelling of the ankles/legs and those symptoms have improved.  Patient has lost weight on Wegovy  Endometrial cancer was diagnosed in 2022 and she had SERGIO and BSO, Taxol plus carboplatin and radiation.  She had slight decrease in platelet counts even before getting treatment for uterine cancer.  Since completion of treatments patient has been running pancytopenia with macrocytosis.  Patient does not have symptoms of frequent or severe infections and no bleeding.  She has history of recurrent pulmonary emboli and has been on Eliquis chronically.  Patient    ONCOLOGY HISTORY OF PRESENT ILLNESS        Oncology History   Encounter for follow-up surveillance of endometrial cancer   3/2/2022 Initial Diagnosis    Endometrial cancer (HCC)     3/2/2022 Biopsy    Final Diagnosis   A. Endometrium, EMB:  - High-grade endometrial carcinoma, favor clear cell carcinoma.  See comment.        3/31/2022 -  Cancer Staged    Staging form: Corpus Uteri - Carcinoma, AJCC 8th Edition  - Pathologic stage from 3/31/2022: FIGO Stage IIIC1 - Signed by Gene Coburn MD on 4/19/2022  Histopathologic type: Clear cell adenocarcinoma, NOS  Stage prefix: Initial diagnosis  Histologic grade (G): G3  Histologic grading system: 3 grade  system  Residual tumor (R): R0 - None  Pelvic fátima status: Positive  Number of pelvic nodes positive from dissection: 1  Number of pelvic nodes examined during dissection: 2  Lymph node metastasis: Present  Omentectomy performed: Yes  Morcellation performed: No       3/31/2022 Surgery    Robotic hysterectomy, bilateral salpingo oophorectomy, bilateral pelvic sentinel lymph node biopsies, pelvic peritoneal/omental biopsies.  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  Negative cervix.  Negative parametria.  1/2 sentinel pelvic lymph nodes positive for malignancy.  Negative staging biopsies.  Stage IIIC1.  No evidence of DNA mismatch repair protein loss.     5/11/2022 - 8/25/2022 Chemotherapy    palonosetron (ALOXI), 0.25 mg, Intravenous, Once, 6 of 6 cycles  Administration: 0.25 mg (5/11/2022), 0.25 mg (6/1/2022), 0.25 mg (6/22/2022), 0.25 mg (7/13/2022), 0.25 mg (8/3/2022), 0.25 mg (8/24/2022)  Pegfilgrastim-bmez (ZIEXTENZO), 6 mg, Subcutaneous, Once, 3 of 3 cycles  Administration: 6 mg (7/14/2022), 6 mg (8/4/2022), 6 mg (8/25/2022)  fosaprepitant (EMEND) IVPB, 150 mg, Intravenous, Once, 6 of 6 cycles  Administration: 150 mg (5/11/2022), 150 mg (6/1/2022), 150 mg (6/22/2022), 150 mg (7/13/2022), 150 mg (8/3/2022), 150 mg (8/24/2022)  CARBOplatin (PARAPLATIN) IVPB (GOG AUC DOSING), 352.5 mg, Intravenous, Once, 6 of 6 cycles  Administration: 352.5 mg (5/11/2022), 377.5 mg (6/1/2022), 340.5 mg (6/22/2022), 353 mg (7/13/2022), 266.4 mg (8/3/2022), 271.6 mg (8/24/2022)  PACLItaxel (TAXOL) chemo IVPB, 135 mg/m2 = 290.4 mg (77.1 % of original dose 175 mg/m2), Intravenous, Once, 6 of 6 cycles  Dose modification: 135 mg/m2 (original dose 175 mg/m2, Cycle 1, Reason: Other (Must fill in a comment), Comment: prescribed starting dose)  Administration: 290.4 mg (5/11/2022), 290.4 mg (6/1/2022), 289.2 mg (6/22/2022), 292.8 mg (7/13/2022), 289.2 mg (8/3/2022), 300 mg (8/24/2022)     10/4/2022 -  11/21/2022 Radiation    Plan ID Energy Fractions Dose per Fraction (cGy) Dose Correction (cGy) Total Dose Delivered (cGy) Elapsed Days   Vag Cylinder  2 / 2 600 0 1,200 5   Pelvis 6 MV 25 180  4500 34            ROS:  10/17/24 Reviewed 12 systems: See symptoms in HPI  Presently no other neurological, cardiac, pulmonary, GI and  symptoms other than listed in HPI.  Other symptoms are in HPI.  No  fever, chills, bleeding, bone pains, skin rash, night sweats, arthritic symptoms,  numbness, claudication .No frequent infections.  Not unusually sensitive to heat or cold.  No swollen glands.  Patient is anxious.     Historical Information   Past Medical History:   Diagnosis Date    Arthritis     osteo    Chronic kidney disease     Colon polyp     Diverticula of colon     Endometrial cancer (HCC)     2022    Hypertension     Obesity      Past Surgical History:   Procedure Laterality Date    APPENDECTOMY      BREAST BIOPSY Left 1977    benign    CHOLECYSTECTOMY      COLONOSCOPY      CYSTOSCOPY N/A 03/31/2022    Procedure: CYSTOSCOPY;  Surgeon: Gene Coburn MD;  Location: [facility];  Service: Gynecology Oncology    HERNIA REPAIR Right     inguinal    IR PORT PLACEMENT  05/09/2022    JOINT REPLACEMENT Bilateral     Knee    OOPHORECTOMY      MI COLONOSCOPY FLX DX W/COLLJ SPEC WHEN PFRMD N/A 02/21/2017    Procedure: COLONOSCOPY with polypectomy and hemo clip marjan.;  Surgeon: Ramirez Jones MD;  Location: [facility];  Service: Gastroenterology    MI LAPS TOTAL HYSTERECT 250 GM/< W/RMVL TUBE/OVARY N/A 03/31/2022    Procedure: ROBOTIC HYSTERECTOMY, BILATERAL SALPINGO-OOPHORECTOMY, STAGING PERITONEAL AND OMENTAL BIOPSIES, BILATERAL PELVIC SENTINEL LYMPH NODE BIOPSIES;  Surgeon: Gene Coburn MD;  Location: [facility];  Service: Gynecology Oncology     Social History   Social History     Substance and Sexual Activity   Alcohol Use Yes    Alcohol/week: 1.0 standard drink of alcohol    Types: 1 Glasses of wine per week     Comment: social-seldom     Social History     Substance and Sexual Activity   Drug Use Never     Social History     Tobacco Use   Smoking Status Never   Smokeless Tobacco Never     Family History:   Family History   Problem Relation Age of Onset    Heart disease Mother     Prostate cancer Father 90    Diabetes Sister     Hypertension Sister     Transient ischemic attack Sister     No Known Problems Sister     Diabetes Brother     Heart disease Brother     Kidney disease Son     No Known Problems Maternal Grandmother     No Known Problems Maternal Grandfather     No Known Problems Paternal Grandmother     No Known Problems Paternal Grandfather     No Known Problems Maternal Aunt     No Known Problems Paternal Aunt     No Known Problems Paternal Aunt     No Known Problems Paternal Aunt     Stomach cancer Other 38    Thyroid disease Other     Cancer Other         throat    Breast cancer Neg Hx     Colon cancer Neg Hx     Ovarian cancer Neg Hx          Current Facility-Administered Medications:     acetaminophen (TYLENOL) tablet 650 mg, 650 mg, Oral, Q4H PRN, Kristian S Prechtel, DO, 650 mg at 10/16/24 0732    apixaban (ELIQUIS) tablet 5 mg, 5 mg, Oral, BID, Kristian S Prechtel, DO, 5 mg at 10/17/24 1651    calcitriol (ROCALTROL) capsule 0.25 mcg, 0.25 mcg, Oral, Once per day on Monday Wednesday Friday, Kristian S Prechtel, DO, 0.25 mcg at 10/16/24 0732    Cholecalciferol (VITAMIN D3) tablet 1,000 Units, 1,000 Units, Oral, Daily, Kristian S Prechtel, DO, 1,000 Units at 10/17/24 0829    famotidine (PEPCID) tablet 40 mg, 40 mg, Oral, Daily, Kristian S Prechtel, DO, 40 mg at 10/17/24 0830    midodrine (PROAMATINE) tablet 5 mg, 5 mg, Oral, TID AC, Oc Ziegler MD, 5 mg at 10/17/24 1651    ondansetron (ZOFRAN) injection 4 mg, 4 mg, Intravenous, Q6H PRN, Kristian S Prechtel, DO    No Known Allergies  @ ROS@  Physical Exam:  Vitals:    10/17/24 0826 10/17/24 0827 10/17/24 1530 10/17/24 2226   BP: 99/66 (!) 82/59  115/76 (P) 101/65   BP Location: Left arm Left arm Left arm (P) Left arm   Pulse: 80 86 78 (P) 77   Resp: 18 18 18 (P) 18   Temp: (!) 97.2 °F (36.2 °C)  (!) 97.1 °F (36.2 °C) (!) (P) 97.3 °F (36.3 °C)   TempSrc: Temporal  Temporal (P) Temporal   SpO2: 99% 98% 91% (P) 96%   Weight:       Height:         Alert, oriented, not in distress, vitals are above, no icterus, no oral thrush, no palpable neck mass, clear lung fields, regular heart rate, abdomen  soft and non tender, no palpable abdominal mass, no ascites, 1+ nonpitting edema of ankles, no calf tenderness, no focal neurological deficit, no skin rash, no palpable lymphadenopathy in the neck and axillary areas,  no clubbing.   Patient is anxious.  Performance status 2.      Pathology Result:    Final Diagnosis   Date Value Ref Range Status   03/31/2022   Final    A. Uterus, Cervix, Bilateral Ovaries and Fallopian Tubes, :  - Clear cell adenocarcinoma of endometrium. See note and synoptic report.  - Tumor invades 5 mm of 12 mm myometrial thickness.  - Tumor extends to lower uterine segment, myoinvasive.  - Lipoleiomyoma.  - Leiomyomata.   - Cervix, negative for carcinoma.  - Bilateral parametria, negative for carcinoma. Focal endometriosis/endosalpingiosis of right parametrium.  - Bilateral ovaries and fallopian tubes, negative for carcinoma.    B. Lymph Node, [place], left external illiac sentinel LN:  - Metastatic carcinoma in one of one lymph node (1/1). See note.  - Tumor measures approximately 3 mm.   - No extracapsular extension noted.   - Tumor highlighted with pan keratin stain.    C. Lymph Node, right internal illiac sentinal lymph node:  - One lymph node, negative for carcinoma (0/1).  - No tumor identified with pankeratin immunoperoxidase stain.     D. Peritoneum, pelvic peritineal biopsies:  - Mesothelial lined fibroadipose tissue, negative for carcinoma.     E. Omentum, omental biopsy:  - Omental adipose tissue, negative for carcinoma.     Note (A):  Tumor is positive with AMACR (partial), Napsin A( partial) and negative with ER, WT1. P16 is patchy and p53 shows wild type staining pattern. The morphologic features and immunophenotype support interpretation.  Note (B): Metastatic tumor deposit is noted in the lymph node adjacent to focus of endosalpingiosis. Immunoperoxidase stains help differentiate the 2 foci. While the focus of endosalpingiosis is positive with ER and WT1, the tumor is negative, supporting the interpretation.     03/02/2022   Final    A. Endometrium, EMB:  - High-grade endometrial carcinoma, favor clear cell carcinoma.  See comment.    Comment: Immunohistochemistry is positive in the tumor cells for NapsinA, patchy but strong positive for p16, and demonstrates increased p53 expression.  ER and AK are negative.  The immunohistochemical profile favors clear cell differentiation.     02/21/2017   Final    A. Transverse colon polyp:  - Tubular adenoma (adenomatous polyp).  - No high grade dysplasia and no evidence of malignancy.    Interpretation performed at [facility], [facility] [address]          Image Results:   Image result are reviewed and documented in Hematology/Oncology history    Echo complete    Left Ventricle: Left ventricular cavity size is normal. Wall thickness   is mildly increased. The left ventricular ejection fraction is 62%.   Systolic function is normal. Wall motion is normal. Diastolic function is   mildly abnormal, consistent with grade I (abnormal) relaxation.    Mitral Valve: There is mild annular calcification.    Tricuspid Valve: There is mild regurgitation.      LABS:  Lab data are reviewed and documented in [facility] history.       Lab Results   Component Value Date    HGB 10.6 (L) 10/17/2024    HCT 32.4 (L) 10/17/2024     (H) 10/17/2024     (L) 10/17/2024    WBC 3.10 (L) 10/17/2024    NRBC 0 10/17/2024    BANDSPCT 9 (H) 08/29/2022    ATYLMPCT 4 (H) 05/16/2022   CBC and  differential  Status: Final result      Contains abnormal data CBC and differential  Order: [id number]   Status: Final result       Visible to patient: Yes (not seen)       Next appt: 11/19/2024 at 11:20 AM in Gynecologic Oncology (RUBEN Brantley)    0 Result Notes            Component  Ref Range & Units 10/17/24  4:45 AM 10/16/24  9:44 AM 10/15/24  5:18 AM 10/14/24 12:04 PM 1/4/24  6:04 AM 1/3/24  4:51 AM 1/2/24 12:39 PM   WBC  4.31 - 10.16 Thousand/uL 3.10 Low  3.39 Low  2.91 Low  4.32 2.49 Low  2.87 Low  3.38 Low    RBC  3.81 - 5.12 Million/uL 3.16 Low  3.35 Low  3.32 Low  3.66 Low  3.27 Low  3.21 Low  CM 3.38 Low    Hemoglobin  11.5 - 15.4 g/dL 10.6 Low  11.3 Low  11.2 Low  12.7 10.6 Low  10.6 Low  11.2 Low    Hematocrit  34.8 - 46.1 % 32.4 Low  34.4 Low  34.4 Low  37.8 33.7 Low  33.4 Low  35.7   MCV  82 - 98 fL 103 High  103 High  104 High  103 High  103 High  104 High  106 High    MCH  26.8 - 34.3 pg 33.5 33.7 33.7 34.7 High  32.4 33.0 33.1   MCHC  31.4 - 37.4 g/dL 32.7 32.8 32.6 33.6 31.5 31.7 31.4   RDW  11.6 - 15.1 % 13.7 13.8 14.1 13.9 14.6 14.6 14.6   MPV  8.9 - 12.7 fL 11.1 10.4 10.6 10.3 10.6 10.7 10.6   Platelets  149 - 390 Thousands/uL 130 Low  140 Low  140 Low  149 148 Low  126 Low   Low    nRBC  /100 WBCs 0 0 0 0 0  0   Segmented %  43 - 75 % 55 63 57 69 63  55   Immature Grans %  0 - 2 % 0 0 0 1 1  1   Lymphocytes %  14 - 44 % 31 25 30 21 20  29   Monocytes %  4 - 12 % 10 9 11 8 11  11   Eosinophils Relative  0 - 6 % 3 2 2 0 4  3   Basophils Relative  0 - 1 % 1 1 0 1 1  1   Absolute Neutrophils  1.85 - 7.62 Thousands/µL 1.71 Low  2.15 1.63 Low  2.99 1.58 Low   1.90   Absolute Immature Grans  0.00 - 0.20 Thousand/uL 0.01 0.01 0.01 0.03 0.02  0.02   Absolute Lymphocytes  0.60 - 4.47 Thousands/µL 0.96 0.85 0.88 0.91 0.50 Low   0.98   Absolute Monocytes  0.17 - 1.22 Thousand/µL 0.32 0.29 0.31 0.36 0.27  0.37   Eosinophils Absolute  0.00 - 0.61 Thousand/µL 0.08 0.07 0.07 0.01 0.10  0.09    Basophils Absolute  0.00 - 0.10 Thousands/µL 0.02 0.02 0.01 0.02 0.02  0.02              Specimen Collected: 10/17/24  4:45 AM Last Resulted: 10/17/24  5:46 AM           Lab Results   Component Value Date    K 3.2 (L) 10/16/2024     10/16/2024    CO2 33 (H) 10/16/2024    BUN 27 (H) 10/16/2024    CREATININE 1.14 10/16/2024    GLUCOSE 108 10/09/2024    GLUF 109 (H) 05/16/2022    CALCIUM 8.8 10/16/2024    CORRECTEDCA 9.0 01/04/2024    AST 20 10/14/2024    ALT 25 10/14/2024    ALKPHOS 40 10/14/2024    EGFR 46 10/16/2024               Imaging Studies: I have personally reviewed pertinent reports.    CT abdomen pelvis w contrast  Status: Final result     PACS Images     Show images for CT abdomen pelvis w contrast    CT abdomen pelvis w contrast: Result Notes     RUBEN Brantley  3/6/2024  8:37 AM EST       Results called to patient's sister.            Study Result    Narrative & Impression         CT ABDOMEN AND PELVIS WITH IV CONTRAST     INDICATION:   Personal history of malignant neoplasm of other parts of uterus.       COMPARISON: CT abdomen pelvis dated 3/27/2023     TECHNIQUE:  CT examination of the abdomen and pelvis was performed. Multiplanar 2D reformatted images were created from the source data.     This examination, like all CT scans performed in the [facility], was performed utilizing techniques to minimize radiation dose exposure, including the use of iterative reconstruction and automated exposure control. Radiation dose length   product (DLP) for this visit:     IV Contrast:  CDS - barium (READI-CAT 2) suspension 900 mL  Enteric Contrast:  Enteric contrast was not administered.     FINDINGS:     ABDOMEN     LOWER CHEST: No clinically significant abnormality in the visualized lower chest.     LIVER/BILIARY TREE: Unremarkable.     GALLBLADDER: Post cholecystectomy.     SPLEEN: Unremarkable.     PANCREAS: Unremarkable.      ADRENAL GLANDS: Unremarkable.     KIDNEYS/URETERS: No  hydronephrosis or urinary tract calculi. Subcentimeter hypoattenuating renal lesion(s), too small to characterize but statistically likely benign, which do not warrant follow-up (Radiology June 2019). Punctate left renal calculus     STOMACH AND BOWEL: Colonic diverticulosis without findings of acute diverticulitis.     APPENDIX: No findings to suggest appendicitis.     ABDOMINOPELVIC CAVITY: No ascites. No pneumoperitoneum. No lymphadenopathy.     VESSELS: Unremarkable for patient's age. Mild to moderate atherosclerotic calcification     PELVIS     REPRODUCTIVE ORGANS: Post hysterectomy. Unremarkable vaginal cuff     URINARY BLADDER: Unremarkable.     ABDOMINAL WALL/INGUINAL REGIONS: Right anterior abdominal wall and hernia mesh     BONES: No acute fracture or suspicious osseous lesion.     IMPRESSION:     No evidence of metastatic disease or malignancy in the abdomen or pelvis        Electronically signed: 03/05/2024 08:40 PM Devin Castillo MD     Imaging    CT abdomen pelvis w contrast (Order: [id number]) - 3/5/2024    Result History    CT abdomen pelvis w contrast (Order #[id number]) on 3/5/2024 - Order Result History Report  CTA chest pe study  Status: Final result     PACS Images     Show images for CTA chest pe study  Study Result    Narrative & Impression   CTA - CHEST WITH IV CONTRAST - PULMONARY ANGIOGRAM     INDICATION:   dyspnea on exertion, palpitations.     COMPARISON: CT chest performed on the same day, 3/17/2022     TECHNIQUE: CTA examination of the chest was performed using angiographic technique according to a protocol specifically tailored to evaluate for pulmonary embolism.  Multiplanar 2D reformatted images were created from the source data. In addition,   coronal 3D MIP postprocessing was performed on the acquisition scanner.     Radiation dose length product (DLP) for this visit:  755 mGy-cm .  This examination, like all CT scans performed in the [facility], was performed  utilizing techniques to minimize radiation dose exposure, including the use of iterative   reconstruction and automated exposure control.     IV Contrast:  100 mL of iohexol (OMNIPAQUE)     FINDINGS:     PULMONARY ARTERIAL TREE: 3 small nonoccluding filling defects are seen in the branches of truncus anterior and lower lobar pulmonary arteries (series 5 images 63, 84 and 123) suggestive of acute pulmonary emboli     LUNGS:  Lungs are clear.  There is no tracheal or endobronchial lesion.     PLEURA:  Unremarkable.     HEART/GREAT VESSELS:  Atherosclerotic changes are noted in thoracic aorta and coronary arteries. No thoracic aortic aneurysm.     MEDIASTINUM AND SHAYAN: Slightly prominent subcarinal lymph node is seen measuring up to 1.1 cm in short axis (2/100), stable since 3/17/2022, representing reactive etiology.     CHEST WALL AND LOWER NECK:   Right-sided Mediport.     VISUALIZED STRUCTURES IN THE UPPER ABDOMEN: A small right renal upper pole cyst. Cholecystectomy. Postsurgical changes in the right anterior abdominal wall.     OSSEOUS STRUCTURES:  No acute fracture or destructive osseous lesion.     IMPRESSION:     3 small right lung lobar pulmonary artery nonoccluding emboli     Measured RV/LV ratio is within normal limits at less than 0.9.        I personally discussed this study with MANNIE ROWE MD on 1/3/2024 9:47 AM.              Workstation performed: [id number]        Imaging    CTA chest pe study (Order: [id number]) - 1/2/2024    Order Report      Pathology, and Other Studies: I have personally reviewed pertinent reports.    See above    Assessment and Plan:  See diagnoses, orders instructions below  -Pancytopenia  -Macrocytosis  -History of endometrial cancer status post surgery chemotherapy and radiation  -History of pulmonary emboli and patient is on Eliquis.  -Weight loss on Wegovy.    Pancytopenia could be secondary to chemotherapy and radiation for endometrial cancer.  She could have MDS.  To rule  out deficiency of B12 and folic acid.  TSH is normal.  Discussed bone marrow with the patient and she was okay with that.  Referring to IR for bone marrow test.  All discussed with patient in detail.  Questions answered.  Outpatient follow-up in our office in 3-4 weeks postdischarge.  Goal will be to find out more about pancytopenia.  Patient needs some assistance in her care.   Patient will continue to follow with  primary physician and other consultants.  Patient voiced understanding and agrees      Disclaimer: This document was prepared using a dictation device.  If a word or phrase is confusing, or does not make sense, this is likely due to recognition error which was not discovered during the providers review. If you believe an error has occurred, please Contact me through [facility] service for hany?cation.    Counseling / Coordination of Care  ..  Provided counseling and support

## 2024-10-18 NOTE — RESTORATIVE TECHNICIAN NOTE
Restorative Technician Note      Patient Name: Sg Scherer     [facility], [place] Tech Visit Date: 10/18/24  Note Type: Mobility  Patient Position Upon Consult: Supine  Activity Performed: Ambulated  Assistive Device: Roller walker  Patient Position at End of Consult: All needs within reach; Bed/Chair alarm activated; Seated edge of bed            ns

## 2024-10-18 NOTE — PHYSICAL THERAPY NOTE
Physical Therapy Treatment Note     10/18/24 1241   PT Last Visit   PT Visit Date 10/18/24   Note Type   Note Type Treatment   Pain Assessment   Pain Assessment Tool 0-10   Pain Score No Pain   Restrictions/Precautions   Weight Bearing Precautions Per Order No   Other Precautions Fall Risk  (Orthostatic hypotension)   General   Chart Reviewed Yes   Family/Caregiver Present No   Subjective   Subjective Pt. agreeable to PT   Bed Mobility   Supine to Sit 7  Independent   Sit to Supine 7  Independent   Transfers   Sit to Stand 7  Independent   Stand to Sit 7  Independent   Stand pivot 7  Independent   Ambulation/Elevation   Gait pattern WNL   Gait Assistance   (CGA/CS)   Additional items Assist x 1;Verbal cues   Assistive Device Rolling walker   Distance 40ft, 60ft   Balance   Static Sitting Normal   Dynamic Sitting Good   Static Standing Good   Dynamic Standing Fair   Ambulatory Fair   Endurance Deficit   Endurance Deficit Yes   Endurance Deficit Description dizziness, drop in BP   Activity Tolerance   Activity Tolerance Patient tolerated treatment well;Treatment limited secondary to medical complications (Comment);Other (Comment)  (+ orthostatic Hypotension)   Nurse Made Aware Yes   Exercises   THR Supine;Sitting;Standing;20 reps;Bilateral;AROM   Assessment   Prognosis Good   Problem List Decreased mobility  (+ orthostatic hypotension)   Assessment Pt. able to toelrate all activities however limited in her mobility due to dizziness and orthostatic hypotension. BP readings as follow in supine 94/55, in supine post LE Philip 110/53, seated 93/50, in sitting post LE Philip 88/54, in standing post ambulation got error msg x 2, however in sitting able to get reading 107/63 after  first ambulation and 115/64 after second ambulation. Reported all readings to RN and possibly the error msg. would have been due to DP being too low. Pt. c/o pressure in her head and dizziness with standing and ambulation. Hence second A provided for  safety. pt. had a fall in the bathroom this a.m. per records. Pt. seated at EOB post session with all needs within reach and bed alarm engaged and nursing staff aware of it, Will  continue to follow per PT POC.   Barriers to Discharge None   Goals   Patient Goals None reported   STG Expiration Date 10/22/24   PT Treatment Day 1   Plan   Treatment/Interventions Functional transfer training;LE strengthening/ROM;Therapeutic exercise;Spoke to nursing;Gait training;Bed mobility;Equipment eval/education;Patient/family training   Progress Slow progress, medical status limitations   PT Frequency 1-2x/wk   Discharge Recommendation   Rehab Resource Intensity Level, PT No post-acute rehabilitation needs   AM-PAC Basic Mobility Inpatient   Turning in Flat Bed Without Bedrails 4   Lying on Back to Sitting on Edge of Flat Bed Without Bedrails 4   Moving Bed to Chair 4   Standing Up From Chair Using Arms 4   Walk in Room 4   Climb 3-5 Stairs With Railing 4   Basic Mobility Inpatient Raw Score 24   Basic Mobility Standardized Score 57.68   [facility] Highest Level Of Mobility   -HLM Goal 8: Walk 250 feet or more   -HLM Achieved 7: Walk 25 feet or more   End of Consult   Patient Position at End of Consult All needs within reach;Bed/Chair alarm activated;Seated edge of bed           Jennifer Mullins PTA    An AM-PAC basic mobility standardized score less than 42.9 suggest the patient may benefit from discharge to post-acute rehab services.

## 2024-10-18 NOTE — QUICK NOTE
Rapid Response Team called to bedside due to patient having an unwitnessed mechanical fall while on Eliquis. SLIM provider at bedside. Patient denies head strike and states she fell on her right leg. She also denies any tenderness to palpation and was able to get into bed without any assistance prior to Rapid Response Team's arrival. Patient denies any pain.

## 2024-10-18 NOTE — NURSING NOTE
Pt fell while ambulating in the room. [place] a loud noise, entered pts room, pt was sitting on the floor. Pt reports she turned too fast and was dizzy and fell. Pt denies hitting her head, no loss of consciousness.  Pt denies any injuries. Pt got herself off the floor and into bed. On call provider and hospital supervisor notified.  Pts call bell in reach, bed alarm on, yellow socks on, fall risk sign on door.

## 2024-10-18 NOTE — PROGRESS NOTES
Progress Note - Hospitalist   Name: Sg Scherer 74 y.o. female I MRN: [id number]  Unit/Bed#: E4 -01 I Date of Admission: 10/14/2024   Date of Service: 10/18/2024 I Hospital Day: 4    Assessment & Plan  Dizziness  Due to combination of orthostatic hypotension, dehydration, possible side effect of Wegovy  Dehydration and BARRY resolved  She continues to have symptomatic orthostatic hypotension  Midodrine increased to 5 mg 3 times daily  Apply compression stocking  Hold/DC Wegovy  Orthostatic hypotension  Persistent.  Symptomatic   Could not even stand up   Continue midodrine 5 mg 3 times daily today  Increase 10 mg if orthostasis status  Apply Alec stocking  Fall precaution  Hold Wegovy  Acute kidney injury superimposed on stage 3a chronic kidney disease (HCC)  Due to prerenal azotemia from poor oral intake  Resolved with IVF  History of pulmonary embolism  PE with history of malignancy  Indefinite anticoagulation  Stable.  Ongoing Eliquis 5 mg twice daily  this may need to be held temporarily for bone marrow biopsy   History of endometrial cancer  Most recent CT showed no sign of metastatic disease or recurrence  Follow-up with GYN as scheduled in November  Morbid obesity with BMI of 45.0-49.9, adult (HCC)  Hold wegovy due to suspected side effect  Pancytopenia (HCC)  Check B12 and folate  BM biopsy per IR r/o myelodysplasia.    VTE Pharmacologic Prophylaxis: VTE Score: 5 High Risk (Score >/= 5) - Pharmacological DVT Prophylaxis Ordered: apixaban (Eliquis). Sequential Compression Devices Ordered.    Patient Centered Rounds: Discussed with nurse  Discussions with Specialists or Other Care Team Provider: Case management  Education and Discussions with Family / Patient: Spoke with sister and gave update    Current Length of Stay: 4 day(s)  Current Patient Status: Inpatient   Certification Statement: The patient will continue to require additional inpatient hospital stay due to orthostatic hypotension,  pancytopenia  Discharge Plan: Anticipate discharge in 48-72 hrs to home.    Code Status: Level 1 - Full Code    Subjective   She fell last night while trying to walk with a walker  She denied head trauma  She fell gently on the floor  No pain or discomfort  No bleeding    Objective :  Temp:  [97.1 °F (36.2 °C)-97.3 °F (36.3 °C)] 97.1 °F (36.2 °C)  HR:  [57-88] 88  BP: ()/(54-76) 73/55  Resp:  [18] 18  SpO2:  [91 %-99 %] 99 %  O2 Device: None (Room air)    Body mass index is 36.39 kg/m².     Input and Output Summary (last 24 hours):     Intake/Output Summary (Last 24 hours) at 10/18/2024 1114  Last data filed at 10/18/2024 0931  Gross per 24 hour   Intake 220 ml   Output 2075 ml   Net -1855 ml       Physical Exam  Vitals reviewed.   Constitutional:       Appearance: She is not ill-appearing.   HENT:      Head: Normocephalic and atraumatic.      Nose: No congestion or rhinorrhea.   Eyes:      General: No scleral icterus.  Cardiovascular:      Rate and Rhythm: Normal rate and regular rhythm.   Pulmonary:      Breath sounds: No wheezing or rhonchi.   Abdominal:      General: There is no distension.      Palpations: Abdomen is soft.      Tenderness: There is no abdominal tenderness.   Musculoskeletal:      Right lower leg: No edema.      Left lower leg: No edema.   Skin:     General: Skin is warm and dry.   Neurological:      Mental Status: She is oriented to person, place, and time.   Psychiatric:         Mood and Affect: Mood normal.         Behavior: Behavior normal.     Lines/Drains:  Lines/Drains/Airways       Active Status       Name Placement date Placement time Site Days    Port A Cath 05/09/22 Right Chest 05/09/22  1100  Chest  893                    Central Line:  Goal for removal: Port accessed. Will de-access as appropriate.               Lab Results: I have reviewed the following results:   Results from last 7 days   Lab Units 10/17/24  0445   WBC Thousand/uL 3.10*   HEMOGLOBIN g/dL 10.6*   HEMATOCRIT  % 32.4*   PLATELETS Thousands/uL 130*   SEGS PCT % 55   LYMPHO PCT % 31   MONO PCT % 10   EOS PCT % 3     Results from last 7 days   Lab Units 10/16/24  0614 10/15/24  0518 10/14/24  1204   SODIUM mmol/L 140   < > 142   POTASSIUM mmol/L 3.2*   < > 3.4*   CHLORIDE mmol/L 101   < > 100   CO2 mmol/L 33*   < > 31   BUN mg/dL 27*   < > 34*   CREATININE mg/dL 1.14   < > 1.66*   ANION GAP mmol/L 6   < > 11   CALCIUM mg/dL 8.8   < > 9.7   ALBUMIN g/dL  --   --  3.9   TOTAL BILIRUBIN mg/dL  --   --  0.64   ALK PHOS U/L  --   --  40   ALT U/L  --   --  25   AST U/L  --   --  20   GLUCOSE RANDOM mg/dL 99   < > 118    < > = values in this interval not displayed.         Results from last 7 days   Lab Units 10/14/24  1059   POC GLUCOSE mg/dl 115               Recent Cultures (last 7 days):         Imaging Results Review: I reviewed radiology reports from this admission including: Echocardiogram.      Last 24 Hours Medication List:     Current Facility-Administered Medications:     acetaminophen (TYLENOL) tablet 650 mg, Q4H PRN    apixaban (ELIQUIS) tablet 5 mg, BID    calcitriol (ROCALTROL) capsule 0.25 mcg, Once per day on Monday Wednesday Friday    Cholecalciferol (VITAMIN D3) tablet 1,000 Units, Daily    famotidine (PEPCID) tablet 40 mg, Daily    midodrine (PROAMATINE) tablet 5 mg, TID AC    ondansetron (ZOFRAN) injection 4 mg, Q6H PRN    Administrative Statements   Today, Patient Was Seen By: Oc Ziegler MD      **Please Note: This note may have been constructed using a voice recognition system.**

## 2024-10-18 NOTE — PLAN OF CARE
Problem: PAIN - ADULT  Goal: Verbalizes/displays adequate comfort level or baseline comfort level  Description: Interventions:  - Encourage patient to monitor pain and request assistance  - Assess pain using appropriate pain scale  - Administer analgesics based on type and severity of pain and evaluate response  - Implement non-pharmacological measures as appropriate and evaluate response  - Consider cultural and social influences on pain and pain management  - Notify physician/advanced practitioner if interventions unsuccessful or patient reports new pain  Outcome: Progressing     Problem: INFECTION - ADULT  Goal: Absence or prevention of progression during hospitalization  Description: INTERVENTIONS:  - Assess and monitor for signs and symptoms of infection  - Monitor lab/diagnostic results  - Monitor all insertion sites, i.e. indwelling lines, tubes, and drains  - Monitor endotracheal if appropriate and nasal secretions for changes in amount and color  - [place] appropriate cooling/warming therapies per order  - Administer medications as ordered  - Instruct and encourage patient and family to use good hand hygiene technique  - Identify and instruct in appropriate isolation precautions for identified infection/condition  Outcome: Progressing  Goal: Absence of fever/infection during neutropenic period  Description: INTERVENTIONS:  - Monitor WBC    Outcome: Progressing     Problem: SAFETY ADULT  Goal: Patient will remain free of falls  Description: INTERVENTIONS:  - Educate patient/family on patient safety including physical limitations  - Instruct patient to call for assistance with activity   - Consult OT/PT to assist with strengthening/mobility   - Keep Call bell within reach  - Keep bed low and locked with side rails adjusted as appropriate  - Keep care items and personal belongings within reach  - Initiate and maintain comfort rounds  - Make Fall Risk Sign visible to staff  - Offer Toileting every 2 Hours,  in advance of need  - Initiate/Maintain bed alarm  - Obtain necessary fall risk management equipment:   - Apply yellow socks and bracelet for high fall risk patients  - Consider moving patient to room near nurses station  Outcome: Progressing  Goal: Maintain or return to baseline ADL function  Description: INTERVENTIONS:  -  Assess patient's ability to carry out ADLs; assess patient's baseline for ADL function and identify physical deficits which impact ability to perform ADLs (bathing, care of mouth/teeth, toileting, grooming, dressing, etc.)  - Assess/evaluate cause of self-care deficits   - Assess range of motion  - Assess patient's mobility; develop plan if impaired  - Assess patient's need for assistive devices and provide as appropriate  - Encourage maximum independence but intervene and supervise when necessary  - Involve family in performance of ADLs  - Assess for home care needs following discharge   - Consider OT consult to assist with ADL evaluation and planning for discharge  - Provide patient education as appropriate  Outcome: Progressing  Goal: Maintains/Returns to pre admission functional level  Description: INTERVENTIONS:  - Perform AM-PAC 6 Click Basic Mobility/ Daily Activity assessment daily.  - Set and communicate daily mobility goal to care team and patient/family/caregiver.   - Collaborate with rehabilitation services on mobility goals if consulted  - Perform Range of Motion 3 times a day.  - Reposition patient every 2 hours.  - Dangle patient 3 times a day  - Stand patient 3 times a day  - Ambulate patient 3 times a day  - Out of bed to chair 3 times a day   - Out of bed for meals 3 times a day  - Out of bed for toileting  - Record patient progress and toleration of activity level   Outcome: Progressing     Problem: DISCHARGE PLANNING  Goal: Discharge to home or other facility with appropriate resources  Description: INTERVENTIONS:  - Identify barriers to discharge w/patient and caregiver  -  Arrange for needed discharge resources and transportation as appropriate  - Identify discharge learning needs (meds, wound care, etc.)  - Arrange for interpretive services to assist at discharge as needed  - Refer to Case Management Department for coordinating discharge planning if the patient needs post-hospital services based on physician/advanced practitioner order or complex needs related to functional status, cognitive ability, or social support system  Outcome: Progressing     Problem: Knowledge Deficit  Goal: Patient/family/caregiver demonstrates understanding of disease process, treatment plan, medications, and discharge instructions  Description: Complete learning assessment and assess knowledge base.  Interventions:  - Provide teaching at level of understanding  - Provide teaching via preferred learning methods  Outcome: Progressing

## 2024-10-18 NOTE — ASSESSMENT & PLAN NOTE
PE with history of malignancy  Indefinite anticoagulation  Stable.  Ongoing Eliquis 5 mg twice daily  this may need to be held temporarily for bone marrow biopsy

## 2024-10-18 NOTE — ASSESSMENT & PLAN NOTE
Persistent.  Symptomatic   Could not even stand up   Continue midodrine 5 mg 3 times daily today  Increase 10 mg if orthostasis status  Apply Alec stocking  Fall precaution  Hold Wegovy

## 2024-10-18 NOTE — RAPID RESPONSE
Rapid Response Note  Sg Scherer 74 y.o. female MRN: [id number]  Unit/Bed#: E4 -01 Encounter: [id number]    Rapid Response Notification(s):   Response called date/time:  10/18/2024 12:25 AM  Response team arrival date/time:  10/18/2024 12:27 AM  Response end date/time:  10/18/2024 12:37 AM  Level of care:  [place]  Rapid response location:  [facility] unit  Primary reason for rapid response call:  Fall    Rapid Response Intervention(s):   Airway:  None  Breathing:  None  Circulation:  None  Fluids administered:  None  Medications administered:  None       Assessment:   Mechanical unwitnessed fall    Plan:   Managed by primary team     Rapid Response Outcome:   Transfer:  Remain on floor  Primary service notified of transfer: Yes    Code Status: Level 1 (Full Code)           Background/Situation:   Sg Scherer is a 74 y.o. female who had a mechanical fall while walking back from bathroom. Rapid response managed by Slim.     Review of Systems   Respiratory: Negative.     Cardiovascular: Negative.    Gastrointestinal: Negative.    Neurological: Negative.  Negative for dizziness, syncope, weakness and light-headedness.       Objective:   Vitals:    10/17/24 0827 10/17/24 1530 10/17/24 2226 10/18/24 0010   BP: (!) 82/59 115/76 (P) 101/65 108/72   BP Location: Left arm Left arm (P) Left arm Right arm   Pulse: 86 78 (P) 77 57   Resp: 18 18 (P) 18 18   Temp:  (!) 97.1 °F (36.2 °C) (!) (P) 97.3 °F (36.3 °C)    TempSrc:  Temporal (P) Temporal    SpO2: 98% 91% (P) 96% 97%   Weight:       Height:         Physical Exam

## 2024-10-18 NOTE — CONSULTS
e-Consult (IPC)  - Interventional Radiology  Sg Scherer 74 y.o. female MRN: [id number]  Unit/Bed#: E4 -01 Encounter: [id number]          Interventional Radiology has been consulted to evaluate Sg Scherer    We were consulted by Hematology/Oncology concerning this patient with pancytopenia, requesting bone marrow biopsy to r/o MDS.    Inpatient Consult to IR  Consult performed by: RUBEN Gardner  Consult ordered by: Chuy Soliman MD        10/18/24    Assessment/Recommendation:   73 yo female with h/o endometrial cancer s/p SERGIO and SSO, chemotherapy and radiation, pulmonary embolism on chronic Eliquis and currently on Wegovy for weight loss who presented to ED on 10/14/24 with dyspnea on exertion x several weeks and vertigo.      Patient found to have orthostatic hypotension and acute on chronic renal insufficiency.     Hematology/oncology following for pancytopenia. Given concern for possible MDS, IR consulted for bone marrow biopsy.      Of note, today rapid response after unwitnessed mechanical fall.     -Discussed case with Dr Thornton  -Plan for IR bone marrow biopsy next week pending IR schedule  -NPO midnight prior to procedure  -remainder of care per Primary team    31 + minutes, >50% of the total time devoted to medical consultative verbal/EMR discussion between providers. Written report will be generated in the EMR.     Thank you for allowing Interventional Radiology to participate in the care of Sg Scherer. Please don't hesitate to call or TigerText us with any questions.     RUBEN Gardner

## 2024-10-18 NOTE — ASSESSMENT & PLAN NOTE
Due to combination of orthostatic hypotension, dehydration, possible side effect of Wegovy  Dehydration and BARRY resolved  She continues to have symptomatic orthostatic hypotension  Midodrine increased to 5 mg 3 times daily  Apply compression stocking  Hold/DC Wegovy

## 2024-10-18 NOTE — PLAN OF CARE
Problem: PHYSICAL THERAPY ADULT  Goal: Performs mobility at highest level of function for planned discharge setting.  See evaluation for individualized goals.  Description: Treatment/Interventions: Functional transfer training, LE strengthening/ROM, Therapeutic exercise, Elevations, Patient/family training, Equipment eval/education, Bed mobility, Gait training, Compensatory technique education, Endurance training, Spoke to nursing, Family, OT          See flowsheet documentation for full assessment, interventions and recommendations.  Outcome: Progressing  Note: Prognosis: Good  Problem List: Decreased mobility (+ orthostatic hypotension)  Assessment: Pt. able to toelrate all activities however limited in her mobility due to dizziness and orthostatic hypotension. BP readings as follow in supine 94/55, in supine post LE Philip 110/53, seated 93/50, in sitting post LE [place] 88/54, in standing post ambulation got error msg x 2, however in sitting able to get reading 107/63 after  first ambulation and 115/64 after second ambulation. Reported all readings to RN and possibly the error msg. would have been due to DP being too low. Pt. c/o pressure in her head and dizziness with standing and ambulation. Hence second A provided for safety. pt. had a fall in the bathroom this a.m. per records. Pt. seated at EOB post session with all needs within reach and bed alarm engaged and nursing staff aware of it, Will  continue to follow per PT POC.  Barriers to Discharge: None     Rehab Resource Intensity Level, PT: No post-acute rehabilitation needs    See flowsheet documentation for full assessment.

## 2024-10-19 PROCEDURE — 99232 SBSQ HOSP IP/OBS MODERATE 35: CPT | Performed by: INTERNAL MEDICINE

## 2024-10-19 RX ADMIN — MIDODRINE HYDROCHLORIDE 5 MG: 5 TABLET ORAL at 06:04

## 2024-10-19 RX ADMIN — MIDODRINE HYDROCHLORIDE 5 MG: 5 TABLET ORAL at 11:27

## 2024-10-19 RX ADMIN — FAMOTIDINE 40 MG: 20 TABLET, FILM COATED ORAL at 08:11

## 2024-10-19 RX ADMIN — APIXABAN 5 MG: 5 TABLET, FILM COATED ORAL at 08:11

## 2024-10-19 RX ADMIN — MIDODRINE HYDROCHLORIDE 5 MG: 5 TABLET ORAL at 16:01

## 2024-10-19 RX ADMIN — Medication 1000 UNITS: at 08:11

## 2024-10-19 RX ADMIN — APIXABAN 5 MG: 5 TABLET, FILM COATED ORAL at 16:01

## 2024-10-19 NOTE — ASSESSMENT & PLAN NOTE
Due to combination of orthostatic hypotension, dehydration, possible side effect of Wegovy  Continue midodrine 5 mg 3 times daily   Continue compression stockings   BARRY/dehydration resolved/treated  Hold/DC Wegovy

## 2024-10-19 NOTE — PROGRESS NOTES
Progress Note - Hospitalist   Name: Sg Scherer 74 y.o. female I MRN: [id number]  Unit/Bed#: E4 -01 I Date of Admission: 10/14/2024   Date of Service: 10/19/2024 I Hospital Day: 5    Assessment & Plan  Dizziness  Due to combination of orthostatic hypotension, dehydration, possible side effect of Wegovy  Continue midodrine 5 mg 3 times daily   Continue compression stockings   BARRY/dehydration resolved/treated  Hold/DC Wegovy  Orthostatic hypotension  Persistent.  Symptomatic  Possible side effect of Wegovy and rapid weight loss  Continue 5 mg 3 times daily of midodrine.  Titrate to 10 mg if orthostasis persist today.  Continue teds  Acute kidney injury superimposed on stage 3a chronic kidney disease (HCC)  Due to prerenal azotemia from poor oral intake  Resolved with IVF  History of pulmonary embolism  PE with history of malignancy  Indefinite anticoagulation  Stable.  Ongoing Eliquis 5 mg twice daily  Discussed with IR on 10/18/2024.  No need to hold Eliquis for bone marrow biopsy  History of endometrial cancer  Most recent CT showed no sign of metastatic disease or recurrence  Follow-up with GYN as scheduled in November  Morbid obesity with BMI of 45.0-49.9, adult (HCC)  Hold wegovy due to suspected side effect  Pancytopenia (HCC)  Ongoing pancytopenia with macrocytosis.  With normal B12 and folate.  With history of endometrial cancer and previous chemotherapy.  She has suspected myelodysplasia.  Bone marrow biopsy possibly Monday at the soonest      VTE Pharmacologic Prophylaxis: VTE Score: 5 High Risk (Score >/= 5) - Pharmacological DVT Prophylaxis Ordered: apixaban (Eliquis). Sequential Compression Devices Ordered.    Patient Centered Rounds: Discussed with her nurse    Current Length of Stay: 5 day(s)  Current Patient Status: Inpatient   Certification Statement: The patient will continue to require additional inpatient hospital stay due to orthostasis.  Pancytopenia  Discharge Plan: Anticipate  discharge in >72 hrs to home with home services.    Code Status: Level 1 - Full Code    Subjective   + Dizziness when standing    Objective :  Temp:  [97.9 °F (36.6 °C)-98.4 °F (36.9 °C)] 97.9 °F (36.6 °C)  HR:  [67-81] 67  BP: ()/(51-64) 128/64  Resp:  [18] 18  SpO2:  [97 %-100 %] 100 %  O2 Device: None (Room air)    Body mass index is 36.39 kg/m².     Input and Output Summary (last 24 hours):     Intake/Output Summary (Last 24 hours) at 10/19/2024 1222  Last data filed at 10/19/2024 0421  Gross per 24 hour   Intake 120 ml   Output 950 ml   Net -830 ml       Physical Exam  Vitals reviewed.   Constitutional:       Appearance: She is not ill-appearing.   HENT:      Head: Normocephalic and atraumatic.      Nose: No congestion or rhinorrhea.   Eyes:      General: No scleral icterus.  Cardiovascular:      Rate and Rhythm: Normal rate and regular rhythm.   Pulmonary:      Breath sounds: No wheezing or rhonchi.   Abdominal:      Palpations: Abdomen is soft.      Tenderness: There is no abdominal tenderness. There is no guarding.   Musculoskeletal:      Right lower leg: No edema.      Left lower leg: No edema.   Skin:     General: Skin is warm and dry.   Neurological:      Mental Status: She is oriented to person, place, and time.   Psychiatric:         Mood and Affect: Mood normal.         Behavior: Behavior normal.         Lines/Drains:  Lines/Drains/Airways       Active Status       Name Placement date Placement time Site Days    Port A Cath 05/09/22 Right Chest 05/09/22  1100  Chest  894                                 Lab Results: I have reviewed the following results:   Results from last 7 days   Lab Units 10/17/24  0445   WBC Thousand/uL 3.10*   HEMOGLOBIN g/dL 10.6*   HEMATOCRIT % 32.4*   PLATELETS Thousands/uL 130*   SEGS PCT % 55   LYMPHO PCT % 31   MONO PCT % 10   EOS PCT % 3     Results from last 7 days   Lab Units 10/16/24  0614 10/15/24  0518 10/14/24  1204   SODIUM mmol/L 140   < > 142   POTASSIUM  mmol/L 3.2*   < > 3.4*   CHLORIDE mmol/L 101   < > 100   CO2 mmol/L 33*   < > 31   BUN mg/dL 27*   < > 34*   CREATININE mg/dL 1.14   < > 1.66*   ANION GAP mmol/L 6   < > 11   CALCIUM mg/dL 8.8   < > 9.7   ALBUMIN g/dL  --   --  3.9   TOTAL BILIRUBIN mg/dL  --   --  0.64   ALK PHOS U/L  --   --  40   ALT U/L  --   --  25   AST U/L  --   --  20   GLUCOSE RANDOM mg/dL 99   < > 118    < > = values in this interval not displayed.         Results from last 7 days   Lab Units 10/14/24  1059   POC GLUCOSE mg/dl 115               Recent Cultures (last 7 days):               Last 24 Hours Medication List:     Current Facility-Administered Medications:     acetaminophen (TYLENOL) tablet 650 mg, Q4H PRN    apixaban (ELIQUIS) tablet 5 mg, BID    calcitriol (ROCALTROL) capsule 0.25 mcg, Once per day on Monday Wednesday Friday    Cholecalciferol (VITAMIN D3) tablet 1,000 Units, Daily    famotidine (PEPCID) tablet 40 mg, Daily    midodrine (PROAMATINE) tablet 5 mg, TID AC    ondansetron (ZOFRAN) injection 4 mg, Q6H PRN    Administrative Statements   Today, Patient Was Seen By: Oc Ziegler MD      **Please Note: This note may have been constructed using a voice recognition system.**

## 2024-10-19 NOTE — ASSESSMENT & PLAN NOTE
Ongoing pancytopenia with macrocytosis.  With normal B12 and folate.  With history of endometrial cancer and previous chemotherapy.  She has suspected myelodysplasia.  Bone marrow biopsy possibly Monday at the soonest     Small frequent meals  Low in fat  Low in fiber    Large meals take longer to digest, so they will sit in your stomach longer and make you feel uncomfortable.  That is why we encouraged you to have smaller meals (1/3 - 1/2 the size of your usual meal).  You will need to eat more often since your meal size is smaller.    Fat takes longer to digest, so it will sit in your stomach longer and make you feel uncomfortable.  Avoid fat in solid foods such as: steak, pizza, ribs...  However, fat in liquid foods you will be able to tolerate better, such as: whole milk.  This can be helpful if you are losing weight unintentionally, you can boost your calories by adding in whole milk    Fiber takes longer to digest, so it will sit in your stomach longer and make you feel uncomfortable.  Limit or avoid high fiber foods such as: avocado, beans, whole grains, berries...      Everyone is different regarding how much they can tolerate - it is important to be aware of how foods make you feel.    Chew foods well and take your time with meals, give yourself at least 20 minutes to eat.    If you are diabetic, it is important to manage your blood sugars.  When they are running high, it can affect how well your stomach works.       Snack meals:   Cheese and crackers  Hard boiled egg, toast or fruit  Peanut butter sandwich    For questions or concerns, please contact Pamela Spangler at [phone] or [email or web address]

## 2024-10-19 NOTE — ASSESSMENT & PLAN NOTE
Persistent.  Symptomatic  Possible side effect of Wegovy and rapid weight loss  Continue 5 mg 3 times daily of midodrine.  Titrate to 10 mg if orthostasis persist today.  Continue teds

## 2024-10-19 NOTE — ASSESSMENT & PLAN NOTE
PE with history of malignancy  Indefinite anticoagulation  Stable.  Ongoing Eliquis 5 mg twice daily  Discussed with IR on 10/18/2024.  No need to hold Eliquis for bone marrow biopsy

## 2024-10-19 NOTE — PLAN OF CARE
Problem: PAIN - ADULT  Goal: Verbalizes/displays adequate comfort level or baseline comfort level  Description: Interventions:  - Encourage patient to monitor pain and request assistance  - Assess pain using appropriate pain scale  - Administer analgesics based on type and severity of pain and evaluate response  - Implement non-pharmacological measures as appropriate and evaluate response  - Consider cultural and social influences on pain and pain management  - Notify physician/advanced practitioner if interventions unsuccessful or patient reports new pain  Outcome: Progressing     Problem: INFECTION - ADULT  Goal: Absence or prevention of progression during hospitalization  Description: INTERVENTIONS:  - Assess and monitor for signs and symptoms of infection  - Monitor lab/diagnostic results  - Monitor all insertion sites, i.e. indwelling lines, tubes, and drains  - Monitor endotracheal if appropriate and nasal secretions for changes in amount and color  - [place] appropriate cooling/warming therapies per order  - Administer medications as ordered  - Instruct and encourage patient and family to use good hand hygiene technique  - Identify and instruct in appropriate isolation precautions for identified infection/condition  Outcome: Progressing     Problem: SAFETY ADULT  Goal: Patient will remain free of falls  Description: INTERVENTIONS:  - Educate patient/family on patient safety including physical limitations  - Instruct patient to call for assistance with activity   - Consult OT/PT to assist with strengthening/mobility   - Keep Call bell within reach  - Keep bed low and locked with side rails adjusted as appropriate  - Keep care items and personal belongings within reach  - Initiate and maintain comfort rounds  - Make Fall Risk Sign visible to staff  - Offer Toileting every 2 Hours, in advance of need  - Initiate/Maintain bed alarm  - Obtain necessary fall risk management equipment: bed alarm  - Apply yellow  socks and bracelet for high fall risk patients  - Consider moving patient to room near nurses station  Outcome: Progressing  Goal: Maintain or return to baseline ADL function  Description: INTERVENTIONS:  -  Assess patient's ability to carry out ADLs; assess patient's baseline for ADL function and identify physical deficits which impact ability to perform ADLs (bathing, care of mouth/teeth, toileting, grooming, dressing, etc.)  - Assess/evaluate cause of self-care deficits   - Assess range of motion  - Assess patient's mobility; develop plan if impaired  - Assess patient's need for assistive devices and provide as appropriate  - Encourage maximum independence but intervene and supervise when necessary  - Involve family in performance of ADLs  - Assess for home care needs following discharge   - Consider OT consult to assist with ADL evaluation and planning for discharge  - Provide patient education as appropriate  Outcome: Progressing  Goal: Maintains/Returns to pre admission functional level  Description: INTERVENTIONS:  - Perform AM-PAC 6 Click Basic Mobility/ Daily Activity assessment daily.  - Set and communicate daily mobility goal to care team and patient/family/caregiver.   - Collaborate with rehabilitation services on mobility goals if consulted  - Ambulate patient 3 times a day  - Out of bed to chair 3 times a day   - Out of bed for meals 3 times a day  - Out of bed for toileting  - Record patient progress and toleration of activity level   Outcome: Progressing     Problem: DISCHARGE PLANNING  Goal: Discharge to home or other facility with appropriate resources  Description: INTERVENTIONS:  - Identify barriers to discharge w/patient and caregiver  - Arrange for needed discharge resources and transportation as appropriate  - Identify discharge learning needs (meds, wound care, etc.)  - Arrange for interpretive services to assist at discharge as needed  - Refer to Case Management Department for coordinating  discharge planning if the patient needs post-hospital services based on physician/advanced practitioner order or complex needs related to functional status, cognitive ability, or social support system  Outcome: Progressing     Problem: Knowledge Deficit  Goal: Patient/family/caregiver demonstrates understanding of disease process, treatment plan, medications, and discharge instructions  Description: Complete learning assessment and assess knowledge base.  Interventions:  - Provide teaching at level of understanding  - Provide teaching via preferred learning methods  Outcome: Progressing

## 2024-10-20 PROCEDURE — 99232 SBSQ HOSP IP/OBS MODERATE 35: CPT | Performed by: INTERNAL MEDICINE

## 2024-10-20 RX ORDER — POLYETHYLENE GLYCOL 3350 17 G/17G
17 POWDER, FOR SOLUTION ORAL DAILY PRN
Status: DISCONTINUED | OUTPATIENT
Start: 2024-10-20 | End: 2024-10-23 | Stop reason: HOSPADM

## 2024-10-20 RX ADMIN — FAMOTIDINE 40 MG: 20 TABLET, FILM COATED ORAL at 08:34

## 2024-10-20 RX ADMIN — MIDODRINE HYDROCHLORIDE 5 MG: 5 TABLET ORAL at 06:16

## 2024-10-20 RX ADMIN — POLYETHYLENE GLYCOL 3350 17 G: 17 POWDER, FOR SOLUTION ORAL at 16:59

## 2024-10-20 RX ADMIN — Medication 1000 UNITS: at 08:34

## 2024-10-20 RX ADMIN — APIXABAN 5 MG: 5 TABLET, FILM COATED ORAL at 08:34

## 2024-10-20 RX ADMIN — MIDODRINE HYDROCHLORIDE 5 MG: 5 TABLET ORAL at 16:59

## 2024-10-20 RX ADMIN — APIXABAN 5 MG: 5 TABLET, FILM COATED ORAL at 17:52

## 2024-10-20 RX ADMIN — MIDODRINE HYDROCHLORIDE 5 MG: 5 TABLET ORAL at 11:41

## 2024-10-20 NOTE — ASSESSMENT & PLAN NOTE
Due to combination of orthostatic hypotension, dehydration, possible side effect of Wegovy  AKA/dehydration resolved  Continue midodrine 5 mg 3 times daily for orthostatic hypotension  Continue compression stockings   Hold/DC Wegovy

## 2024-10-20 NOTE — ASSESSMENT & PLAN NOTE
Ongoing pancytopenia with macrocytosis.  With normal B12 and folate.  With history of endometrial cancer and previous chemotherapy.  She has suspected myelodysplasia.  Bone marrow biopsy possibly Monday at the soonest per IR

## 2024-10-20 NOTE — PLAN OF CARE
Problem: PAIN - ADULT  Goal: Verbalizes/displays adequate comfort level or baseline comfort level  Description: Interventions:  - Encourage patient to monitor pain and request assistance  - Assess pain using appropriate pain scale  - Administer analgesics based on type and severity of pain and evaluate response  - Implement non-pharmacological measures as appropriate and evaluate response  - Consider cultural and social influences on pain and pain management  - Notify physician/advanced practitioner if interventions unsuccessful or patient reports new pain  Outcome: Progressing     Problem: INFECTION - ADULT  Goal: Absence or prevention of progression during hospitalization  Description: INTERVENTIONS:  - Assess and monitor for signs and symptoms of infection  - Monitor lab/diagnostic results  - Monitor all insertion sites, i.e. indwelling lines, tubes, and drains  - Monitor endotracheal if appropriate and nasal secretions for changes in amount and color  - [place] appropriate cooling/warming therapies per order  - Administer medications as ordered  - Instruct and encourage patient and family to use good hand hygiene technique  - Identify and instruct in appropriate isolation precautions for identified infection/condition  Outcome: Progressing     Problem: SAFETY ADULT  Goal: Patient will remain free of falls  Description: INTERVENTIONS:  - Educate patient/family on patient safety including physical limitations  - Instruct patient to call for assistance with activity   - Consult OT/PT to assist with strengthening/mobility   - Keep Call bell within reach  - Keep bed low and locked with side rails adjusted as appropriate  - Keep care items and personal belongings within reach  - Initiate and maintain comfort rounds  - Make Fall Risk Sign visible to staff  - Offer Toileting every 2 Hours, in advance of need  - Initiate/Maintain bed alarm  - Obtain necessary fall risk management equipment: bed alarm  - Apply yellow  socks and bracelet for high fall risk patients  - Consider moving patient to room near nurses station  Outcome: Progressing  Goal: Maintain or return to baseline ADL function  Description: INTERVENTIONS:  -  Assess patient's ability to carry out ADLs; assess patient's baseline for ADL function and identify physical deficits which impact ability to perform ADLs (bathing, care of mouth/teeth, toileting, grooming, dressing, etc.)  - Assess/evaluate cause of self-care deficits   - Assess range of motion  - Assess patient's mobility; develop plan if impaired  - Assess patient's need for assistive devices and provide as appropriate  - Encourage maximum independence but intervene and supervise when necessary  - Involve family in performance of ADLs  - Assess for home care needs following discharge   - Consider OT consult to assist with ADL evaluation and planning for discharge  - Provide patient education as appropriate  Outcome: Progressing  Goal: Maintains/Returns to pre admission functional level  Description: INTERVENTIONS:  - Perform AM-PAC 6 Click Basic Mobility/ Daily Activity assessment daily.  - Set and communicate daily mobility goal to care team and patient/family/caregiver.   - Collaborate with rehabilitation services on mobility goals if consulted  - Ambulate patient 3 times a day  - Out of bed to chair 3 times a day   - Out of bed for meals 3 times a day  - Out of bed for toileting  - Record patient progress and toleration of activity level   Outcome: Progressing     Problem: DISCHARGE PLANNING  Goal: Discharge to home or other facility with appropriate resources  Description: INTERVENTIONS:  - Identify barriers to discharge w/patient and caregiver  - Arrange for needed discharge resources and transportation as appropriate  - Identify discharge learning needs (meds, wound care, etc.)  - Arrange for interpretive services to assist at discharge as needed  - Refer to Case Management Department for coordinating  discharge planning if the patient needs post-hospital services based on physician/advanced practitioner order or complex needs related to functional status, cognitive ability, or social support system  Outcome: Progressing     Problem: Knowledge Deficit  Goal: Patient/family/caregiver demonstrates understanding of disease process, treatment plan, medications, and discharge instructions  Description: Complete learning assessment and assess knowledge base.  Interventions:  - Provide teaching at level of understanding  - Provide teaching via preferred learning methods  Outcome: Progressing

## 2024-10-20 NOTE — ASSESSMENT & PLAN NOTE
Persistent.  Symptomatic  Possible side effect of Wegovy and rapid weight loss  Check orthostatic today to see if we need to increase her midodrine  For now continue 5 mg 3 times daily of midodrine and REGINE stockings

## 2024-10-20 NOTE — PROGRESS NOTES
Progress Note - Hospitalist   Name: Sg Scherer 74 y.o. female I MRN: [id number]  Unit/Bed#: E4 -01 I Date of Admission: 10/14/2024   Date of Service: 10/20/2024 I Hospital Day: 6    Assessment & Plan  Dizziness  Due to combination of orthostatic hypotension, dehydration, possible side effect of Wegovy  AKA/dehydration resolved  Continue midodrine 5 mg 3 times daily for orthostatic hypotension  Continue compression stockings   Hold/DC Wegovy  Orthostatic hypotension  Persistent.  Symptomatic  Possible side effect of Wegovy and rapid weight loss  Check orthostatic today to see if we need to increase her midodrine  For now continue 5 mg 3 times daily of midodrine and REGINE stockings  Acute kidney injury superimposed on stage 3a chronic kidney disease (HCC)  Due to prerenal azotemia from poor oral intake  Resolved with IVF  History of pulmonary embolism  PE with history of malignancy  Indefinite anticoagulation  Stable.  Continue Eliquis 5 mg twice daily  Discussed with IR on 10/18/2024.  No need to hold Eliquis for bone marrow biopsy  History of endometrial cancer  Most recent CT showed no sign of metastatic disease or recurrence  Follow-up with GYN as scheduled in November  Morbid obesity with BMI of 45.0-49.9, adult (HCC)  Hold wegovy due to suspected side effect  Pancytopenia (HCC)  Ongoing pancytopenia with macrocytosis.  With normal B12 and folate.  With history of endometrial cancer and previous chemotherapy.  She has suspected myelodysplasia.  Bone marrow biopsy possibly Monday at the soonest per IR      VTE Pharmacologic Prophylaxis: VTE Score: 5 High Risk (Score >/= 5) - Pharmacological DVT Prophylaxis Ordered: apixaban (Eliquis). Sequential Compression Devices Ordered.    Patient Centered Rounds: Discussed with her  Education and Discussions with Family / Patient: Discussed with sister at bedside    Current Length of Stay: 6 day(s)  Current Patient Status: Inpatient   Certification Statement: The  patient will continue to require additional inpatient hospital stay due to orthostasis  Discharge Plan: Anticipate discharge in 48 hrs to home with home services.    Code Status: Level 1 - Full Code    Subjective   Intermittent dizziness on standing and walking  No fever or chills  No bleeding    Objective :  Temp:  [97.2 °F (36.2 °C)-97.8 °F (36.6 °C)] 97.2 °F (36.2 °C)  HR:  [56-97] 56  BP: (104-139)/(60-76) 112/75  Resp:  [16-18] 18  SpO2:  [92 %-99 %] 99 %  O2 Device: None (Room air)    Body mass index is 36.39 kg/m².     Input and Output Summary (last 24 hours):     Intake/Output Summary (Last 24 hours) at 10/20/2024 1318  Last data filed at 10/20/2024 0840  Gross per 24 hour   Intake 240 ml   Output 1000 ml   Net -760 ml       Physical Exam  Vitals reviewed.   Constitutional:       General: She is not in acute distress.     Appearance: She is not ill-appearing, toxic-appearing or diaphoretic.   HENT:      Head: Normocephalic and atraumatic.      Nose: No congestion or rhinorrhea.   Eyes:      General: No scleral icterus.  Cardiovascular:      Rate and Rhythm: Normal rate and regular rhythm.   Pulmonary:      Breath sounds: No wheezing or rhonchi.   Abdominal:      Palpations: Abdomen is soft.      Tenderness: There is no abdominal tenderness. There is no guarding.   Musculoskeletal:      Right lower leg: No edema.      Left lower leg: No edema.   Skin:     General: Skin is warm and dry.   Neurological:      Mental Status: She is oriented to person, place, and time.   Psychiatric:         Mood and Affect: Mood normal.         Behavior: Behavior normal.       Lines/Drains:  Lines/Drains/Airways       Active Status       Name Placement date Placement time Site Days    Port A Cath 05/09/22 Right Chest 05/09/22  1100  Chest  895                    Central Line:  Goal for removal: Port accessed. Will de-access as appropriate.               Lab Results: I have reviewed the following results:   Results from last 7  days   Lab Units 10/17/24  0445   WBC Thousand/uL 3.10*   HEMOGLOBIN g/dL 10.6*   HEMATOCRIT % 32.4*   PLATELETS Thousands/uL 130*   SEGS PCT % 55   LYMPHO PCT % 31   MONO PCT % 10   EOS PCT % 3     Results from last 7 days   Lab Units 10/16/24  0614 10/15/24  0518 10/14/24  1204   SODIUM mmol/L 140   < > 142   POTASSIUM mmol/L 3.2*   < > 3.4*   CHLORIDE mmol/L 101   < > 100   CO2 mmol/L 33*   < > 31   BUN mg/dL 27*   < > 34*   CREATININE mg/dL 1.14   < > 1.66*   ANION GAP mmol/L 6   < > 11   CALCIUM mg/dL 8.8   < > 9.7   ALBUMIN g/dL  --   --  3.9   TOTAL BILIRUBIN mg/dL  --   --  0.64   ALK PHOS U/L  --   --  40   ALT U/L  --   --  25   AST U/L  --   --  20   GLUCOSE RANDOM mg/dL 99   < > 118    < > = values in this interval not displayed.         Results from last 7 days   Lab Units 10/14/24  1059   POC GLUCOSE mg/dl 115               Recent Cultures (last 7 days):         No new imaging    Last 24 Hours Medication List:     Current Facility-Administered Medications:     acetaminophen (TYLENOL) tablet 650 mg, Q4H PRN    apixaban (ELIQUIS) tablet 5 mg, BID    calcitriol (ROCALTROL) capsule 0.25 mcg, Once per day on Monday Wednesday Friday    Cholecalciferol (VITAMIN D3) tablet 1,000 Units, Daily    famotidine (PEPCID) tablet 40 mg, Daily    midodrine (PROAMATINE) tablet 5 mg, TID AC    ondansetron (ZOFRAN) injection 4 mg, Q6H PRN    Administrative Statements   Today, Patient Was Seen By: Oc Ziegler MD      **Please Note: This note may have been constructed using a voice recognition system.**

## 2024-10-20 NOTE — PLAN OF CARE
Problem: PAIN - ADULT  Goal: Verbalizes/displays adequate comfort level or baseline comfort level  Description: Interventions:  - Encourage patient to monitor pain and request assistance  - Assess pain using appropriate pain scale  - Administer analgesics based on type and severity of pain and evaluate response  - Implement non-pharmacological measures as appropriate and evaluate response  - Consider cultural and social influences on pain and pain management  - Notify physician/advanced practitioner if interventions unsuccessful or patient reports new pain  Outcome: Progressing     Problem: INFECTION - ADULT  Goal: Absence or prevention of progression during hospitalization  Description: INTERVENTIONS:  - Assess and monitor for signs and symptoms of infection  - Monitor lab/diagnostic results  - Monitor all insertion sites, i.e. indwelling lines, tubes, and drains  - Monitor endotracheal if appropriate and nasal secretions for changes in amount and color  - [place] appropriate cooling/warming therapies per order  - Administer medications as ordered  - Instruct and encourage patient and family to use good hand hygiene technique  - Identify and instruct in appropriate isolation precautions for identified infection/condition  Outcome: Progressing     Problem: SAFETY ADULT  Goal: Patient will remain free of falls  Description: INTERVENTIONS:  - Educate patient/family on patient safety including physical limitations  - Instruct patient to call for assistance with activity   - Consult OT/PT to assist with strengthening/mobility   - Keep Call bell within reach  - Keep bed low and locked with side rails adjusted as appropriate  - Keep care items and personal belongings within reach  - Initiate and maintain comfort rounds  - Make Fall Risk Sign visible to staff  - Offer Toileting every 2 Hours, in advance of need  - Initiate/Maintain bed alarm  - Obtain necessary fall risk management equipment: bed alarm  - Apply yellow  socks and bracelet for high fall risk patients  - Consider moving patient to room near nurses station  Outcome: Progressing  Goal: Maintain or return to baseline ADL function  Description: INTERVENTIONS:  -  Assess patient's ability to carry out ADLs; assess patient's baseline for ADL function and identify physical deficits which impact ability to perform ADLs (bathing, care of mouth/teeth, toileting, grooming, dressing, etc.)  - Assess/evaluate cause of self-care deficits   - Assess range of motion  - Assess patient's mobility; develop plan if impaired  - Assess patient's need for assistive devices and provide as appropriate  - Encourage maximum independence but intervene and supervise when necessary  - Involve family in performance of ADLs  - Assess for home care needs following discharge   - Consider OT consult to assist with ADL evaluation and planning for discharge  - Provide patient education as appropriate  Outcome: Progressing  Goal: Maintains/Returns to pre admission functional level  Description: INTERVENTIONS:  - Perform AM-PAC 6 Click Basic Mobility/ Daily Activity assessment daily.  - Set and communicate daily mobility goal to care team and patient/family/caregiver.   - Collaborate with rehabilitation services on mobility goals if consulted  - Ambulate patient 3 times a day  - Out of bed to chair 3 times a day   - Out of bed for meals 3 times a day  - Out of bed for toileting  - Record patient progress and toleration of activity level   Outcome: Progressing     Problem: DISCHARGE PLANNING  Goal: Discharge to home or other facility with appropriate resources  Description: INTERVENTIONS:  - Identify barriers to discharge w/patient and caregiver  - Arrange for needed discharge resources and transportation as appropriate  - Identify discharge learning needs (meds, wound care, etc.)  - Arrange for interpretive services to assist at discharge as needed  - Refer to Case Management Department for coordinating  discharge planning if the patient needs post-hospital services based on physician/advanced practitioner order or complex needs related to functional status, cognitive ability, or social support system  Outcome: Progressing     Problem: Knowledge Deficit  Goal: Patient/family/caregiver demonstrates understanding of disease process, treatment plan, medications, and discharge instructions  Description: Complete learning assessment and assess knowledge base.  Interventions:  - Provide teaching at level of understanding  - Provide teaching via preferred learning methods  Outcome: Progressing

## 2024-10-20 NOTE — ASSESSMENT & PLAN NOTE
PE with history of malignancy  Indefinite anticoagulation  Stable.  Continue Eliquis 5 mg twice daily  Discussed with IR on 10/18/2024.  No need to hold Eliquis for bone marrow biopsy

## 2024-10-21 PROCEDURE — 97110 THERAPEUTIC EXERCISES: CPT

## 2024-10-21 PROCEDURE — 99232 SBSQ HOSP IP/OBS MODERATE 35: CPT | Performed by: INTERNAL MEDICINE

## 2024-10-21 PROCEDURE — 97530 THERAPEUTIC ACTIVITIES: CPT

## 2024-10-21 PROCEDURE — 97116 GAIT TRAINING THERAPY: CPT

## 2024-10-21 RX ADMIN — MIDODRINE HYDROCHLORIDE 5 MG: 5 TABLET ORAL at 05:44

## 2024-10-21 RX ADMIN — APIXABAN 5 MG: 5 TABLET, FILM COATED ORAL at 16:44

## 2024-10-21 RX ADMIN — Medication 1000 UNITS: at 08:19

## 2024-10-21 RX ADMIN — MIDODRINE HYDROCHLORIDE 5 MG: 5 TABLET ORAL at 16:44

## 2024-10-21 RX ADMIN — CALCITRIOL 0.25 MCG: 0.25 CAPSULE, LIQUID FILLED ORAL at 08:19

## 2024-10-21 RX ADMIN — MIDODRINE HYDROCHLORIDE 5 MG: 5 TABLET ORAL at 11:42

## 2024-10-21 RX ADMIN — FAMOTIDINE 40 MG: 20 TABLET, FILM COATED ORAL at 08:19

## 2024-10-21 RX ADMIN — APIXABAN 5 MG: 5 TABLET, FILM COATED ORAL at 08:19

## 2024-10-21 NOTE — ASSESSMENT & PLAN NOTE
Dyspnea on exertion for several weeks to months    Increased lower extremity edema    It looks like her PCP started her on torsemide 20 mg by mouth daily on October 4    Now she is having presyncope and acute kidney injury likely related to the torsemide    Likely new onset CHF given history; echo obtained and reveals EF of 62% with grade 1 diastolic dysfunction.    Monitor off diuretics for now; reinitiate diuretics on discharge if stable    PT/OT recommending no postacute rehab; dyspnea improving and may have some component of deconditioning

## 2024-10-21 NOTE — PHYSICAL THERAPY NOTE
PT PROGRESS NOTE    Name: Sg Scherer  AGE: 74 y.o.  MRN: [id number]  LENGTH OF STAY: 7    Admitting Dx:  Shortness of breath [R06.02]  LBBB (left bundle branch block) [I44.7]  Dyspnea [R06.00]  BARRY (acute kidney injury) (HCC) [N17.9]      Patient Active Problem List   Diagnosis    Essential hypertension    Acute kidney injury superimposed on stage 3a chronic kidney disease (HCC)    Acute pulmonary embolism without acute cor pulmonale (HCC)    Encounter for follow-up surveillance of endometrial cancer    Morbid obesity with BMI of 45.0-49.9, adult (HCC)    Encounter for central line care    Insomnia    Chemotherapy induced neutropenia (HCC)    History of endometrial cancer    Osteoporosis without current pathological fracture    Secondary hyperparathyroidism (HCC)    Anemia due to stage 3a chronic kidney disease     Secondary hyperparathyroidism of renal origin (HCC)    Dyspnea on exertion    Acute kidney injury (HCC)    History of pulmonary embolism    Dizziness    Orthostatic hypotension    Pancytopenia (HCC)               10/21/24 1420   PT Last Visit   PT Visit Date 10/21/24   Note Type   Note Type Treatment   Pain Assessment   Pain Score No Pain   Multiple Pain Sites No   Restrictions/Precautions   Weight Bearing Precautions Per Order No   Other Precautions Fall Risk;Telemetry  (symptomatic orthostatic hypotension)   General   Chart Reviewed Yes   Response to Previous Treatment Patient with no complaints from previous session.   Family/Caregiver Present No   Cognition   Overall Cognitive Status WFL   Arousal/Participation Alert;Cooperative   Attention Within functional limits   Orientation Level Oriented X4   Following Commands Follows all commands and directions without difficulty   Comments pleasant & cooperative   Subjective   Subjective Pt agreeable to PT tx.   Bed Mobility   Supine to Sit Unable to assess   Sit to Supine Unable to assess   Additional Comments pt OOB in chair pre & post-session    Transfers   Sit to Stand 6  Modified independent   Additional items Armrests   Stand to Sit 6  Modified independent   Additional items Armrests   Toilet transfer 6  Modified independent   Additional items Standard toilet   Ambulation/Elevation   Gait pattern Excessively slow   Gait Assistance 5  Supervision   Additional items Verbal cues   Assistive Device Rolling walker   Distance 70'x1 + 140'x1 + 20'x1   Ambulation/Elevation Additional Comments no gross LOB noted; no dizziness reported t/o session. (+) fatigue hence seated rest in between amb; (+) chair follow for safety given orthostatic hypotension   Balance   Static Sitting Normal   Dynamic Sitting Normal   Static Standing Good  (w/ RW)   Dynamic Standing Fair +  (w/ RW)   Ambulatory Fair  (w/ RW)   Endurance Deficit   Endurance Deficit Yes   Endurance Deficit Description fatigue   Activity Tolerance   Activity Tolerance Patient tolerated treatment well;Patient limited by fatigue   Medical Staff Made Aware Restorative Specialist Amanda   Nurse Made Aware yes, [place]   Exercises   Hip Flexion Sitting;15 reps;AROM;Bilateral   Hip Abduction Sitting;15 reps;AROM;Bilateral   Hip Adduction Sitting;15 reps;AROM;Bilateral   Knee AROM Long Arc Quad Sitting;15 reps;AROM;Bilateral   Ankle Pumps Sitting;AROM;Bilateral;25 reps   Assessment   Prognosis Good   Problem List Decreased endurance  (orthostatic hypotension)   Assessment Pt seen for PT per POC. Improved mobility & activity tolerance noted this tx session. Pt require modified I w/ transfers however continue to require S for amb w/ RW + cues for techniques & safety. Gait deviations as above, slow & guarded but no gross LOB noted. Continue to require chair follow during amb given orthostatic hypotension. No dizziness reported t/o session however needed seated rest in between amb  2* to fatigue. Pt tolerated above mentioned thera.ex well. Pt require cues for proper exercise techniques and performance.  Please see  "flowsheet above for objective findings & levels of assistance required for all functional tasks during this tx session. Nsg staff most recent vital signs as follows: /70 (BP Location: Left arm)   Pulse 74   Temp (!) 96.6 °F (35.9 °C) (Temporal)   Resp 18   Ht 5' 4\" (1.626 m)   Wt 96.2 kg (212 lb)   LMP  (LMP Unknown)   SpO2 98%   BMI 36.39 kg/m² . Will continue PT per POC. At end of session, pt OOB in chair in stable condition, call bell & phone in reach, chair alarm activated. Fall precautions reinforced w/ good understanding. The patient's AM-PAC Basic Mobility Inpatient Short Form Raw Score is 22. A Raw score of greater than 16 suggests the patient may benefit from discharge to home. Please also refer to the recommendation of the Physical Therapist for safe discharge planning. CM to follow. Nsg staff to continue to mobilized pt (OOB in chair for all meals & ambulate in room/unit) as tolerated to prevent decline in function. Will continue to recommend Restorative for daily amb &/or daily OOB in chair as appropriate. Nsg notified.   Goals   Patient Goals to go home   STG Expiration Date 10/28/24   Short Term Goal #1 extend initial PT goals x7 days   PT Treatment Day 2   Plan   Treatment/Interventions Functional transfer training;LE strengthening/ROM;Elevations;Therapeutic exercise;Endurance training;Patient/family training;Bed mobility;Gait training;Spoke to nursing   Progress Progressing toward goals   PT Frequency 1-2x/wk   Discharge Recommendation   Rehab Resource Intensity Level, PT No post-acute rehabilitation needs   Additional Comments restorative for daily amb   AM-PAC Basic Mobility Inpatient   Turning in Flat Bed Without Bedrails 4   Lying on Back to Sitting on Edge of Flat Bed Without Bedrails 4   Moving Bed to Chair 4   Standing Up From Chair Using Arms 4   Walk in Room 3   Climb 3-5 Stairs With Railing 3   Basic Mobility Inpatient Raw Score 22   Basic Mobility Standardized Score 47.4 "   [place] Highest Level Of Mobility   -HL Goal 7: Walk 25 feet or more   -HLM Achieved 7: Walk 25 feet or more   Education   Education Provided Mobility training;Home exercise program;Assistive device   Patient Demonstrates acceptance/verbal understanding   End of Consult   Patient Position at End of Consult Bedside chair;Bed/Chair alarm activated;All needs within reach   End of Consult Comments Pt in stable condition. All needs in reach. Chair alarm activated.   Ramakrishna Levy

## 2024-10-21 NOTE — PLAN OF CARE
Problem: PAIN - ADULT  Goal: Verbalizes/displays adequate comfort level or baseline comfort level  Description: Interventions:  - Encourage patient to monitor pain and request assistance  - Assess pain using appropriate pain scale  - Administer analgesics based on type and severity of pain and evaluate response  - Implement non-pharmacological measures as appropriate and evaluate response  - Consider cultural and social influences on pain and pain management  - Notify physician/advanced practitioner if interventions unsuccessful or patient reports new pain  Outcome: Progressing     Problem: INFECTION - ADULT  Goal: Absence or prevention of progression during hospitalization  Description: INTERVENTIONS:  - Assess and monitor for signs and symptoms of infection  - Monitor lab/diagnostic results  - Monitor all insertion sites, i.e. indwelling lines, tubes, and drains  - Monitor endotracheal if appropriate and nasal secretions for changes in amount and color  - [place] appropriate cooling/warming therapies per order  - Administer medications as ordered  - Instruct and encourage patient and family to use good hand hygiene technique  - Identify and instruct in appropriate isolation precautions for identified infection/condition  Outcome: Progressing     Problem: SAFETY ADULT  Goal: Patient will remain free of falls  Description: INTERVENTIONS:  - Educate patient/family on patient safety including physical limitations  - Instruct patient to call for assistance with activity   - Consult OT/PT to assist with strengthening/mobility   - Keep Call bell within reach  - Keep bed low and locked with side rails adjusted as appropriate  - Keep care items and personal belongings within reach  - Initiate and maintain comfort rounds  - Make Fall Risk Sign visible to staff  - Offer Toileting every 2 Hours, in advance of need  - Initiate/Maintain bed alarm  - Obtain necessary fall risk management equipment: bed alarm  - Apply yellow  socks and bracelet for high fall risk patients  - Consider moving patient to room near nurses station  Outcome: Progressing  Goal: Maintain or return to baseline ADL function  Description: INTERVENTIONS:  -  Assess patient's ability to carry out ADLs; assess patient's baseline for ADL function and identify physical deficits which impact ability to perform ADLs (bathing, care of mouth/teeth, toileting, grooming, dressing, etc.)  - Assess/evaluate cause of self-care deficits   - Assess range of motion  - Assess patient's mobility; develop plan if impaired  - Assess patient's need for assistive devices and provide as appropriate  - Encourage maximum independence but intervene and supervise when necessary  - Involve family in performance of ADLs  - Assess for home care needs following discharge   - Consider OT consult to assist with ADL evaluation and planning for discharge  - Provide patient education as appropriate  Outcome: Progressing  Goal: Maintains/Returns to pre admission functional level  Description: INTERVENTIONS:  - Perform AM-PAC 6 Click Basic Mobility/ Daily Activity assessment daily.  - Set and communicate daily mobility goal to care team and patient/family/caregiver.   - Collaborate with rehabilitation services on mobility goals if consulted  - Ambulate patient 3 times a day  - Out of bed to chair 3 times a day   - Out of bed for meals 3 times a day  - Out of bed for toileting  - Record patient progress and toleration of activity level   Outcome: Progressing     Problem: DISCHARGE PLANNING  Goal: Discharge to home or other facility with appropriate resources  Description: INTERVENTIONS:  - Identify barriers to discharge w/patient and caregiver  - Arrange for needed discharge resources and transportation as appropriate  - Identify discharge learning needs (meds, wound care, etc.)  - Arrange for interpretive services to assist at discharge as needed  - Refer to Case Management Department for coordinating  discharge planning if the patient needs post-hospital services based on physician/advanced practitioner order or complex needs related to functional status, cognitive ability, or social support system  Outcome: Progressing     Problem: Knowledge Deficit  Goal: Patient/family/caregiver demonstrates understanding of disease process, treatment plan, medications, and discharge instructions  Description: Complete learning assessment and assess knowledge base.  Interventions:  - Provide teaching at level of understanding  - Provide teaching via preferred learning methods  Outcome: Progressing

## 2024-10-21 NOTE — RESTORATIVE TECHNICIAN NOTE
Restorative Technician Note      [facility] Visit Date: 10/21/24  Note Type: Mobility  Patient Position Upon Consult: Bedside chair  Activity Performed: Ambulated  Assistive Device: Roller walker  Patient Position at End of Consult: Bedside chair; All needs within reach; Bed/Chair alarm activated            ns

## 2024-10-21 NOTE — ASSESSMENT & PLAN NOTE
Patient with baseline creatinine of approximately 1.3; creatinine noted to be 1.66 on admission  Due to prerenal azotemia from poor oral intake  Resolved with IVF; monitor on morning labs

## 2024-10-21 NOTE — PLAN OF CARE
"  Problem: PHYSICAL THERAPY ADULT  Goal: Performs mobility at highest level of function for planned discharge setting.  See evaluation for individualized goals.  Description: Treatment/Interventions: Functional transfer training, LE strengthening/ROM, Therapeutic exercise, Elevations, Patient/family training, Equipment eval/education, Bed mobility, Gait training, Compensatory technique education, Endurance training, Spoke to nursing, Family, OT          See flowsheet documentation for full assessment, interventions and recommendations.  Outcome: Progressing  Note: Prognosis: Good  Problem List: Decreased endurance (orthostatic hypotension)  Assessment: Pt seen for PT per POC. Improved mobility & activity tolerance noted this tx session. Pt require modified I w/ transfers however continue to require S for amb w/ RW + cues for techniques & safety. Gait deviations as above, slow & guarded but no gross LOB noted. Continue to require chair follow during amb given orthostatic hypotension. No dizziness reported t/o session however needed seated rest in between amb  2* to fatigue. Pt tolerated above mentioned thera.ex well. Pt require cues for proper exercise techniques and performance.  Please see flowsheet above for objective findings & levels of assistance required for all functional tasks during this tx session. Nsg staff most recent vital signs as follows: /70 (BP Location: Left arm)   Pulse 74   Temp (!) 96.6 °F (35.9 °C) (Temporal)   Resp 18   Ht 5' 4\" (1.626 m)   Wt 96.2 kg (212 lb)   LMP  (LMP Unknown)   SpO2 98%   BMI 36.39 kg/m² . Will continue PT per POC. At end of session, pt OOB in chair in stable condition, call bell & phone in reach, chair alarm activated. Fall precautions reinforced w/ good understanding. The patient's AM-PAC Basic Mobility Inpatient Short Form Raw Score is 22. A Raw score of greater than 16 suggests the patient may benefit from discharge to home. Please also refer to the " recommendation of the Physical Therapist for safe discharge planning. CM to follow. Nsg staff to continue to mobilized pt (OOB in chair for all meals & ambulate in room/unit) as tolerated to prevent decline in function. Will continue to recommend Restorative for daily amb &/or daily OOB in chair as appropriate. Nsg notified.    Barriers to Discharge: None     Rehab Resource Intensity Level, PT: No post-acute rehabilitation needs    See flowsheet documentation for full assessment.

## 2024-10-21 NOTE — ASSESSMENT & PLAN NOTE
Persistent.  Symptomatic  Possible side effect of Wegovy and rapid weight loss  Check orthostatic today to see if we need to increase her midodrine-orthostatics negative today  For now continue 5 mg 3 times daily of midodrine and REGINE stockings

## 2024-10-21 NOTE — ASSESSMENT & PLAN NOTE
Ongoing pancytopenia with macrocytosis.  With normal B12 and folate.  With history of endometrial cancer and previous chemotherapy.  She has suspected myelodysplasia.  Bone marrow biopsy possibly on 10/22

## 2024-10-22 ENCOUNTER — TELEPHONE (OUTPATIENT)
Dept: HEMATOLOGY ONCOLOGY | Facility: CLINIC | Age: 74
End: 2024-10-22

## 2024-10-22 ENCOUNTER — APPOINTMENT (INPATIENT)
Dept: RADIOLOGY | Facility: HOSPITAL | Age: 74
DRG: 683 | End: 2024-10-22
Payer: COMMERCIAL

## 2024-10-22 LAB
ALBUMIN SERPL BCG-MCNC: 3 G/DL (ref 3.5–5)
ALP SERPL-CCNC: 37 U/L (ref 34–104)
ALT SERPL W P-5'-P-CCNC: 18 U/L (ref 7–52)
ANION GAP SERPL CALCULATED.3IONS-SCNC: 4 MMOL/L (ref 4–13)
AST SERPL W P-5'-P-CCNC: 14 U/L (ref 13–39)
BASOPHILS # BLD AUTO: 0.02 THOUSANDS/ΜL (ref 0–0.1)
BASOPHILS NFR BLD AUTO: 1 % (ref 0–1)
BILIRUB SERPL-MCNC: 0.48 MG/DL (ref 0.2–1)
BUN SERPL-MCNC: 19 MG/DL (ref 5–25)
CALCIUM ALBUM COR SERPL-MCNC: 9.4 MG/DL (ref 8.3–10.1)
CALCIUM SERPL-MCNC: 8.6 MG/DL (ref 8.4–10.2)
CHLORIDE SERPL-SCNC: 109 MMOL/L (ref 96–108)
CO2 SERPL-SCNC: 29 MMOL/L (ref 21–32)
CREAT SERPL-MCNC: 1.08 MG/DL (ref 0.6–1.3)
EOSINOPHIL # BLD AUTO: 0.09 THOUSAND/ΜL (ref 0–0.61)
EOSINOPHIL NFR BLD AUTO: 3 % (ref 0–6)
ERYTHROCYTE [DISTWIDTH] IN BLOOD BY AUTOMATED COUNT: 13.8 % (ref 11.6–15.1)
GFR SERPL CREATININE-BSD FRML MDRD: 49 ML/MIN/1.73SQ M
GLUCOSE SERPL-MCNC: 90 MG/DL (ref 65–140)
HCT VFR BLD AUTO: 30.4 % (ref 34.8–46.1)
HGB BLD-MCNC: 9.9 G/DL (ref 11.5–15.4)
IMM GRANULOCYTES # BLD AUTO: 0.01 THOUSAND/UL (ref 0–0.2)
IMM GRANULOCYTES NFR BLD AUTO: 0 % (ref 0–2)
LYMPHOCYTES # BLD AUTO: 0.92 THOUSANDS/ΜL (ref 0.6–4.47)
LYMPHOCYTES NFR BLD AUTO: 34 % (ref 14–44)
MAGNESIUM SERPL-MCNC: 2.1 MG/DL (ref 1.9–2.7)
MCH RBC QN AUTO: 33.9 PG (ref 26.8–34.3)
MCHC RBC AUTO-ENTMCNC: 32.6 G/DL (ref 31.4–37.4)
MCV RBC AUTO: 104 FL (ref 82–98)
MONOCYTES # BLD AUTO: 0.29 THOUSAND/ΜL (ref 0.17–1.22)
MONOCYTES NFR BLD AUTO: 11 % (ref 4–12)
NEUTROPHILS # BLD AUTO: 1.36 THOUSANDS/ΜL (ref 1.85–7.62)
NEUTS SEG NFR BLD AUTO: 51 % (ref 43–75)
NRBC BLD AUTO-RTO: 0 /100 WBCS
PHOSPHATE SERPL-MCNC: 3.4 MG/DL (ref 2.3–4.1)
PLATELET # BLD AUTO: 125 THOUSANDS/UL (ref 149–390)
PMV BLD AUTO: 10.9 FL (ref 8.9–12.7)
POTASSIUM SERPL-SCNC: 4 MMOL/L (ref 3.5–5.3)
PROT SERPL-MCNC: 5.7 G/DL (ref 6.4–8.4)
RBC # BLD AUTO: 2.92 MILLION/UL (ref 3.81–5.12)
SODIUM SERPL-SCNC: 142 MMOL/L (ref 135–147)
WBC # BLD AUTO: 2.69 THOUSAND/UL (ref 4.31–10.16)

## 2024-10-22 PROCEDURE — 77002 NEEDLE LOCALIZATION BY XRAY: CPT

## 2024-10-22 PROCEDURE — 88264 CHROMOSOME ANALYSIS 20-25: CPT | Performed by: INTERNAL MEDICINE

## 2024-10-22 PROCEDURE — 83735 ASSAY OF MAGNESIUM: CPT | Performed by: INTERNAL MEDICINE

## 2024-10-22 PROCEDURE — C1830 POWER BONE MARROW BX NEEDLE: HCPCS

## 2024-10-22 PROCEDURE — 99152 MOD SED SAME PHYS/QHP 5/>YRS: CPT

## 2024-10-22 PROCEDURE — 88185 FLOWCYTOMETRY/TC ADD-ON: CPT

## 2024-10-22 PROCEDURE — 81263 IGH VARI REGIONAL MUTATION: CPT | Performed by: INTERNAL MEDICINE

## 2024-10-22 PROCEDURE — 88184 FLOWCYTOMETRY/ TC 1 MARKER: CPT | Performed by: INTERNAL MEDICINE

## 2024-10-22 PROCEDURE — 07DR3ZX EXTRACTION OF ILIAC BONE MARROW, PERCUTANEOUS APPROACH, DIAGNOSTIC: ICD-10-PCS | Performed by: RADIOLOGY

## 2024-10-22 PROCEDURE — 80053 COMPREHEN METABOLIC PANEL: CPT | Performed by: INTERNAL MEDICINE

## 2024-10-22 PROCEDURE — 85007 BL SMEAR W/DIFF WBC COUNT: CPT | Performed by: STUDENT IN AN ORGANIZED HEALTH CARE EDUCATION/TRAINING PROGRAM

## 2024-10-22 PROCEDURE — 88237 TISSUE CULTURE BONE MARROW: CPT | Performed by: INTERNAL MEDICINE

## 2024-10-22 PROCEDURE — 85027 COMPLETE CBC AUTOMATED: CPT | Performed by: STUDENT IN AN ORGANIZED HEALTH CARE EDUCATION/TRAINING PROGRAM

## 2024-10-22 PROCEDURE — 38222 DX BONE MARROW BX & ASPIR: CPT

## 2024-10-22 PROCEDURE — 99232 SBSQ HOSP IP/OBS MODERATE 35: CPT | Performed by: STUDENT IN AN ORGANIZED HEALTH CARE EDUCATION/TRAINING PROGRAM

## 2024-10-22 PROCEDURE — 84100 ASSAY OF PHOSPHORUS: CPT | Performed by: INTERNAL MEDICINE

## 2024-10-22 PROCEDURE — 88374 M/PHMTRC ALYS ISHQUANT/SEMIQ: CPT | Performed by: INTERNAL MEDICINE

## 2024-10-22 RX ORDER — FENTANYL CITRATE 50 UG/ML
INJECTION, SOLUTION INTRAMUSCULAR; INTRAVENOUS AS NEEDED
Status: COMPLETED | OUTPATIENT
Start: 2024-10-22 | End: 2024-10-22

## 2024-10-22 RX ORDER — LIDOCAINE WITH 8.4% SOD BICARB 0.9%(10ML)
SYRINGE (ML) INJECTION AS NEEDED
Status: COMPLETED | OUTPATIENT
Start: 2024-10-22 | End: 2024-10-22

## 2024-10-22 RX ORDER — MIDAZOLAM HYDROCHLORIDE 2 MG/2ML
INJECTION, SOLUTION INTRAMUSCULAR; INTRAVENOUS AS NEEDED
Status: COMPLETED | OUTPATIENT
Start: 2024-10-22 | End: 2024-10-22

## 2024-10-22 RX ADMIN — MIDAZOLAM 1 MG: 1 INJECTION INTRAMUSCULAR; INTRAVENOUS at 11:30

## 2024-10-22 RX ADMIN — APIXABAN 5 MG: 5 TABLET, FILM COATED ORAL at 16:46

## 2024-10-22 RX ADMIN — POLYETHYLENE GLYCOL 3350 17 G: 17 POWDER, FOR SOLUTION ORAL at 09:51

## 2024-10-22 RX ADMIN — FAMOTIDINE 40 MG: 20 TABLET, FILM COATED ORAL at 08:01

## 2024-10-22 RX ADMIN — MIDAZOLAM 0.5 MG: 1 INJECTION INTRAMUSCULAR; INTRAVENOUS at 11:25

## 2024-10-22 RX ADMIN — Medication 10 ML: at 11:28

## 2024-10-22 RX ADMIN — Medication 1000 UNITS: at 08:01

## 2024-10-22 RX ADMIN — MIDODRINE HYDROCHLORIDE 5 MG: 5 TABLET ORAL at 12:18

## 2024-10-22 RX ADMIN — MIDODRINE HYDROCHLORIDE 5 MG: 5 TABLET ORAL at 05:16

## 2024-10-22 RX ADMIN — MIDODRINE HYDROCHLORIDE 5 MG: 5 TABLET ORAL at 16:46

## 2024-10-22 RX ADMIN — FENTANYL CITRATE 50 MCG: 50 INJECTION INTRAMUSCULAR; INTRAVENOUS at 11:25

## 2024-10-22 RX ADMIN — APIXABAN 5 MG: 5 TABLET, FILM COATED ORAL at 08:01

## 2024-10-22 RX ADMIN — FENTANYL CITRATE 25 MCG: 50 INJECTION INTRAMUSCULAR; INTRAVENOUS at 11:30

## 2024-10-22 NOTE — TELEPHONE ENCOUNTER
New Patient Intake Form   Patient Details:    Sg Scherer  1950    Appointment Information   Who is calling to schedule? [place]   If not self, what is the caller's name?   NA   DID YOU CONFIRM INSURANCE WITH PATIENT? E verified, routed to finance   Referring provider Dr. Soliman   What is the diagnosis? Pancytopenia -Macrocytosis -History of endometrial cancer status post surgery chemotherapy and radiation -History of pulmonary emboli and patient is on Eliquis. -Weight loss on Wegovy.     Is there a confirmed tissue diagnosis?   BMBX taken on 10/22     Is there a biopsy ordered or pending?  Please specify dates  If yes, route to NN/OCC   See above     Is patient aware of diagnosis?   Yes     Have you had any imaging or labs done?  If yes, where?  (If imaging done outside of [facility], please remind patient to bring a disk.) Yes-[facility]     If imaging done at outside facility, did you instruct patient to obtain discs and bring to visit? NA   Have you been seen by another Oncologist/Hematologist?  If so, who and where? The patient followed with RUB[facility] for PE. Per notes, the pt has a h/o endometrial CA   Are the records in [facility] or Care Everywhere? Yes   Does the patient have records at another facility/hospital?    If yes, Name of facility, city and state where facility is located. [facility]     Did you instruct patient to have records faxed to rightx and provide rightfax number?   Care Everywhere   Preferred [facility]   Is the patient willing to be seen by another provider?  (This is for breast patients only) NA     Did you send new patient paperwork?  Email or mail? NA   Miscellaneous Information: The patient is scheduled for her HFU appointment with Dr. Dye on 11/5 at 1140 in the [facility].

## 2024-10-22 NOTE — DISCHARGE INSTRUCTIONS
Bone Marrow Biopsy     WHAT YOU NEED TO KNOW:   A bone marrow biopsy is a procedure to remove a small amount of bone marrow from your bone. Bone marrow is the soft tissue inside your bone that helps to make blood cells. The sample is tested for disease or infection.    DISCHARGE INSTRUCTIONS:     1. Limit your activities day of biopsy as directed by your doctor.    2. Use medication as ordered.    3. Return to your normal diet.Small sips of flat soda will help with nausea.    4. Remove band-aid or dressing 24 hours after procedure.    Contact Interventional Radiology at [phone] if:    1. Difficulty breathing, nausea or vomiting.    2. Chills or fever above 101 F.    3. Pain at biopsy site not relieved by medication    4. Develop any redness, swelling, heat, unusual drainage, heavy bruising or bleeding from biopsy site.    Your skin is itchy, swollen, or you have a rash.     You have nausea or are vomiting for more than 8 hours after the procedure.      You have questions or concerns about your condition or care.     Procedural Sedation   WHAT YOU NEED TO KNOW:   Procedural sedation is medicine used during procedures to help you feel relaxed and calm. You will remember little to none of the procedure. After sedation you may feel tired, weak, or unsteady on your feet. You may also have trouble concentrating or short-term memory loss. These symptoms should go away in 24 hours or less.   DISCHARGE INSTRUCTIONS:   Call 911 or have someone else call for any of the following:   You have sudden trouble breathing.     You cannot be woken.     Contact Interventional Radiology at [phone]   [place] PATIENTS: Contact Interventional Radiology at [phone] [place] PATIENTS: Contact Interventional Radiology at [phone]) if any of the following occur:      You have a severe headache or dizziness.     Your heart is beating faster than usual.    You have a fever or chills.     Your skin is itchy, swollen, or you have a  rash.     You have nausea or are vomiting for more than 8 hours after the procedure.      You have questions or concerns about your condition or care.  Self-care:   Have someone stay with you for 24 hours. This person can drive you to errands and help you do things around the house. This person can also watch for problems.      Rest and do quiet activities for 24 hours. Do not exercise, ride a bike, or play sports. Stand up slowly to prevent dizziness and falls. Take short walks around the house with another person. Slowly return to your usual activities the next day.      Do not drive or use dangerous machines or tools for 24 hours. You may injure yourself or others. Examples include a lawnmower, saw, or drill. Do not return to work for 24 hours if you use dangerous machines or tools for work.      Do not make important decisions for 24 hours. For example, do not sign important papers or invest money.      Drink liquids as directed. Liquids help flush the sedation medicine out of your body. Ask how much liquid to drink each day and which liquids are best for you.      Eat small, frequent meals to prevent nausea and vomiting. Start with clear liquids such as juice or broth. If you do not vomit after clear liquids, you can eat your usual foods.      Do not drink alcohol or take medicines that make you drowsy. This includes medicines that help you sleep and anxiety medicines. Ask your healthcare provider if it is safe for you to take pain medicine.  Follow up with your healthcare provider as directed: Write down your questions so you remember to ask them during your visits.  Procedural Sedation   WHAT YOU NEED TO KNOW:   Procedural sedation is medicine used during procedures to help you feel relaxed and calm. You will remember little to none of the procedure. After sedation you may feel tired, weak, or unsteady on your feet. You may also have trouble concentrating or short-term memory loss. These symptoms should go  away in 24 hours or less.   DISCHARGE INSTRUCTIONS:   Call 911 or have someone else call for any of the following:   You have sudden trouble breathing.     You cannot be woken.     Contact Interventional Radiology at [phone]   [place] PATIENTS: Contact Interventional Radiology at [phone] [place] PATIENTS: Contact Interventional Radiology at [phone]) if any of the following occur:      You have a severe headache or dizziness.     Your heart is beating faster than usual.    You have a fever or chills.     Your skin is itchy, swollen, or you have a rash.     You have nausea or are vomiting for more than 8 hours after the procedure.      You have questions or concerns about your condition or care.  Self-care:   Have someone stay with you for 24 hours. This person can drive you to errands and help you do things around the house. This person can also watch for problems.      Rest and do quiet activities for 24 hours. Do not exercise, ride a bike, or play sports. Stand up slowly to prevent dizziness and falls. Take short walks around the house with another person. Slowly return to your usual activities the next day.      Do not drive or use dangerous machines or tools for 24 hours. You may injure yourself or others. Examples include a lawnmower, saw, or drill. Do not return to work for 24 hours if you use dangerous machines or tools for work.      Do not make important decisions for 24 hours. For example, do not sign important papers or invest money.      Drink liquids as directed. Liquids help flush the sedation medicine out of your body. Ask how much liquid to drink each day and which liquids are best for you.      Eat small, frequent meals to prevent nausea and vomiting. Start with clear liquids such as juice or broth. If you do not vomit after clear liquids, you can eat your usual foods.      Do not drink alcohol or take medicines that make you drowsy. This includes medicines that help you sleep and  anxiety medicines. Ask your healthcare provider if it is safe for you to take pain medicine.  Follow up with your healthcare provider as directed: Write down your questions so you remember to ask them during your visits.

## 2024-10-22 NOTE — ASSESSMENT & PLAN NOTE
Ongoing pancytopenia with macrocytosis.  With normal B12 and folate.  With history of endometrial cancer and previous chemotherapy. Suspected myelodysplasia.  S/p bone marrow biopsy today 10/22/2024.    Recent Labs     10/22/24  0602   WBC 2.69*   HGB 9.9*   *

## 2024-10-22 NOTE — PROGRESS NOTES
Progress Note - Hospitalist   Name: Sg Scherer 74 y.o. female I MRN: [id number]  Unit/Bed#: E4 -01 I Date of Admission: 10/14/2024   Date of Service: 10/22/2024 I Hospital Day: 8    Assessment & Plan  Dizziness  Etiology possible due to combination of orthostatic hypotension, dehydration, history of malignancy.   Increase Midodrine to 7.5 mg 3 times daily for orthostatic hypotension  Orthostatic hypotension  Continue compression stockings   Discontinue Wegovy  Orthostatic hypotension  Persistent. Symptomatic. Possible side effect of Wegovy and rapid weight loss, however after discussing this with her further she did have some episodes of orthostatic symptoms prior to Wegovy  As written above  Pancytopenia (HCC)  Ongoing pancytopenia with macrocytosis.  With normal B12 and folate.  With history of endometrial cancer and previous chemotherapy. Suspected myelodysplasia.  S/p bone marrow biopsy today 10/22/2024.    Recent Labs     10/22/24  0602   WBC 2.69*   HGB 9.9*   *     Acute kidney injury superimposed on stage 3a chronic kidney disease (HCC)  Baseline 1.3, creatinine 1.66.   Resolved with IV hydration    Recent Labs     10/22/24  0602   BUN 19   CREATININE 1.08   EGFR 49     History of pulmonary embolism  PE with history of malignancy. Unclear compliance.  Continue Eliquis 5 mg twice daily  History of endometrial cancer  Most recent CT showed no sign of metastatic disease or recurrence  Follow-up with GYN as scheduled in November  Morbid obesity with BMI of 45.0-49.9, adult (HCC)  Hold wegovy due to suspected side effect  Encourage lifestyle modifications    VTE Pharmacologic Prophylaxis: VTE Score: 5 Moderate Risk (Score 3-4) - Pharmacological DVT Prophylaxis Ordered: apixaban (Eliquis).    Mobility:   Basic Mobility Inpatient Raw Score: 22  JH-HLM Goal: 7: Walk 25 feet or more  JH-HLM Achieved: 7: Walk 25 feet or more    Discussions with Specialists or Other Care Team Provider:  nursing    Education and Discussions with Family / Patient: patient    Current Length of Stay: 8 day(s)  Current Patient Status: Inpatient   Certification Statement: The patient will continue to require additional inpatient hospital stay due to increase midodrine, orthostatic hypotension, post-biopsy  Discharge Plan: 24-48 hours    Code Status: Level 1 - Full Code    Subjective   Patient seen and examined. States that she still remains symptomatic whenever she stands up.    Objective   Vitals:   Temp (24hrs), Av.4 °F (36.3 °C), Min:96.8 °F (36 °C), Max:97.7 °F (36.5 °C)    Temp:  [96.8 °F (36 °C)-97.7 °F (36.5 °C)] 96.8 °F (36 °C)  HR:  [70-82] 76  Resp:  [10-18] 18  BP: ()/(54-87) 141/75  SpO2:  [93 %-100 %] 95 %  Body mass index is 36.39 kg/m².     Input and Output Summary (last 24 hours):     Intake/Output Summary (Last 24 hours) at 10/22/2024 1741  Last data filed at 10/22/2024 1335  Gross per 24 hour   Intake 240 ml   Output 1600 ml   Net -1360 ml       Physical Exam  Vitals reviewed.   Constitutional:       General: She is not in acute distress.  HENT:      Head: Normocephalic.      Nose: Nose normal.      Mouth/Throat:      Mouth: Mucous membranes are moist.   Eyes:      General: No scleral icterus.  Cardiovascular:      Rate and Rhythm: Normal rate and regular rhythm.   Pulmonary:      Effort: Pulmonary effort is normal. No respiratory distress.      Breath sounds: No wheezing.   Abdominal:      General: There is no distension.      Palpations: Abdomen is soft.      Tenderness: There is no abdominal tenderness.   Skin:     General: Skin is warm.   Neurological:      Mental Status: She is alert.   Psychiatric:         Mood and Affect: Mood normal.         Behavior: Behavior normal.       Lines/Drains:  Lines/Drains/Airways       Active Status       Name Placement date Placement time Site Days    Port A Cath 22 Right Chest 22  1100  Chest  897                    Central Line:  Goal for  removal: port cath               Lab Results: I have reviewed the following results:   Results from last 7 days   Lab Units 10/22/24  0602 10/17/24  0445 10/16/24  0944   WBC Thousand/uL 2.69* 3.10* 3.39*   HEMOGLOBIN g/dL 9.9* 10.6* 11.3*   PLATELETS Thousands/uL 125* 130* 140*   MCV fL 104* 103* 103*     Results from last 7 days   Lab Units 10/22/24  0602 10/16/24  0614   SODIUM mmol/L 142 140   POTASSIUM mmol/L 4.0 3.2*   CHLORIDE mmol/L 109* 101   CO2 mmol/L 29 33*   ANION GAP mmol/L 4 6   BUN mg/dL 19 27*   CREATININE mg/dL 1.08 1.14   CALCIUM mg/dL 8.6 8.8   ALBUMIN g/dL 3.0*  --    TOTAL BILIRUBIN mg/dL 0.48  --    ALK PHOS U/L 37  --    ALT U/L 18  --    AST U/L 14  --    EGFR ml/min/1.73sq m 49 46   GLUCOSE RANDOM mg/dL 90 99     Results from last 7 days   Lab Units 10/22/24  0602   MAGNESIUM mg/dL 2.1   PHOSPHORUS mg/dL 3.4                                    Recent Cultures (last 7 days):         Imaging:  Reviewed radiology reports from this admission: xr chest    Last 24 Hours Medication List:     Current Facility-Administered Medications:     acetaminophen (TYLENOL) tablet 650 mg, Q4H PRN    apixaban (ELIQUIS) tablet 5 mg, BID    calcitriol (ROCALTROL) capsule 0.25 mcg, Once per day on Monday Wednesday Friday    Cholecalciferol (VITAMIN D3) tablet 1,000 Units, Daily    famotidine (PEPCID) tablet 40 mg, Daily    midodrine (PROAMATINE) tablet 5 mg, TID AC    ondansetron (ZOFRAN) injection 4 mg, Q6H PRN    polyethylene glycol (MIRALAX) packet 17 g, Daily PRN      **Please Note: This note may have been constructed using a voice recognition system.**

## 2024-10-22 NOTE — PLAN OF CARE
Problem: PAIN - ADULT  Goal: Verbalizes/displays adequate comfort level or baseline comfort level  Description: Interventions:  - Encourage patient to monitor pain and request assistance  - Assess pain using appropriate pain scale  - Administer analgesics based on type and severity of pain and evaluate response  - Implement non-pharmacological measures as appropriate and evaluate response  - Consider cultural and social influences on pain and pain management  - Notify physician/advanced practitioner if interventions unsuccessful or patient reports new pain  Outcome: Progressing     Problem: INFECTION - ADULT  Goal: Absence or prevention of progression during hospitalization  Description: INTERVENTIONS:  - Assess and monitor for signs and symptoms of infection  - Monitor lab/diagnostic results  - Monitor all insertion sites, i.e. indwelling lines, tubes, and drains  - Monitor endotracheal if appropriate and nasal secretions for changes in amount and color  - [place] appropriate cooling/warming therapies per order  - Administer medications as ordered  - Instruct and encourage patient and family to use good hand hygiene technique  - Identify and instruct in appropriate isolation precautions for identified infection/condition  Outcome: Progressing     Problem: SAFETY ADULT  Goal: Patient will remain free of falls  Description: INTERVENTIONS:  - Educate patient/family on patient safety including physical limitations  - Instruct patient to call for assistance with activity   - Consult OT/PT to assist with strengthening/mobility   - Keep Call bell within reach  - Keep bed low and locked with side rails adjusted as appropriate  - Keep care items and personal belongings within reach  - Initiate and maintain comfort rounds  - Make Fall Risk Sign visible to staff  - Offer Toileting every 2 Hours, in advance of need  - Initiate/Maintain bed alarm  - Obtain necessary fall risk management equipment: bed alarm  - Apply yellow  socks and bracelet for high fall risk patients  - Consider moving patient to room near nurses station  Outcome: Progressing  Goal: Maintain or return to baseline ADL function  Description: INTERVENTIONS:  -  Assess patient's ability to carry out ADLs; assess patient's baseline for ADL function and identify physical deficits which impact ability to perform ADLs (bathing, care of mouth/teeth, toileting, grooming, dressing, etc.)  - Assess/evaluate cause of self-care deficits   - Assess range of motion  - Assess patient's mobility; develop plan if impaired  - Assess patient's need for assistive devices and provide as appropriate  - Encourage maximum independence but intervene and supervise when necessary  - Involve family in performance of ADLs  - Assess for home care needs following discharge   - Consider OT consult to assist with ADL evaluation and planning for discharge  - Provide patient education as appropriate  Outcome: Progressing  Goal: Maintains/Returns to pre admission functional level  Description: INTERVENTIONS:  - Perform AM-PAC 6 Click Basic Mobility/ Daily Activity assessment daily.  - Set and communicate daily mobility goal to care team and patient/family/caregiver.   - Collaborate with rehabilitation services on mobility goals if consulted  - Ambulate patient 3 times a day  - Out of bed to chair 3 times a day   - Out of bed for meals 3 times a day  - Out of bed for toileting  - Record patient progress and toleration of activity level   Outcome: Progressing     Problem: DISCHARGE PLANNING  Goal: Discharge to home or other facility with appropriate resources  Description: INTERVENTIONS:  - Identify barriers to discharge w/patient and caregiver  - Arrange for needed discharge resources and transportation as appropriate  - Identify discharge learning needs (meds, wound care, etc.)  - Arrange for interpretive services to assist at discharge as needed  - Refer to Case Management Department for coordinating  discharge planning if the patient needs post-hospital services based on physician/advanced practitioner order or complex needs related to functional status, cognitive ability, or social support system  Outcome: Progressing     Problem: Knowledge Deficit  Goal: Patient/family/caregiver demonstrates understanding of disease process, treatment plan, medications, and discharge instructions  Description: Complete learning assessment and assess knowledge base.  Interventions:  - Provide teaching at level of understanding  - Provide teaching via preferred learning methods  Outcome: Progressing

## 2024-10-22 NOTE — ASSESSMENT & PLAN NOTE
Baseline 1.3, creatinine 1.66.   Resolved with IV hydration    Recent Labs     10/22/24  0602   BUN 19   CREATININE 1.08   EGFR 49

## 2024-10-22 NOTE — ASSESSMENT & PLAN NOTE
Etiology possible due to combination of orthostatic hypotension, dehydration, history of malignancy.   Increase Midodrine to 7.5 mg 3 times daily for orthostatic hypotension  Orthostatic hypotension  Continue compression stockings   Discontinue Wegovy

## 2024-10-22 NOTE — PLAN OF CARE
Problem: PAIN - ADULT  Goal: Verbalizes/displays adequate comfort level or baseline comfort level  Description: Interventions:  - Encourage patient to monitor pain and request assistance  - Assess pain using appropriate pain scale  - Administer analgesics based on type and severity of pain and evaluate response  - Implement non-pharmacological measures as appropriate and evaluate response  - Consider cultural and social influences on pain and pain management  - Notify physician/advanced practitioner if interventions unsuccessful or patient reports new pain  Outcome: Progressing     Problem: INFECTION - ADULT  Goal: Absence or prevention of progression during hospitalization  Description: INTERVENTIONS:  - Assess and monitor for signs and symptoms of infection  - Monitor lab/diagnostic results  - Monitor all insertion sites, i.e. indwelling lines, tubes, and drains  - Monitor endotracheal if appropriate and nasal secretions for changes in amount and color  - [place] appropriate cooling/warming therapies per order  - Administer medications as ordered  - Instruct and encourage patient and family to use good hand hygiene technique  - Identify and instruct in appropriate isolation precautions for identified infection/condition  Outcome: Progressing     Problem: SAFETY ADULT  Goal: Patient will remain free of falls  Description: INTERVENTIONS:  - Educate patient/family on patient safety including physical limitations  - Instruct patient to call for assistance with activity   - Consult OT/PT to assist with strengthening/mobility   - Keep Call bell within reach  - Keep bed low and locked with side rails adjusted as appropriate  - Keep care items and personal belongings within reach  - Initiate and maintain comfort rounds  - Make Fall Risk Sign visible to staff  - Offer Toileting every 2 Hours, in advance of need  - Initiate/Maintain bed alarm  - Obtain necessary fall risk management equipment: bed alarm  - Apply yellow  socks and bracelet for high fall risk patients  - Consider moving patient to room near nurses station  Outcome: Progressing  Goal: Maintain or return to baseline ADL function  Description: INTERVENTIONS:  -  Assess patient's ability to carry out ADLs; assess patient's baseline for ADL function and identify physical deficits which impact ability to perform ADLs (bathing, care of mouth/teeth, toileting, grooming, dressing, etc.)  - Assess/evaluate cause of self-care deficits   - Assess range of motion  - Assess patient's mobility; develop plan if impaired  - Assess patient's need for assistive devices and provide as appropriate  - Encourage maximum independence but intervene and supervise when necessary  - Involve family in performance of ADLs  - Assess for home care needs following discharge   - Consider OT consult to assist with ADL evaluation and planning for discharge  - Provide patient education as appropriate  Outcome: Progressing  Goal: Maintains/Returns to pre admission functional level  Description: INTERVENTIONS:  - Perform AM-PAC 6 Click Basic Mobility/ Daily Activity assessment daily.  - Set and communicate daily mobility goal to care team and patient/family/caregiver.   - Collaborate with rehabilitation services on mobility goals if consulted  - Ambulate patient 3 times a day  - Out of bed to chair 3 times a day   - Out of bed for meals 3 times a day  - Out of bed for toileting  - Record patient progress and toleration of activity level   Outcome: Progressing     Problem: DISCHARGE PLANNING  Goal: Discharge to home or other facility with appropriate resources  Description: INTERVENTIONS:  - Identify barriers to discharge w/patient and caregiver  - Arrange for needed discharge resources and transportation as appropriate  - Identify discharge learning needs (meds, wound care, etc.)  - Arrange for interpretive services to assist at discharge as needed  - Refer to Case Management Department for coordinating  discharge planning if the patient needs post-hospital services based on physician/advanced practitioner order or complex needs related to functional status, cognitive ability, or social support system  Outcome: Progressing     Problem: Knowledge Deficit  Goal: Patient/family/caregiver demonstrates understanding of disease process, treatment plan, medications, and discharge instructions  Description: Complete learning assessment and assess knowledge base.  Interventions:  - Provide teaching at level of understanding  - Provide teaching via preferred learning methods  Outcome: Progressing

## 2024-10-22 NOTE — BRIEF OP NOTE (RAD/CATH)
IR BIOPSY BONE MARROW Procedure Note    PATIENT NAME: Sg Scherer  : 1950  MRN: [id number]    Pre-op Diagnosis:   1. LBBB (left bundle branch block)    2. Dyspnea    3. BARRY (acute kidney injury) (HCC)    4. Pancytopenia (HCC)    5. MDS (myelodysplastic syndrome), low grade (HCC)      Post-op Diagnosis:   1. LBBB (left bundle branch block)    2. Dyspnea    3. BARRY (acute kidney injury) (HCC)    4. Pancytopenia (HCC)    5. MDS (myelodysplastic syndrome), low grade (HCC)        Provider:   RUBEN Gardner  Assistants:     No qualified resident was available.    Estimated Blood Loss: minimal < 10 ml    Findings: BM aspirate and biopsy right iliac crest    Specimens: aspirate and core sent to path    Complications:  none immediately    Anesthesia: conscious sedation and local    RUBEN Gardner     Date: 10/22/2024  Time: 11:43 AM

## 2024-10-22 NOTE — ASSESSMENT & PLAN NOTE
Persistent. Symptomatic. Possible side effect of Wegovy and rapid weight loss, however after discussing this with her further she did have some episodes of orthostatic symptoms prior to Wegovy  As written above

## 2024-10-22 NOTE — RESTORATIVE TECHNICIAN NOTE
Restorative Technician Note      Patient Name: Sg QUACH [facility] Tech Visit Date: 10/22/24  Note Type: Mobility  Patient Position Upon Consult: Supine  Activity Performed: Ambulated  Assistive Device: Roller walker  Patient Position at End of Consult: All needs within reach; Supine; Bed/Chair alarm activated            ns

## 2024-10-23 VITALS
DIASTOLIC BLOOD PRESSURE: 71 MMHG | TEMPERATURE: 98 F | BODY MASS INDEX: 36.19 KG/M2 | OXYGEN SATURATION: 93 % | SYSTOLIC BLOOD PRESSURE: 117 MMHG | HEART RATE: 71 BPM | RESPIRATION RATE: 18 BRPM | WEIGHT: 212 LBS | HEIGHT: 64 IN

## 2024-10-23 LAB
ALBUMIN SERPL BCG-MCNC: 3.1 G/DL (ref 3.5–5)
ALP SERPL-CCNC: 40 U/L (ref 34–104)
ALT SERPL W P-5'-P-CCNC: 21 U/L (ref 7–52)
ANION GAP SERPL CALCULATED.3IONS-SCNC: 2 MMOL/L (ref 4–13)
AST SERPL W P-5'-P-CCNC: 17 U/L (ref 13–39)
BASOPHILS NFR MAR MANUAL: 1 % (ref 0–1)
BILIRUB SERPL-MCNC: 0.53 MG/DL (ref 0.2–1)
BUN SERPL-MCNC: 19 MG/DL (ref 5–25)
CALCIUM ALBUM COR SERPL-MCNC: 9.1 MG/DL (ref 8.3–10.1)
CALCIUM SERPL-MCNC: 8.4 MG/DL (ref 8.4–10.2)
CHLORIDE SERPL-SCNC: 110 MMOL/L (ref 96–108)
CO2 SERPL-SCNC: 29 MMOL/L (ref 21–32)
CREAT SERPL-MCNC: 1.16 MG/DL (ref 0.6–1.3)
DME PARACHUTE DELIVERY DATE REQUESTED: NORMAL
DME PARACHUTE ITEM DESCRIPTION: NORMAL
DME PARACHUTE ORDER STATUS: NORMAL
DME PARACHUTE SUPPLIER NAME: NORMAL
DME PARACHUTE SUPPLIER PHONE: NORMAL
EOSINOPHIL NFR BLD MANUAL: 2 % (ref 0–6)
ERYTHROCYTE [DISTWIDTH] IN BLOOD BY AUTOMATED COUNT: 13.7 % (ref 11.6–15.1)
GFR SERPL CREATININE-BSD FRML MDRD: 45 ML/MIN/1.73SQ M
GLUCOSE SERPL-MCNC: 87 MG/DL (ref 65–140)
HCT VFR BLD AUTO: 30.6 % (ref 34.8–46.1)
HGB BLD-MCNC: 10 G/DL (ref 11.5–15.4)
LG PLATELETS BLD QL SMEAR: PRESENT
LYMPHOCYTES # BLD AUTO: 18 % (ref 14–44)
MAGNESIUM SERPL-MCNC: 2.2 MG/DL (ref 1.9–2.7)
MCH RBC QN AUTO: 33.8 PG (ref 26.8–34.3)
MCHC RBC AUTO-ENTMCNC: 32.7 G/DL (ref 31.4–37.4)
MCV RBC AUTO: 103 FL (ref 82–98)
MONOCYTES NFR BLD: 7 % (ref 4–12)
NEUTS BAND NFR BLD MANUAL: 1 % (ref 0–8)
NEUTS SEG NFR BLD AUTO: 65 % (ref 43–75)
OVALOCYTES BLD QL SMEAR: PRESENT
PATHOLOGY REVIEW: YES
PHOSPHATE SERPL-MCNC: 3.2 MG/DL (ref 2.3–4.1)
PLATELET # BLD AUTO: 127 THOUSANDS/UL (ref 149–390)
PLATELET BLD QL SMEAR: ABNORMAL
PMV BLD AUTO: 11 FL (ref 8.9–12.7)
POLYCHROMASIA BLD QL SMEAR: PRESENT
POTASSIUM SERPL-SCNC: 4.2 MMOL/L (ref 3.5–5.3)
PROT SERPL-MCNC: 5.8 G/DL (ref 6.4–8.4)
RBC # BLD AUTO: 2.96 MILLION/UL (ref 3.81–5.12)
RBC MORPH BLD: PRESENT
SODIUM SERPL-SCNC: 141 MMOL/L (ref 135–147)
TOTAL CELLS COUNTED SPEC: 100
VARIANT LYMPHS # BLD AUTO: 6 %
WBC # BLD AUTO: 3.31 THOUSAND/UL (ref 4.31–10.16)

## 2024-10-23 PROCEDURE — 80053 COMPREHEN METABOLIC PANEL: CPT | Performed by: STUDENT IN AN ORGANIZED HEALTH CARE EDUCATION/TRAINING PROGRAM

## 2024-10-23 PROCEDURE — 83735 ASSAY OF MAGNESIUM: CPT | Performed by: STUDENT IN AN ORGANIZED HEALTH CARE EDUCATION/TRAINING PROGRAM

## 2024-10-23 PROCEDURE — 85027 COMPLETE CBC AUTOMATED: CPT | Performed by: STUDENT IN AN ORGANIZED HEALTH CARE EDUCATION/TRAINING PROGRAM

## 2024-10-23 PROCEDURE — 84100 ASSAY OF PHOSPHORUS: CPT | Performed by: STUDENT IN AN ORGANIZED HEALTH CARE EDUCATION/TRAINING PROGRAM

## 2024-10-23 RX ORDER — MIDODRINE HYDROCHLORIDE 5 MG/1
7.5 TABLET ORAL
Qty: 180 TABLET | Refills: 0 | Status: SHIPPED | OUTPATIENT
Start: 2024-10-23

## 2024-10-23 RX ADMIN — MIDODRINE HYDROCHLORIDE 7.5 MG: 2.5 TABLET ORAL at 06:26

## 2024-10-23 RX ADMIN — FAMOTIDINE 40 MG: 20 TABLET, FILM COATED ORAL at 09:14

## 2024-10-23 RX ADMIN — APIXABAN 5 MG: 5 TABLET, FILM COATED ORAL at 09:14

## 2024-10-23 RX ADMIN — POLYETHYLENE GLYCOL 3350 17 G: 17 POWDER, FOR SOLUTION ORAL at 09:14

## 2024-10-23 RX ADMIN — CALCITRIOL 0.25 MCG: 0.25 CAPSULE, LIQUID FILLED ORAL at 09:14

## 2024-10-23 RX ADMIN — Medication 1000 UNITS: at 09:14

## 2024-10-23 RX ADMIN — MIDODRINE HYDROCHLORIDE 7.5 MG: 2.5 TABLET ORAL at 12:41

## 2024-10-23 NOTE — ASSESSMENT & PLAN NOTE
Ongoing pancytopenia with macrocytosis.  With normal B12 and folate.  With history of endometrial cancer and previous chemotherapy. Suspected myelodysplasia.  S/p bone marrow biopsy today 10/22/2024.    Recent Labs     10/22/24  0602 10/23/24  0636   WBC 2.69* 3.31*   HGB 9.9* 10.0*   * 127*

## 2024-10-23 NOTE — CASE MANAGEMENT
Case Management Discharge Planning Note    Patient name Sg Scherer  Location East 4 /E4 -* MRN [id number]  : 1950 Date 10/23/2024       Current Admission Date: 10/14/2024  Current Admission Diagnosis:Dizziness   Patient Active Problem List    Diagnosis Date Noted Date Diagnosed    Orthostatic hypotension 10/16/2024     Pancytopenia (HCC) 10/16/2024     Acute kidney injury (HCC) 10/14/2024     History of pulmonary embolism 10/14/2024     Dizziness 10/14/2024     Dyspnea on exertion 2024     Anemia due to stage 3a chronic kidney disease  2023     Secondary hyperparathyroidism of renal origin (HCC) 2023     Secondary hyperparathyroidism (HCC) 2023     History of endometrial cancer 2023     Osteoporosis without current pathological fracture 2023     Chemotherapy induced neutropenia (HCC) 2022     Insomnia 2022     Encounter for central line care 2022     Morbid obesity with BMI of 45.0-49.9, adult (HCC) 2022     Encounter for follow-up surveillance of endometrial cancer 03/15/2022     Acute pulmonary embolism without acute cor pulmonale (HCC) 2022     Acute kidney injury superimposed on stage 3a chronic kidney disease (HCC) 2020     Essential hypertension 2018       LOS (days): 9  Geometric Mean LOS (GMLOS) (days): 3  Days to GMLOS:-6     OBJECTIVE:  Risk of Unplanned Readmission Score: 12.94         Current admission status: Inpatient   Preferred Pharmacy:   [id number] [place] - [address]  [address] [facility] [address]  Phone: [phone] Fax: [phone]    [facility] - [place], [place] - [address]  [address] [address]  Phone: [phone] Fax: [phone]    [facility]  [place] - [address]  [facility],  [address]  [facility],  First Floor [facility] [address]  Phone: [phone] Fax:  [phone]    [facility] AND [facility], [place] - [address]  [address]  [address]  [facility] [address]  Phone: [phone] Fax: [phone]    Primary Care Provider: Thad Savage MD    Primary Insurance: [facility]  Secondary Insurance:     DISCHARGE DETAILS:     CM sent referral via parachute to [employer] for a rolling walker.  CM informed patient of the $19.55 copay for a rolling walker. Patient was in agreement. Patient signed the hand delivered agreement ticket and agreed to the copay. Patient provided with yellow copy of delivery ticket and rolling walker to take home. CM will place delivery ticket in S2 office bin.   DME Referral Provided  Referral made for DME?: Yes  DME referral completed for the following items:: Walker  DME Supplier Name:: [employer]

## 2024-10-23 NOTE — PLAN OF CARE
Problem: PAIN - ADULT  Goal: Verbalizes/displays adequate comfort level or baseline comfort level  Description: Interventions:  - Encourage patient to monitor pain and request assistance  - Assess pain using appropriate pain scale  - Administer analgesics based on type and severity of pain and evaluate response  - Implement non-pharmacological measures as appropriate and evaluate response  - Consider cultural and social influences on pain and pain management  - Notify physician/advanced practitioner if interventions unsuccessful or patient reports new pain  Outcome: Progressing     Problem: INFECTION - ADULT  Goal: Absence or prevention of progression during hospitalization  Description: INTERVENTIONS:  - Assess and monitor for signs and symptoms of infection  - Monitor lab/diagnostic results  - Monitor all insertion sites, i.e. indwelling lines, tubes, and drains  - Monitor endotracheal if appropriate and nasal secretions for changes in amount and color  - Farrell appropriate cooling/warming therapies per order  - Administer medications as ordered  - Instruct and encourage patient and family to use good hand hygiene technique  - Identify and instruct in appropriate isolation precautions for identified infection/condition  Outcome: Progressing     Problem: SAFETY ADULT  Goal: Patient will remain free of falls  Description: INTERVENTIONS:  - Educate patient/family on patient safety including physical limitations  - Instruct patient to call for assistance with activity   - Consult OT/PT to assist with strengthening/mobility   - Keep Call bell within reach  - Keep bed low and locked with side rails adjusted as appropriate  - Keep care items and personal belongings within reach  - Initiate and maintain comfort rounds  - Make Fall Risk Sign visible to staff  - Offer Toileting every 2 Hours, in advance of need  - Initiate/Maintain bed alarm  - Obtain necessary fall risk management equipment: bed alarm  - Apply yellow  socks and bracelet for high fall risk patients  - Consider moving patient to room near nurses station  Outcome: Progressing  Goal: Maintain or return to baseline ADL function  Description: INTERVENTIONS:  -  Assess patient's ability to carry out ADLs; assess patient's baseline for ADL function and identify physical deficits which impact ability to perform ADLs (bathing, care of mouth/teeth, toileting, grooming, dressing, etc.)  - Assess/evaluate cause of self-care deficits   - Assess range of motion  - Assess patient's mobility; develop plan if impaired  - Assess patient's need for assistive devices and provide as appropriate  - Encourage maximum independence but intervene and supervise when necessary  - Involve family in performance of ADLs  - Assess for home care needs following discharge   - Consider OT consult to assist with ADL evaluation and planning for discharge  - Provide patient education as appropriate  Outcome: Progressing  Goal: Maintains/Returns to pre admission functional level  Description: INTERVENTIONS:  - Perform AM-PAC 6 Click Basic Mobility/ Daily Activity assessment daily.  - Set and communicate daily mobility goal to care team and patient/family/caregiver.   - Collaborate with rehabilitation services on mobility goals if consulted  - Ambulate patient 3 times a day  - Out of bed to chair 3 times a day   - Out of bed for meals 3 times a day  - Out of bed for toileting  - Record patient progress and toleration of activity level   Outcome: Progressing     Problem: DISCHARGE PLANNING  Goal: Discharge to home or other facility with appropriate resources  Description: INTERVENTIONS:  - Identify barriers to discharge w/patient and caregiver  - Arrange for needed discharge resources and transportation as appropriate  - Identify discharge learning needs (meds, wound care, etc.)  - Arrange for interpretive services to assist at discharge as needed  - Refer to Case Management Department for coordinating  discharge planning if the patient needs post-hospital services based on physician/advanced practitioner order or complex needs related to functional status, cognitive ability, or social support system  Outcome: Progressing     Problem: Knowledge Deficit  Goal: Patient/family/caregiver demonstrates understanding of disease process, treatment plan, medications, and discharge instructions  Description: Complete learning assessment and assess knowledge base.  Interventions:  - Provide teaching at level of understanding  - Provide teaching via preferred learning methods  Outcome: Progressing

## 2024-10-23 NOTE — DISCHARGE INSTR - AVS FIRST PAGE
Please remember to take all medications as directed on your medication list and follow-up with your primary care provider in 1-2 weeks.    You are encouraged to keep your med list on your person (in your wallet or purse) and please take your medication list provided in this After Visit Summary to your PCP visit following discharge.    Please ask your nurse to show you the medication list and explain when to take your medications.    Additionally, we encourage all patient's to take their actual medications with them to their primary care visit! This is to verify you have the proper medications and the proper dosages.  Remember, while medications are often listed in your computer record; that may not always be right as mistakes can occur at the pharmacy or in the computer and sometimes old medication bottles can be mistaken for newer medications.    (Note to nursing: please place patient's medication list on top of the AVS.)  _________________________________________________    Please take midodrine 7.5 mg 3 times daily and monitor your blood pressure at home.    Please keep blood pressure log and take it with you to follow-up with your primary care physician at next visit.

## 2024-10-23 NOTE — PLAN OF CARE
Problem: PAIN - ADULT  Goal: Verbalizes/displays adequate comfort level or baseline comfort level  Description: Interventions:  - Encourage patient to monitor pain and request assistance  - Assess pain using appropriate pain scale  - Administer analgesics based on type and severity of pain and evaluate response  - Implement non-pharmacological measures as appropriate and evaluate response  - Consider cultural and social influences on pain and pain management  - Notify physician/advanced practitioner if interventions unsuccessful or patient reports new pain  Outcome: Progressing     Problem: INFECTION - ADULT  Goal: Absence or prevention of progression during hospitalization  Description: INTERVENTIONS:  - Assess and monitor for signs and symptoms of infection  - Monitor lab/diagnostic results  - Monitor all insertion sites, i.e. indwelling lines, tubes, and drains  - Monitor endotracheal if appropriate and nasal secretions for changes in amount and color  - Edgard appropriate cooling/warming therapies per order  - Administer medications as ordered  - Instruct and encourage patient and family to use good hand hygiene technique  - Identify and instruct in appropriate isolation precautions for identified infection/condition  Outcome: Progressing     Problem: SAFETY ADULT  Goal: Patient will remain free of falls  Description: INTERVENTIONS:  - Educate patient/family on patient safety including physical limitations  - Instruct patient to call for assistance with activity   - Consult OT/PT to assist with strengthening/mobility   - Keep Call bell within reach  - Keep bed low and locked with side rails adjusted as appropriate  - Keep care items and personal belongings within reach  - Initiate and maintain comfort rounds  - Make Fall Risk Sign visible to staff  - Offer Toileting every 2 Hours, in advance of need  - Initiate/Maintain bed alarm  - Obtain necessary fall risk management equipment: bed alarm  - Apply yellow  socks and bracelet for high fall risk patients  - Consider moving patient to room near nurses station  Outcome: Progressing  Goal: Maintain or return to baseline ADL function  Description: INTERVENTIONS:  -  Assess patient's ability to carry out ADLs; assess patient's baseline for ADL function and identify physical deficits which impact ability to perform ADLs (bathing, care of mouth/teeth, toileting, grooming, dressing, etc.)  - Assess/evaluate cause of self-care deficits   - Assess range of motion  - Assess patient's mobility; develop plan if impaired  - Assess patient's need for assistive devices and provide as appropriate  - Encourage maximum independence but intervene and supervise when necessary  - Involve family in performance of ADLs  - Assess for home care needs following discharge   - Consider OT consult to assist with ADL evaluation and planning for discharge  - Provide patient education as appropriate  Outcome: Progressing  Goal: Maintains/Returns to pre admission functional level  Description: INTERVENTIONS:  - Perform AM-PAC 6 Click Basic Mobility/ Daily Activity assessment daily.  - Set and communicate daily mobility goal to care team and patient/family/caregiver.   - Collaborate with rehabilitation services on mobility goals if consulted  - Ambulate patient 3 times a day  - Out of bed to chair 3 times a day   - Out of bed for meals 3 times a day  - Out of bed for toileting  - Record patient progress and toleration of activity level   Outcome: Progressing     Problem: DISCHARGE PLANNING  Goal: Discharge to home or other facility with appropriate resources  Description: INTERVENTIONS:  - Identify barriers to discharge w/patient and caregiver  - Arrange for needed discharge resources and transportation as appropriate  - Identify discharge learning needs (meds, wound care, etc.)  - Arrange for interpretive services to assist at discharge as needed  - Refer to Case Management Department for coordinating  discharge planning if the patient needs post-hospital services based on physician/advanced practitioner order or complex needs related to functional status, cognitive ability, or social support system  Outcome: Progressing     Problem: Knowledge Deficit  Goal: Patient/family/caregiver demonstrates understanding of disease process, treatment plan, medications, and discharge instructions  Description: Complete learning assessment and assess knowledge base.  Interventions:  - Provide teaching at level of understanding  - Provide teaching via preferred learning methods  Outcome: Progressing

## 2024-10-23 NOTE — CASE MANAGEMENT
Case Management Discharge Planning Note    Patient name Sg Scherer  Location East 4 /E4 -* MRN [id number]  : 1950 Date 10/23/2024       Current Admission Date: 10/14/2024  Current Admission Diagnosis:Dizziness   Patient Active Problem List    Diagnosis Date Noted Date Diagnosed    Orthostatic hypotension 10/16/2024     Pancytopenia (HCC) 10/16/2024     Acute kidney injury (HCC) 10/14/2024     History of pulmonary embolism 10/14/2024     Dizziness 10/14/2024     Dyspnea on exertion 2024     Anemia due to stage 3a chronic kidney disease  2023     Secondary hyperparathyroidism of renal origin (HCC) 2023     Secondary hyperparathyroidism (HCC) 2023     History of endometrial cancer 2023     Osteoporosis without current pathological fracture 2023     Chemotherapy induced neutropenia (HCC) 2022     Insomnia 2022     Encounter for central line care 2022     Morbid obesity with BMI of 45.0-49.9, adult (HCC) 2022     Encounter for follow-up surveillance of endometrial cancer 03/15/2022     Acute pulmonary embolism without acute cor pulmonale (HCC) 2022     Acute kidney injury superimposed on stage 3a chronic kidney disease (HCC) 2020     Essential hypertension 2018       LOS (days): 9  Geometric Mean LOS (GMLOS) (days): 3  Days to GMLOS:-5.8     OBJECTIVE:  Risk of Unplanned Readmission Score: 13.34         Current admission status: Inpatient   Preferred Pharmacy:   [id number] [place] - [address]  [address] [facility] [address]  Phone: [phone] Fax: [phone]    [facility] - [place], [place] - [address]  [address] [address]  Phone: [phone] Fax: [phone]    [facility]  [place] - [address]  [facility],  [address]  [facility],  First Floor [facility] [address]  Phone: [phone] Fax:  [phone]    [facility] AND [facility], [place] - [address]  [address] [facility]  [address]  [facility] [address]  Phone: [phone] Fax: [phone]    Primary Care Provider: Thad Savage MD    Primary Insurance: [facility]  Secondary Insurance:     DISCHARGE DETAILS:                                                                                        IMM Given (Date):: 10/23/24 (IMM reviewed with the pt.  she was made aware of her rights to appeal. She verbalized understanding and copy was provided.)  IMM Given to:: Patient     Additional Comments: CM met with the pt and sister, Marimar was present.  CM discussed orthostatic BP issues with the pt and sister.  Pt is aware that the docotr has her on Midodrine and teds stockings to promote stable BPs.  We also reviewed planning activities such as routine toileting, keeping HOB elevated, and stabelizing herself before doing activity to prevent falls due to orthostatic BP problem.  They verbalized understanding.  Pt lives in a 2 story home with her sister and has 16 steps.  She has a RW that they had bought greater than 5 years ago.  CM will get a RW for pt so that a RW can be kept on both floors.  Pt and sister feels no other post acute services needed at this time.  Order placed in [place] for RW.

## 2024-10-23 NOTE — NURSING NOTE
Patient discharged to home. All belongings were taken. AVS reviewed with the patient. No further questions at this time.

## 2024-10-23 NOTE — ASSESSMENT & PLAN NOTE
Baseline 1.3, creatinine 1.66.   Resolved with IV hydration    Recent Labs     10/22/24  0602 10/23/24  0636   BUN 19 19   CREATININE 1.08 1.16   EGFR 49 45

## 2024-10-24 NOTE — UTILIZATION REVIEW
NOTIFICATION OF ADMISSION DISCHARGE   This is a Notification of Discharge from [facility]. Please be advised that this patient has been discharge from our facility. Below you will find the admission and discharge date and time including the patient’s disposition.   UTILIZATION REVIEW CONTACT:  Bettye Paulson MA  Utilization   Network Utilization Review Department  Phone: [phone] x carefully listen to the prompts. All voicemails are confidential.  Email: [email or web address]     ADMISSION INFORMATION  PRESENTATION DATE: 10/14/2024 10:34 AM  OBERVATION ADMISSION DATE: N/A  INPATIENT ADMISSION DATE: 10/14/24  2:08 PM   DISCHARGE DATE: 10/23/2024  4:01 PM   DISPOSITION:Home/Self Care    Network Utilization Review Department  ATTENTION: Please call with any questions or concerns to [phone] and carefully listen to the prompts so that you are directed to the right person. All voicemails are confidential.   For Discharge needs, contact Care Management DC Support Team at [phone] opt. 2  Send all requests for admission clinical reviews, approved or denied determinations and any other requests to dedicated fax number below belonging to the campus where the patient is receiving treatment. List of dedicated fax numbers for the Facilities:  FACILITY NAME UR FAX NUMBER   ADMISSION DENIALS (Administrative/Medical Necessity) [phone]   DISCHARGE SUPPORT TEAM ([facility]) [phone]   PARENT CHILD HEALTH (Maternity/NICU/Pediatrics) [phone]   [facility] [phone]   [facility] [phone]   [facility] [phone]   [facility] [phone]   [facility] [phone]   General acute hospital [phone]   [facility] [phone]   [facility]  [phone]   [facility] [phone]   [facility] [phone]   [facility] [phone]   [facility] [phone]

## 2024-10-30 LAB — SCAN RESULT: NORMAL

## 2024-10-31 LAB
SCAN RESULT: NORMAL

## 2024-11-01 LAB — SCAN RESULT: NORMAL

## 2024-11-04 ENCOUNTER — TELEPHONE (OUTPATIENT)
Dept: HEMATOLOGY ONCOLOGY | Facility: CLINIC | Age: 74
End: 2024-11-04

## 2024-11-04 LAB
DME PARACHUTE DELIVERY DATE ACTUAL: NORMAL
DME PARACHUTE DELIVERY DATE REQUESTED: NORMAL
DME PARACHUTE ITEM DESCRIPTION: NORMAL
DME PARACHUTE ORDER STATUS: NORMAL
DME PARACHUTE SUPPLIER NAME: NORMAL
DME PARACHUTE SUPPLIER PHONE: NORMAL
MISCELLANEOUS LAB TEST RESULT: NORMAL

## 2024-11-04 NOTE — TELEPHONE ENCOUNTER
I phoned the patient and introduced myself and indicated that I was calling from [facility]'s Hematology to provide a reminder for her appointment tomorrow at 1140 with Dr. Dye in her [place] office. Rever indicated that she is aware of this appointment and the location of the office, and that she will be able to attend the appointment tomorrow.

## 2024-11-05 ENCOUNTER — TELEPHONE (OUTPATIENT)
Dept: NEPHROLOGY | Facility: CLINIC | Age: 74
End: 2024-11-05

## 2024-11-05 ENCOUNTER — TELEPHONE (OUTPATIENT)
Dept: HEMATOLOGY ONCOLOGY | Facility: CLINIC | Age: 74
End: 2024-11-05

## 2024-11-05 LAB — MISCELLANEOUS LAB TEST RESULT: NORMAL

## 2024-11-05 NOTE — TELEPHONE ENCOUNTER
Spoke with Marimar. Informed her that the results for the biopsy is not back yet and we will need to reschedule today's appointment. Rescheduled for 12/5/2024. Pt also confirmed that it was okay.

## 2024-11-05 NOTE — TELEPHONE ENCOUNTER
Called patient and spoke with her sister regarding a follow up with Dr. Ray from [facility].   I scheduled in [place], Wednesday 4/30/2025 at 2:30 Am     mailing an appointment card as well

## 2024-11-19 ENCOUNTER — OFFICE VISIT (OUTPATIENT)
Dept: GYNECOLOGIC ONCOLOGY | Facility: CLINIC | Age: 74
End: 2024-11-19
Payer: COMMERCIAL

## 2024-11-19 ENCOUNTER — TELEPHONE (OUTPATIENT)
Dept: GYNECOLOGIC ONCOLOGY | Facility: CLINIC | Age: 74
End: 2024-11-19

## 2024-11-19 VITALS
OXYGEN SATURATION: 99 % | SYSTOLIC BLOOD PRESSURE: 122 MMHG | DIASTOLIC BLOOD PRESSURE: 72 MMHG | WEIGHT: 230 LBS | HEART RATE: 62 BPM | TEMPERATURE: 97 F | BODY MASS INDEX: 39.27 KG/M2 | HEIGHT: 64 IN

## 2024-11-19 DIAGNOSIS — Z86.711 HISTORY OF PULMONARY EMBOLISM: ICD-10-CM

## 2024-11-19 DIAGNOSIS — R42 DIZZINESS: ICD-10-CM

## 2024-11-19 DIAGNOSIS — Z85.42 HISTORY OF ENDOMETRIAL CANCER: ICD-10-CM

## 2024-11-19 DIAGNOSIS — Z08 ENCOUNTER FOR FOLLOW-UP SURVEILLANCE OF ENDOMETRIAL CANCER: Primary | ICD-10-CM

## 2024-11-19 DIAGNOSIS — Z85.42 ENCOUNTER FOR FOLLOW-UP SURVEILLANCE OF ENDOMETRIAL CANCER: Primary | ICD-10-CM

## 2024-11-19 DIAGNOSIS — I95.1 ORTHOSTATIC HYPOTENSION: ICD-10-CM

## 2024-11-19 PROCEDURE — 99214 OFFICE O/P EST MOD 30 MIN: CPT | Performed by: NURSE PRACTITIONER

## 2024-11-19 RX ORDER — MIDODRINE HYDROCHLORIDE 5 MG/1
7.5 TABLET ORAL
Qty: 180 TABLET | Refills: 0 | Status: SHIPPED | OUTPATIENT
Start: 2024-11-19

## 2024-11-19 NOTE — TELEPHONE ENCOUNTER
Call placed to [facility] pharmacy. They have patients rx and med in stock. They will call the patient to let her know.

## 2024-11-19 NOTE — ASSESSMENT & PLAN NOTE
Patient was hospitalized in October for dizziness. She was diagnosed with orthostatic hypotension. She started midodrine 7.5mg TID, and this has been helping. Unfortunately, she was not able to have the full rx filled at [facility] at the time of her discharge. She has only 2 pills left.    New rx sent to [facility]. Will f/u with phone call. Patient and her sister understand that future fills should come from her PCP.

## 2024-11-19 NOTE — PROGRESS NOTES
Assessment/Plan:    Problem List Items Addressed This Visit          Cardiovascular and Mediastinum    Orthostatic hypotension    Relevant Medications    midodrine (PROAMATINE) 5 mg tablet       Oncology    Encounter for follow-up surveillance of endometrial cancer - Primary    74-year-old with a history of stage IIIC1 endometrial cancer. She completed chemotherapy in August 2022 and radiation in November 2022. She is taking Eliquis for PE history. Patient is feeling well and has no new concerns. Her  was low at the time of diagnosis and is not a good marker. Her pelvic exam is without abnormality. Her PS is 1.    Patient will return to the office in 6 months for next surveillance visit. She will call the office in the interim with any new concerns or issues.         History of endometrial cancer       Other    History of pulmonary embolism    Continue Eliquis.         Dizziness    Patient was hospitalized in October for dizziness. She was diagnosed with orthostatic hypotension. She started midodrine 7.5mg TID, and this has been helping. Unfortunately, she was not able to have the full rx filled at [facility] at the time of her discharge. She has only 2 pills left.    New rx sent to [facility]. Will f/u with phone call. Patient and her sister understand that future fills should come from her PCP.         Relevant Medications    midodrine (PROAMATINE) 5 mg tablet           CHIEF COMPLAINT: Endometrial cancer surveillance      Subjective:     Problem:  Cancer Staging   Encounter for follow-up surveillance of endometrial cancer  Staging form: Corpus Uteri - Carcinoma, AJCC 8th Edition  - Pathologic stage from 3/31/2022: FIGO Stage IIIC1 - Signed by Gene Coburn MD on 4/19/2022      Previous therapy:  Oncology History   Encounter for follow-up surveillance of endometrial cancer   3/2/2022 Initial Diagnosis    Endometrial cancer (HCC)     3/2/2022 Biopsy    Final Diagnosis   A. Endometrium, EMB:  - High-grade endometrial  carcinoma, favor clear cell carcinoma.  See comment.        3/31/2022 -  Cancer Staged    Staging form: Corpus Uteri - Carcinoma, AJCC 8th Edition  - Pathologic stage from 3/31/2022: FIGO Stage IIIC1 - Signed by Gene Coburn MD on 4/19/2022  Histopathologic type: Clear cell adenocarcinoma, NOS  Stage prefix: Initial diagnosis  Histologic grade (G): G3  Histologic grading system: 3 grade system  Residual tumor (R): R0 - None  Pelvic fátima status: Positive  Number of pelvic nodes positive from dissection: 1  Number of pelvic nodes examined during dissection: 2  Lymph node metastasis: Present  Omentectomy performed: Yes  Morcellation performed: No       3/31/2022 Surgery    Robotic hysterectomy, bilateral salpingo oophorectomy, bilateral pelvic sentinel lymph node biopsies, pelvic peritoneal/omental biopsies.  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  Negative cervix.  Negative parametria.  1/2 sentinel pelvic lymph nodes positive for malignancy.  Negative staging biopsies.  Stage IIIC1.  No evidence of DNA mismatch repair protein loss.     5/11/2022 - 8/25/2022 Chemotherapy    palonosetron (ALOXI), 0.25 mg, Intravenous, Once, 6 of 6 cycles  Administration: 0.25 mg (5/11/2022), 0.25 mg (6/1/2022), 0.25 mg (6/22/2022), 0.25 mg (7/13/2022), 0.25 mg (8/3/2022), 0.25 mg (8/24/2022)  Pegfilgrastim-bmez (ZIEXTENZO), 6 mg, Subcutaneous, Once, 3 of 3 cycles  Administration: 6 mg (7/14/2022), 6 mg (8/4/2022), 6 mg (8/25/2022)  fosaprepitant (EMEND) IVPB, 150 mg, Intravenous, Once, 6 of 6 cycles  Administration: 150 mg (5/11/2022), 150 mg (6/1/2022), 150 mg (6/22/2022), 150 mg (7/13/2022), 150 mg (8/3/2022), 150 mg (8/24/2022)  CARBOplatin (PARAPLATIN) IVPB (GOG AUC DOSING), 352.5 mg, Intravenous, Once, 6 of 6 cycles  Administration: 352.5 mg (5/11/2022), 377.5 mg (6/1/2022), 340.5 mg (6/22/2022), 353 mg (7/13/2022), 266.4 mg (8/3/2022), 271.6 mg (8/24/2022)  PACLItaxel (TAXOL) chemo IVPB,  135 mg/m2 = 290.4 mg (77.1 % of original dose 175 mg/m2), Intravenous, Once, 6 of 6 cycles  Dose modification: 135 mg/m2 (original dose 175 mg/m2, Cycle 1, Reason: Other (Must fill in a comment), Comment: prescribed starting dose)  Administration: 290.4 mg (5/11/2022), 290.4 mg (6/1/2022), 289.2 mg (6/22/2022), 292.8 mg (7/13/2022), 289.2 mg (8/3/2022), 300 mg (8/24/2022)     10/4/2022 - 11/21/2022 Radiation    Plan ID Energy Fractions Dose per Fraction (cGy) Dose Correction (cGy) Total Dose Delivered (cGy) Elapsed Days   Vag Cylinder  2 / 2 600 0 1,200 5   Pelvis 6 MV 25 180  4500 34              Patient ID: Sg Scherer is a 74 y.o. female    Since our last encounter, patient was hospitalized and diagnosed with orthostatic hypotension. She was started on a new medication which has been helpful, and her symptoms have abated. From a gynecological standpoint, she is feeling well. Denies symptoms of recurrent disease, including nausea/vomiting, constipation/diarrhea, abdominal pain, pelvic pain, changes in bladder function, and vaginal bleeding/discharge. Endorses a normal appetite and is without SOB, CP, and bloating. She is ambulatory with a walker.          Review of Systems   Constitutional:  Positive for fatigue. Negative for chills, fever and unexpected weight change.   HENT:  Negative for nosebleeds.    Eyes: Negative.    Respiratory:  Negative for cough, chest tightness, shortness of breath and wheezing.    Cardiovascular:  Negative for chest pain, palpitations and leg swelling.   Gastrointestinal:  Negative for abdominal distention, abdominal pain, anal bleeding, blood in stool, constipation, diarrhea, nausea, rectal pain and vomiting.   Endocrine: Negative.    Genitourinary:  Negative for difficulty urinating, dysuria, frequency, hematuria, pelvic pain, urgency, vaginal bleeding, vaginal discharge and vaginal pain.   Musculoskeletal:  Positive for arthralgias and gait problem. Negative for joint  swelling.   Skin:  Negative for color change, pallor and rash.   Neurological:  Negative for dizziness, weakness, light-headedness, numbness and headaches.   Hematological: Negative.    Psychiatric/Behavioral: Negative.         Current Outpatient Medications   Medication Sig Dispense Refill   • acetaminophen (TYLENOL) 650 mg CR tablet Take 1 tablet (650 mg total) by mouth every 6 (six) hours as needed for mild pain 30 tablet 0   • apixaban (Eliquis) 5 mg Take 1 tablet (5 mg total) by mouth 2 (two) times a day 60 tablet 6   • Ascorbic Acid (vitamin C) 100 MG tablet Take by mouth daily Pt unsure of mg     • cholecalciferol (VITAMIN D3) 1,000 units tablet Take 1,000 Units by mouth daily Pt unsure how many units     • denosumab (Prolia) 60 mg/mL Inject 1 mL under the skin every 6 (six) months     • famotidine (PEPCID) 40 MG tablet Take by mouth Daily     • midodrine (PROAMATINE) 5 mg tablet Take 1.5 tablets (7.5 mg total) by mouth 3 (three) times a day before meals 180 tablet 0   • torsemide (DEMADEX) 20 mg tablet Take 20 mg by mouth daily     • vitamin B-12 (CYANOCOBALAMIN) 50 MCG tablet Take by mouth daily Pt unsure of mcg     • calcitriol (ROCALTROL) 0.25 mcg capsule Take 1 capsule (0.25 mcg total) by mouth 3 (three) times a week 36 capsule 2     No current facility-administered medications for this visit.       No Known Allergies    Past Medical History:   Diagnosis Date   • Arthritis     osteo   • Chronic kidney disease    • Colon polyp    • Diverticula of colon    • Endometrial cancer (HCC)     2022   • Hypertension    • Obesity        Past Surgical History:   Procedure Laterality Date   • APPENDECTOMY     • BREAST BIOPSY Left 1977    benign   • CHOLECYSTECTOMY     • COLONOSCOPY     • CYSTOSCOPY N/A 03/31/2022    Procedure: CYSTOSCOPY;  Surgeon: Gene Coburn MD;  Location: [facility];  Service: Gynecology Oncology   • HERNIA REPAIR Right     inguinal   • IR BIOPSY BONE MARROW  10/22/2024   • IR PORT  PLACEMENT  2022   • JOINT REPLACEMENT Bilateral     Knee   • OOPHORECTOMY     • ND COLONOSCOPY FLX DX W/COLLJ SPEC WHEN PFRMD N/A 2017    Procedure: COLONOSCOPY with polypectomy and hemo clip marjan.;  Surgeon: Ramirez Jones MD;  Location: [facility];  Service: Gastroenterology   • ND LAPS TOTAL HYSTERECT 250 GM/< W/RMVL TUBE/OVARY N/A 2022    Procedure: ROBOTIC HYSTERECTOMY, BILATERAL SALPINGO-OOPHORECTOMY, STAGING PERITONEAL AND OMENTAL BIOPSIES, BILATERAL PELVIC SENTINEL LYMPH NODE BIOPSIES;  Surgeon: Gene Coburn MD;  Location:  MAIN OR;  Service: Gynecology Oncology       OB History          1    Para   1    Term   1            AB        Living   1         SAB        IAB        Ectopic        Multiple        Live Births   1           Obstetric Comments   Menarche age 10               Family History   Problem Relation Age of Onset   • Heart disease Mother    • Prostate cancer Father 90   • Diabetes Sister    • Hypertension Sister    • Transient ischemic attack Sister    • No Known Problems Sister    • Diabetes Brother    • Heart disease Brother    • Kidney disease Son    • No Known Problems Maternal Grandmother    • No Known Problems Maternal Grandfather    • No Known Problems Paternal Grandmother    • No Known Problems Paternal Grandfather    • No Known Problems Maternal Aunt    • No Known Problems Paternal Aunt    • No Known Problems Paternal Aunt    • No Known Problems Paternal Aunt    • Stomach cancer Other 38   • Thyroid disease Other    • Cancer Other         throat   • Breast cancer Neg Hx    • Colon cancer Neg Hx    • Ovarian cancer Neg Hx        The following portions of the patient's history were reviewed and updated as appropriate: allergies, current medications, past family history, past medical history, past social history, past surgical history, and problem list.      Objective:    Blood pressure 122/72, pulse 62, temperature (!) 97 °F (36.1 °C), temperature  "source Temporal, height 5' 4\" (1.626 m), weight 104 kg (230 lb), SpO2 99%, not currently breastfeeding.  Body mass index is 39.48 kg/m².    Physical Exam  Vitals reviewed. Exam conducted with a chaperone present.   Constitutional:       General: She is not in acute distress.     Appearance: Normal appearance. She is obese. She is not ill-appearing.   HENT:      Head: Normocephalic and atraumatic.      Mouth/Throat:      Mouth: Mucous membranes are moist.   Eyes:      General:         Right eye: No discharge.         Left eye: No discharge.      Conjunctiva/sclera: Conjunctivae normal.   Pulmonary:      Effort: Pulmonary effort is normal.   Abdominal:      Palpations: Abdomen is soft. There is no mass.      Tenderness: There is no abdominal tenderness.      Hernia: No hernia is present.   Genitourinary:     Comments: The external female genitalia is normal. The bartholin's, uretheral and skenes glands are normal. The urethral meatus is normal (midline with no lesions). Anus without fissure or lesion. Speculum exam reveals a grossly normal vagina. No masses, lesions,discharge or bleeding. No significant cystocele or rectocele noted. Bimanual exam notes a surgical absent cervix, uterus and adnexal structures. No masses or fullness. Bladder is without fullness, mass or tenderness.  Musculoskeletal:      Right lower leg: No edema.      Left lower leg: No edema.   Skin:     General: Skin is warm and dry.      Coloration: Skin is not jaundiced.      Findings: No rash.   Neurological:      General: No focal deficit present.      Mental Status: She is alert and oriented to person, place, and time.      Cranial Nerves: No cranial nerve deficit.      Sensory: No sensory deficit.      Motor: No weakness.      Gait: Gait normal.   Psychiatric:         Mood and Affect: Mood normal.         Behavior: Behavior normal.         Thought Content: Thought content normal.         Judgment: Judgment normal.         Lab Results   Component " Value Date     5.1 03/10/2022     Lab Results   Component Value Date    WBC 3.31 (L) 10/23/2024    HGB 10.0 (L) 10/23/2024    HCT 30.6 (L) 10/23/2024     (H) 10/23/2024     (L) 10/23/2024     Lab Results   Component Value Date    K 4.3 10/31/2024     10/31/2024    CO2 25 10/31/2024    BUN 16 10/31/2024    CREATININE 1.21 (H) 10/31/2024    GLUCOSE 108 10/09/2024    GLUF 109 (H) 05/16/2022    CALCIUM 8.7 10/31/2024    CORRECTEDCA 9.1 10/23/2024    AST 17 10/23/2024    ALT 21 10/23/2024    ALKPHOS 40 10/23/2024    EGFR 46 (L) 10/31/2024        Trend:  Lab Results   Component Value Date     5.1 03/10/2022

## 2024-11-19 NOTE — ASSESSMENT & PLAN NOTE
74-year-old with a history of stage IIIC1 endometrial cancer. She completed chemotherapy in August 2022 and radiation in November 2022. She is taking Eliquis for PE history. Patient is feeling well and has no new concerns. Her  was low at the time of diagnosis and is not a good marker. Her pelvic exam is without abnormality. Her PS is 1.    Patient will return to the office in 6 months for next surveillance visit. She will call the office in the interim with any new concerns or issues.

## 2024-11-22 ENCOUNTER — HOSPITAL ENCOUNTER (OUTPATIENT)
Dept: MAMMOGRAPHY | Facility: MEDICAL CENTER | Age: 74
Discharge: HOME | End: 2024-11-22
Payer: COMMERCIAL

## 2024-11-22 VITALS — HEIGHT: 64 IN | BODY MASS INDEX: 39.27 KG/M2 | WEIGHT: 230 LBS

## 2024-11-22 DIAGNOSIS — Z12.31 BREAST CANCER SCREENING BY MAMMOGRAM: ICD-10-CM

## 2024-11-22 PROCEDURE — 77067 SCR MAMMO BI INCL CAD: CPT

## 2024-11-22 PROCEDURE — 77063 BREAST TOMOSYNTHESIS BI: CPT

## 2024-11-26 ENCOUNTER — RESULTS FOLLOW-UP (OUTPATIENT)
Dept: OBGYN CLINIC | Facility: CLINIC | Age: 74
End: 2024-11-26

## 2024-12-03 ENCOUNTER — TELEPHONE (OUTPATIENT)
Dept: HEMATOLOGY ONCOLOGY | Facility: CLINIC | Age: 74
End: 2024-12-03

## 2024-12-03 NOTE — TELEPHONE ENCOUNTER
Called and advised patient that 12/5 appt was rescheduled for 12/12 at 4 pm due to a change in the provider's schedule.  New date and time confirmed with patient.

## 2024-12-12 ENCOUNTER — OFFICE VISIT (OUTPATIENT)
Dept: HEMATOLOGY ONCOLOGY | Facility: CLINIC | Age: 74
End: 2024-12-12
Payer: COMMERCIAL

## 2024-12-12 VITALS
DIASTOLIC BLOOD PRESSURE: 60 MMHG | BODY MASS INDEX: 39.61 KG/M2 | HEART RATE: 76 BPM | WEIGHT: 232 LBS | SYSTOLIC BLOOD PRESSURE: 110 MMHG | RESPIRATION RATE: 18 BRPM | HEIGHT: 64 IN | OXYGEN SATURATION: 97 % | TEMPERATURE: 97.3 F

## 2024-12-12 DIAGNOSIS — D61.818 PANCYTOPENIA (HCC): ICD-10-CM

## 2024-12-12 DIAGNOSIS — N18.31 ANEMIA DUE TO STAGE 3A CHRONIC KIDNEY DISEASE  (HCC): Primary | ICD-10-CM

## 2024-12-12 DIAGNOSIS — D63.1 ANEMIA DUE TO STAGE 3A CHRONIC KIDNEY DISEASE  (HCC): Primary | ICD-10-CM

## 2024-12-12 PROCEDURE — 99215 OFFICE O/P EST HI 40 MIN: CPT | Performed by: INTERNAL MEDICINE

## 2024-12-12 NOTE — PROGRESS NOTES
Hematology/Oncology Outpatient Office Note  Date of Service: 12/12/2024    [facility]ATOLOG[facility] ONCOL[facility]Y SPECIALISTS [place] [id number]  [phone]    Reason for Consultation:   Chief Complaint   Patient presents with    Follow-up     Referral Physician: No ref. provider found  Primary Care Physician:  Thad Savage MD     Assessment/plan:     1.  Pancytopenia  2.  Macrocytosis  3.  History of pulmonary embolism on treatment with Eliquis  4.  History of endometrial cancer s/p surgery, chemotherapy and radiation  5.  Weight loss on Wegovy    Rever MAIN Scherer is a 74 y.o. female with pancytopenia.  Recent bone marrow biopsy in October 2024 notes a monoclonal CD5 positive B-cell population, detected by flow cytometry, deletion 13 q. detected by FISH, scant trilineage maturing hematopoiesis without increase in blasts.  The sample had low cellularity, but there is no evidence of a myeloid neoplasm by flow cytometry, MDS FISH, karyotype and next generation sequencing. Flow cytometry detects a clonal B-cell population of unclear significance with a chronic lymphocytic leukemia/small lymphocytic lymphoma immunophenotype.  At this juncture, recommend repeat labs and flow cytometry to assess for any ongoing changes. If the patient develops worsening symptoms or cytopenias then recommend repeat bone marrow biopsy.  Next generation sequencing identified numerous mutations of unknown significance therefore will consider genetic eval pending further workup.    Orders Placed This Encounter   Procedures    CBC and differential    Comprehensive metabolic panel    LD,Blood    Leukemia/Lymphoma flow cytometry      Diagnosis ICD-10-CM Associated Orders   1. Anemia due to stage 3a chronic kidney disease   N18.31 CBC and differential    D63.1 Comprehensive metabolic panel     LD,Blood     Leukemia/Lymphoma flow cytometry     Iron Panel (Includes Ferritin, Iron Sat%, Iron, and TIBC)       2. Pancytopenia (HCC)  D61.818 CBC and differential     Comprehensive metabolic panel     LD,Blood     Leukemia/Lymphoma flow cytometry     Iron Panel (Includes Ferritin, Iron Sat%, Iron, and TIBC)          Return in about 4 weeks (around 1/9/2025) for Office Visit, Labs.    Vianca Dye DO 12/12/2024   Hematology & Medical Oncology Physician    Oncology history:     Oncology History   Encounter for follow-up surveillance of endometrial cancer   3/2/2022 Initial Diagnosis    Endometrial cancer (HCC)     3/2/2022 Biopsy    Final Diagnosis   A. Endometrium, EMB:  - High-grade endometrial carcinoma, favor clear cell carcinoma.  See comment.        3/31/2022 -  Cancer Staged    Staging form: Corpus Uteri - Carcinoma, AJCC 8th Edition  - Pathologic stage from 3/31/2022: FIGO Stage IIIC1 - Signed by Gene Coburn MD on 4/19/2022  Histopathologic type: Clear cell adenocarcinoma, NOS  Stage prefix: Initial diagnosis  Histologic grade (G): G3  Histologic grading system: 3 grade system  Residual tumor (R): R0 - None  Pelvic fátima status: Positive  Number of pelvic nodes positive from dissection: 1  Number of pelvic nodes examined during dissection: 2  Lymph node metastasis: Present  Omentectomy performed: Yes  Morcellation performed: No       3/31/2022 Surgery    Robotic hysterectomy, bilateral salpingo oophorectomy, bilateral pelvic sentinel lymph node biopsies, pelvic peritoneal/omental biopsies.  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  Negative cervix.  Negative parametria.  1/2 sentinel pelvic lymph nodes positive for malignancy.  Negative staging biopsies.  Stage IIIC1.  No evidence of DNA mismatch repair protein loss.     5/11/2022 - 8/25/2022 Chemotherapy    palonosetron (ALOXI), 0.25 mg, Intravenous, Once, 6 of 6 cycles  Administration: 0.25 mg (5/11/2022), 0.25 mg (6/1/2022), 0.25 mg (6/22/2022), 0.25 mg (7/13/2022), 0.25 mg (8/3/2022), 0.25 mg  (8/24/2022)  Pegfilgrastim-bmez (ZIEXTENZO), 6 mg, Subcutaneous, Once, 3 of 3 cycles  Administration: 6 mg (7/14/2022), 6 mg (8/4/2022), 6 mg (8/25/2022)  fosaprepitant (EMEND) IVPB, 150 mg, Intravenous, Once, 6 of 6 cycles  Administration: 150 mg (5/11/2022), 150 mg (6/1/2022), 150 mg (6/22/2022), 150 mg (7/13/2022), 150 mg (8/3/2022), 150 mg (8/24/2022)  CARBOplatin (PARAPLATIN) IVPB ([facility] AUC DOSING), 352.5 mg, Intravenous, Once, 6 of 6 cycles  Administration: 352.5 mg (5/11/2022), 377.5 mg (6/1/2022), 340.5 mg (6/22/2022), 353 mg (7/13/2022), 266.4 mg (8/3/2022), 271.6 mg (8/24/2022)  PACLItaxel (TAXOL) chemo IVPB, 135 mg/m2 = 290.4 mg (77.1 % of original dose 175 mg/m2), Intravenous, Once, 6 of 6 cycles  Dose modification: 135 mg/m2 (original dose 175 mg/m2, Cycle 1, Reason: Other (Must fill in a comment), Comment: prescribed starting dose)  Administration: 290.4 mg (5/11/2022), 290.4 mg (6/1/2022), 289.2 mg (6/22/2022), 292.8 mg (7/13/2022), 289.2 mg (8/3/2022), 300 mg (8/24/2022)     10/4/2022 - 11/21/2022 Radiation    Plan ID Energy Fractions Dose per Fraction (cGy) Dose Correction (cGy) Total Dose Delivered (cGy) Elapsed Days   Vag Cylinder  2 / 2 600 0 1,200 5   Pelvis 6 MV 25 180  4500 34          History of present illness:   Sg Scherer is a 74-year-old female with past medical history significant for vertigo, orthostatic hypotension, CKD, history of pulmonary embolism (on treatment with Eliquis) and history of endometrial cancer and chronic pancytopenia.  Her endometrial cancer was diagnosed in 2022 s/p SERGIO and BSO s/p Taxol plus carboplatin and radiation.  She had mild thrombocytopenia even before her treatment for uterine cancer.  Since completing treatments, she was noted to have pancytopenia with macrocytosis.  She denies frequent infections.  She underwent a bone marrow biopsy in October 2024 which noted monoclonal CD5 positive B-cell population detected by flow cytometry, deletion 13 q.  detected by FISH, scant trilineage maturing hematopoiesis without increase in blasts.  The sample had low cellularity.  Patient presents for hospital follow-up.  Denies any fever, night sweats or weight loss.      Review of systems:   ROS     A 10-point review of system was performed, pertinent positive and negative were detailed as above. Otherwise, the 10-point review of system was negative.    Past Medical History:   Diagnosis Date    Arthritis     osteo    Chronic kidney disease     Colon polyp     Diverticula of colon     Endometrial cancer (HCC)     2022    Hypertension     Obesity        Past Surgical History:   Procedure Laterality Date    APPENDECTOMY      BREAST BIOPSY Left 1977    benign    CHOLECYSTECTOMY      COLONOSCOPY      CYSTOSCOPY N/A 03/31/2022    Procedure: CYSTOSCOPY;  Surgeon: Gene Coburn MD;  Location: [facility];  Service: Gynecology Oncology    HERNIA REPAIR Right     inguinal    IR BIOPSY BONE MARROW  10/22/2024    IR PORT PLACEMENT  05/09/2022    JOINT REPLACEMENT Bilateral     Knee    OOPHORECTOMY      AK COLONOSCOPY FLX DX W/COLLJ SPEC WHEN PFRMD N/A 02/21/2017    Procedure: COLONOSCOPY with polypectomy and hemo clip marjan.;  Surgeon: Ramirez Jones MD;  Location: [facility];  Service: Gastroenterology    AK LAPS TOTAL HYSTERECT 250 GM/< W/RMVL TUBE/OVARY N/A 03/31/2022    Procedure: ROBOTIC HYSTERECTOMY, BILATERAL SALPINGO-OOPHORECTOMY, STAGING PERITONEAL AND OMENTAL BIOPSIES, BILATERAL PELVIC SENTINEL LYMPH NODE BIOPSIES;  Surgeon: Gene Coburn MD;  Location: [facility];  Service: Gynecology Oncology       Family History   Problem Relation Age of Onset    Heart disease Mother     Prostate cancer Father 90    Diabetes Sister     Hypertension Sister     Transient ischemic attack Sister     No Known Problems Sister     Diabetes Brother     Heart disease Brother     Kidney disease Son     No Known Problems Maternal Grandmother     No Known Problems Maternal Grandfather      No Known Problems Paternal Grandmother     No Known Problems Paternal Grandfather     No Known Problems Maternal Aunt     No Known Problems Paternal Aunt     No Known Problems Paternal Aunt     No Known Problems Paternal Aunt     Stomach cancer Other 38    Thyroid disease Other     Cancer Other         throat    Breast cancer Neg Hx     Colon cancer Neg Hx     Ovarian cancer Neg Hx        Social History     Socioeconomic History    Marital status: Single     Spouse name: Not on file    Number of children: Not on file    Years of education: Not on file    Highest education level: Not on file   Occupational History    Not on file   Tobacco Use    Smoking status: Never    Smokeless tobacco: Never   Vaping Use    Vaping status: Never Used   Substance and Sexual Activity    Alcohol use: Yes     Alcohol/week: 1.0 standard drink of alcohol     Types: 1 Glasses of wine per week     Comment: social-seldom    Drug use: Never    Sexual activity: Not Currently   Other Topics Concern    Not on file   Social History Narrative    Not on file     Social Drivers of Health     Financial Resource Strain: Not on file   Food Insecurity: No Food Insecurity (10/15/2024)    Nursing - Inadequate Food Risk Classification     Worried About Running Out of Food in the Last Year: Never true     Ran Out of Food in the Last Year: Never true     Ran Out of Food in the Last Year: Not on file   Transportation Needs: No Transportation Needs (10/15/2024)    PRAPARE - Transportation     Lack of Transportation (Medical): No     Lack of Transportation (Non-Medical): No   Physical Activity: Not on file   Stress: Not on file   Social Connections: Not on file   Intimate Partner Violence: Not on file   Housing Stability: Low Risk  (10/15/2024)    Housing Stability Vital Sign     Unable to Pay for Housing in the Last Year: No     Number of Times Moved in the Last Year: 0     Homeless in the Last Year: No       No Known Allergies    Current Outpatient  Medications   Medication Sig Dispense Refill    acetaminophen (TYLENOL) 650 mg CR tablet Take 1 tablet (650 mg total) by mouth every 6 (six) hours as needed for mild pain 30 tablet 0    apixaban (Eliquis) 5 mg Take 1 tablet (5 mg total) by mouth 2 (two) times a day 60 tablet 6    Ascorbic Acid (vitamin C) 100 MG tablet Take by mouth daily Pt unsure of mg      cholecalciferol (VITAMIN D3) 1,000 units tablet Take 1,000 Units by mouth daily Pt unsure how many units      denosumab (Prolia) 60 mg/mL Inject 1 mL under the skin every 6 (six) months      famotidine (PEPCID) 40 MG tablet Take by mouth Daily      midodrine (PROAMATINE) 5 mg tablet Take 1.5 tablets (7.5 mg total) by mouth 3 (three) times a day before meals 180 tablet 0    torsemide (DEMADEX) 20 mg tablet Take 20 mg by mouth daily      vitamin B-12 (CYANOCOBALAMIN) 50 MCG tablet Take by mouth daily Pt unsure of mcg      calcitriol (ROCALTROL) 0.25 mcg capsule Take 1 capsule (0.25 mcg total) by mouth 3 (three) times a week 36 capsule 2     No current facility-administered medications for this visit.     I have reviewed the relevant past medical, surgical, social and family history. I have also reviewed allergies and medications for this patient.    Objective:      Vitals:    12/12/24 1544   BP: 110/60   Pulse: 76   Resp: 18   Temp: (!) 97.3 °F (36.3 °C)   SpO2: 97%       Wt Readings from Last 3 Encounters:   12/12/24 105 kg (232 lb)   11/22/24 104 kg (230 lb)   11/19/24 104 kg (230 lb)     Physical Exam  Vitals and nursing note reviewed.   Constitutional:       General: She is not in acute distress.     Appearance: She is well-developed.   HENT:      Head: Normocephalic and atraumatic.   Cardiovascular:      Rate and Rhythm: Normal rate.   Pulmonary:      Effort: Pulmonary effort is normal. No respiratory distress.      Breath sounds: Normal breath sounds.   Musculoskeletal:         General: No swelling.      Cervical back: Neck supple.   Neurological:       Mental Status: She is alert and oriented to person, place, and time.   Psychiatric:         Mood and Affect: Mood normal.         Data Review:  Pathology Result:  Final Diagnosis   Date Value Ref Range Status   10/22/2024   Final    A -C.  Bone marrow,  right iliac crest,  biopsy, clot, peripheral blood, and aspirate:  -  Monoclonal CD5+ B-cell population, detected by flow cytometry, see comment.   Del 13q detected by FISH.  -  Scant trilineage maturing hematopoiesis without increase in blasts, see comment.    Comment: The patient's presentation for chronic pancytopenia with history of carcinoma status post treatment is noted.   The current sample is limited by low cellularity and inadequate core biopsy. Therefore, morphologic assessment cannot be performed in this sample. There is limited cellularity in the clot sample that shows trilineage maturing hematopoiesis without clear evidence of dysplasia, increase in blasts, or lymphoid aggregates. Ancillary testing of the myeloid lineage is within normal limits. In this suboptimal sample there is no evidence of a myeloid neoplasm by flow cytometry, MDS FISH, karyotype and next generation sequencing. If the patient develops worsening symptoms or cytopenias, repeat sampling could be considered as clinically indicated.    Flow cytometry detects a clonal B-cell population of unclear significance with a chronic lymphocytic leukemia/small lymphocytic lymphoma immunophenotype. This population is likely present in the current sample as there is a small population of CD5+ B-cells, however given the limited quantity of material morphologic work-up is unreliable at this time.  Ancillary testing shows a del 13q by FISH which is a common abnormality seen in CLL/SLL. Further work-up could be considered in the peripheral blood, but given the low absolute lymphocytosis by CBC the current sampling likely represents peripheral blood. Additionally, imaging to exclude lymphadenopathy may  be considered. At this time the differential diagnosis includes but is not limited to a monoclonal B-cell lymphocytosis versus chronic lymphocytic leukemia/small lymphocytic lymphoma.   Finally, next generation sequencing identifies numerous mutation of unknown significance. Multiple mutations exhibit high variable allelic frequency (VAF) concerning for germline mutations including: BRCA2 and SLXK4. These mutations have been implicated in cancer predisposition syndromes and given the patient's history of malignancy further evaluation by a genetic counselor to evaluate for germline abnormalities that may be associated with cancer pre-disposition syndrome may be considered as clinically indicated.    Intradepartmental consultation is in agreement (YL).  Dr. Dye is notified of the findings by Dr. Vicente via [employer] Secure Chat on 11/5/24.         03/31/2022   Final    A. Uterus, Cervix, Bilateral Ovaries and Fallopian Tubes, :  - Clear cell adenocarcinoma of endometrium. See note and synoptic report.  - Tumor invades 5 mm of 12 mm myometrial thickness.  - Tumor extends to lower uterine segment, myoinvasive.  - Lipoleiomyoma.  - Leiomyomata.   - Cervix, negative for carcinoma.  - Bilateral parametria, negative for carcinoma. Focal endometriosis/endosalpingiosis of right parametrium.  - Bilateral ovaries and fallopian tubes, negative for carcinoma.    B. Lymph Node, [place], left external illiac sentinel LN:  - Metastatic carcinoma in one of one lymph node (1/1). See note.  - Tumor measures approximately 3 mm.   - No extracapsular extension noted.   - Tumor highlighted with pan keratin stain.    C. Lymph Node, right internal illiac sentinal lymph node:  - One lymph node, negative for carcinoma (0/1).  - No tumor identified with pankeratin immunoperoxidase stain.     D. Peritoneum, pelvic peritineal biopsies:  - Mesothelial lined fibroadipose tissue, negative for carcinoma.     E. Omentum, omental biopsy:  - Omental  adipose tissue, negative for carcinoma.     Note (A): Tumor is positive with AMACR (partial), Napsin A( partial) and negative with ER, WT1. P16 is patchy and p53 shows wild type staining pattern. The morphologic features and immunophenotype support interpretation.  Note (B): Metastatic tumor deposit is noted in the lymph node adjacent to focus of endosalpingiosis. Immunoperoxidase stains help differentiate the 2 foci. While the focus of endosalpingiosis is positive with ER and WT1, the tumor is negative, supporting the interpretation.     03/02/2022   Final    A. Endometrium, EMB:  - High-grade endometrial carcinoma, favor clear cell carcinoma.  See comment.    Comment: Immunohistochemistry is positive in the tumor cells for NapsinA, patchy but strong positive for p16, and demonstrates increased p53 expression.  ER and IN are negative.  The immunohistochemical profile favors clear cell differentiation.     02/21/2017   Final    A. Transverse colon polyp:  - Tubular adenoma (adenomatous polyp).  - No high grade dysplasia and no evidence of malignancy.    Interpretation performed at [facility], [facility] [id number]         Image Results:  Image result are reviewed and documented in Hematology/Oncology history    Mammo screening bilateral w 3d & cad  Narrative: DIAGNOSIS: Breast cancer screening by mammogram     TECHNIQUE:  Digital screening mammography was performed. Computer Aided Detection   (CAD) analyzed all applicable images.   COMPARISONS: Prior breast imaging dated: 11/09/2023, 11/08/2022,   05/25/2021, 05/19/2020, 02/20/2019, 02/20/2019, 01/31/2019, 05/07/2018,   12/07/2015, 12/07/2015, and 11/20/2015    RELEVANT HISTORY:   Family Breast Cancer History: History of breast cancer in Neg Hx.  Family Medical History: No known relevant family medical history.   Personal History: No known relevant hormone history. Surgical history   includes breast biopsy and oophorectomy. Medical history  includes   endometrial cancer.    The patient is scheduled in a reminder system for screening mammography.    8-10% of cancers will be missed on mammography. Management of a palpable   abnormality must be based on clinical grounds.  Patients will be notified   of their results via letter from our facility. Accredited by American   College [employer] and FDA.    RISK ASSESSMENT:   5 Year Tyrer-Cuzick: 0.73%  10 Year Tyrer-Cuzick: No Score  Lifetime Tyrer-Cuzick: 1.38%    TISSUE DENSITY:   The breasts are almost entirely fatty.     INDICATION: Sg Scherer is a 74 y.o. female presenting for screening   mammography.    FINDINGS:   Bilateral  There are no suspicious masses, grouped microcalcifications or areas of   unexplained architectural distortion. The skin and nipple areolar complex   are unremarkable.   There are scattered calcifications present.  Impression: No mammographic evidence of malignancy.    ASSESSMENT/BI-RADS CATEGORY:  Left: 2 - Benign  Right: 2 - Benign  Overall: 2 - Benign    RECOMMENDATION:       - Routine screening mammogram in 1 year for both breasts.    Workstation ID: [id number]    Signed by:  Bradley Landon Kocher, MD      Labs:  Lab data are reviewed and documented in [facility] history.     Lab Results   Component Value Date    HGB 10.0 (L) 10/23/2024    HCT 30.6 (L) 10/23/2024     (H) 10/23/2024     (L) 10/23/2024    WBC 3.31 (L) 10/23/2024    NRBC 0 10/22/2024    BANDSPCT 1 10/22/2024    ATYLMPCT 6 (H) 10/22/2024     Lab Results   Component Value Date    K 4.3 10/31/2024     10/31/2024    CO2 25 10/31/2024    BUN 16 10/31/2024    CREATININE 1.21 (H) 10/31/2024    GLUCOSE 108 10/09/2024    GLUF 109 (H) 05/16/2022    CALCIUM 8.7 10/31/2024    CORRECTEDCA 9.1 10/23/2024    AST 17 10/23/2024    ALT 21 10/23/2024    ALKPHOS 40 10/23/2024    EGFR 46 (L) 10/31/2024     Lab Results   Component Value Date    [id number] 10/18/2024       Disclaimer: This document was  prepared using [employer] Direct technology. If a word or phrase is confusing, or does not make sense, this is likely due to recognition error which was not discovered during this clinician's review. If you believe an error has occurred, please contact me through [facility] service line for hany?cation.

## 2024-12-17 NOTE — PROGRESS NOTES
Progress Note - Hospitalist   Name: Sg Scherer 74 y.o. female I MRN: [id number]  Unit/Bed#: E4 -01 I Date of Admission: 10/14/2024   Date of Service: 10/21/2024 I Hospital Day: 7    Assessment & Plan  Dizziness  Due to combination of orthostatic hypotension, dehydration, possible side effect of Wegovy  AKA/dehydration resolved  Continue midodrine 5 mg 3 times daily for orthostatic hypotension  Continue compression stockings   Hold/DC Wegovy  Orthostatic hypotension  Persistent.  Symptomatic  Possible side effect of Wegovy and rapid weight loss  Check orthostatic today to see if we need to increase her midodrine-orthostatics negative today  For now continue 5 mg 3 times daily of midodrine and REGINE stockings  Acute kidney injury superimposed on stage 3a chronic kidney disease (HCC)  Patient with baseline creatinine of approximately 1.3; creatinine noted to be 1.66 on admission  Due to prerenal azotemia from poor oral intake  Resolved with IVF; monitor on morning labs  History of pulmonary embolism  PE with history of malignancy  Indefinite anticoagulation  Stable.  Continue Eliquis 5 mg twice daily  Discussed with IR on 10/18/2024.  No need to hold Eliquis for bone marrow biopsy  History of endometrial cancer  Most recent CT showed no sign of metastatic disease or recurrence  Follow-up with GYN as scheduled in November  Morbid obesity with BMI of 45.0-49.9, adult (HCC)  Hold wegovy due to suspected side effect  Pancytopenia (HCC)  Ongoing pancytopenia with macrocytosis.  With normal B12 and folate.  With history of endometrial cancer and previous chemotherapy.  She has suspected myelodysplasia.  Bone marrow biopsy possibly on 10/22      VTE Pharmacologic Prophylaxis: VTE Score: 5 High Risk (Score >/= 5) - Pharmacological DVT Prophylaxis Ordered: apixaban (Eliquis). Sequential Compression Devices Ordered.    Mobility:   Basic Mobility Inpatient Raw Score: 22  JH-HLM Goal: 7: Walk 25 feet or more  JH-HLM  Achieved: 7: Walk 25 feet or more  -HLM Goal achieved. Continue to encourage appropriate mobility.    Patient Centered Rounds: I performed bedside rounds with nursing staff today.   Discussions with Specialists or Other Care Team Provider:     Education and Discussions with Family / Patient: Care plan discussed with patient who voiced understanding and agrees with recommendations.      Current Length of Stay: 7 day(s)  Current Patient Status: Inpatient   Certification Statement: The patient will continue to require additional inpatient hospital stay due to orthostatic hypotension and bone marrow biopsy  Discharge Plan: Anticipate discharge in 24-48 hrs to home.    Code Status: Level 1 - Full Code    Subjective   Patient seen and examined bedside, no acute distress or discomfort noted.  Still reports occasional dizziness, but states that it is much improved from earlier.  Orthostatics negative this afternoon, will continue midodrine at current dose and monitor on morning labs.  Likely for bone marrow biopsy tomorrow, await further IR recommendations.  Likely stable for discharge in 24 hours.    Objective :  Temp:  [96.6 °F (35.9 °C)-98 °F (36.7 °C)] 97.4 °F (36.3 °C)  HR:  [71-90] 90  BP: ()/(50-74) 89/50  Resp:  [18] 18  SpO2:  [97 %-100 %] 100 %  O2 Device: None (Room air)    Body mass index is 36.39 kg/m².     Input and Output Summary (last 24 hours):     Intake/Output Summary (Last 24 hours) at 10/21/2024 1828  Last data filed at 10/21/2024 1700  Gross per 24 hour   Intake 180 ml   Output 530 ml   Net -350 ml       Physical Exam  Vitals and nursing note reviewed.   Constitutional:       General: She is not in acute distress.     Appearance: She is well-developed. She is obese.   HENT:      Head: Normocephalic and atraumatic.   Eyes:      Conjunctiva/sclera: Conjunctivae normal.   Cardiovascular:      Rate and Rhythm: Normal rate.   Pulmonary:      Effort: Pulmonary effort is normal. No respiratory  distress.   Abdominal:      Palpations: Abdomen is soft.      Tenderness: There is no abdominal tenderness.   Musculoskeletal:         General: No swelling.      Cervical back: Neck supple.   Skin:     General: Skin is warm and dry.   Neurological:      Mental Status: She is alert and oriented to person, place, and time.   Psychiatric:         Mood and Affect: Mood normal.           Lines/Drains:  Lines/Drains/Airways       Active Status       Name Placement date Placement time Site Days    Port A Cath 05/09/22 Right Chest 05/09/22  1100  Chest  896                    Central Line:  Goal for removal: Port accessed. Will de-access as appropriate.               Lab Results: I have reviewed the following results:   Results from last 7 days   Lab Units 10/17/24  0445   WBC Thousand/uL 3.10*   HEMOGLOBIN g/dL 10.6*   HEMATOCRIT % 32.4*   PLATELETS Thousands/uL 130*   SEGS PCT % 55   LYMPHO PCT % 31   MONO PCT % 10   EOS PCT % 3     Results from last 7 days   Lab Units 10/16/24  0614   SODIUM mmol/L 140   POTASSIUM mmol/L 3.2*   CHLORIDE mmol/L 101   CO2 mmol/L 33*   BUN mg/dL 27*   CREATININE mg/dL 1.14   ANION GAP mmol/L 6   CALCIUM mg/dL 8.8   GLUCOSE RANDOM mg/dL 99                       Recent Cultures (last 7 days):         Imaging Results Review: I reviewed radiology reports from this admission including: chest xray.  Other Study Results Review: No additional pertinent studies reviewed.    Last 24 Hours Medication List:     Current Facility-Administered Medications:     acetaminophen (TYLENOL) tablet 650 mg, Q4H PRN    apixaban (ELIQUIS) tablet 5 mg, BID    calcitriol (ROCALTROL) capsule 0.25 mcg, Once per day on Monday Wednesday Friday    Cholecalciferol (VITAMIN D3) tablet 1,000 Units, Daily    famotidine (PEPCID) tablet 40 mg, Daily    midodrine (PROAMATINE) tablet 5 mg, TID AC    ondansetron (ZOFRAN) injection 4 mg, Q6H PRN    polyethylene glycol (MIRALAX) packet 17 g, Daily PRN    Administrative Statements    Today, Patient Was Seen By: Zay Dodge MD  I have spent a total time of 45 minutes in caring for this patient on the day of the visit/encounter including Diagnostic results, Prognosis, Risks and benefits of tx options, Risk factor reductions, Impressions, Documenting in the medical record, Reviewing / ordering tests, medicine, procedures  , and Obtaining or reviewing history  .    **Please Note: This note may have been constructed using a voice recognition system.**   normal (ped)... Negative

## 2024-12-24 DIAGNOSIS — R42 DIZZINESS: ICD-10-CM

## 2024-12-24 DIAGNOSIS — I95.1 ORTHOSTATIC HYPOTENSION: ICD-10-CM

## 2024-12-24 RX ORDER — MIDODRINE HYDROCHLORIDE 5 MG/1
7.5 TABLET ORAL
Qty: 180 TABLET | Refills: 0 | Status: SHIPPED | OUTPATIENT
Start: 2024-12-24

## 2025-01-08 LAB
ALBUMIN SERPL-MCNC: 3.6 G/DL (ref 3.6–5.1)
ALBUMIN/GLOB SERPL: 1.3 (CALC) (ref 1–2.5)
ALP SERPL-CCNC: 36 U/L (ref 37–153)
ALT SERPL-CCNC: 13 U/L (ref 6–29)
AST SERPL-CCNC: 15 U/L (ref 10–35)
BASOPHILS # BLD AUTO: 20 CELLS/UL (ref 0–200)
BASOPHILS NFR BLD AUTO: 0.7 %
BILIRUB SERPL-MCNC: 0.5 MG/DL (ref 0.2–1.2)
BUN SERPL-MCNC: 26 MG/DL (ref 7–25)
BUN/CREAT SERPL: 24 (CALC) (ref 6–22)
CALCIUM SERPL-MCNC: 8.6 MG/DL (ref 8.6–10.4)
CHLORIDE SERPL-SCNC: 109 MMOL/L (ref 98–110)
CLINICAL INFO: NORMAL
CO2 SERPL-SCNC: 29 MMOL/L (ref 20–32)
CREAT SERPL-MCNC: 1.07 MG/DL (ref 0.6–1)
EOSINOPHIL # BLD AUTO: 90 CELLS/UL (ref 15–500)
EOSINOPHIL NFR BLD AUTO: 3.2 %
ERYTHROCYTE [DISTWIDTH] IN BLOOD BY AUTOMATED COUNT: 12.7 % (ref 11–15)
EVENTS COUNTED SPEC: 24
FERRITIN SERPL-MCNC: 117 NG/ML (ref 16–288)
GFR/BSA.PRED SERPLBLD CYS-BASED-ARV: 54 ML/MIN/1.73M2
GLOBULIN SER CALC-MCNC: 2.8 G/DL (CALC) (ref 1.9–3.7)
GLUCOSE SERPL-MCNC: 89 MG/DL (ref 65–99)
HCT VFR BLD AUTO: 34.1 % (ref 35–45)
HGB BLD-MCNC: 11 G/DL (ref 11.7–15.5)
IRON SATN MFR SERPL: 37 % (CALC) (ref 16–45)
IRON SERPL-MCNC: 91 MCG/DL (ref 45–160)
LDH SERPL-CCNC: 143 U/L (ref 120–250)
LYMPHOCYTES # BLD AUTO: 868 CELLS/UL (ref 850–3900)
LYMPHOCYTES NFR BLD AUTO: 31 %
MCH RBC QN AUTO: 33.3 PG (ref 27–33)
MCHC RBC AUTO-ENTMCNC: 32.3 G/DL (ref 32–36)
MCV RBC AUTO: 103.3 FL (ref 80–100)
MONOCYTES # BLD AUTO: 283 CELLS/UL (ref 200–950)
MONOCYTES NFR BLD AUTO: 10.1 %
NEUTROPHILS # BLD AUTO: 1540 CELLS/UL (ref 1500–7800)
NEUTROPHILS NFR BLD AUTO: 55 %
PLATELET # BLD AUTO: 138 THOUSAND/UL (ref 140–400)
PMV BLD REES-ECKER: 11.7 FL (ref 7.5–12.5)
POTASSIUM SERPL-SCNC: 3.8 MMOL/L (ref 3.5–5.3)
PROT SERPL-MCNC: 6.4 G/DL (ref 6.1–8.1)
RBC # BLD AUTO: 3.3 MILLION/UL (ref 3.8–5.1)
SODIUM SERPL-SCNC: 144 MMOL/L (ref 135–146)
SPECIMEN SOURCE: NORMAL
TIBC SERPL-MCNC: 248 MCG/DL (CALC) (ref 250–450)
VIABLE CELLS NFR SPEC: 99 %
WBC # BLD AUTO: 2.8 THOUSAND/UL (ref 3.8–10.8)

## 2025-01-13 ENCOUNTER — OFFICE VISIT (OUTPATIENT)
Dept: HEMATOLOGY ONCOLOGY | Facility: CLINIC | Age: 75
End: 2025-01-13
Payer: COMMERCIAL

## 2025-01-13 VITALS
HEART RATE: 78 BPM | DIASTOLIC BLOOD PRESSURE: 78 MMHG | SYSTOLIC BLOOD PRESSURE: 132 MMHG | OXYGEN SATURATION: 99 % | BODY MASS INDEX: 39.95 KG/M2 | WEIGHT: 234 LBS | RESPIRATION RATE: 18 BRPM | HEIGHT: 64 IN | TEMPERATURE: 98.4 F

## 2025-01-13 DIAGNOSIS — Z82.49 FAMILY HISTORY OF PULMONARY EMBOLISM: ICD-10-CM

## 2025-01-13 DIAGNOSIS — D61.818 PANCYTOPENIA (HCC): Primary | ICD-10-CM

## 2025-01-13 DIAGNOSIS — N18.31 ANEMIA DUE TO STAGE 3A CHRONIC KIDNEY DISEASE  (HCC): ICD-10-CM

## 2025-01-13 DIAGNOSIS — Z85.42 HISTORY OF ENDOMETRIAL CANCER: ICD-10-CM

## 2025-01-13 DIAGNOSIS — D63.1 ANEMIA DUE TO STAGE 3A CHRONIC KIDNEY DISEASE  (HCC): ICD-10-CM

## 2025-01-13 DIAGNOSIS — Z86.711 HISTORY OF PULMONARY EMBOLISM: ICD-10-CM

## 2025-01-13 DIAGNOSIS — I26.99 ACUTE PULMONARY EMBOLISM WITHOUT ACUTE COR PULMONALE, UNSPECIFIED PULMONARY EMBOLISM TYPE (HCC): ICD-10-CM

## 2025-01-13 DIAGNOSIS — Z13.71 SCREENING FOR GENETIC DISEASE CARRIER STATUS: ICD-10-CM

## 2025-01-13 PROCEDURE — 99214 OFFICE O/P EST MOD 30 MIN: CPT | Performed by: INTERNAL MEDICINE

## 2025-01-13 NOTE — ASSESSMENT & PLAN NOTE
Lab Results   Component Value Date    EGFR 54 (L) 01/07/2025    EGFR 46 (L) 10/31/2024    EGFR 45 10/23/2024    CREATININE 1.07 (H) 01/07/2025    CREATININE 1.21 (H) 10/31/2024    CREATININE 1.16 10/23/2024   Follows with nephrology    Orders:    CBC and differential; Future    Comprehensive metabolic panel; Future    LD,Blood; Future

## 2025-01-13 NOTE — ASSESSMENT & PLAN NOTE
27-year-old with a history of stage IIIC1 endometrial cancer  She completed chemotherapy in August 2022 and radiation in fall 2022  No interval concerns  Her  was low at the time of diagnosis and is not a good marker  Her pelvic exam is normal  Her PS is 0  Given her advanced disease at diagnosis and inadequacy of  as a marker for recurrence, we will obtain CT c/a/p  Patient will return to the office in 3 months for her next surveillance visit  She will call in the interim with any new concerns  PAST MEDICAL HISTORY:  Asthma     Autism     Autism     Enuresis     Factitious disorder     GERD (gastroesophageal reflux disease)     History of BPH     HLD (hyperlipidemia)     Hypothyroid     Impulse control disorder     Intellectual disability     Myopia of both eyes     Obesity     Testicular cancer     Type 2 diabetes mellitus     Urinary retention

## 2025-01-13 NOTE — ASSESSMENT & PLAN NOTE
Sg Scherer is a 74 y.o. female with pancytopenia.  Recent bone marrow biopsy in October 2024 notes a monoclonal CD5 positive B-cell population, detected by flow cytometry, deletion 13 q. detected by FISH, scant trilineage maturing hematopoiesis without increase in blasts.  The sample had low cellularity, but there is no evidence of a myeloid neoplasm by flow cytometry, MDS FISH, karyotype and next generation sequencing. Flow cytometry detects a clonal B-cell population of unclear significance with a chronic lymphocytic leukemia/small lymphocytic lymphoma immunophenotype.  At this juncture, recommend repeat labs and flow cytometry to assess for any ongoing changes. If the patient develops worsening symptoms or cytopenias then recommend repeat bone marrow biopsy.  Next generation sequencing identified numerous mutations of unknown significance therefore will consider genetic eval pending further workup.  -Routine office follow up every three months alternative with advanced practitioner  -CBC, CMP, and LDH prior to each visit  -No current plans for BMBx.  Next BMBx based on results of future screening labs and clinical evaluations.    Orders:    CBC and differential; Future    Comprehensive metabolic panel; Future    LD,Blood; Future

## 2025-01-13 NOTE — PROGRESS NOTES
Name: Sg Scherer      : 1950      MRN: [id number]  Encounter Provider: Vianca Dye DO  Encounter Date: 2025   Encounter department: [facility] [place]  :  Assessment & Plan  Anemia due to stage 3a chronic kidney disease  (HCC)  Lab Results   Component Value Date    EGFR 54 (L) 2025    EGFR 46 (L) 10/31/2024    EGFR 45 10/23/2024    CREATININE 1.07 (H) 2025    CREATININE 1.21 (H) 10/31/2024    CREATININE 1.16 10/23/2024   Follows with nephrology    Orders:    CBC and differential; Future    Comprehensive metabolic panel; Future    LD,Blood; Future    Pancytopenia (HCC)  Sg Scherer is a 74 y.o. female with pancytopenia.  Recent bone marrow biopsy in 2024 notes a monoclonal CD5 positive B-cell population, detected by flow cytometry, deletion 13 q. detected by FISH, scant trilineage maturing hematopoiesis without increase in blasts.  The sample had low cellularity, but there is no evidence of a myeloid neoplasm by flow cytometry, MDS FISH, karyotype and next generation sequencing. Flow cytometry detects a clonal B-cell population of unclear significance with a chronic lymphocytic leukemia/small lymphocytic lymphoma immunophenotype.  At this juncture, recommend repeat labs and flow cytometry to assess for any ongoing changes. If the patient develops worsening symptoms or cytopenias then recommend repeat bone marrow biopsy.  Next generation sequencing identified numerous mutations of unknown significance therefore will consider genetic eval pending further workup.  -Routine office follow up every three months alternative with advanced practitioner  -CBC, CMP, and LDH prior to each visit  -No current plans for BMBx.  Next BMBx based on results of future screening labs and clinical evaluations.    Orders:    CBC and differential; Future    Comprehensive metabolic panel; Future    LD,Blood; Future    Acute pulmonary embolism without acute  cor pulmonale, unspecified pulmonary embolism type (HCC)  Compliant with Eliquis 5 BID       History of pulmonary embolism  Compliant with Eliquis 5 BID       Family history of pulmonary embolism  Compliant with Eliquis 5 BID       History of endometrial cancer  s/p SERGIO and BSO (Mar 2022) and chemoradiation with Taxol plus carboplatin       Additional recommendations to follow per attending, Dr. Dye.    Julio Jones DO, PGY4  Hematology/Oncology Fellow    History of Present Illness   Chief Complaint   Patient presents with    Follow-up     Rever Gilmer is a 74-year-old female with past medical history significant for vertigo, orthostatic hypotension, CKD, history of pulmonary embolism (on treatment with Eliquis) and history of endometrial cancer and chronic pancytopenia.  Her endometrial cancer was diagnosed in 2022 s/p SERGIO and BSO s/p Taxol plus carboplatin and radiation.  She had mild thrombocytopenia even before her treatment for uterine cancer.  Since completing treatments, she was noted to have pancytopenia with macrocytosis.  She denies frequent infections.  She underwent a bone marrow biopsy in October 2024 which noted monoclonal CD5 positive B-cell population detected by flow cytometry, deletion 13 q. detected by FISH, scant trilineage maturing hematopoiesis without increase in blasts.  The sample had low cellularity.  Patient presents for hospital follow-up.  Denies any fever, night sweats or weight loss.    Interval History: 1/13/2025  Patient presents for four week follow up last seen 12/12/2024.  She continues to have some dizziness after walking for a few minutes.  She is compliant with recently prescribed midodrine and compression stockings, and had a normal TTE.  Otherwise, she denied a multi-system ROS.  Most recent labs (1/7/2025) showed , SCr 1.07, IronSat 37, ferritin 117, globulin 2.8, Hb 11.0, WBC 2.8, and Plt 138.  Her CBC is stable from prior labs on 10/31/2024.  There is no new  imaging.  She continues on Eliquis for history of PE.    Oncology History   Cancer Staging   Encounter for follow-up surveillance of endometrial cancer  Staging form: Corpus Uteri - Carcinoma, AJCC 8th Edition  - Pathologic stage from 3/31/2022: FIGO Stage IIIC1 - Signed by Gene Coburn MD on 4/19/2022  Histopathologic type: Clear cell adenocarcinoma, NOS  Stage prefix: Initial diagnosis  Histologic grade (G): G3  Histologic grading system: 3 grade system  Residual tumor (R): R0 - None  Pelvic fátima status: Positive  Number of pelvic nodes positive from dissection: 1  Number of pelvic nodes examined during dissection: 2  Lymph node metastasis: Present  Omentectomy performed: Yes  Morcellation performed: No  Oncology History   Encounter for follow-up surveillance of endometrial cancer   3/2/2022 Initial Diagnosis    Endometrial cancer (HCC)     3/2/2022 Biopsy    Final Diagnosis   A. Endometrium, EMB:  - High-grade endometrial carcinoma, favor clear cell carcinoma.  See comment.        3/31/2022 -  Cancer Staged    Staging form: Corpus Uteri - Carcinoma, AJCC 8th Edition  - Pathologic stage from 3/31/2022: FIGO Stage IIIC1 - Signed by Gene Coburn MD on 4/19/2022  Histopathologic type: Clear cell adenocarcinoma, NOS  Stage prefix: Initial diagnosis  Histologic grade (G): G3  Histologic grading system: 3 grade system  Residual tumor (R): R0 - None  Pelvic fátima status: Positive  Number of pelvic nodes positive from dissection: 1  Number of pelvic nodes examined during dissection: 2  Lymph node metastasis: Present  Omentectomy performed: Yes  Morcellation performed: No       3/31/2022 Surgery    Robotic hysterectomy, bilateral salpingo oophorectomy, bilateral pelvic sentinel lymph node biopsies, pelvic peritoneal/omental biopsies.  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  Negative cervix.  Negative parametria.  1/2 sentinel pelvic lymph nodes positive for malignancy.   Negative staging biopsies.  Stage IIIC1.  No evidence of DNA mismatch repair protein loss.     5/11/2022 - 8/25/2022 Chemotherapy    palonosetron (ALOXI), 0.25 mg, Intravenous, Once, 6 of 6 cycles  Administration: 0.25 mg (5/11/2022), 0.25 mg (6/1/2022), 0.25 mg (6/22/2022), 0.25 mg (7/13/2022), 0.25 mg (8/3/2022), 0.25 mg (8/24/2022)  Pegfilgrastim-bmez (ZIEXTENZO), 6 mg, Subcutaneous, Once, 3 of 3 cycles  Administration: 6 mg (7/14/2022), 6 mg (8/4/2022), 6 mg (8/25/2022)  fosaprepitant (EMEND) IVPB, 150 mg, Intravenous, Once, 6 of 6 cycles  Administration: 150 mg (5/11/2022), 150 mg (6/1/2022), 150 mg (6/22/2022), 150 mg (7/13/2022), 150 mg (8/3/2022), 150 mg (8/24/2022)  CARBOplatin (PARAPLATIN) IVPB ([facility] DOSING), 352.5 mg, Intravenous, Once, 6 of 6 cycles  Administration: 352.5 mg (5/11/2022), 377.5 mg (6/1/2022), 340.5 mg (6/22/2022), 353 mg (7/13/2022), 266.4 mg (8/3/2022), 271.6 mg (8/24/2022)  PACLItaxel (TAXOL) chemo IVPB, 135 mg/m2 = 290.4 mg (77.1 % of original dose 175 mg/m2), Intravenous, Once, 6 of 6 cycles  Dose modification: 135 mg/m2 (original dose 175 mg/m2, Cycle 1, Reason: Other (Must fill in a comment), Comment: prescribed starting dose)  Administration: 290.4 mg (5/11/2022), 290.4 mg (6/1/2022), 289.2 mg (6/22/2022), 292.8 mg (7/13/2022), 289.2 mg (8/3/2022), 300 mg (8/24/2022)     10/4/2022 - 11/21/2022 Radiation    Plan ID Energy Fractions Dose per Fraction (cGy) Dose Correction (cGy) Total Dose Delivered (cGy) Elapsed Days   Vag Cylinder  2 / 2 600 0 1,200 5   Pelvis 6 MV 25 180  4500 34           Pertinent Medical History   01/13/25: See above    Review of Systems   Constitutional:  Positive for fatigue. Negative for chills and fever.   HENT:  Negative for ear pain and sore throat.    Eyes:  Negative for pain and visual disturbance.   Respiratory:  Negative for cough and shortness of breath.    Cardiovascular:  Negative for chest pain and palpitations.   Gastrointestinal:  Negative for  abdominal pain and vomiting.   Genitourinary:  Negative for dysuria and hematuria.   Musculoskeletal:  Negative for arthralgias and back pain.   Skin:  Negative for color change and rash.   Neurological:  Negative for seizures and syncope.   All other systems reviewed and are negative.    Pertinent Medical History     Medical History Reviewed by provider this encounter:     .  Past Medical History   Past Medical History:   Diagnosis Date    Arthritis     osteo    Chronic kidney disease     Colon polyp     Diverticula of colon     Endometrial cancer (HCC)     2022    Hypertension     Obesity      Past Surgical History:   Procedure Laterality Date    APPENDECTOMY      BREAST BIOPSY Left 1977    benign    CHOLECYSTECTOMY      COLONOSCOPY      CYSTOSCOPY N/A 03/31/2022    Procedure: CYSTOSCOPY;  Surgeon: Gene Coburn MD;  Location: [facility];  Service: Gynecology Oncology    HERNIA REPAIR Right     inguinal    IR BIOPSY BONE MARROW  10/22/2024    IR PORT PLACEMENT  05/09/2022    JOINT REPLACEMENT Bilateral     Knee    OOPHORECTOMY      NV COLONOSCOPY FLX DX W/COLLJ SPEC WHEN PFRMD N/A 02/21/2017    Procedure: COLONOSCOPY with polypectomy and hemo clip marjan.;  Surgeon: Ramirez Jones MD;  Location: [facility];  Service: Gastroenterology    NV LAPS TOTAL HYSTERECT 250 GM/< W/RMVL TUBE/OVARY N/A 03/31/2022    Procedure: ROBOTIC HYSTERECTOMY, BILATERAL SALPINGO-OOPHORECTOMY, STAGING PERITONEAL AND OMENTAL BIOPSIES, BILATERAL PELVIC SENTINEL LYMPH NODE BIOPSIES;  Surgeon: Gene Coburn MD;  Location: [facility];  Service: Gynecology Oncology     Family History   Problem Relation Age of Onset    Heart disease Mother     Prostate cancer Father 90    Diabetes Sister     Hypertension Sister     Transient ischemic attack Sister     No Known Problems Sister     Diabetes Brother     Heart disease Brother     Kidney disease Son     No Known Problems Maternal Grandmother     No Known Problems Maternal Grandfather     No  Known Problems Paternal Grandmother     No Known Problems Paternal Grandfather     No Known Problems Maternal Aunt     No Known Problems Paternal Aunt     No Known Problems Paternal Aunt     No Known Problems Paternal Aunt     Stomach cancer Other 38    Thyroid disease Other     Cancer Other         throat    Breast cancer Neg Hx     Colon cancer Neg Hx     Ovarian cancer Neg Hx       reports that she has never smoked. She has never used smokeless tobacco. She reports current alcohol use of about 1.0 standard drink of alcohol per week. She reports that she does not use drugs.  Current Outpatient Medications on File Prior to Visit   Medication Sig Dispense Refill    acetaminophen (TYLENOL) 650 mg CR tablet Take 1 tablet (650 mg total) by mouth every 6 (six) hours as needed for mild pain 30 tablet 0    apixaban (Eliquis) 5 mg Take 1 tablet (5 mg total) by mouth 2 (two) times a day 60 tablet 6    Ascorbic Acid (vitamin C) 100 MG tablet Take by mouth daily Pt unsure of mg      cholecalciferol (VITAMIN D3) 1,000 units tablet Take 1,000 Units by mouth daily Pt unsure how many units      denosumab (Prolia) 60 mg/mL Inject 1 mL under the skin every 6 (six) months      midodrine (PROAMATINE) 5 mg tablet TAKE 1.5 TABLETS (7.5 MG TOTAL) BY MOUTH 3 (THREE) TIMES A DAY BEFORE MEALS 180 tablet 0    vitamin B-12 (CYANOCOBALAMIN) 50 MCG tablet Take by mouth daily Pt unsure of mcg      calcitriol (ROCALTROL) 0.25 mcg capsule Take 1 capsule (0.25 mcg total) by mouth 3 (three) times a week 36 capsule 2    famotidine (PEPCID) 40 MG tablet Take by mouth Daily (Patient not taking: Reported on 1/13/2025)      torsemide (DEMADEX) 20 mg tablet Take 20 mg by mouth daily (Patient not taking: Reported on 1/13/2025)       No current facility-administered medications on file prior to visit.   No Known Allergies   Current Outpatient Medications on File Prior to Visit   Medication Sig Dispense Refill    acetaminophen (TYLENOL) 650 mg CR tablet  "Take 1 tablet (650 mg total) by mouth every 6 (six) hours as needed for mild pain 30 tablet 0    apixaban (Eliquis) 5 mg Take 1 tablet (5 mg total) by mouth 2 (two) times a day 60 tablet 6    Ascorbic Acid (vitamin C) 100 MG tablet Take by mouth daily Pt unsure of mg      cholecalciferol (VITAMIN D3) 1,000 units tablet Take 1,000 Units by mouth daily Pt unsure how many units      denosumab (Prolia) 60 mg/mL Inject 1 mL under the skin every 6 (six) months      midodrine (PROAMATINE) 5 mg tablet TAKE 1.5 TABLETS (7.5 MG TOTAL) BY MOUTH 3 (THREE) TIMES A DAY BEFORE MEALS 180 tablet 0    vitamin B-12 (CYANOCOBALAMIN) 50 MCG tablet Take by mouth daily Pt unsure of mcg      calcitriol (ROCALTROL) 0.25 mcg capsule Take 1 capsule (0.25 mcg total) by mouth 3 (three) times a week 36 capsule 2    famotidine (PEPCID) 40 MG tablet Take by mouth Daily (Patient not taking: Reported on 1/13/2025)      torsemide (DEMADEX) 20 mg tablet Take 20 mg by mouth daily (Patient not taking: Reported on 1/13/2025)       No current facility-administered medications on file prior to visit.      Social History     Tobacco Use    Smoking status: Never    Smokeless tobacco: Never   Vaping Use    Vaping status: Never Used   Substance and Sexual Activity    Alcohol use: Yes     Alcohol/week: 1.0 standard drink of alcohol     Types: 1 Glasses of wine per week     Comment: social-seldom    Drug use: Never    Sexual activity: Not Currently         Objective   /78 (Patient Position: Sitting, Cuff Size: Large)   Pulse 78   Temp 98.4 °F (36.9 °C) (Temporal)   Resp 18   Ht 5' 4\" (1.626 m)   Wt 106 kg (234 lb)   LMP  (LMP Unknown)   SpO2 99%   BMI 40.17 kg/m²     Pain Screening:  Pain Score: 0-No pain    ECOG   1    Physical Exam  Vitals and nursing note reviewed.   Constitutional:       General: She is not in acute distress.     Appearance: She is well-developed.   HENT:      Head: Normocephalic and atraumatic.   Cardiovascular:      Rate and " Rhythm: Normal rate.   Pulmonary:      Effort: Pulmonary effort is normal. No respiratory distress.      Breath sounds: Normal breath sounds.   Musculoskeletal:         General: No swelling.      Cervical back: Neck supple.   Neurological:      Mental Status: She is alert and oriented to person, place, and time.   Psychiatric:         Mood and Affect: Mood normal.       Labs: I have reviewed the following labs:  Lab Results   Component Value Date/Time    WBC 3.31 (L) 10/23/2024 06:36 AM    White Blood Cell Count 2.8 (L) 01/07/2025 09:17 AM    RBC 2.96 (L) 10/23/2024 06:36 AM    Red Blood Cell Count 3.30 (L) 01/07/2025 09:17 AM    Hemoglobin 11.0 (L) 01/07/2025 09:17 AM    Hemoglobin 10.0 (L) 10/23/2024 06:36 AM    Hematocrit 30.6 (L) 10/23/2024 06:36 AM    HCT 34.1 (L) 01/07/2025 09:17 AM    .3 (H) 01/07/2025 09:17 AM     (H) 10/23/2024 06:36 AM    MCH 33.3 (H) 01/07/2025 09:17 AM    MCH 33.8 10/23/2024 06:36 AM    RDW 12.7 01/07/2025 09:17 AM    RDW 13.7 10/23/2024 06:36 AM    Platelet Count 138 (L) 01/07/2025 09:17 AM    Platelets 127 (L) 10/23/2024 06:36 AM    Segmented % 51 10/22/2024 06:02 AM    Neutrophils 55 01/07/2025 09:17 AM    Lymphocytes % 34 10/22/2024 06:02 AM    Lymphocytes % 18 10/22/2024 06:02 AM    Lymphocytes 31.0 01/07/2025 09:17 AM    Monocytes % 11 10/22/2024 06:02 AM    Monocytes % 7 10/22/2024 06:02 AM    Monocytes 10.1 01/07/2025 09:17 AM    Eosinophils % 2 10/22/2024 06:02 AM    Eosinophils Relative 3 10/22/2024 06:02 AM    Eosinophils 3.2 01/07/2025 09:17 AM    Basophils % 1 10/22/2024 06:02 AM    Basophils Relative 1 10/22/2024 06:02 AM    Immature Grans % 0 10/22/2024 06:02 AM    Absolute Neutrophils 1.36 (L) 10/22/2024 06:02 AM    Neutrophils (Absolute) 1,540 01/07/2025 09:17 AM     Lab Results   Component Value Date/Time    Potassium 3.8 01/07/2025 09:17 AM    Potassium 4.3 10/31/2024 09:45 AM    Chloride 109 01/07/2025 09:17 AM    Chloride 109 10/31/2024 09:45 AM     Carbon Dioxide 25 10/31/2024 09:45 AM    CO2 29 01/07/2025 09:17 AM    BUN 26 (H) 01/07/2025 09:17 AM    BUN 16 10/31/2024 09:45 AM    Creatinine 1.07 (H) 01/07/2025 09:17 AM    Creatinine 1.21 (H) 10/31/2024 09:45 AM    Glucose, i-STAT 108 10/09/2024 01:14 PM    Calcium 8.6 01/07/2025 09:17 AM    Calcium 8.7 10/31/2024 09:45 AM    Corrected Calcium 9.1 10/23/2024 06:36 AM    AST 15 01/07/2025 09:17 AM    AST 19 10/04/2024 10:15 AM    ALT 13 01/07/2025 09:17 AM    ALT 16 10/04/2024 10:15 AM    Alkaline Phosphatase 36 (L) 01/07/2025 09:17 AM    Alkaline Phosphatase 33 (L) 10/04/2024 10:15 AM    Protein, Total 6.4 01/07/2025 09:17 AM    Protein, Total 6.5 10/04/2024 10:15 AM    Albumin 3.6 01/07/2025 09:17 AM    Albumin 3.1 (L) 10/23/2024 06:36 AM    ALBUMIN 3.7 10/04/2024 10:15 AM    Total Bilirubin 0.6 10/04/2024 10:15 AM    TOTAL BILIRUBIN 0.5 01/07/2025 09:17 AM    eGFRcr 46 (L) 10/31/2024 09:45 AM    eGFR 54 (L) 01/07/2025 09:17 AM     Lab Results   Component Value Date/Time    WBC 3.31 (L) 10/23/2024 06:36 AM    White Blood Cell Count 2.8 (L) 01/07/2025 09:17 AM    RBC 2.96 (L) 10/23/2024 06:36 AM    Red Blood Cell Count 3.30 (L) 01/07/2025 09:17 AM    Hemoglobin 11.0 (L) 01/07/2025 09:17 AM    Hemoglobin 10.0 (L) 10/23/2024 06:36 AM    Hematocrit 30.6 (L) 10/23/2024 06:36 AM    HCT 34.1 (L) 01/07/2025 09:17 AM    .3 (H) 01/07/2025 09:17 AM     (H) 10/23/2024 06:36 AM    MCH 33.3 (H) 01/07/2025 09:17 AM    MCH 33.8 10/23/2024 06:36 AM    RDW 12.7 01/07/2025 09:17 AM    RDW 13.7 10/23/2024 06:36 AM    Platelet Count 138 (L) 01/07/2025 09:17 AM    Platelets 127 (L) 10/23/2024 06:36 AM    Segmented % 51 10/22/2024 06:02 AM    Neutrophils 55 01/07/2025 09:17 AM    Bands % 1 10/22/2024 06:02 AM    Lymphocytes % 34 10/22/2024 06:02 AM    Lymphocytes % 18 10/22/2024 06:02 AM    Lymphocytes 31.0 01/07/2025 09:17 AM    Monocytes % 11 10/22/2024 06:02 AM    Monocytes % 7 10/22/2024 06:02 AM    Monocytes 10.1  01/07/2025 09:17 AM    Eosinophils % 2 10/22/2024 06:02 AM    Eosinophils Relative 3 10/22/2024 06:02 AM    Eosinophils 3.2 01/07/2025 09:17 AM    Basophils % 1 10/22/2024 06:02 AM    Basophils Relative 1 10/22/2024 06:02 AM    Immature Grans % 0 10/22/2024 06:02 AM    Absolute Neutrophils 1.36 (L) 10/22/2024 06:02 AM    Neutrophils (Absolute) 1,540 01/07/2025 09:17 AM      Lab Results   Component Value Date/Time    Sodium 144 01/07/2025 09:17 AM    Sodium 144 10/31/2024 09:45 AM    Potassium 3.8 01/07/2025 09:17 AM    Potassium 4.3 10/31/2024 09:45 AM    Chloride 109 01/07/2025 09:17 AM    Chloride 109 10/31/2024 09:45 AM    Carbon Dioxide 25 10/31/2024 09:45 AM    CO2 29 01/07/2025 09:17 AM    ANION GAP 10 10/31/2024 09:45 AM    BUN 26 (H) 01/07/2025 09:17 AM    BUN 16 10/31/2024 09:45 AM    Creatinine 1.07 (H) 01/07/2025 09:17 AM    Creatinine 1.21 (H) 10/31/2024 09:45 AM    Glucose 95 10/31/2024 09:45 AM    Glucose, Random 89 01/07/2025 09:17 AM    Calcium 8.6 01/07/2025 09:17 AM    Calcium 8.7 10/31/2024 09:45 AM    Corrected Calcium 9.1 10/23/2024 06:36 AM    AST 15 01/07/2025 09:17 AM    AST 19 10/04/2024 10:15 AM    ALT 13 01/07/2025 09:17 AM    ALT 16 10/04/2024 10:15 AM    Alkaline Phosphatase 36 (L) 01/07/2025 09:17 AM    Alkaline Phosphatase 33 (L) 10/04/2024 10:15 AM    Protein, Total 6.4 01/07/2025 09:17 AM    Protein, Total 6.5 10/04/2024 10:15 AM    Albumin 3.6 01/07/2025 09:17 AM    Albumin 3.1 (L) 10/23/2024 06:36 AM    ALBUMIN 3.7 10/04/2024 10:15 AM    Total Bilirubin 0.6 10/04/2024 10:15 AM    TOTAL BILIRUBIN 0.5 01/07/2025 09:17 AM    eGFRcr 46 (L) 10/31/2024 09:45 AM    eGFR 54 (L) 01/07/2025 09:17 AM      Lab Results   Component Value Date/Time     01/07/2025 09:17 AM      Lab Results   Component Value Date/Time    Iron, Serum 91 01/07/2025 09:17 AM    Iron Saturation 37 01/07/2025 09:17 AM    Ferritin 117 01/07/2025 09:17 AM    Vitamin B-12 416 10/18/2024 08:47 AM    Folate 7.4  10/18/2024 08:47 AM        Radiology Results Review : No pertinent imaging studies reviewed.    Administrative Statements   I have spent a total time of 30 minutes in caring for this patient on the day of the visit/encounter including Diagnostic results, Prognosis, Risks and benefits of tx options, Instructions for management, Patient and family education, Importance of tx compliance, Risk factor reductions, Impressions, Counseling / Coordination of care, Documenting in the medical record, Reviewing / ordering tests, medicine, procedures  , Obtaining or reviewing history  , and Communicating with other healthcare professionals . Topics discussed with the patient / family include symptom assessment and management.        Pathology Result:  Final Diagnosis   Date Value Ref Range Status   10/22/2024   Final    A -C.  Bone marrow,  right iliac crest,  biopsy, clot, peripheral blood, and aspirate:  -  Monoclonal CD5+ B-cell population, detected by flow cytometry, see comment.   Del 13q detected by FISH.  -  Scant trilineage maturing hematopoiesis without increase in blasts, see comment.    Comment: The patient's presentation for chronic pancytopenia with history of carcinoma status post treatment is noted.   The current sample is limited by low cellularity and inadequate core biopsy. Therefore, morphologic assessment cannot be performed in this sample. There is limited cellularity in the clot sample that shows trilineage maturing hematopoiesis without clear evidence of dysplasia, increase in blasts, or lymphoid aggregates. Ancillary testing of the myeloid lineage is within normal limits. In this suboptimal sample there is no evidence of a myeloid neoplasm by flow cytometry, MDS FISH, karyotype and next generation sequencing. If the patient develops worsening symptoms or cytopenias, repeat sampling could be considered as clinically indicated.    Flow cytometry detects a clonal B-cell population of unclear significance  with a chronic lymphocytic leukemia/small lymphocytic lymphoma immunophenotype. This population is likely present in the current sample as there is a small population of CD5+ B-cells, however given the limited quantity of material morphologic work-up is unreliable at this time.  Ancillary testing shows a del 13q by FISH which is a common abnormality seen in CLL/SLL. Further work-up could be considered in the peripheral blood, but given the low absolute lymphocytosis by CBC the current sampling likely represents peripheral blood. Additionally, imaging to exclude lymphadenopathy may be considered. At this time the differential diagnosis includes but is not limited to a monoclonal B-cell lymphocytosis versus chronic lymphocytic leukemia/small lymphocytic lymphoma.   Finally, next generation sequencing identifies numerous mutation of unknown significance. Multiple mutations exhibit high variable allelic frequency (VAF) concerning for germline mutations including: BRCA2 and SLXK4. These mutations have been implicated in cancer predisposition syndromes and given the patient's history of malignancy further evaluation by a genetic counselor to evaluate for germline abnormalities that may be associated with cancer pre-disposition syndrome may be considered as clinically indicated.    Intradepartmental consultation is in agreement (YL).  Dr. Dye is notified of the findings by Dr. Vicente via [employer] Secure Chat on 11/5/24.         03/31/2022   Final    A. Uterus, Cervix, Bilateral Ovaries and Fallopian Tubes, :  - Clear cell adenocarcinoma of endometrium. See note and synoptic report.  - Tumor invades 5 mm of 12 mm myometrial thickness.  - Tumor extends to lower uterine segment, myoinvasive.  - Lipoleiomyoma.  - Leiomyomata.   - Cervix, negative for carcinoma.  - Bilateral parametria, negative for carcinoma. Focal endometriosis/endosalpingiosis of right parametrium.  - Bilateral ovaries and fallopian tubes, negative for  carcinoma.    B. Lymph Node, [place], left external illiac sentinel LN:  - Metastatic carcinoma in one of one lymph node (1/1). See note.  - Tumor measures approximately 3 mm.   - No extracapsular extension noted.   - Tumor highlighted with pan keratin stain.    C. Lymph Node, right internal illiac sentinal lymph node:  - One lymph node, negative for carcinoma (0/1).  - No tumor identified with pankeratin immunoperoxidase stain.     D. Peritoneum, pelvic peritineal biopsies:  - Mesothelial lined fibroadipose tissue, negative for carcinoma.     E. Omentum, omental biopsy:  - Omental adipose tissue, negative for carcinoma.     Note (A): Tumor is positive with AMACR (partial), Napsin A( partial) and negative with ER, WT1. P16 is patchy and p53 shows wild type staining pattern. The morphologic features and immunophenotype support interpretation.  Note (B): Metastatic tumor deposit is noted in the lymph node adjacent to focus of endosalpingiosis. Immunoperoxidase stains help differentiate the 2 foci. While the focus of endosalpingiosis is positive with ER and WT1, the tumor is negative, supporting the interpretation.     03/02/2022   Final    A. Endometrium, EMB:  - High-grade endometrial carcinoma, favor clear cell carcinoma.  See comment.    Comment: Immunohistochemistry is positive in the tumor cells for NapsinA, patchy but strong positive for p16, and demonstrates increased p53 expression.  ER and OR are negative.  The immunohistochemical profile favors clear cell differentiation.     02/21/2017   Final    A. Transverse colon polyp:  - Tubular adenoma (adenomatous polyp).  - No high grade dysplasia and no evidence of malignancy.    Interpretation performed at [facility] [place], [address] [place] [address]       Image Results:  Image result are reviewed and documented in Hematology/Oncology history    Mammo screening bilateral w 3d & cad  Narrative: DIAGNOSIS: Breast cancer screening by mammogram      TECHNIQUE:  Digital screening mammography was performed. Computer Aided Detection   (CAD) analyzed all applicable images.   COMPARISONS: Prior breast imaging dated: 11/09/2023, 11/08/2022,   05/25/2021, 05/19/2020, 02/20/2019, 02/20/2019, 01/31/2019, 05/07/2018,   12/07/2015, 12/07/2015, and 11/20/2015    RELEVANT HISTORY:   Family Breast Cancer History: History of breast cancer in Neg Hx.  Family Medical History: No known relevant family medical history.   Personal History: No known relevant hormone history. Surgical history   includes breast biopsy and oophorectomy. Medical history includes   endometrial cancer.    The patient is scheduled in a reminder system for screening mammography.    8-10% of cancers will be missed on mammography. Management of a palpable   abnormality must be based on clinical grounds.  Patients will be notified   of their results via letter from our facility. Accredited by American   College [employer] and FDA.    RISK ASSESSMENT:   5 Year Tyrer-Cuzick: 0.73%  10 Year Tyrer-Cuzick: No Score  Lifetime Tyrer-Cuzick: 1.38%    TISSUE DENSITY:   The breasts are almost entirely fatty.     INDICATION: Sg Scherer is a 74 y.o. female presenting for screening   mammography.    FINDINGS:   Bilateral  There are no suspicious masses, grouped microcalcifications or areas of   unexplained architectural distortion. The skin and nipple areolar complex   are unremarkable.   There are scattered calcifications present.  Impression: No mammographic evidence of malignancy.    ASSESSMENT/BI-RADS CATEGORY:  Left: 2 - Benign  Right: 2 - Benign  Overall: 2 - Benign    RECOMMENDATION:       - Routine screening mammogram in 1 year for both breasts.    Workstation ID: [id number]    Signed by:  Bradley Landon Kocher, MD    Disclaimer: This document was prepared using [employer] technology. If a word or phrase is confusing, or does not make sense, this is likely due to recognition error which  was not discovered during this clinician's review. If you believe an error has occurred, please contact me through [employer] service line for hany?cation.

## 2025-01-28 DIAGNOSIS — I95.1 ORTHOSTATIC HYPOTENSION: ICD-10-CM

## 2025-01-28 DIAGNOSIS — R42 DIZZINESS: ICD-10-CM

## 2025-01-28 RX ORDER — MIDODRINE HYDROCHLORIDE 5 MG/1
7.5 TABLET ORAL
Qty: 180 TABLET | Refills: 0 | Status: SHIPPED | OUTPATIENT
Start: 2025-01-28

## 2025-02-14 ENCOUNTER — HOSPITAL ENCOUNTER (OUTPATIENT)
Dept: RADIOLOGY | Facility: HOSPITAL | Age: 75
Discharge: HOME | End: 2025-02-14
Payer: COMMERCIAL

## 2025-02-14 DIAGNOSIS — R10.30 INTERMITTENT LOWER ABDOMINAL PAIN: ICD-10-CM

## 2025-02-14 PROCEDURE — 74018 RADEX ABDOMEN 1 VIEW: CPT

## 2025-03-04 DIAGNOSIS — R42 DIZZINESS: ICD-10-CM

## 2025-03-04 DIAGNOSIS — I95.1 ORTHOSTATIC HYPOTENSION: ICD-10-CM

## 2025-03-04 RX ORDER — MIDODRINE HYDROCHLORIDE 5 MG/1
7.5 TABLET ORAL
Qty: 180 TABLET | Refills: 0 | Status: SHIPPED | OUTPATIENT
Start: 2025-03-04

## 2025-03-05 ENCOUNTER — TELEPHONE (OUTPATIENT)
Dept: HEMATOLOGY ONCOLOGY | Facility: CLINIC | Age: 75
End: 2025-03-05

## 2025-03-05 NOTE — TELEPHONE ENCOUNTER
Spoke with patients sister and rescheduled her appointment with maria antonia andres from 04/18/2025 to 04/22/2025 @ 10:30.

## 2025-03-24 ENCOUNTER — OFFICE VISIT (OUTPATIENT)
Dept: OBGYN CLINIC | Facility: CLINIC | Age: 75
End: 2025-03-24
Payer: COMMERCIAL

## 2025-03-24 VITALS
HEIGHT: 64 IN | WEIGHT: 234.4 LBS | SYSTOLIC BLOOD PRESSURE: 110 MMHG | BODY MASS INDEX: 40.02 KG/M2 | DIASTOLIC BLOOD PRESSURE: 72 MMHG

## 2025-03-24 DIAGNOSIS — R10.2 PELVIC PAIN: Primary | ICD-10-CM

## 2025-03-24 LAB
SL AMB  POCT GLUCOSE, UA: NORMAL
SL AMB LEUKOCYTE ESTERASE,UA: NORMAL
SL AMB POCT BILIRUBIN,UA: NORMAL
SL AMB POCT BLOOD,UA: NORMAL
SL AMB POCT CLARITY,UA: YELLOW
SL AMB POCT COLOR,UA: YELLOW
SL AMB POCT KETONES,UA: NORMAL
SL AMB POCT NITRITE,UA: NORMAL
SL AMB POCT PH,UA: 5
SL AMB POCT SPECIFIC GRAVITY,UA: 1
SL AMB POCT URINE PROTEIN: 0.15
SL AMB POCT UROBILINOGEN: 0.2

## 2025-03-24 PROCEDURE — 99213 OFFICE O/P EST LOW 20 MIN: CPT | Performed by: NURSE PRACTITIONER

## 2025-03-24 PROCEDURE — 81002 URINALYSIS NONAUTO W/O SCOPE: CPT | Performed by: NURSE PRACTITIONER

## 2025-03-24 PROCEDURE — 81514 NFCT DS BV&VAGINITIS DNA ALG: CPT | Performed by: NURSE PRACTITIONER

## 2025-03-24 RX ORDER — OXYBUTYNIN CHLORIDE 5 MG/1
5 TABLET, EXTENDED RELEASE ORAL DAILY
COMMUNITY
Start: 2025-03-05 | End: 2025-04-04

## 2025-03-24 RX ORDER — AMOXICILLIN 500 MG/1
1 CAPSULE ORAL EVERY 8 HOURS
COMMUNITY
Start: 2025-03-20

## 2025-03-24 RX ORDER — FLUCONAZOLE 200 MG/1
1 TABLET ORAL DAILY
COMMUNITY
Start: 2025-02-15 | End: 2025-03-24

## 2025-03-24 RX ORDER — METRONIDAZOLE 500 MG/1
TABLET ORAL EVERY 8 HOURS
COMMUNITY
Start: 2025-02-15 | End: 2025-03-24

## 2025-03-24 NOTE — PROGRESS NOTES
Name: Sg Scherer      : 1950      MRN: [id number]  Encounter Provider: RUBEN Sotelo  Encounter Date: 3/24/2025   Encounter department: [facility]  :  Assessment & Plan  Pelvic pain  Reviewed recommendation for pelvic ultrasound  Reviewed recommendation for molecular vaginal panel collected at time of today's visit  Encouraged to continue taking amoxicillin as ordered for UTI  Signs and symptoms to report reviewed  We will call/Diagnosiahart message with results         RTO annual exam     History of Present Illness   HPI  Sg Scherer is a 75 y.o. female who presents with complaints of pelvic pain for about 2 months. S/p complete hysterectomy 3/31/22 for endometrial cancer. Pain is described as bilateral, crampy .associated with urine stream. Denies alleviating or aggravating factors.  Was recently treated for BV and yeast    mmg 24 birads 2  dexa 23 osteoporosis  crc 3/10/20 colonoscopy 5yr  History obtained from: patient    Review of Systems   Constitutional:  Negative for chills and fever.   Respiratory: Negative.     Cardiovascular: Negative.    Genitourinary:  Positive for pelvic pain.     Medical History Reviewed by provider this encounter:  Meds  Problems     .     Objective   LMP  (LMP Unknown)      Physical Exam  Constitutional:       Appearance: Normal appearance.   Neurological:      Mental Status: She is alert and oriented to person, place, and time.   Psychiatric:         Mood and Affect: Mood normal.         Behavior: Behavior normal.

## 2025-03-25 ENCOUNTER — RESULTS FOLLOW-UP (OUTPATIENT)
Dept: OBGYN CLINIC | Facility: CLINIC | Age: 75
End: 2025-03-25

## 2025-03-25 LAB
C GLABRATA DNA VAG QL NAA+PROBE: NEGATIVE
C KRUSEI DNA VAG QL NAA+PROBE: NEGATIVE
CANDIDA SP 6 PNL VAG NAA+PROBE: NEGATIVE
T VAGINALIS DNA VAG QL NAA+PROBE: NEGATIVE
VAGINOSIS/ITIS DNA PNL VAG PROBE+SIG AMP: NEGATIVE

## 2025-04-02 ENCOUNTER — HOSPITAL ENCOUNTER (OUTPATIENT)
Dept: RADIOLOGY | Facility: HOSPITAL | Age: 75
Discharge: HOME | End: 2025-04-02
Payer: COMMERCIAL

## 2025-04-02 DIAGNOSIS — R10.30 INGUINAL PAIN, UNSPECIFIED LATERALITY: ICD-10-CM

## 2025-04-02 PROCEDURE — 74018 RADEX ABDOMEN 1 VIEW: CPT

## 2025-04-08 DIAGNOSIS — R42 DIZZINESS: ICD-10-CM

## 2025-04-08 DIAGNOSIS — I95.1 ORTHOSTATIC HYPOTENSION: ICD-10-CM

## 2025-04-08 RX ORDER — MIDODRINE HYDROCHLORIDE 5 MG/1
7.5 TABLET ORAL
Qty: 180 TABLET | Refills: 0 | Status: SHIPPED | OUTPATIENT
Start: 2025-04-08

## 2025-04-14 ENCOUNTER — TELEPHONE (OUTPATIENT)
Age: 75
End: 2025-04-14

## 2025-04-14 DIAGNOSIS — I10 ESSENTIAL HYPERTENSION: ICD-10-CM

## 2025-04-14 DIAGNOSIS — N25.81 SECONDARY HYPERPARATHYROIDISM OF RENAL ORIGIN (HCC): ICD-10-CM

## 2025-04-14 DIAGNOSIS — N18.31 STAGE 3A CHRONIC KIDNEY DISEASE (HCC): Primary | ICD-10-CM

## 2025-04-14 NOTE — TELEPHONE ENCOUNTER
Patient's sister is requesting lab orders from Dr Ray to be mailed to her home address on file:      [address] [address]

## 2025-04-21 ENCOUNTER — TELEPHONE (OUTPATIENT)
Dept: HEMATOLOGY ONCOLOGY | Facility: CLINIC | Age: 75
End: 2025-04-21

## 2025-04-22 ENCOUNTER — OFFICE VISIT (OUTPATIENT)
Dept: HEMATOLOGY ONCOLOGY | Facility: CLINIC | Age: 75
End: 2025-04-22
Payer: COMMERCIAL

## 2025-04-22 VITALS
RESPIRATION RATE: 18 BRPM | BODY MASS INDEX: 41.46 KG/M2 | HEART RATE: 63 BPM | OXYGEN SATURATION: 98 % | HEIGHT: 63 IN | DIASTOLIC BLOOD PRESSURE: 78 MMHG | WEIGHT: 234 LBS | SYSTOLIC BLOOD PRESSURE: 138 MMHG | TEMPERATURE: 97.5 F

## 2025-04-22 DIAGNOSIS — D72.820 MONOCLONAL B-CELL LYMPHOCYTOSIS WITH IMMUNOPHENOTYPE LIKE CHRONIC LYMPHOCYTIC LEUKEMIA (CLL) (HCC): ICD-10-CM

## 2025-04-22 DIAGNOSIS — D61.818 PANCYTOPENIA (HCC): Primary | ICD-10-CM

## 2025-04-22 DIAGNOSIS — C91.10 MONOCLONAL B-CELL LYMPHOCYTOSIS WITH IMMUNOPHENOTYPE LIKE CHRONIC LYMPHOCYTIC LEUKEMIA (CLL) (HCC): ICD-10-CM

## 2025-04-22 PROCEDURE — G2211 COMPLEX E/M VISIT ADD ON: HCPCS | Performed by: PHYSICIAN ASSISTANT

## 2025-04-22 PROCEDURE — 99215 OFFICE O/P EST HI 40 MIN: CPT | Performed by: PHYSICIAN ASSISTANT

## 2025-04-22 NOTE — PATIENT INSTRUCTIONS
[facility] Medical Oncology and Hematology Team  [place] - [phone]    Your Team Member:  Advanced Practitioner:  Toshia Sheriff PA-C/ Dr. Vianca Dye    Please answer Private and Unavailable Calls - this may be your team(s) contacting you.  If you have medical questions/concerns/issues - contact us either by (1) My Chart (2) Hope Line

## 2025-04-22 NOTE — PROGRESS NOTES
Name: Sg Scherer      : 1950      MRN: [id number]  Encounter Provider: Toshia Sheriff PA-C  Encounter Date: 2025   Encounter department: [facility] [place]  :  Assessment & Plan  Pancytopenia (HCC)  WBC 2.8 with a low ANC of 1.5, acceptable hemoglobin with an elevated MCV and a mildly depressed platelet count.    This is a 75-year-old female who was previously seen by Dr. Dye.  Patient did have a bone marrow biopsy that did not demonstrate any underlying bone marrow dysfunction and that these results are likely reactive.  Patient was also discovered to have a monoclonal B-cell lymphoma cytosis however white blood cell count is opposite of what 1 would anticipate and absolute lymphocytes are mildly low.     Patient's pancytopenia is quite mild observation has been opted.  Unfortunately, patient did not come to the office today with current blood work.  Patient will be getting these later this week.  Patient will then be under observation as if the differential changes or hemoglobin or platelets decreased further, additional interventions may be needed.  Since the patient is going for blood work now I have requested a flow cytometry.    Patient will follow-up in approximately 4 months.     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  Observation is the standard of care.  Patient exhibits no B symptoms.    Orders:  •  CBC and differential; Future  •  Comprehensive metabolic panel; Future  •  LD,Blood; Future  •  IgG; Future  •  Leukemia/Lymphoma flow cytometry; Future  •  CBC and  differential; Future  •  Comprehensive metabolic panel; Future    Return in about 4 months (around 8/22/2025) for [facility] Office Visit, Labs.    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    4/6/2018   WBC 3.4, hemoglobin 12.6, MCV 94, platelet count 143  5/6/2019   WBC 3.3, hemoglobin 12.6, MCV 93, platelet count 133  7/16/2020 WBC 3.8, hemoglobin 13.3, MCV 96, platelet count 167  3/10/2022 WBC 4.3, hemoglobin 12.2, platelet count 156  3/31/2022: Surgical intervention with hysterectomy oophorectomy omentectomy  5/9/2022   WBC 3.75, hemoglobin 11.7, , platelet count 126 (first chemo 5/11)    9/12/2022 postchemotherapy labs   WBC 3.3, hemoglobin 10.0, , platelet count 164    1/2/24 WBC 3.3, hemoglobin 11.2, , platelet count 147 (post radiation labs)    October 2024:  Hematologic workup was completed and a bone marrow biopsy was performed.  monoclonal CD5 positive B-cell population, detected by flow cytometry, deletion 13 q. detected by FISH, scant trilineage maturing hematopoiesis without increase in blasts.    low cellularity, but there is no evidence of a myeloid neoplasm by flow cytometry, MDS FISH, karyotype and next generation sequencing.   Flow cytometry detects a clonal B-cell population of unclear significance with a chronic lymphocytic leukemia/small lymphocytic lymphoma immunophenotype   IgVH negative    1/7/2025 WBC 2.8, hemoglobin 11.0, , platelet count 138   Ferritin = 117, iron saturation 37%    Oncology History   Encounter for follow-up surveillance of endometrial cancer   3/2/2022 Initial Diagnosis    Endometrial cancer (HCC)     3/2/2022 Biopsy    Final Diagnosis   A. Endometrium, EMB:  - High-grade endometrial carcinoma,  favor clear cell carcinoma.  See comment.      3/31/2022 -  Cancer Staged    Staging form: Corpus Uteri - Carcinoma, AJCC 8th Edition  - Pathologic stage from 3/31/2022: FIGO Stage IIIC1 - Signed by Gene Coburn MD on 4/19/2022  Histopathologic type: Clear cell adenocarcinoma, NOS  Stage prefix: Initial diagnosis  Histologic grade (G): G3  Histologic grading system: 3 grade system  Residual tumor (R): R0 - None  Pelvic fátima status: Positive  Number of pelvic nodes positive from dissection: 1  Number of pelvic nodes examined during dissection: 2  Lymph node metastasis: Present  Omentectomy performed: Yes  Morcellation performed: No       3/31/2022 Surgery    Robotic hysterectomy, bilateral salpingo oophorectomy, bilateral pelvic sentinel lymph node biopsies, pelvic peritoneal/omental biopsies.  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  Negative cervix.  Negative parametria.  1/2 sentinel pelvic lymph nodes positive for malignancy.  Negative staging biopsies.  Stage IIIC1.  No evidence of DNA mismatch repair protein loss.     5/11/2022 - 8/25/2022 Chemotherapy    palonosetron (ALOXI), 0.25 mg, Intravenous, Once, 6 of 6 cycles  Administration: 0.25 mg (5/11/2022), 0.25 mg (6/1/2022), 0.25 mg (6/22/2022), 0.25 mg (7/13/2022), 0.25 mg (8/3/2022), 0.25 mg (8/24/2022)  Pegfilgrastim-bmez (ZIEXTENZO), 6 mg, Subcutaneous, Once, 3 of 3 cycles  Administration: 6 mg (7/14/2022), 6 mg (8/4/2022), 6 mg (8/25/2022)  fosaprepitant (EMEND) IVPB, 150 mg, Intravenous, Once, 6 of 6 cycles  Administration: 150 mg (5/11/2022), 150 mg (6/1/2022), 150 mg (6/22/2022), 150 mg (7/13/2022), 150 mg (8/3/2022), 150 mg (8/24/2022)  CARBOplatin (PARAPLATIN) IVPB (GOG AUC DOSING), 352.5 mg, Intravenous, Once, 6 of 6 cycles  Administration: 352.5 mg (5/11/2022), 377.5 mg (6/1/2022), 340.5 mg (6/22/2022), 353 mg (7/13/2022), 266.4 mg (8/3/2022), 271.6 mg (8/24/2022)  PACLItaxel (TAXOL) chemo IVPB, 135 mg/m2 =  290.4 mg (77.1 % of original dose 175 mg/m2), Intravenous, Once, 6 of 6 cycles  Dose modification: 135 mg/m2 (original dose 175 mg/m2, Cycle 1, Reason: Other (Must fill in a comment), Comment: prescribed starting dose)  Administration: 290.4 mg (5/11/2022), 290.4 mg (6/1/2022), 289.2 mg (6/22/2022), 292.8 mg (7/13/2022), 289.2 mg (8/3/2022), 300 mg (8/24/2022)     10/4/2022 - 11/21/2022 Radiation    Plan ID Energy Fractions Dose per Fraction (cGy) Dose Correction (cGy) Total Dose Delivered (cGy) Elapsed Days   Vag Cylinder  2 / 2 600 0 1,200 5   Pelvis 6 MV 25 180  4500 34          04/22/25: no labs     Review of Systems   Constitutional:  Negative for appetite change, fatigue, fever and unexpected weight change.   HENT:  Negative for nosebleeds.    Respiratory:  Negative for cough, choking and shortness of breath.         Negative hemoptysis.   Cardiovascular:  Negative for chest pain, palpitations and leg swelling.   Gastrointestinal: Negative.  Negative for abdominal distention, abdominal pain, anal bleeding, blood in stool, constipation, diarrhea, nausea and vomiting.   Endocrine: Negative.  Negative for cold intolerance.   Genitourinary: Negative.  Negative for hematuria, menstrual problem, vaginal bleeding, vaginal discharge and vaginal pain.   Musculoskeletal: Negative.  Negative for arthralgias, myalgias, neck pain and neck stiffness.   Skin: Negative.  Negative for color change, pallor and rash.   Allergic/Immunologic: Negative.  Negative for immunocompromised state.   Neurological: Negative.  Negative for weakness and headaches.   Hematological:  Negative for adenopathy. Does not bruise/bleed easily.   All other systems reviewed and are negative.    Medical History Reviewed by provider this encounter:  Tobacco  Allergies  Meds  Problems  Med Hx  Surg Hx  Fam Hx     .      Objective   /78 (BP Location: Left arm, Patient Position: Sitting, Cuff Size: Adult)   Pulse 63   Temp 97.5 °F (36.4 °C)  "(Temporal)   Resp 18   Ht 5' 3\" (1.6 m)   Wt 106 kg (234 lb)   LMP  (LMP Unknown)   SpO2 98%   BMI 41.45 kg/m²     Physical Exam  Constitutional:       General: She is not in acute distress.     Appearance: She is well-developed.   HENT:      Head: Normocephalic and atraumatic.   Eyes:      General: No scleral icterus.     Conjunctiva/sclera: Conjunctivae normal.   Cardiovascular:      Rate and Rhythm: Normal rate.   Pulmonary:      Effort: Pulmonary effort is normal. No respiratory distress.   Skin:     General: Skin is warm.      Coloration: Skin is not pale.      Findings: No rash.   Neurological:      Mental Status: She is alert and oriented to person, place, and time.   Psychiatric:         Thought Content: Thought content normal.         Labs: I have reviewed the following labs:  Results for orders placed or performed in visit on 03/24/25   Molecular Vaginal Panel    Specimen: Vaginal; Genital   Result Value Ref Range    Bacterial Vaginosis Negative Negative    Candida species Negative Negative    Candida glabrata Negative Negative    Candida krusei Negative Negative    Trichomonas vaginalis Negative Negative   POCT urine dip   Result Value Ref Range    LEUKOCYTE ESTERASE,UA neg     NITRITE,UA neg     SL AMB POCT UROBILINOGEN 0.2     POCT URINE PROTEIN 0.15      PH,UA 5.0     BLOOD,UA neg     SPECIFIC GRAVITY,UA 1.005     KETONES,UA neg     BILIRUBIN,UA neg     GLUCOSE, UA neg      COLOR,UA yellow     CLARITY,UA yellow      Administrative Statements   I have spent a total time of 43 minutes in caring for this patient on the day of the visit/encounter including Risks and benefits of tx options, Instructions for management, Patient and family education, Documenting in the medical record, Reviewing/placing orders in the medical record (including tests, medications, and/or procedures), and Obtaining or reviewing history  .  "

## 2025-04-22 NOTE — ASSESSMENT & PLAN NOTE
WBC 2.8 with a low ANC of 1.5, acceptable hemoglobin with an elevated MCV and a mildly depressed platelet count.    This is a 75-year-old female who was previously seen by Dr. Dye.  Patient did have a bone marrow biopsy that did not demonstrate any underlying bone marrow dysfunction and that these results are likely reactive.  Patient was also discovered to have a monoclonal B-cell lymphoma cytosis however white blood cell count is opposite of what 1 would anticipate and absolute lymphocytes are mildly low.     Patient's pancytopenia is quite mild observation has been opted.  Unfortunately, patient did not come to the office today with current blood work.  Patient will be getting these later this week.  Patient will then be under observation as if the differential changes or hemoglobin or platelets decreased further, additional interventions may be needed.  Since the patient is going for blood work now I have requested a flow cytometry.    Patient will follow-up in approximately 4 months.     Orders:  •  CBC and differential; Future  •  Comprehensive metabolic panel; Future  •  LD,Blood; Future  •  IgG; Future  •  Leukemia/Lymphoma flow cytometry; Future  •  CBC and differential; Future  •  Comprehensive metabolic panel; Future

## 2025-04-23 ENCOUNTER — TELEPHONE (OUTPATIENT)
Dept: NEPHROLOGY | Facility: CLINIC | Age: 75
End: 2025-04-23

## 2025-04-23 NOTE — TELEPHONE ENCOUNTER
LVM to patient reminding to please complete blood and  urine test  prior to 4/30 appointment with Dr. Ray. Advised patient to call back with any questions or  Concerns.

## 2025-04-24 LAB
ALBUMIN SERPL-MCNC: 3.8 G/DL (ref 3.6–5.1)
BASOPHILS # BLD AUTO: 11 CELLS/UL (ref 0–200)
BASOPHILS NFR BLD AUTO: 0.4 %
BUN SERPL-MCNC: 14 MG/DL (ref 7–25)
BUN/CREAT SERPL: 12 (CALC) (ref 6–22)
CALCIUM SERPL-MCNC: 9 MG/DL (ref 8.6–10.4)
CALCIUM SERPL-MCNC: 9 MG/DL (ref 8.6–10.4)
CHLORIDE SERPL-SCNC: 107 MMOL/L (ref 98–110)
CO2 SERPL-SCNC: 29 MMOL/L (ref 20–32)
CREAT SERPL-MCNC: 1.21 MG/DL (ref 0.6–1)
CREAT UR-MCNC: 162 MG/DL (ref 20–275)
EOSINOPHIL # BLD AUTO: 100 CELLS/UL (ref 15–500)
EOSINOPHIL NFR BLD AUTO: 3.7 %
ERYTHROCYTE [DISTWIDTH] IN BLOOD BY AUTOMATED COUNT: 13.1 % (ref 11–15)
GFR/BSA.PRED SERPLBLD CYS-BASED-ARV: 46 ML/MIN/1.73M2
GLUCOSE SERPL-MCNC: 91 MG/DL (ref 65–99)
HCT VFR BLD AUTO: 36.3 % (ref 35–45)
HGB BLD-MCNC: 11.5 G/DL (ref 11.7–15.5)
LYMPHOCYTES # BLD AUTO: 980 CELLS/UL (ref 850–3900)
LYMPHOCYTES NFR BLD AUTO: 36.3 %
MCH RBC QN AUTO: 33.2 PG (ref 27–33)
MCHC RBC AUTO-ENTMCNC: 31.7 G/DL (ref 32–36)
MCV RBC AUTO: 104.9 FL (ref 80–100)
MONOCYTES # BLD AUTO: 254 CELLS/UL (ref 200–950)
MONOCYTES NFR BLD AUTO: 9.4 %
NEUTROPHILS # BLD AUTO: 1355 CELLS/UL (ref 1500–7800)
NEUTROPHILS NFR BLD AUTO: 50.2 %
PHOSPHATE SERPL-MCNC: 4 MG/DL (ref 2.1–4.3)
PLATELET # BLD AUTO: 134 THOUSAND/UL (ref 140–400)
PMV BLD REES-ECKER: 11.7 FL (ref 7.5–12.5)
POTASSIUM SERPL-SCNC: 4.1 MMOL/L (ref 3.5–5.3)
PROT UR-MCNC: 13 MG/DL (ref 5–24)
PROT/CREAT UR: 0.08 MG/MG CREAT (ref 0.02–0.18)
PROT/CREAT UR: 80 MG/G CREAT (ref 24–184)
PTH-INTACT SERPL-MCNC: 59 PG/ML (ref 16–77)
RBC # BLD AUTO: 3.46 MILLION/UL (ref 3.8–5.1)
SODIUM SERPL-SCNC: 142 MMOL/L (ref 135–146)
WBC # BLD AUTO: 2.7 THOUSAND/UL (ref 3.8–10.8)

## 2025-04-30 ENCOUNTER — OFFICE VISIT (OUTPATIENT)
Dept: NEPHROLOGY | Facility: CLINIC | Age: 75
End: 2025-04-30
Payer: COMMERCIAL

## 2025-04-30 VITALS
WEIGHT: 233 LBS | DIASTOLIC BLOOD PRESSURE: 78 MMHG | OXYGEN SATURATION: 98 % | HEART RATE: 75 BPM | SYSTOLIC BLOOD PRESSURE: 130 MMHG | BODY MASS INDEX: 41.27 KG/M2

## 2025-04-30 DIAGNOSIS — N25.81 SECONDARY HYPERPARATHYROIDISM OF RENAL ORIGIN (HCC): ICD-10-CM

## 2025-04-30 DIAGNOSIS — Z85.42 HISTORY OF ENDOMETRIAL CANCER: ICD-10-CM

## 2025-04-30 DIAGNOSIS — D61.818 PANCYTOPENIA (HCC): ICD-10-CM

## 2025-04-30 DIAGNOSIS — N18.31 CKD STAGE 3A, GFR 45-59 ML/MIN (HCC): Primary | ICD-10-CM

## 2025-04-30 DIAGNOSIS — D63.1 ANEMIA DUE TO STAGE 3A CHRONIC KIDNEY DISEASE  (HCC): ICD-10-CM

## 2025-04-30 DIAGNOSIS — E66.01 MORBID OBESITY WITH BMI OF 45.0-49.9, ADULT (HCC): ICD-10-CM

## 2025-04-30 DIAGNOSIS — I10 ESSENTIAL HYPERTENSION: ICD-10-CM

## 2025-04-30 DIAGNOSIS — I95.1 ORTHOSTATIC HYPOTENSION: ICD-10-CM

## 2025-04-30 DIAGNOSIS — I26.99 OTHER ACUTE PULMONARY EMBOLISM WITHOUT ACUTE COR PULMONALE (HCC): ICD-10-CM

## 2025-04-30 DIAGNOSIS — N18.31 ANEMIA DUE TO STAGE 3A CHRONIC KIDNEY DISEASE  (HCC): ICD-10-CM

## 2025-04-30 PROBLEM — N17.9 ACUTE KIDNEY INJURY SUPERIMPOSED ON STAGE 3A CHRONIC KIDNEY DISEASE (HCC): Status: RESOLVED | Noted: 2020-11-03 | Resolved: 2025-04-30

## 2025-04-30 PROCEDURE — G2211 COMPLEX E/M VISIT ADD ON: HCPCS | Performed by: INTERNAL MEDICINE

## 2025-04-30 PROCEDURE — 99214 OFFICE O/P EST MOD 30 MIN: CPT | Performed by: INTERNAL MEDICINE

## 2025-04-30 NOTE — ASSESSMENT & PLAN NOTE
Lab Results   Component Value Date    EGFR 46 (L) 04/23/2025    EGFR 47 (L) 02/14/2025    EGFR 54 (L) 01/07/2025    CREATININE 1.21 (H) 04/23/2025    CREATININE 1.2 (H) 02/14/2025    CREATININE 1.07 (H) 01/07/2025       Orders:    CBC; Future    Mild anemia in the setting of pancytopenia as well as CKD.  Most recent hemoglobin is stable at 11.5.  Continue follow-up with hematology

## 2025-04-30 NOTE — LETTER
2025     Thad Savage MD  [address] [facility] [address]    Patient: Sg Scherer   YOB: 1950   Date of Visit: 2025       Dear Dr. Thad Savage MD:    Thank you for referring Sg Scherer to me for evaluation. Below are my notes for this consultation.    If you have questions, please do not hesitate to call me. I look forward to following your patient along with you.         Sincerely,        Maninder Ray MD        CC: No Recipients    Maninder Ray MD  2025  2:53 PM  Sign when Signing Visit  Name: Sg Scherer      : 1950      MRN: [id number]  Encounter Provider: Maninder Ray MD  Encounter Date: 2025   Encounter department: [facility] [place]  :  Assessment & Plan  CKD stage 3a, GFR 45-59 ml/min (Formerly [facility])  Lab Results   Component Value Date    EGFR 46 (L) 2025    EGFR 47 (L) 2025    EGFR 54 (L) 2025    CREATININE 1.21 (H) 2025    CREATININE 1.2 (H) 2025    CREATININE 1.07 (H) 2025       Orders:  •  CBC; Future  •  Renal function panel; Future  •  PTH, intact; Future  •  Protein / creatinine ratio, urine; Future  CKD stage III with creatinine fluctuating around 1.1-1.4 back since 2018 as per Care Everywhere.  CKD in the setting of long-term history of hypertension, morbid obesity, age-related nephron loss.  Renal function fairly stable with a more recent creatinine of 1.21 and no proteinuria.  Avoid NSAIDs.  Stay well-hydrated.  Blood pressure currently well-controlled blood pressure medications were discontinued and she is currently on midodrine due to previous orthostatic hypotension.  Advised to keep working on losing weight.  Follow-up in 1 year with repeat labs   Secondary hyperparathyroidism of renal origin (Formerly [facility])    Orders:  •  Renal function panel; Future  •  PTH, intact; Future  Recent calcium and phosphorus between normal limits, PTH improved.  Continue with  Calcitrol low-dose 3 times a week.  Follow labs in 1 year  Orthostatic hypotension       With orthostatic hypotension since was started on Wegovy and had rapid weight loss last year.  Blood pressure well-controlled on midodrine 7.5 mg 3 times a day.  Advised to stay well-hydrated.  Currently patient is asymptomatic  Essential hypertension       Hypertension, blood pressure medication was discontinued in the past in the setting of orthostatic hypotension.  Advised to keep working on losing weight  Other acute pulmonary embolism without acute cor pulmonale (HCC)       Anticoagulation with Eliquis.  PE suspected secondary to malignancy  Pancytopenia (HCC)       Follows with hematology oncology.  Myelodysplasia status post bone marrow biopsy on 10/2024  Anemia due to stage 3a chronic kidney disease   Lab Results   Component Value Date    EGFR 46 (L) 04/23/2025    EGFR 47 (L) 02/14/2025    EGFR 54 (L) 01/07/2025    CREATININE 1.21 (H) 04/23/2025    CREATININE 1.2 (H) 02/14/2025    CREATININE 1.07 (H) 01/07/2025       Orders:  •  CBC; Future    Mild anemia in the setting of pancytopenia as well as CKD.  Most recent hemoglobin is stable at 11.5.  Continue follow-up with hematology  Morbid obesity with BMI of 45.0-49.9, adult (HCC)       She lost close to 20 pounds while she was taking Wegovy in the past.  Currently off.  Advised to keep working on losing weight  History of endometrial cancer       Follows with gynecology at [facility].  Last CAT scan on 03/2024 no signs of metastatic disease or recurrence      Patient Instructions   As we discussed in the office visit and after reviewing most recent blood test your kidney function remains fairly stable.  No changes in your medication at this moment.  Stay well-hydrated.  Avoid NSAIDs (no ibuprofen, Motrin, Advil, Aleve, naproxen).  K to take Tylenol or acetaminophen as needed for pain.  Continue close follow-up with your primary care doctor, gynecologist,  hematologist oncologist.  I would like to see you back in the office in 1 year with repeat labs.  Stay active.  Keep working on losing weight    It was a pleasure evaluating your patient in the office today. Thank you for allowing our team to participate in the care of  Sg Scherer. Please do not hesitate to contact our team if further issues/questions shall arise in the interim.     History of Present Illness  HPI  Sg Scherer is a 75 y.o. female who presents to the follow-up with her Sister Lili.    Last time seen was on 4/2024  Was hospitalized in [facility] on October 2024 in the setting of dizziness suspected secondary to dehydration as well as orthostatic hypotension noted patient with mild BARRY with creatinine up to 1.6 that improved with IV fluids, midodrine was increased to 7.5 mg daily.    Today in general he is feeling okay other than noticed feeling more short of breath with minimal exertion over the last couple of months.  Currently denies any chest pain, no leg swelling.  Denies any abdominal pain, no recent nausea, no vomiting, no diarrhea or constipation.  Denies any dizziness.  Denies any dysuria or gross hematuria.      History obtained from: patient  Pertinent Medical History  CKD stage III creatinine around 1.1-1.4 since 2018 as per Care Everywhere.  History of hypertension currently off antihypertensive medication.  Orthostatic hypotension  Morbid obesity.  Pancytopenia.  History of endometrial cancer status post bilateral salpingo-oophorectomy, hysterectomy on 3/2022 completed adjuvant chemotherapy with Taxol and carboplatin 8/2022, completed radiation 4/20/2022  Secondary hyperparathyroidism  PE on anticoagulation with Eliquis    Review of Systems  ROS: All the systems were reviewed and were negative except as documented on the H&P.    Medical History Reviewed by provider this encounter:  Allergies  Meds     .       Current Outpatient Medications on File Prior to  Visit   Medication Sig Dispense Refill   • acetaminophen (TYLENOL) 650 mg CR tablet Take 1 tablet (650 mg total) by mouth every 6 (six) hours as needed for mild pain 30 tablet 0   • apixaban (Eliquis) 5 mg Take 1 tablet (5 mg total) by mouth 2 (two) times a day 60 tablet 6   • Ascorbic Acid (vitamin C) 100 MG tablet Take by mouth daily Pt unsure of mg     • calcitriol (ROCALTROL) 0.25 mcg capsule Take 1 capsule (0.25 mcg total) by mouth 3 (three) times a week 36 capsule 2   • cholecalciferol (VITAMIN D3) 1,000 units tablet Take 1,000 Units by mouth daily Pt unsure how many units     • denosumab (Prolia) 60 mg/mL Inject 1 mL under the skin every 6 (six) months     • midodrine (PROAMATINE) 5 mg tablet TAKE 1.5 TABLETS (7.5 MG TOTAL) BY MOUTH 3 (THREE) TIMES A DAY BEFORE MEALS 180 tablet 0   • oxybutynin (DITROPAN-XL) 5 mg 24 hr tablet Take 5 mg by mouth daily     • vitamin B-12 (CYANOCOBALAMIN) 50 MCG tablet Take by mouth daily Pt unsure of mcg     • amoxicillin (AMOXIL) 500 mg capsule Take 1 capsule by mouth every 8 (eight) hours (Patient not taking: Reported on 4/30/2025)     • Na Sulfate-K Sulfate-Mg Sulf 17.5-3.13-1.6 GM/177ML SOLN Take 1 kit by mouth see administration instructions (Patient not taking: Reported on 4/30/2025) 177 mL 0     No current facility-administered medications on file prior to visit.     Objective  /78 (BP Location: Left arm, Patient Position: Sitting, Cuff Size: Adult)   Pulse 75   Wt 106 kg (233 lb)   LMP  (LMP Unknown)   SpO2 98%   BMI 41.27 kg/m²      Physical Exam  General: morbid obese, cooperative, in not acute distress  Eyes: conjunctivae pink, anicteric sclerae  ENT: lips and mucous membranes moist  Neck: supple, no JVD  Chest: clear breath sounds bilateral, no crackles, ronchus or wheezings  CVS: distinct S1 & S2, normal rate, regular rhythm  Abdomen: soft, non-tender, non-distended, normoactive bowel sounds  Back: no CVA tenderness  Extremities: no edema of both  "legs  Skin: no rash  Neuro: awake, alert, oriented      Laboratory Results:        Invalid input(s): \"ALBUMIN\"    Results for orders placed or performed in visit on 04/23/25   PTH, Intact and Calcium   Result Value Ref Range    PTH, Intact 59 16 - 77 pg/mL    Calcium 9.0 8.6 - 10.4 mg/dL   Renal function panel   Result Value Ref Range    Glucose, Random 91 65 - 99 mg/dL    BUN 14 7 - 25 mg/dL    Creatinine 1.21 (H) 0.60 - 1.00 mg/dL    eGFR 46 (L) > OR = 60 mL/min/1.73m2    SL AMB BUN/CREATININE RATIO 12 6 - 22 (calc)    Sodium 142 135 - 146 mmol/L    Potassium 4.1 3.5 - 5.3 mmol/L    Chloride 107 98 - 110 mmol/L    CO2 29 20 - 32 mmol/L    Calcium 9.0 8.6 - 10.4 mg/dL    Phosphorus, Serum 4.0 2.1 - 4.3 mg/dL    Albumin 3.8 3.6 - 5.1 g/dL   CBC and differential   Result Value Ref Range    White Blood Cell Count 2.7 (L) 3.8 - 10.8 Thousand/uL    Red Blood Cell Count 3.46 (L) 3.80 - 5.10 Million/uL    Hemoglobin 11.5 (L) 11.7 - 15.5 g/dL    HCT 36.3 35.0 - 45.0 %    .9 (H) 80.0 - 100.0 fL    MCH 33.2 (H) 27.0 - 33.0 pg    MCHC 31.7 (L) 32.0 - 36.0 g/dL    RDW 13.1 11.0 - 15.0 %    Platelet Count 134 (L) 140 - 400 Thousand/uL    SL AMB MPV 11.7 7.5 - 12.5 fL    Neutrophils (Absolute) 1,355 (L) 1,500 - 7,800 cells/uL    Lymphocytes (Absolute) 980 850 - 3,900 cells/uL    Monocytes (Absolute) 254 200 - 950 cells/uL    Eosinophils (Absolute) 100 15 - 500 cells/uL    Basophils ABS 11 0 - 200 cells/uL    Neutrophils 50.2 %    Lymphocytes 36.3 %    Monocytes 9.4 %    Eosinophils 3.7 %    Basophils PCT 0.4 %    Always Message     Protein, Total w/Creat, Random Urine   Result Value Ref Range    Creatinine, Urine 162 20 - 275 mg/dL    Protein/Creat Ratio 80 24 - 184 mg/g creat    Protein/Creat Ratio 0.080 0.024 - 0.184 mg/mg creat    Total Protein, Urine 13 5 - 24 mg/dL         "

## 2025-04-30 NOTE — PATIENT INSTRUCTIONS
As we discussed in the office visit and after reviewing most recent blood test your kidney function remains fairly stable.  No changes in your medication at this moment.  Stay well-hydrated.  Avoid NSAIDs (no ibuprofen, Motrin, Advil, Aleve, naproxen).  K to take Tylenol or acetaminophen as needed for pain.  Continue close follow-up with your primary care doctor, gynecologist, hematologist oncologist.  I would like to see you back in the office in 1 year with repeat labs.  Stay active.  Keep working on losing weight

## 2025-04-30 NOTE — ASSESSMENT & PLAN NOTE
Lab Results   Component Value Date    EGFR 46 (L) 04/23/2025    EGFR 47 (L) 02/14/2025    EGFR 54 (L) 01/07/2025    CREATININE 1.21 (H) 04/23/2025    CREATININE 1.2 (H) 02/14/2025    CREATININE 1.07 (H) 01/07/2025       Orders:    CBC; Future    Renal function panel; Future    PTH, intact; Future    Protein / creatinine ratio, urine; Future  CKD stage III with creatinine fluctuating around 1.1-1.4 back since 2018 as per Care Everywhere.  CKD in the setting of long-term history of hypertension, morbid obesity, age-related nephron loss.  Renal function fairly stable with a more recent creatinine of 1.21 and no proteinuria.  Avoid NSAIDs.  Stay well-hydrated.  Blood pressure currently well-controlled blood pressure medications were discontinued and she is currently on midodrine due to previous orthostatic hypotension.  Advised to keep working on losing weight.  Follow-up in 1 year with repeat labs

## 2025-04-30 NOTE — PROGRESS NOTES
Name: Sg Scherer      : 1950      MRN: [id number]  Encounter Provider: Maninder Ray MD  Encounter Date: 2025   Encounter department: [facility]  :  Assessment & Plan  CKD stage 3a, GFR 45-59 ml/min ([facility])  Lab Results   Component Value Date    EGFR 46 (L) 2025    EGFR 47 (L) 2025    EGFR 54 (L) 2025    CREATININE 1.21 (H) 2025    CREATININE 1.2 (H) 2025    CREATININE 1.07 (H) 2025       Orders:    CBC; Future    Renal function panel; Future    PTH, intact; Future    Protein / creatinine ratio, urine; Future  CKD stage III with creatinine fluctuating around 1.1-1.4 back since 2018 as per Care Everywhere.  CKD in the setting of long-term history of hypertension, morbid obesity, age-related nephron loss.  Renal function fairly stable with a more recent creatinine of 1.21 and no proteinuria.  Avoid NSAIDs.  Stay well-hydrated.  Blood pressure currently well-controlled blood pressure medications were discontinued and she is currently on midodrine due to previous orthostatic hypotension.  Advised to keep working on losing weight.  Follow-up in 1 year with repeat labs   Secondary hyperparathyroidism of renal origin ([facility])    Orders:    Renal function panel; Future    PTH, intact; Future  Recent calcium and phosphorus between normal limits, PTH improved.  Continue with Calcitrol low-dose 3 times a week.  Follow labs in 1 year  Orthostatic hypotension       With orthostatic hypotension since was started on Wegovy and had rapid weight loss last year.  Blood pressure well-controlled on midodrine 7.5 mg 3 times a day.  Advised to stay well-hydrated.  Currently patient is asymptomatic  Essential hypertension       Hypertension, blood pressure medication was discontinued in the past in the setting of orthostatic hypotension.  Advised to keep working on losing weight  Other acute pulmonary embolism without acute cor pulmonale (HCC)        Anticoagulation with Eliquis.  PE suspected secondary to malignancy  Pancytopenia (HCC)       Follows with hematology oncology.  Myelodysplasia status post bone marrow biopsy on 10/2024  Anemia due to stage 3a chronic kidney disease   Lab Results   Component Value Date    EGFR 46 (L) 04/23/2025    EGFR 47 (L) 02/14/2025    EGFR 54 (L) 01/07/2025    CREATININE 1.21 (H) 04/23/2025    CREATININE 1.2 (H) 02/14/2025    CREATININE 1.07 (H) 01/07/2025       Orders:    CBC; Future    Mild anemia in the setting of pancytopenia as well as CKD.  Most recent hemoglobin is stable at 11.5.  Continue follow-up with hematology  Morbid obesity with BMI of 45.0-49.9, adult (HCC)       She lost close to 20 pounds while she was taking Wegovy in the past.  Currently off.  Advised to keep working on losing weight  History of endometrial cancer       Follows with gynecology at [facility].  Last CAT scan on 03/2024 no signs of metastatic disease or recurrence      Patient Instructions   As we discussed in the office visit and after reviewing most recent blood test your kidney function remains fairly stable.  No changes in your medication at this moment.  Stay well-hydrated.  Avoid NSAIDs (no ibuprofen, Motrin, Advil, Aleve, naproxen).  K to take Tylenol or acetaminophen as needed for pain.  Continue close follow-up with your primary care doctor, gynecologist, hematologist oncologist.  I would like to see you back in the office in 1 year with repeat labs.  Stay active.  Keep working on losing weight    It was a pleasure evaluating your patient in the office today. Thank you for allowing our team to participate in the care of  Sg Scherer. Please do not hesitate to contact our team if further issues/questions shall arise in the interim.     History of Present Illness   HPI  Sg Scherer is a 75 y.o. female who presents to the follow-up with her Sister Lili.    Last time seen was on 4/2024  Was hospitalized in [facility] on October 2024 in the setting of dizziness suspected secondary to dehydration as well as orthostatic hypotension noted patient with mild BARRY with creatinine up to 1.6 that improved with IV fluids, midodrine was increased to 7.5 mg daily.    Today in general he is feeling okay other than noticed feeling more short of breath with minimal exertion over the last couple of months.  Currently denies any chest pain, no leg swelling.  Denies any abdominal pain, no recent nausea, no vomiting, no diarrhea or constipation.  Denies any dizziness.  Denies any dysuria or gross hematuria.      History obtained from: patient  Pertinent Medical History   CKD stage III creatinine around 1.1-1.4 since 2018 as per Care Everywhere.  History of hypertension currently off antihypertensive medication.  Orthostatic hypotension  Morbid obesity.  Pancytopenia.  History of endometrial cancer status post bilateral salpingo-oophorectomy, hysterectomy on 3/2022 completed adjuvant chemotherapy with Taxol and carboplatin 8/2022, completed radiation 4/20/2022  Secondary hyperparathyroidism  PE on anticoagulation with Eliquis    Review of Systems  ROS: All the systems were reviewed and were negative except as documented on the H&P.    Medical History Reviewed by provider this encounter:  Allergies  Meds     .       Current Outpatient Medications on File Prior to Visit   Medication Sig Dispense Refill    acetaminophen (TYLENOL) 650 mg CR tablet Take 1 tablet (650 mg total) by mouth every 6 (six) hours as needed for mild pain 30 tablet 0    apixaban (Eliquis) 5 mg Take 1 tablet (5 mg total) by mouth 2 (two) times a day 60 tablet 6    Ascorbic Acid (vitamin C) 100 MG tablet Take by mouth daily Pt unsure of mg      calcitriol (ROCALTROL) 0.25 mcg capsule Take 1 capsule (0.25 mcg total) by mouth 3 (three) times a week 36 capsule 2    cholecalciferol (VITAMIN D3) 1,000 units tablet Take 1,000 Units by mouth daily Pt unsure how  "many units      denosumab (Prolia) 60 mg/mL Inject 1 mL under the skin every 6 (six) months      midodrine (PROAMATINE) 5 mg tablet TAKE 1.5 TABLETS (7.5 MG TOTAL) BY MOUTH 3 (THREE) TIMES A DAY BEFORE MEALS 180 tablet 0    oxybutynin (DITROPAN-XL) 5 mg 24 hr tablet Take 5 mg by mouth daily      vitamin B-12 (CYANOCOBALAMIN) 50 MCG tablet Take by mouth daily Pt unsure of mcg      amoxicillin (AMOXIL) 500 mg capsule Take 1 capsule by mouth every 8 (eight) hours (Patient not taking: Reported on 4/30/2025)      Na Sulfate-K Sulfate-Mg Sulf 17.5-3.13-1.6 GM/177ML SOLN Take 1 kit by mouth see administration instructions (Patient not taking: Reported on 4/30/2025) 177 mL 0     No current facility-administered medications on file prior to visit.     Objective   /78 (BP Location: Left arm, Patient Position: Sitting, Cuff Size: Adult)   Pulse 75   Wt 106 kg (233 lb)   LMP  (LMP Unknown)   SpO2 98%   BMI 41.27 kg/m²      Physical Exam  General: morbid obese, cooperative, in not acute distress  Eyes: conjunctivae pink, anicteric sclerae  ENT: lips and mucous membranes moist  Neck: supple, no JVD  Chest: clear breath sounds bilateral, no crackles, ronchus or wheezings  CVS: distinct S1 & S2, normal rate, regular rhythm  Abdomen: soft, non-tender, non-distended, normoactive bowel sounds  Back: no CVA tenderness  Extremities: no edema of both legs  Skin: no rash  Neuro: awake, alert, oriented      Laboratory Results:        Invalid input(s): \"ALBUMIN\"    Results for orders placed or performed in visit on 04/23/25   PTH, Intact and Calcium   Result Value Ref Range    PTH, Intact 59 16 - 77 pg/mL    Calcium 9.0 8.6 - 10.4 mg/dL   Renal function panel   Result Value Ref Range    Glucose, Random 91 65 - 99 mg/dL    BUN 14 7 - 25 mg/dL    Creatinine 1.21 (H) 0.60 - 1.00 mg/dL    eGFR 46 (L) > OR = 60 mL/min/1.73m2    SL AMB BUN/CREATININE RATIO 12 6 - 22 (calc)    Sodium 142 135 - 146 mmol/L    Potassium 4.1 3.5 - 5.3 " mmol/L    Chloride 107 98 - 110 mmol/L    CO2 29 20 - 32 mmol/L    Calcium 9.0 8.6 - 10.4 mg/dL    Phosphorus, Serum 4.0 2.1 - 4.3 mg/dL    Albumin 3.8 3.6 - 5.1 g/dL   CBC and differential   Result Value Ref Range    White Blood Cell Count 2.7 (L) 3.8 - 10.8 Thousand/uL    Red Blood Cell Count 3.46 (L) 3.80 - 5.10 Million/uL    Hemoglobin 11.5 (L) 11.7 - 15.5 g/dL    HCT 36.3 35.0 - 45.0 %    .9 (H) 80.0 - 100.0 fL    MCH 33.2 (H) 27.0 - 33.0 pg    MCHC 31.7 (L) 32.0 - 36.0 g/dL    RDW 13.1 11.0 - 15.0 %    Platelet Count 134 (L) 140 - 400 Thousand/uL    SL AMB MPV 11.7 7.5 - 12.5 fL    Neutrophils (Absolute) 1,355 (L) 1,500 - 7,800 cells/uL    Lymphocytes (Absolute) 980 850 - 3,900 cells/uL    Monocytes (Absolute) 254 200 - 950 cells/uL    Eosinophils (Absolute) 100 15 - 500 cells/uL    Basophils ABS 11 0 - 200 cells/uL    Neutrophils 50.2 %    Lymphocytes 36.3 %    Monocytes 9.4 %    Eosinophils 3.7 %    Basophils PCT 0.4 %    Always Message     Protein, Total w/Creat, Random Urine   Result Value Ref Range    Creatinine, Urine 162 20 - 275 mg/dL    Protein/Creat Ratio 80 24 - 184 mg/g creat    Protein/Creat Ratio 0.080 0.024 - 0.184 mg/mg creat    Total Protein, Urine 13 5 - 24 mg/dL

## 2025-04-30 NOTE — ASSESSMENT & PLAN NOTE
She lost close to 20 pounds while she was taking Wegovy in the past.  Currently off.  Advised to keep working on losing weight

## 2025-04-30 NOTE — ASSESSMENT & PLAN NOTE
Follows with gynecology at [facility].  Last CAT scan on 03/2024 no signs of metastatic disease or recurrence

## 2025-04-30 NOTE — ASSESSMENT & PLAN NOTE
With orthostatic hypotension since was started on Wegovy and had rapid weight loss last year.  Blood pressure well-controlled on midodrine 7.5 mg 3 times a day.  Advised to stay well-hydrated.  Currently patient is asymptomatic

## 2025-04-30 NOTE — ASSESSMENT & PLAN NOTE
Orders:    Renal function panel; Future    PTH, intact; Future  Recent calcium and phosphorus between normal limits, PTH improved.  Continue with Calcitrol low-dose 3 times a week.  Follow labs in 1 year

## 2025-04-30 NOTE — ASSESSMENT & PLAN NOTE
Hypertension, blood pressure medication was discontinued in the past in the setting of orthostatic hypotension.  Advised to keep working on losing weight

## 2025-05-20 ENCOUNTER — OFFICE VISIT (OUTPATIENT)
Dept: GYNECOLOGIC ONCOLOGY | Facility: CLINIC | Age: 75
End: 2025-05-20
Payer: COMMERCIAL

## 2025-05-20 VITALS
TEMPERATURE: 97.1 F | BODY MASS INDEX: 42.16 KG/M2 | WEIGHT: 238 LBS | DIASTOLIC BLOOD PRESSURE: 88 MMHG | HEART RATE: 78 BPM | SYSTOLIC BLOOD PRESSURE: 168 MMHG | OXYGEN SATURATION: 98 %

## 2025-05-20 DIAGNOSIS — K21.9 GASTROESOPHAGEAL REFLUX DISEASE WITHOUT ESOPHAGITIS: ICD-10-CM

## 2025-05-20 DIAGNOSIS — R06.09 DYSPNEA ON EXERTION: ICD-10-CM

## 2025-05-20 DIAGNOSIS — Z85.42 HISTORY OF ENDOMETRIAL CANCER: ICD-10-CM

## 2025-05-20 DIAGNOSIS — R42 DIZZINESS: ICD-10-CM

## 2025-05-20 DIAGNOSIS — R06.02 SOB (SHORTNESS OF BREATH): Primary | ICD-10-CM

## 2025-05-20 DIAGNOSIS — Z86.711 HISTORY OF PULMONARY EMBOLISM: ICD-10-CM

## 2025-05-20 DIAGNOSIS — I10 ESSENTIAL HYPERTENSION: ICD-10-CM

## 2025-05-20 PROBLEM — D70.1 CHEMOTHERAPY INDUCED NEUTROPENIA (HCC): Status: RESOLVED | Noted: 2022-07-12 | Resolved: 2025-05-20

## 2025-05-20 PROBLEM — T45.1X5A CHEMOTHERAPY INDUCED NEUTROPENIA (HCC): Status: RESOLVED | Noted: 2022-07-12 | Resolved: 2025-05-20

## 2025-05-20 PROCEDURE — 99459 PELVIC EXAMINATION: CPT | Performed by: NURSE PRACTITIONER

## 2025-05-20 PROCEDURE — 99214 OFFICE O/P EST MOD 30 MIN: CPT | Performed by: NURSE PRACTITIONER

## 2025-05-20 RX ORDER — FAMOTIDINE 40 MG/1
TABLET, FILM COATED ORAL DAILY
COMMUNITY
Start: 2025-05-12

## 2025-05-20 NOTE — ASSESSMENT & PLAN NOTE
75-year-old with a history of stage IIIC1 endometrial cancer. She completed chemotherapy in August 2022 and radiation in November 2022. She is taking Eliquis for PE history. She reports increasing SOB, mostly with activity. This is accompanied by dizziness, which is overall not new. Her pelvic exam is without evidence of recurrent disease. Her PS is 1.    CT PE study/echo now to further evaluate SOB.    Patient will return to the office in 6 months for next surveillance visit. She will call the office in the interim with any new concerns or issues.

## 2025-05-20 NOTE — ASSESSMENT & PLAN NOTE
Orders:    CTA chest pe study; Future    Echo follow up/limited w/ contrast if indicated; Future

## 2025-05-20 NOTE — PROGRESS NOTES
Name: Sg Scherer      : 1950      MRN: [id number]  Encounter Provider: RUBEN Brantley  Encounter Date: 2025   Encounter department: [facility] [place]  :  Assessment & Plan  Dizziness  Overall unchanged, and in the setting of known orthostatic hypotension. Will obtain CTA PE study to r/o PE. Patient is anticoagulated.    Orders:    CTA chest pe study; Future    Echo follow up/limited w/ contrast if indicated; Future    SOB (shortness of breath)  Worse recently. Check for PE despite anticoagulation. Will also repeat echo. Patient had one completed at the time of her hospitalization in 2024, which showed EF 62%. Consider cardiology referral vs pulmonology referral pending results.    Orders:    CTA chest pe study; Future    Echo follow up/limited w/ contrast if indicated; Future    Essential hypertension    Orders:    Echo follow up/limited w/ contrast if indicated; Future    History of pulmonary embolism  Anticoagulated with Eliquis, with continued/worsening SOB/ROSAS.    Orders:    CTA chest pe study; Future    History of endometrial cancer  75-year-old with a history of stage IIIC1 endometrial cancer. She completed chemotherapy in 2022 and radiation in 2022. She is taking Eliquis for PE history. She reports increasing SOB, mostly with activity. This is accompanied by dizziness, which is overall not new. Her pelvic exam is without evidence of recurrent disease. Her PS is 1.    CT PE study/echo now to further evaluate SOB.    Patient will return to the office in 6 months for next surveillance visit. She will call the office in the interim with any new concerns or issues.       Gastroesophageal reflux disease without esophagitis         Dyspnea on exertion    Orders:    CTA chest pe study; Future    Echo follow up/limited w/ contrast if indicated; Future            History of Present Illness   Reason for Visit / CC: SOB, endometrial cancer  surveillance       Sg Scherer is a 75 y.o. female     Patient is feeling well from gyn standpoint. No bleeding, pain, discharge. She gets SOB more frequently, often with exertion. She has dizziness at baseline, for which midodrine is helpful. She denies cough. She has been afebrile, and without nausea or vomiting. She is tolerating a regular diet. She denies bowel or bladder dysfunction, and has no pain in the abdomen or pelvis. She is without vaginal bleeding or discharge. She is ambulatory and independent with all ADL's.        Pertinent Medical History        Oncology History   Cancer Staging   Encounter for follow-up surveillance of endometrial cancer  Staging form: Corpus Uteri - Carcinoma, AJCC 8th Edition  - Pathologic stage from 3/31/2022: FIGO Stage IIIC1 - Signed by Gene Coburn MD on 4/19/2022  Histopathologic type: Clear cell adenocarcinoma, NOS  Stage prefix: Initial diagnosis  Histologic grade (G): G3  Histologic grading system: 3 grade system  Residual tumor (R): R0  Pelvic fátima status: Positive  Number of pelvic nodes positive from dissection: 1  Number of pelvic nodes examined during dissection: 2  Lymph node metastasis: Present  Omentectomy performed: Yes  Morcellation performed: No  Oncology History   Encounter for follow-up surveillance of endometrial cancer   3/2/2022 Initial Diagnosis    Endometrial cancer (HCC)     3/2/2022 Biopsy    Final Diagnosis   A. Endometrium, EMB:  - High-grade endometrial carcinoma, favor clear cell carcinoma.  See comment.        3/31/2022 -  Cancer Staged    Staging form: Corpus Uteri - Carcinoma, AJCC 8th Edition  - Pathologic stage from 3/31/2022: FIGO Stage IIIC1 - Signed by Gene Coburn MD on 4/19/2022  Histopathologic type: Clear cell adenocarcinoma, NOS  Stage prefix: Initial diagnosis  Histologic grade (G): G3  Histologic grading system: 3 grade system  Residual tumor (R): R0 - None  Pelvic fátima status: Positive  Number of pelvic nodes  positive from dissection: 1  Number of pelvic nodes examined during dissection: 2  Lymph node metastasis: Present  Omentectomy performed: Yes  Morcellation performed: No       3/31/2022 Surgery    Robotic hysterectomy, bilateral salpingo oophorectomy, bilateral pelvic sentinel lymph node biopsies, pelvic peritoneal/omental biopsies.  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  Negative cervix.  Negative parametria.  1/2 sentinel pelvic lymph nodes positive for malignancy.  Negative staging biopsies.  Stage IIIC1.  No evidence of DNA mismatch repair protein loss.     5/11/2022 - 8/25/2022 Chemotherapy    palonosetron (ALOXI), 0.25 mg, Intravenous, Once, 6 of 6 cycles  Administration: 0.25 mg (5/11/2022), 0.25 mg (6/1/2022), 0.25 mg (6/22/2022), 0.25 mg (7/13/2022), 0.25 mg (8/3/2022), 0.25 mg (8/24/2022)  Pegfilgrastim-bmez (ZIEXTENZO), 6 mg, Subcutaneous, Once, 3 of 3 cycles  Administration: 6 mg (7/14/2022), 6 mg (8/4/2022), 6 mg (8/25/2022)  fosaprepitant (EMEND) IVPB, 150 mg, Intravenous, Once, 6 of 6 cycles  Administration: 150 mg (5/11/2022), 150 mg (6/1/2022), 150 mg (6/22/2022), 150 mg (7/13/2022), 150 mg (8/3/2022), 150 mg (8/24/2022)  CARBOplatin (PARAPLATIN) IVPB (GOG AUC DOSING), 352.5 mg, Intravenous, Once, 6 of 6 cycles  Administration: 352.5 mg (5/11/2022), 377.5 mg (6/1/2022), 340.5 mg (6/22/2022), 353 mg (7/13/2022), 266.4 mg (8/3/2022), 271.6 mg (8/24/2022)  PACLItaxel (TAXOL) chemo IVPB, 135 mg/m2 = 290.4 mg (77.1 % of original dose 175 mg/m2), Intravenous, Once, 6 of 6 cycles  Dose modification: 135 mg/m2 (original dose 175 mg/m2, Cycle 1, Reason: Other (Must fill in a comment), Comment: prescribed starting dose)  Administration: 290.4 mg (5/11/2022), 290.4 mg (6/1/2022), 289.2 mg (6/22/2022), 292.8 mg (7/13/2022), 289.2 mg (8/3/2022), 300 mg (8/24/2022)     10/4/2022 - 11/21/2022 Radiation    Plan ID Energy Fractions Dose per Fraction (cGy) Dose Correction (cGy)  Total Dose Delivered (cGy) Elapsed Days   Vag Cylinder  2 / 2 600 0 1,200 5   Pelvis 6 MV 25 180  4500 34           Review of Systems   Constitutional:  Positive for fatigue. Negative for chills, fever and unexpected weight change.   HENT:  Negative for nosebleeds.    Eyes: Negative.    Respiratory:  Positive for shortness of breath. Negative for cough, chest tightness and wheezing.    Cardiovascular:  Negative for chest pain, palpitations and leg swelling.   Gastrointestinal:  Negative for abdominal distention, abdominal pain, anal bleeding, blood in stool, constipation, diarrhea, nausea, rectal pain and vomiting.   Endocrine: Negative.    Genitourinary:  Negative for difficulty urinating, dysuria, frequency, hematuria, pelvic pain, urgency, vaginal bleeding, vaginal discharge and vaginal pain.   Musculoskeletal:  Positive for arthralgias. Negative for joint swelling.   Skin:  Negative for color change, pallor and rash.   Neurological:  Positive for dizziness. Negative for weakness, light-headedness, numbness and headaches.   Hematological: Negative.    Psychiatric/Behavioral: Negative.      A complete review of systems is negative other than that noted above in the HPI.  Medical History Reviewed by provider this encounter:     .  Past Medical History   Past Medical History:   Diagnosis Date    Acute kidney injury superimposed on stage 3a chronic kidney disease (HCC) 11/03/2020    Acute pulmonary embolism without acute cor pulmonale (HCC) 03/02/2022    Arthritis     osteo    Chemotherapy induced neutropenia (HCC) 07/12/2022    Chronic kidney disease     Colon polyp     Diverticula of colon     Endometrial cancer (HCC)     2022    History of endometrial cancer 03/16/2023    History of pulmonary embolism 10/14/2024    Hypertension     Obesity     Osteoporosis without current pathological fracture 03/16/2023    Secondary hyperparathyroidism of renal origin (HCC) 09/26/2023     Past Surgical History:   Procedure  Laterality Date    APPENDECTOMY      BREAST BIOPSY Left 1977    benign    CHOLECYSTECTOMY      COLONOSCOPY  03/2020    Dr Orr.  Diverticulosis in sigmoid colon, normal exam prep was not adequate, large protruding grade 2 hemorrhoids without bleeding.  5 year recall.    COLONOSCOPY  2017    Dr. Jones.  Tubular adenoma polyp removed, diverticulosis.    COLONOSCOPY  2007    Dr. Jones, diverticulosis.    CYSTOSCOPY N/A 03/31/2022    Procedure: CYSTOSCOPY;  Surgeon: Gene Coburn MD;  Location: [facility];  Service: Gynecology Oncology    HERNIA REPAIR Right     inguinal    IR BIOPSY BONE MARROW  10/22/2024    IR PORT PLACEMENT  05/09/2022    JOINT REPLACEMENT Bilateral 2009    Knee    OOPHORECTOMY      HI COLONOSCOPY FLX DX W/COLLJ SPEC WHEN PFRMD N/A 02/21/2017    Procedure: COLONOSCOPY with polypectomy and hemo clip marjan.;  Surgeon: Ramirez Jones MD;  Location: [facility];  Service: Gastroenterology    HI LAPS TOTAL HYSTERECT 250 GM/< W/RMVL TUBE/OVARY N/A 03/31/2022    Procedure: ROBOTIC HYSTERECTOMY, BILATERAL SALPINGO-OOPHORECTOMY, STAGING PERITONEAL AND OMENTAL BIOPSIES, BILATERAL PELVIC SENTINEL LYMPH NODE BIOPSIES;  Surgeon: Gene Coburn MD;  Location: [facility];  Service: Gynecology Oncology     Family History   Problem Relation Age of Onset    Heart disease Mother     Prostate cancer Father 90    Diabetes Sister     Hypertension Sister     Transient ischemic attack Sister     No Known Problems Sister     Diabetes Brother     Heart disease Brother     Kidney disease Son     No Known Problems Maternal Grandmother     No Known Problems Maternal Grandfather     No Known Problems Paternal Grandmother     No Known Problems Paternal Grandfather     No Known Problems Maternal Aunt     No Known Problems Paternal Aunt     No Known Problems Paternal Aunt     No Known Problems Paternal Aunt     Stomach cancer Other 38    Thyroid disease Other     Cancer Other         throat    Breast cancer Neg Hx      Colon cancer Neg Hx     Ovarian cancer Neg Hx       reports that she has never smoked. She has never used smokeless tobacco. She reports current alcohol use of about 1.0 standard drink of alcohol per week. She reports that she does not use drugs.  Current Outpatient Medications   Medication Instructions    acetaminophen (TYLENOL) 650 mg, Oral, Every 6 hours PRN    amoxicillin (AMOXIL) 500 mg capsule 1 capsule, Every 8 hours    apixaban (ELIQUIS) 5 mg, Oral, 2 times daily    Ascorbic Acid (vitamin C) 100 MG tablet Daily    calcitriol (ROCALTROL) 0.25 mcg, Oral, 3 times weekly    cholecalciferol (VITAMIN D3) 1,000 Units, Daily    denosumab (Prolia) 60 mg/mL 1 mL, Every 6 months    famotidine (PEPCID) 40 MG tablet Oral, Daily    midodrine (PROAMATINE) 7.5 mg, Oral, 3 times daily before meals    Na Sulfate-K Sulfate-Mg Sulf 17.5-3.13-1.6 GM/177ML SOLN 1 kit, Oral, See admin instructions    oxybutynin (DITROPAN-XL) 5 mg, Daily    vitamin B-12 (CYANOCOBALAMIN) 50 MCG tablet Daily   Allergies[1]   Medications Ordered Prior to Encounter[2]   Social History     Tobacco Use    Smoking status: Never    Smokeless tobacco: Never   Vaping Use    Vaping status: Never Used   Substance and Sexual Activity    Alcohol use: Yes     Alcohol/week: 1.0 standard drink of alcohol     Types: 1 Glasses of wine per week     Comment: social-seldom    Drug use: Never    Sexual activity: Not Currently        Objective   /88 (BP Location: Left arm, Patient Position: Sitting, Cuff Size: Large)   Pulse 78   Temp (!) 97.1 °F (36.2 °C) (Temporal)   Wt 108 kg (238 lb)   LMP  (LMP Unknown)   SpO2 98%   BMI 42.16 kg/m²     Body mass index is 42.16 kg/m².  Pain Screening:  Pain Score: 0-No pain  ECOG   1    Physical Exam  Vitals reviewed. Exam conducted with a chaperone present.   Constitutional:       General: She is not in acute distress.     Appearance: Normal appearance. She is obese. She is not ill-appearing.   HENT:      Head:  "Normocephalic and atraumatic.      Mouth/Throat:      Mouth: Mucous membranes are moist.     Eyes:      General:         Right eye: No discharge.         Left eye: No discharge.      Conjunctiva/sclera: Conjunctivae normal.     Pulmonary:      Effort: Pulmonary effort is normal.   Abdominal:      Palpations: Abdomen is soft. There is no mass.      Tenderness: There is no abdominal tenderness.      Hernia: No hernia is present.   Genitourinary:     Comments: The external female genitalia is normal. The bartholin's, uretheral and skenes glands are normal. The urethral meatus is normal (midline with no lesions). Anus without fissure or lesion. Speculum exam reveals a grossly normal vagina. No masses, lesions,discharge or bleeding. No significant cystocele or rectocele noted. Bimanual exam notes a surgical absent cervix, uterus and adnexal structures. No masses or fullness. Bladder is without fullness, mass or tenderness.    Musculoskeletal:      Right lower leg: No edema.      Left lower leg: No edema.     Skin:     General: Skin is warm and dry.      Coloration: Skin is not jaundiced.      Findings: No rash.     Neurological:      General: No focal deficit present.      Mental Status: She is alert and oriented to person, place, and time.      Cranial Nerves: No cranial nerve deficit.      Sensory: No sensory deficit.      Motor: No weakness.      Gait: Gait normal.     Psychiatric:         Mood and Affect: Mood normal.         Behavior: Behavior normal.         Thought Content: Thought content normal.         Judgment: Judgment normal.          Labs: I have reviewed pertinent labs.   No results found for: \"\"  Lab Results   Component Value Date/Time    Potassium 4.1 04/23/2025 09:44 AM    Potassium 4.8 02/14/2025 09:30 AM    Chloride 107 04/23/2025 09:44 AM    Chloride 108 02/14/2025 09:30 AM    Carbon Dioxide 28 02/14/2025 09:30 AM    CO2 29 04/23/2025 09:44 AM    BUN 14 04/23/2025 09:44 AM    BUN 23 02/14/2025 " 09:30 AM    Creatinine 1.21 (H) 04/23/2025 09:44 AM    Creatinine 1.2 (H) 02/14/2025 09:30 AM    Glucose, i-STAT 108 10/09/2024 01:14 PM    Calcium 9.0 04/23/2025 09:44 AM    Calcium 9.0 04/23/2025 09:44 AM    Calcium 8.9 02/14/2025 09:30 AM    Corrected Calcium 9.1 10/23/2024 06:36 AM    AST 15 02/14/2025 09:30 AM    ALT 15 02/14/2025 09:30 AM    Alkaline Phosphatase 37 02/14/2025 09:30 AM    eGFRcr 47 (L) 02/14/2025 09:30 AM    eGFR 46 (L) 04/23/2025 09:44 AM     Lab Results   Component Value Date/Time    WBC 3.31 (L) 10/23/2024 06:36 AM    White Blood Cell Count 2.7 (L) 04/23/2025 09:44 AM    Hemoglobin 11.5 (L) 04/23/2025 09:44 AM    Hemoglobin 10.0 (L) 10/23/2024 06:36 AM    Hematocrit 30.6 (L) 10/23/2024 06:36 AM    HCT 36.3 04/23/2025 09:44 AM    .9 (H) 04/23/2025 09:44 AM     (H) 10/23/2024 06:36 AM    Platelet Count 134 (L) 04/23/2025 09:44 AM    Platelets 127 (L) 10/23/2024 06:36 AM     Lab Results   Component Value Date/Time    Absolute Neutrophils 1.36 (L) 10/22/2024 06:02 AM    Neutrophils (Absolute) 1,355 (L) 04/23/2025 09:44 AM        Trend:  Lab Results   Component Value Date     5.1 03/10/2022                    [1] No Known Allergies  [2]   Current Outpatient Medications on File Prior to Visit   Medication Sig Dispense Refill    acetaminophen (TYLENOL) 650 mg CR tablet Take 1 tablet (650 mg total) by mouth every 6 (six) hours as needed for mild pain 30 tablet 0    apixaban (Eliquis) 5 mg Take 1 tablet (5 mg total) by mouth 2 (two) times a day 60 tablet 6    Ascorbic Acid (vitamin C) 100 MG tablet Take by mouth in the morning. Pt unsure of mg.      cholecalciferol (VITAMIN D3) 1,000 units tablet Take 1,000 Units by mouth in the morning. Pt unsure how many units.      denosumab (Prolia) 60 mg/mL Inject 1 mL under the skin every 6 (six) months      famotidine (PEPCID) 40 MG tablet Take by mouth in the morning.      midodrine (PROAMATINE) 5 mg tablet TAKE 1.5 TABLETS (7.5 MG  TOTAL) BY MOUTH 3 (THREE) TIMES A DAY BEFORE MEALS 180 tablet 0    vitamin B-12 (CYANOCOBALAMIN) 50 MCG tablet Take by mouth in the morning. Pt unsure of mcg.      amoxicillin (AMOXIL) 500 mg capsule Take 1 capsule by mouth every 8 (eight) hours (Patient not taking: Reported on 4/1/2025)      calcitriol (ROCALTROL) 0.25 mcg capsule Take 1 capsule (0.25 mcg total) by mouth 3 (three) times a week 36 capsule 2    Na Sulfate-K Sulfate-Mg Sulf 17.5-3.13-1.6 GM/177ML SOLN Take 1 kit by mouth see administration instructions (Patient not taking: Reported on 4/30/2025) 177 mL 0    oxybutynin (DITROPAN-XL) 5 mg 24 hr tablet Take 5 mg by mouth daily       No current facility-administered medications on file prior to visit.

## 2025-05-20 NOTE — PATIENT INSTRUCTIONS
1. Return to the office in 6 months for continued surveillance.   2. Contact the office in the interim if you develop any any new or concerning symptoms, including vaginal bleeding, discharge, abdominal or pelvic pain, etc.  3. CT PE study and echo now to evaluate shortness of breath. Office will call with result.

## 2025-05-20 NOTE — ASSESSMENT & PLAN NOTE
Overall unchanged, and in the setting of known orthostatic hypotension. Will obtain CTA PE study to r/o PE. Patient is anticoagulated.    Orders:    CTA chest pe study; Future    Echo follow up/limited w/ contrast if indicated; Future

## 2025-05-20 NOTE — ASSESSMENT & PLAN NOTE
Anticoagulated with Eliquis, with continued/worsening SOB/ROSAS.    Orders:    CTA chest pe study; Future

## 2025-05-21 ENCOUNTER — HOSPITAL ENCOUNTER (OUTPATIENT)
Dept: CT IMAGING | Facility: HOSPITAL | Age: 75
Discharge: HOME | End: 2025-05-21
Attending: NURSE PRACTITIONER
Payer: COMMERCIAL

## 2025-05-21 ENCOUNTER — TELEPHONE (OUTPATIENT)
Age: 75
End: 2025-05-21

## 2025-05-21 ENCOUNTER — DOCUMENTATION (OUTPATIENT)
Dept: GYNECOLOGIC ONCOLOGY | Facility: CLINIC | Age: 75
End: 2025-05-21

## 2025-05-21 DIAGNOSIS — R06.09 DYSPNEA ON EXERTION: ICD-10-CM

## 2025-05-21 DIAGNOSIS — R42 DIZZINESS: ICD-10-CM

## 2025-05-21 DIAGNOSIS — Z86.711 HISTORY OF PULMONARY EMBOLISM: ICD-10-CM

## 2025-05-21 DIAGNOSIS — R06.02 SOB (SHORTNESS OF BREATH): ICD-10-CM

## 2025-05-21 PROCEDURE — 71275 CT ANGIOGRAPHY CHEST: CPT

## 2025-05-21 RX ADMIN — IOHEXOL 85 ML: 350 INJECTION, SOLUTION INTRAVENOUS at 19:06

## 2025-05-21 NOTE — TELEPHONE ENCOUNTER
Attempted to contact patient in regards to rescheduling an appointment. Voicemail was left. Another attempt will be made.

## 2025-05-22 ENCOUNTER — RESULTS FOLLOW-UP (OUTPATIENT)
Dept: GYNECOLOGIC ONCOLOGY | Facility: CLINIC | Age: 75
End: 2025-05-22

## 2025-06-02 ENCOUNTER — HOSPITAL ENCOUNTER (OUTPATIENT)
Dept: NON INVASIVE DIAGNOSTICS | Facility: HOSPITAL | Age: 75
Discharge: HOME | End: 2025-06-02
Attending: NURSE PRACTITIONER
Payer: COMMERCIAL

## 2025-06-02 VITALS
HEIGHT: 63 IN | SYSTOLIC BLOOD PRESSURE: 124 MMHG | WEIGHT: 238 LBS | DIASTOLIC BLOOD PRESSURE: 58 MMHG | BODY MASS INDEX: 42.17 KG/M2 | HEART RATE: 60 BPM

## 2025-06-02 DIAGNOSIS — R42 DIZZINESS: ICD-10-CM

## 2025-06-02 DIAGNOSIS — R06.09 DYSPNEA ON EXERTION: ICD-10-CM

## 2025-06-02 DIAGNOSIS — I10 ESSENTIAL HYPERTENSION: ICD-10-CM

## 2025-06-02 DIAGNOSIS — R06.02 SOB (SHORTNESS OF BREATH): ICD-10-CM

## 2025-06-02 LAB
AORTIC VALVE MEAN VELOCITY: 8.6 M/S
AV AREA BY CONTINUOUS VTI: 3.1 CM2
AV AREA PEAK VELOCITY: 3 CM2
AV LVOT MEAN GRADIENT: 3 MMHG
AV LVOT PEAK GRADIENT: 6 MMHG
AV MEAN PRESS GRAD SYS DOP V1V2: 3 MMHG
AV ORIFICE AREA US: 3.06 CM2
AV PEAK GRADIENT: 6 MMHG
AV VELOCITY RATIO: 1.08
AV VMAX SYS DOP: 1.19 M/S
BSA FOR ECHO PROCEDURE: 2.08 M2
DOP CALC AO VTI: 28.3 CM
DOP CALC LVOT AREA: 2.83 CM2
DOP CALC LVOT CARDIAC INDEX: 2.71 L/MIN/M2
DOP CALC LVOT CARDIAC OUTPUT: 5.63 L/MIN
DOP CALC LVOT DIAMETER: 1.9 CM
DOP CALC LVOT PEAK VEL VTI: 30.53 CM
DOP CALC LVOT PEAK VEL: 1.27 M/S
DOP CALC LVOT STROKE INDEX: 39.9 ML/M2
DOP CALC LVOT STROKE VOLUME: 83
E WAVE DECELERATION TIME: 170 MS
E/A RATIO: 1.24
FRACTIONAL SHORTENING: 32 (ref 28–44)
INTERVENTRICULAR SEPTUM IN DIASTOLE (PARASTERNAL SHORT AXIS VIEW): 1.3 CM
INTERVENTRICULAR SEPTUM: 1.3 CM (ref 0.6–1.1)
LAAS-AP4: 22.9 CM2
LEFT INTERNAL DIMENSION IN SYSTOLE: 2.6 CM (ref 2.1–4)
LEFT VENTRICULAR INTERNAL DIMENSION IN DIASTOLE: 3.8 CM (ref 3.5–6)
LEFT VENTRICULAR POSTERIOR WALL IN END DIASTOLE: 1 CM
LEFT VENTRICULAR STROKE VOLUME: 36 ML
LV EF US.2D.A4C+ESTIMATED: 61 %
LVSV (TEICH): 36 ML
MV E'TISSUE VEL-LAT: 10 CM/S
MV E'TISSUE VEL-SEP: 10 CM/S
MV PEAK A VEL: 0.7 M/S
MV PEAK E VEL: 87 CM/S
MV STENOSIS PRESSURE HALF TIME: 49 MS
MV VALVE AREA P 1/2 METHOD: 4.5
RIGHT VENTRICLE ID DIMENSION: 3.4 CM
SL CV LV EF: 60
SL CV PED ECHO LEFT VENTRICLE DIASTOLIC VOLUME (MOD BIPLANE) 2D: 60 ML
SL CV PED ECHO LEFT VENTRICLE SYSTOLIC VOLUME (MOD BIPLANE) 2D: 24 ML
TR MAX PG: 36 MMHG
TR PEAK VELOCITY: 3 M/S
TRICUSPID ANNULAR PLANE SYSTOLIC EXCURSION: 1.6 CM
TRICUSPID VALVE PEAK REGURGITATION VELOCITY: 3.02 M/S

## 2025-06-02 PROCEDURE — 93306 TTE W/DOPPLER COMPLETE: CPT | Performed by: INTERNAL MEDICINE

## 2025-06-02 PROCEDURE — 93306 TTE W/DOPPLER COMPLETE: CPT

## 2025-06-02 NOTE — PROGRESS NOTES
Assessment/Plan:      Diagnoses and all orders for this visit:    PMB (postmenopausal bleeding)  -     US pelvis complete w transvaginal; Future  -     Tissue Exam    Other orders  -     hydrochlorothiazide (HYDRODIURIL) 12 5 mg tablet; Take 12 5 mg by mouth daily  -     omeprazole (PriLOSEC) 40 MG capsule; Take 40 mg by mouth daily      - reviewed need for EMB patient is agreeable for sampling today  Please see procedure note  - reviewed recommendation for pelvic US   -signs and symptoms report reviewed    RTO depending on results     Subjective:     Patient ID: Sg Michael is a 74 y o  female  HPI  here with complaints of postmenopausal bleeding  for  3 weeks  Patient describes bleeding as small amounts daily  Has been wearing panty liner  Denies associated symptoms including trauma, pain  Denies alleviating or aggravating factors  Patient is not currently sexually active  Pap smear NA   Last mammogram 21 birads 2  Last DEXA 21 osteoporosis   CRC screening 3/10/20 colonoscopy     Review of Systems   Constitutional: Negative for chills, fatigue and fever  Respiratory: Negative  Cardiovascular: Negative  Genitourinary: Positive for menstrual problem and vaginal bleeding  Objective:  /60   Ht 5' 2 5" (1 588 m)   Wt 121 kg (266 lb)   LMP  (LMP Unknown)   BMI 47 88 kg/m²      Physical Exam  Vitals reviewed  Exam conducted with a chaperone present  Constitutional:       Appearance: Normal appearance  She is obese  Genitourinary:     General: Normal vulva  Vagina: Bleeding present  Cervix: Normal       Comments: Uterus nontender difficult to palpate due to body habitus  Adnexa nontender difficult to palpate due to body habitus  Skin:     General: Skin is warm and dry  Neurological:      Mental Status: She is alert and oriented to person, place, and time     Psychiatric:         Mood and Affect: Mood normal          Behavior: Behavior normal  Nurse Triage SBAR    Is this a 2nd Level Triage? NO    Situation: Difficulty Breathing and Kidney Pain    Background: Patient has history of COPD and asthma. He missed recent scheduled paracentesis.    Assessment: Patient reports onset of difficulty breathing today at 0800. He reports no improvement with use of inhaler today at 1030. Patient reports severe difficulty breathing, struggling with each breath. Patient reports bilateral flank pain.    Protocol Recommended Disposition:   Call  Now    Recommendation: According to the protocol, patient should call 911.  Care advice given. Patient verbalizes understanding and agrees with plan of care. Plans to contact EMS.    Marcella Gomez RN  06/01/25 8:03 PM  [facility] Nurse Advisor    Reason for Disposition   SEVERE difficulty breathing (e.g., struggling for each breath, speaks in single words)    Protocols used: Breathing Difficulty-A-AH     Endometrial biopsy    Date/Time: 3/2/2022 11:37 AM  Performed by: RUBEN Escudero  Authorized by: RUBEN Escudero   Universal Protocol:  Consent: Verbal consent obtained  Written consent obtained  Risks and benefits: risks, benefits and alternatives were discussed  Consent given by: patient  Time out: Immediately prior to procedure a "time out" was called to verify the correct patient, procedure, equipment, support staff and site/side marked as required  Timeout called at: 3/2/2022 11:22 AM   Patient understanding: patient states understanding of the procedure being performed  Patient's understanding of procedure matches consent: NA, recommended at visit   Procedure consent matches procedure scheduled: NA   Relevant documents: relevant documents present and verified  Site marked: the operative site was not marked  Radiology Images displayed and confirmed  If images not available, report reviewed: imaging studies not available  Required items: required blood products, implants, devices, and special equipment available  Patient identity confirmed: verbally with patient      Indication:     Indications: Post-menopausal bleeding      Chronicity of post-menopausal bleeding:  New    Progression of post-menopausal bleeding:  Unchanged  Pre-procedure:     Anesthesia premedication: none  Procedure:     Procedure: endometrial biopsy with Pipelle      A bivalve speculum was placed in the vagina: yes      Cervix cleaned and prepped: yes      A paracervical block was performed: no      An intracervical block was performed: no      The cervix was dilated: no      Uterus sounded: yes      Uterus sound depth (cm):  9    Curettes used:  1    Specimen collected: specimen collected and sent to pathology      Patient tolerated procedure well with no complications: yes    Findings:     Uterus size (weeks): unable to assess due to body habitus      Cervix: normal      Adnexa comment:  Unable to assess d/t body habitus Comments:     Procedure comments:  Reviewed endometrial biopsy  Reviewed risks, benefits and alternatives  Consent reviewed and signed  Patient verbalized understanding, denies questions and desires procedure today      Tolerated well signs and symptoms to report reviewed

## 2025-07-14 ENCOUNTER — OFFICE VISIT (OUTPATIENT)
Dept: HEMATOLOGY ONCOLOGY | Facility: CLINIC | Age: 75
End: 2025-07-14
Payer: COMMERCIAL

## 2025-07-14 VITALS
DIASTOLIC BLOOD PRESSURE: 80 MMHG | TEMPERATURE: 97.1 F | BODY MASS INDEX: 42.17 KG/M2 | RESPIRATION RATE: 16 BRPM | HEART RATE: 57 BPM | HEIGHT: 63 IN | SYSTOLIC BLOOD PRESSURE: 128 MMHG | WEIGHT: 238 LBS | OXYGEN SATURATION: 97 %

## 2025-07-14 DIAGNOSIS — Z85.42 HISTORY OF ENDOMETRIAL CANCER: ICD-10-CM

## 2025-07-14 DIAGNOSIS — D61.818 PANCYTOPENIA (HCC): Primary | ICD-10-CM

## 2025-07-14 DIAGNOSIS — D46.Z OTHER MYELODYSPLASTIC SYNDROMES (HCC): ICD-10-CM

## 2025-07-14 PROCEDURE — 99214 OFFICE O/P EST MOD 30 MIN: CPT | Performed by: INTERNAL MEDICINE

## 2025-07-14 NOTE — ASSESSMENT & PLAN NOTE
1.  Pancytopenia  2.  Macrocytosis  3.  History of pulmonary embolism on treatment with Eliquis  4.  History of endometrial cancer s/p surgery, chemotherapy and radiation  5.  Weight loss on Wegovy     R[facility] MAIN Scherer is a 75 y.o. female with pancytopenia.  Bone marrow biopsy in October 2024 notes a monoclonal CD5 positive B-cell population, detected by flow cytometry, deletion 13q. detected by FISH, scant trilineage maturing hematopoiesis without increase in blasts.  The sample had low cellularity, but there is no evidence of a myeloid neoplasm by flow cytometry, MDS FISH, karyotype and next generation sequencing. Flow cytometry detects a clonal B-cell population of unclear significance with a chronic lymphocytic leukemia/small lymphocytic lymphoma immunophenotype.  She has no B-symptoms.  Will continue observation for now. If the patient develops worsening symptoms or cytopenias then recommend repeat bone marrow biopsy.  Next generation sequencing identified numerous mutations of unknown significance therefore will consider genetic eval pending further workup.     She will have updated labs checked now.  Follow-up in 4 months.    Orders:    CBC and differential    Comprehensive metabolic panel    Leukemia/Lymphoma flow cytometry

## 2025-07-14 NOTE — PROGRESS NOTES
Name: Sg Scherer      : 1950      MRN: [id number]  Encounter Provider: Vianca Dye DO  Encounter Date: 2025   Encounter department: [facility] [place]  :  Assessment & Plan  Pancytopenia (HCC)    1.  Pancytopenia  2.  Macrocytosis  3.  History of pulmonary embolism on treatment with Eliquis  4.  History of endometrial cancer s/p surgery, chemotherapy and radiation  5.  Weight loss on Wegovy     Sg Scherer is a 75 y.o. female with pancytopenia.  Bone marrow biopsy in 2024 notes a monoclonal CD5 positive B-cell population, detected by flow cytometry, deletion 13q. detected by FISH, scant trilineage maturing hematopoiesis without increase in blasts.  The sample had low cellularity, but there is no evidence of a myeloid neoplasm by flow cytometry, MDS FISH, karyotype and next generation sequencing. Flow cytometry detects a clonal B-cell population of unclear significance with a chronic lymphocytic leukemia/small lymphocytic lymphoma immunophenotype.  She has no B-symptoms.  Will continue observation for now. If the patient develops worsening symptoms or cytopenias then recommend repeat bone marrow biopsy.  Next generation sequencing identified numerous mutations of unknown significance therefore will consider genetic eval pending further workup.     She will have updated labs checked now.  Follow-up in 4 months.    Orders:    CBC and differential    Comprehensive metabolic panel    Leukemia/Lymphoma flow cytometry    Other myelodysplastic syndromes (HCC)         History of endometrial cancer  Follows with gynecologic oncology.           Return in about 4 months (around 2025) for Office Visit, Labs.    History of Present Illness   Chief Complaint   Patient presents with    Follow-up     Oncology History   Cancer Staging   Encounter for follow-up surveillance of endometrial cancer  Staging form: Corpus Uteri - Carcinoma, AJCC 8th Edition  -  Pathologic stage from 3/31/2022: FIGO Stage IIIC1 - Signed by Gene Coburn MD on 4/19/2022  Histopathologic type: Clear cell adenocarcinoma, NOS  Stage prefix: Initial diagnosis  Histologic grade (G): G3  Histologic grading system: 3 grade system  Residual tumor (R): R0  Pelvic fátima status: Positive  Number of pelvic nodes positive from dissection: 1  Number of pelvic nodes examined during dissection: 2  Lymph node metastasis: Present  Omentectomy performed: Yes  Morcellation performed: No  Oncology History   Encounter for follow-up surveillance of endometrial cancer   3/2/2022 Initial Diagnosis    Endometrial cancer (HCC)     3/2/2022 Biopsy    Final Diagnosis   A. Endometrium, EMB:  - High-grade endometrial carcinoma, favor clear cell carcinoma.  See comment.        3/31/2022 -  Cancer Staged    Staging form: Corpus Uteri - Carcinoma, AJCC 8th Edition  - Pathologic stage from 3/31/2022: FIGO Stage IIIC1 - Signed by Gene Coburn MD on 4/19/2022  Histopathologic type: Clear cell adenocarcinoma, NOS  Stage prefix: Initial diagnosis  Histologic grade (G): G3  Histologic grading system: 3 grade system  Residual tumor (R): R0 - None  Pelvic fátima status: Positive  Number of pelvic nodes positive from dissection: 1  Number of pelvic nodes examined during dissection: 2  Lymph node metastasis: Present  Omentectomy performed: Yes  Morcellation performed: No       3/31/2022 Surgery    Robotic hysterectomy, bilateral salpingo oophorectomy, bilateral pelvic sentinel lymph node biopsies, pelvic peritoneal/omental biopsies.  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  Negative cervix.  Negative parametria.  1/2 sentinel pelvic lymph nodes positive for malignancy.  Negative staging biopsies.  Stage IIIC1.  No evidence of DNA mismatch repair protein loss.     5/11/2022 - 8/25/2022 Chemotherapy    palonosetron (ALOXI), 0.25 mg, Intravenous, Once, 6 of 6 cycles  Administration: 0.25 mg  (5/11/2022), 0.25 mg (6/1/2022), 0.25 mg (6/22/2022), 0.25 mg (7/13/2022), 0.25 mg (8/3/2022), 0.25 mg (8/24/2022)  Pegfilgrastim-bmez (ZIEXTENZO), 6 mg, Subcutaneous, Once, 3 of 3 cycles  Administration: 6 mg (7/14/2022), 6 mg (8/4/2022), 6 mg (8/25/2022)  fosaprepitant (EMEND) IVPB, 150 mg, Intravenous, Once, 6 of 6 cycles  Administration: 150 mg (5/11/2022), 150 mg (6/1/2022), 150 mg (6/22/2022), 150 mg (7/13/2022), 150 mg (8/3/2022), 150 mg (8/24/2022)  CARBOplatin (PARAPLATIN) IVPB ([facility] AUC DOSING), 352.5 mg, Intravenous, Once, 6 of 6 cycles  Administration: 352.5 mg (5/11/2022), 377.5 mg (6/1/2022), 340.5 mg (6/22/2022), 353 mg (7/13/2022), 266.4 mg (8/3/2022), 271.6 mg (8/24/2022)  PACLItaxel (TAXOL) chemo IVPB, 135 mg/m2 = 290.4 mg (77.1 % of original dose 175 mg/m2), Intravenous, Once, 6 of 6 cycles  Dose modification: 135 mg/m2 (original dose 175 mg/m2, Cycle 1, Reason: Other (Must fill in a comment), Comment: prescribed starting dose)  Administration: 290.4 mg (5/11/2022), 290.4 mg (6/1/2022), 289.2 mg (6/22/2022), 292.8 mg (7/13/2022), 289.2 mg (8/3/2022), 300 mg (8/24/2022)     10/4/2022 - 11/21/2022 Radiation    Plan ID Energy Fractions Dose per Fraction (cGy) Dose Correction (cGy) Total Dose Delivered (cGy) Elapsed Days   Vag Cylinder  2 / 2 600 0 1,200 5   Pelvis 6 MV 25 180  4500 34           Pertinent Medical History     07/14/25:   Follow-up for pancytopenia and CLL.  Patient denies any fever, night sweats or weight loss.  Doing well overall.  Does report intermittent dizziness with position change.  Follows with ENT.       Review of Systems   Constitutional:  Negative for chills and fever.   Respiratory:  Negative for cough and shortness of breath.    Cardiovascular:  Negative for chest pain and palpitations.   Gastrointestinal:  Negative for abdominal pain.   Genitourinary:  Negative for hematuria.   Skin:  Negative for rash.   Hematological:  Does not bruise/bleed easily.   All other systems  "reviewed and are negative.    Medical History Reviewed by provider this encounter:     .      Objective   /80 (BP Location: Left arm, Patient Position: Sitting, Cuff Size: Large)   Pulse 57   Temp (!) 97.1 °F (36.2 °C) (Temporal)   Resp 16   Ht 5' 3\" (1.6 m)   Wt 108 kg (238 lb)   LMP  (LMP Unknown)   SpO2 97%   BMI 42.16 kg/m²     Pain Screening:  Pain Score: (S)   5  ECOG     Physical Exam  Vitals reviewed.   Constitutional:       General: She is not in acute distress.     Appearance: Normal appearance.   HENT:      Head: Normocephalic and atraumatic.      Mouth/Throat:      Mouth: Mucous membranes are moist.     Eyes:      Extraocular Movements: Extraocular movements intact.      Conjunctiva/sclera: Conjunctivae normal.       Cardiovascular:      Rate and Rhythm: Normal rate.   Pulmonary:      Effort: Pulmonary effort is normal. No respiratory distress.      Breath sounds: No wheezing, rhonchi or rales.   Abdominal:      General: Abdomen is flat. There is no distension.      Palpations: Abdomen is soft.     Musculoskeletal:      Cervical back: Normal range of motion and neck supple.      Right lower leg: No edema.      Left lower leg: No edema.     Skin:     General: Skin is warm.      Coloration: Skin is not pale.     Neurological:      Mental Status: She is alert and oriented to person, place, and time. Mental status is at baseline.      Cranial Nerves: No cranial nerve deficit.     Psychiatric:         Thought Content: Thought content normal.         Labs: I have reviewed the following labs:  Lab Results   Component Value Date/Time    WBC 3.31 (L) 10/23/2024 06:36 AM    White Blood Cell Count 2.7 (L) 04/23/2025 09:44 AM    RBC 2.96 (L) 10/23/2024 06:36 AM    Red Blood Cell Count 3.46 (L) 04/23/2025 09:44 AM    Hemoglobin 11.5 (L) 04/23/2025 09:44 AM    Hemoglobin 10.0 (L) 10/23/2024 06:36 AM    Hematocrit 30.6 (L) 10/23/2024 06:36 AM    HCT 36.3 04/23/2025 09:44 AM    .9 (H) 04/23/2025 " 09:44 AM     (H) 10/23/2024 06:36 AM    MCH 33.2 (H) 04/23/2025 09:44 AM    MCH 33.8 10/23/2024 06:36 AM    RDW 13.1 04/23/2025 09:44 AM    RDW 13.7 10/23/2024 06:36 AM    Platelet Count 134 (L) 04/23/2025 09:44 AM    Platelets 127 (L) 10/23/2024 06:36 AM    Segmented % 51 10/22/2024 06:02 AM    Neutrophils 50.2 04/23/2025 09:44 AM    Lymphocytes % 34 10/22/2024 06:02 AM    Lymphocytes % 18 10/22/2024 06:02 AM    Lymphocytes 36.3 04/23/2025 09:44 AM    Monocytes % 11 10/22/2024 06:02 AM    Monocytes % 7 10/22/2024 06:02 AM    Monocytes 9.4 04/23/2025 09:44 AM    Eosinophils % 2 10/22/2024 06:02 AM    Eosinophils Relative 3 10/22/2024 06:02 AM    Eosinophils 3.7 04/23/2025 09:44 AM    Basophils % 1 10/22/2024 06:02 AM    Basophils Relative 1 10/22/2024 06:02 AM    Immature Grans % 0 10/22/2024 06:02 AM    Absolute Neutrophils 1.36 (L) 10/22/2024 06:02 AM    Neutrophils (Absolute) 1,355 (L) 04/23/2025 09:44 AM     Lab Results   Component Value Date/Time    Potassium 4.1 04/23/2025 09:44 AM    Potassium 4.8 02/14/2025 09:30 AM    Chloride 107 04/23/2025 09:44 AM    Chloride 108 02/14/2025 09:30 AM    Carbon Dioxide 28 02/14/2025 09:30 AM    CO2 29 04/23/2025 09:44 AM    BUN 14 04/23/2025 09:44 AM    BUN 23 02/14/2025 09:30 AM    Creatinine 1.21 (H) 04/23/2025 09:44 AM    Creatinine 1.2 (H) 02/14/2025 09:30 AM    Calcium 9.0 04/23/2025 09:44 AM    Calcium 9.0 04/23/2025 09:44 AM    Calcium 8.9 02/14/2025 09:30 AM    Corrected Calcium 9.1 10/23/2024 06:36 AM    AST 15 02/14/2025 09:30 AM    ALT 15 02/14/2025 09:30 AM    Alkaline Phosphatase 37 02/14/2025 09:30 AM    Protein, Total 6.7 02/14/2025 09:30 AM    Albumin 3.8 04/23/2025 09:44 AM    ALBUMIN 4.1 02/14/2025 09:30 AM    Total Bilirubin 0.4 02/14/2025 09:30 AM    eGFRcr 47 (L) 02/14/2025 09:30 AM    eGFR 46 (L) 04/23/2025 09:44 AM     Pathology Result:         Final Diagnosis   Date Value Ref Range Status   10/22/2024     Final     A -C.  Bone marrow,  right  iliac crest,  biopsy, clot, peripheral blood, and aspirate:  -  Monoclonal CD5+ B-cell population, detected by flow cytometry, see comment.              Del 13q detected by FISH.  -  Scant trilineage maturing hematopoiesis without increase in blasts, see comment.     Comment: The patient's presentation for chronic pancytopenia with history of carcinoma status post treatment is noted.              The current sample is limited by low cellularity and inadequate core biopsy. Therefore, morphologic assessment cannot be performed in this sample. There is limited cellularity in the clot sample that shows trilineage maturing hematopoiesis without clear evidence of dysplasia, increase in blasts, or lymphoid aggregates. Ancillary testing of the myeloid lineage is within normal limits. In this suboptimal sample there is no evidence of a myeloid neoplasm by flow cytometry, MDS FISH, karyotype and next generation sequencing. If the patient develops worsening symptoms or cytopenias, repeat sampling could be considered as clinically indicated.               Flow cytometry detects a clonal B-cell population of unclear significance with a chronic lymphocytic leukemia/small lymphocytic lymphoma immunophenotype. This population is likely present in the current sample as there is a small population of CD5+ B-cells, however given the limited quantity of material morphologic work-up is unreliable at this time.  Ancillary testing shows a del 13q by FISH which is a common abnormality seen in CLL/SLL. Further work-up could be considered in the peripheral blood, but given the low absolute lymphocytosis by CBC the current sampling likely represents peripheral blood. Additionally, imaging to exclude lymphadenopathy may be considered. At this time the differential diagnosis includes but is not limited to a monoclonal B-cell lymphocytosis versus chronic lymphocytic leukemia/small lymphocytic lymphoma.              Finally, next generation  sequencing identifies numerous mutation of unknown significance. Multiple mutations exhibit high variable allelic frequency (VAF) concerning for germline mutations including: BRCA2 and SLXK4. These mutations have been implicated in cancer predisposition syndromes and given the patient's history of malignancy further evaluation by a genetic counselor to evaluate for germline abnormalities that may be associated with cancer pre-disposition syndrome may be considered as clinically indicated.     Intradepartmental consultation is in agreement (YL).  Dr. Dye is notified of the findings by Dr. Vicente via [employer] Secure Chat on 11/5/24.            03/31/2022     Final     A. Uterus, Cervix, Bilateral Ovaries and Fallopian Tubes, :  - Clear cell adenocarcinoma of endometrium. See note and synoptic report.  - Tumor invades 5 mm of 12 mm myometrial thickness.  - Tumor extends to lower uterine segment, myoinvasive.  - Lipoleiomyoma.  - Leiomyomata.   - Cervix, negative for carcinoma.  - Bilateral parametria, negative for carcinoma. Focal endometriosis/endosalpingiosis of right parametrium.  - Bilateral ovaries and fallopian tubes, negative for carcinoma.     B. Lymph Node, Max, left external illiac sentinel LN:  - Metastatic carcinoma in one of one lymph node (1/1). See note.  - Tumor measures approximately 3 mm.   - No extracapsular extension noted.   - Tumor highlighted with pan keratin stain.     C. Lymph Node, right internal illiac sentinal lymph node:  - One lymph node, negative for carcinoma (0/1).  - No tumor identified with pankeratin immunoperoxidase stain.      D. Peritoneum, pelvic peritineal biopsies:  - Mesothelial lined fibroadipose tissue, negative for carcinoma.      E. Omentum, omental biopsy:  - Omental adipose tissue, negative for carcinoma.      Note (A): Tumor is positive with AMACR (partial), Napsin A( partial) and negative with ER, WT1. P16 is patchy and p53 shows wild type staining pattern. The  morphologic features and immunophenotype support interpretation.  Note (B): Metastatic tumor deposit is noted in the lymph node adjacent to focus of endosalpingiosis. Immunoperoxidase stains help differentiate the 2 foci. While the focus of endosalpingiosis is positive with ER and WT1, the tumor is negative, supporting the interpretation.      03/02/2022     Final     A. Endometrium, EMB:  - High-grade endometrial carcinoma, favor clear cell carcinoma.  See comment.     Comment: Immunohistochemistry is positive in the tumor cells for NapsinA, patchy but strong positive for p16, and demonstrates increased p53 expression.  ER and MI are negative.  The immunohistochemical profile favors clear cell differentiation.      02/21/2017     Final     A. Transverse colon polyp:  - Tubular adenoma (adenomatous polyp).  - No high grade dysplasia and no evidence of malignancy.     Interpretation performed at [facility]       Mammo screening bilateral w 3d & cad  Narrative: DIAGNOSIS: Breast cancer screening by mammogram      TECHNIQUE:  Digital screening mammography was performed. Computer Aided Detection   (CAD) analyzed all applicable images.   COMPARISONS: Prior breast imaging dated: 11/09/2023, 11/08/2022,   05/25/2021, 05/19/2020, 02/20/2019, 02/20/2019, 01/31/2019, 05/07/2018,   12/07/2015, 12/07/2015, and 11/20/2015     RELEVANT HISTORY:   Family Breast Cancer History: History of breast cancer in Neg Hx.  Family Medical History: No known relevant family medical history.   Personal History: No known relevant hormone history. Surgical history   includes breast biopsy and oophorectomy. Medical history includes   endometrial cancer.     The patient is scheduled in a reminder system for screening mammography.     8-10% of cancers will be missed on mammography. Management of a palpable   abnormality must be based on clinical grounds.  Patients will be notified   of their results via letter  from our facility. Accredited by [employer]   [employer] and FDA.     RISK ASSESSMENT:   5 Year Tyrer-Cuzick: 0.73%  10 Year Tyrer-Cuzick: No Score  Lifetime Tyrer-Cuzick: 1.38%     TISSUE DENSITY:   The breasts are almost entirely fatty.      INDICATION: Sg Scherer is a 74 y.o. female presenting for screening   mammography.     FINDINGS:   Bilateral  There are no suspicious masses, grouped microcalcifications or areas of   unexplained architectural distortion. The skin and nipple areolar complex   are unremarkable.   There are scattered calcifications present.  Impression: No mammographic evidence of malignancy.     ASSESSMENT/BI-RADS CATEGORY:  Left: 2 - Benign  Right: 2 - Benign  Overall: 2 - Benign     RECOMMENDATION:       - Routine screening mammogram in 1 year for both breasts.     Workstation ID: [id number]     Signed by:  Bradley Landon Kocher, MD

## 2025-07-15 ENCOUNTER — HOSPITAL ENCOUNTER (OUTPATIENT)
Dept: BONE DENSITY | Facility: MEDICAL CENTER | Age: 75
Discharge: HOME | End: 2025-07-15
Attending: FAMILY MEDICINE
Payer: COMMERCIAL

## 2025-07-15 VITALS — HEIGHT: 63 IN | BODY MASS INDEX: 42.17 KG/M2 | WEIGHT: 238 LBS

## 2025-07-15 DIAGNOSIS — M81.8 OSTEOPOROSIS AND OCULOCUTANEOUS HYPOPIGMENTATION SYNDROME: ICD-10-CM

## 2025-07-15 DIAGNOSIS — Q87.89 OSTEOPOROSIS AND OCULOCUTANEOUS HYPOPIGMENTATION SYNDROME: ICD-10-CM

## 2025-07-15 DIAGNOSIS — L81.8 OSTEOPOROSIS AND OCULOCUTANEOUS HYPOPIGMENTATION SYNDROME: ICD-10-CM

## 2025-07-15 PROCEDURE — 77080 DXA BONE DENSITY AXIAL: CPT

## 2025-07-17 LAB
ALBUMIN SERPL-MCNC: 3.8 G/DL (ref 3.6–5.1)
ALBUMIN/GLOB SERPL: 1.4 (CALC) (ref 1–2.5)
ALP SERPL-CCNC: 57 U/L (ref 37–153)
ALT SERPL-CCNC: 13 U/L (ref 6–29)
AST SERPL-CCNC: 15 U/L (ref 10–35)
BASOPHILS # BLD AUTO: 21 CELLS/UL (ref 0–200)
BASOPHILS NFR BLD AUTO: 0.8 %
BILIRUB SERPL-MCNC: 0.6 MG/DL (ref 0.2–1.2)
BUN SERPL-MCNC: 24 MG/DL (ref 7–25)
BUN/CREAT SERPL: 15 (CALC) (ref 6–22)
CALCIUM SERPL-MCNC: 9.3 MG/DL (ref 8.6–10.4)
CHLORIDE SERPL-SCNC: 108 MMOL/L (ref 98–110)
CO2 SERPL-SCNC: 28 MMOL/L (ref 20–32)
CREAT SERPL-MCNC: 1.59 MG/DL (ref 0.6–1)
EOSINOPHIL # BLD AUTO: 81 CELLS/UL (ref 15–500)
EOSINOPHIL NFR BLD AUTO: 3.1 %
ERYTHROCYTE [DISTWIDTH] IN BLOOD BY AUTOMATED COUNT: 13.7 % (ref 11–15)
GFR/BSA.PRED SERPLBLD CYS-BASED-ARV: 33 ML/MIN/1.73M2
GLOBULIN SER CALC-MCNC: 2.7 G/DL (CALC) (ref 1.9–3.7)
GLUCOSE SERPL-MCNC: 90 MG/DL (ref 65–99)
HCT VFR BLD AUTO: 35.8 % (ref 35–45)
HGB BLD-MCNC: 11.3 G/DL (ref 11.7–15.5)
LYMPHOCYTES # BLD AUTO: 783 CELLS/UL (ref 850–3900)
LYMPHOCYTES NFR BLD AUTO: 30.1 %
MCH RBC QN AUTO: 32.8 PG (ref 27–33)
MCHC RBC AUTO-ENTMCNC: 31.6 G/DL (ref 32–36)
MCV RBC AUTO: 104.1 FL (ref 80–100)
MONOCYTES # BLD AUTO: 203 CELLS/UL (ref 200–950)
MONOCYTES NFR BLD AUTO: 7.8 %
NEUTROPHILS # BLD AUTO: 1513 CELLS/UL (ref 1500–7800)
NEUTROPHILS NFR BLD AUTO: 58.2 %
PLATELET # BLD AUTO: 152 THOUSAND/UL (ref 140–400)
PMV BLD REES-ECKER: 11.5 FL (ref 7.5–12.5)
POTASSIUM SERPL-SCNC: 4.1 MMOL/L (ref 3.5–5.3)
PROT SERPL-MCNC: 6.5 G/DL (ref 6.1–8.1)
RBC # BLD AUTO: 3.44 MILLION/UL (ref 3.8–5.1)
SODIUM SERPL-SCNC: 144 MMOL/L (ref 135–146)
WBC # BLD AUTO: 2.6 THOUSAND/UL (ref 3.8–10.8)

## 2025-07-18 LAB
CLINICAL INFO: NORMAL
EVENTS COUNTED SPEC: 24
SPECIMEN SOURCE: NORMAL
VIABLE CELLS NFR SPEC: 100 %

## (undated) DEVICE — CHLORHEXIDINE 4PCT 4 OZ

## (undated) DEVICE — LUBRICANT INST ELECTROLUBE ANTISTK WO PAD

## (undated) DEVICE — COLUMN DRAPE

## (undated) DEVICE — CANNULA SEAL

## (undated) DEVICE — SINGLE-USE POLYPECTOMY SNARE: Brand: ROTATABLE SNARE

## (undated) DEVICE — ARM DRAPE

## (undated) DEVICE — TIP COVER ACCESSORY

## (undated) DEVICE — NEEDLE SPINAL 22G X 3.5IN  QUINCKE

## (undated) DEVICE — SUT MONOCRYL PLUS 4-0 PS-2 18 IN MCP496G

## (undated) DEVICE — GLOVE INDICATOR PI UNDERGLOVE SZ 8.5 BLUE

## (undated) DEVICE — GLOVE INDICATOR PI UNDERGLOVE SZ 7 BLUE

## (undated) DEVICE — GLOVE PI ULTRA TOUCH SZ.8.5

## (undated) DEVICE — ADHESIVE SKIN HIGH VISCOSITY EXOFIN 1ML

## (undated) DEVICE — RESOLUTION CLIP 2.8MM X 235CM STR LF DISP

## (undated) DEVICE — TROCAR: Brand: KII FIOS FIRST ENTRY

## (undated) DEVICE — SYRINGE 20ML LL

## (undated) DEVICE — OCCLUDER COLPO-PNEUMO

## (undated) DEVICE — KIT, BETHLEHEM ROBOTIC PROST: Brand: CARDINAL HEALTH

## (undated) DEVICE — LARGE NEEDLE DRIVER: Brand: ENDOWRIST

## (undated) DEVICE — DRAPE SHEET THREE QUARTER

## (undated) DEVICE — VESSEL SEALER EXTEND: Brand: ENDOWRIST

## (undated) DEVICE — NEEDLE 18 G X 1 1/2 SAFETY

## (undated) DEVICE — VISUALIZATION SYSTEM: Brand: CLEARIFY

## (undated) DEVICE — INTENDED FOR TISSUE SEPARATION, AND OTHER PROCEDURES THAT REQUIRE A SHARP SURGICAL BLADE TO PUNCTURE OR CUT.: Brand: BARD-PARKER SAFETY BLADES SIZE 15, STERILE

## (undated) DEVICE — SUT STRATAFIX SPIRAL 2-0 CT-1 30 CM SXPP1B410

## (undated) DEVICE — UTERINE MANIPULATOR RUMI 6.7 X 10 CM

## (undated) DEVICE — ANTIBACTERIAL VIOLET BRAIDED (POLYGLACTIN 910), SYNTHETIC ABSORBABLE SUTURE: Brand: COATED VICRYL

## (undated) DEVICE — MAYO STAND COVER: Brand: CONVERTORS

## (undated) DEVICE — PROGRASP FORCEPS: Brand: ENDOWRIST

## (undated) DEVICE — PREMIUM DRY TRAY LF: Brand: MEDLINE INDUSTRIES, INC.

## (undated) DEVICE — MONOPOLAR CURVED SCISSORS: Brand: ENDOWRIST

## (undated) DEVICE — TRAY FOLEY 16FR URIMETER SILICONE SURESTEP